# Patient Record
Sex: FEMALE | Race: WHITE | NOT HISPANIC OR LATINO | Employment: UNEMPLOYED | URBAN - METROPOLITAN AREA
[De-identification: names, ages, dates, MRNs, and addresses within clinical notes are randomized per-mention and may not be internally consistent; named-entity substitution may affect disease eponyms.]

---

## 2017-08-03 ENCOUNTER — OFFICE VISIT (OUTPATIENT)
Dept: FAMILY MEDICINE | Facility: CLINIC | Age: 47
End: 2017-08-03
Payer: COMMERCIAL

## 2017-08-03 VITALS
HEART RATE: 76 BPM | BODY MASS INDEX: 37.26 KG/M2 | WEIGHT: 237.4 LBS | SYSTOLIC BLOOD PRESSURE: 122 MMHG | DIASTOLIC BLOOD PRESSURE: 98 MMHG | HEIGHT: 67 IN

## 2017-08-03 DIAGNOSIS — M19.072 DJD (DEGENERATIVE JOINT DISEASE), ANKLE AND FOOT, LEFT: ICD-10-CM

## 2017-08-03 DIAGNOSIS — G89.29 CHRONIC BILATERAL LOW BACK PAIN WITH LEFT-SIDED SCIATICA: Primary | ICD-10-CM

## 2017-08-03 DIAGNOSIS — R51.9 NONINTRACTABLE EPISODIC HEADACHE, UNSPECIFIED HEADACHE TYPE: ICD-10-CM

## 2017-08-03 DIAGNOSIS — M54.42 CHRONIC BILATERAL LOW BACK PAIN WITH LEFT-SIDED SCIATICA: Primary | ICD-10-CM

## 2017-08-03 DIAGNOSIS — G43.009 MIGRAINE WITHOUT AURA AND WITHOUT STATUS MIGRAINOSUS, NOT INTRACTABLE: ICD-10-CM

## 2017-08-03 PROCEDURE — 99202 OFFICE O/P NEW SF 15 MIN: CPT | Performed by: FAMILY MEDICINE

## 2017-08-03 RX ORDER — IBUPROFEN 800 MG/1
1 TABLET, FILM COATED ORAL EVERY 6 HOURS PRN
COMMUNITY
Start: 2014-12-08 | End: 2018-07-26

## 2017-08-03 RX ORDER — LISINOPRIL 5 MG/1
5 TABLET ORAL DAILY
COMMUNITY
Start: 2017-07-06 | End: 2018-06-07

## 2017-08-03 RX ORDER — LORATADINE 10 MG/1
1 TABLET ORAL DAILY
COMMUNITY
Start: 2017-06-21 | End: 2018-09-28

## 2017-08-03 RX ORDER — OXYCODONE AND ACETAMINOPHEN 5; 325 MG/1; MG/1
1 TABLET ORAL 3 TIMES DAILY
COMMUNITY
Start: 2014-12-08 | End: 2017-09-08

## 2017-08-03 RX ORDER — ZOLMITRIPTAN 5 MG/1
1 TABLET, FILM COATED ORAL
COMMUNITY
Start: 2015-01-02 | End: 2018-01-19

## 2017-08-03 RX ORDER — METHOCARBAMOL 500 MG/1
500 TABLET, FILM COATED ORAL 3 TIMES DAILY
COMMUNITY
Start: 2017-07-06 | End: 2017-09-25

## 2017-08-03 RX ORDER — TRAMADOL HYDROCHLORIDE 50 MG/1
1 TABLET ORAL 2 TIMES DAILY
COMMUNITY
Start: 2014-12-08 | End: 2017-10-26 | Stop reason: ALTCHOICE
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
## 2017-08-03 NOTE — NURSING NOTE
"Initial BP (!) 122/98  Pulse 76  Ht 5' 7\" (1.702 m)  Wt 237 lb 6.4 oz (107.7 kg)  Breastfeeding? No  BMI 37.18 kg/m2 Estimated body mass index is 37.18 kg/(m^2) as calculated from the following:    Height as of this encounter: 5' 7\" (1.702 m).    Weight as of this encounter: 237 lb 6.4 oz (107.7 kg). .      "

## 2017-08-03 NOTE — MR AVS SNAPSHOT
"              After Visit Summary   8/3/2017    Arielle Pham    MRN: [id number]           Patient Information     Date Of Birth          1970        Visit Information        Provider Department      8/3/2017 11:00 AM Huong Stahl, DO [facility] Instructions    I'll give you a call on Monday          Follow-ups after your visit        Who to contact     Normal or non-critical lab and imaging results will be communicated to you by MyChart, letter or phone within 4 business days after the clinic has received the results. If you do not hear from us within 7 days, please contact the clinic through MyChart or phone. If you have a critical or abnormal lab result, we will notify you by phone as soon as possible.  Submit refill requests through [employer] or call your pharmacy and they will forward the refill request to us. Please allow 3 business days for your refill to be completed.          If you need to speak with a  for additional information , please call: [phone]           Additional Information About Your Visit        [facility] or [facility] Information     [employer] lets you send messages to your doctor, view your test results, renew your prescriptions, schedule appointments and more. To sign up, go to [email or web address] . Click on \"Log in\" on the left side of the screen, which will take you to the Welcome page. Then click on \"Sign up Now\" on the right side of the page.     You will be asked to enter the access code listed below, as well as some personal information. Please follow the directions to create your username and password.     Your access code is: [email or web address]  Expires: 2017 12:28 PM     Your access code will  in 90 days. If you need help or a new code, please call your [facility] clinic or [phone].        Care EveryWhere ID     This is your Care EveryWhere ID. This could be used by other organizations to access your [facility] medical " "records  [id number]        Your Vitals Were     Pulse Height Breastfeeding? BMI (Body Mass Index)          76 5' 7\" (1.702 m) No 37.18 kg/m2         Blood Pressure from Last 3 Encounters:   08/03/17 (!) 122/98    Weight from Last 3 Encounters:   08/03/17 237 lb 6.4 oz (107.7 kg)              Today, you had the following     No orders found for display       Primary Care Provider    None Specified       No primary provider on file.        Equal Access to Services     MARCO QUINTERO : Hadii aad ku hadasho Soomaali, waaxda luqadaha, qaybta kaalmada adeegyada, waxay idiin hayaan adeeg heavenlyaraseamus lastephie . So [facility] [phone].    ATENCIÓN: Si habla español, tiene a carmona disposición servicios gratuitos de asistencia lingüística. Llame al [phone].    We comply with applicable federal civil rights laws and [place] laws. We do not discriminate on the basis of race, color, national origin, age, disability sex, sexual orientation or gender identity.            Thank you!     Thank you for choosing [facility]  for your care. Our goal is always to provide you with excellent care. Hearing back from our patients is one way we can continue to improve our services. Please take a few minutes to complete the written survey that you may receive in the mail after your visit with us. Thank you!             Your Updated Medication List - Protect others around you: Learn how to safely use, store and throw away your medicines at www.disposemymeds.org.          This list is accurate as of: 8/3/17 12:28 PM.  Always use your most recent med list.                   Brand Name Dispense Instructions for use Diagnosis    ibuprofen 800 MG tablet    ADVIL/MOTRIN     Take 1 tablet by mouth every 6 hours as needed        lisinopril 5 MG tablet    PRINIVIL/ZESTRIL     Take 5 mg by mouth daily        loratadine 10 MG tablet    CLARITIN     Take 1 tablet by mouth daily        methocarbamol 500 MG tablet    ROBAXIN     Take 500 mg by " mouth 3 times daily        oxyCODONE-acetaminophen 5-325 MG per tablet    PERCOCET     Take 1 tablet by mouth 3 times daily        traMADol 50 MG tablet    ULTRAM     Take 1 tablet by mouth 2 times daily        ZOLMitriptan 5 MG tablet    ZOMIG     Take 1 tablet by mouth at onset of headache

## 2017-08-09 ENCOUNTER — TELEPHONE (OUTPATIENT)
Dept: FAMILY MEDICINE | Facility: CLINIC | Age: 47
End: 2017-08-09

## 2017-08-09 DIAGNOSIS — M54.42 CHRONIC BILATERAL LOW BACK PAIN WITH LEFT-SIDED SCIATICA: ICD-10-CM

## 2017-08-09 DIAGNOSIS — M54.2 NECK PAIN: Primary | ICD-10-CM

## 2017-08-09 DIAGNOSIS — G89.29 CHRONIC BILATERAL LOW BACK PAIN WITH LEFT-SIDED SCIATICA: ICD-10-CM

## 2017-08-09 PROBLEM — M19.072 DJD (DEGENERATIVE JOINT DISEASE), ANKLE AND FOOT, LEFT: Status: ACTIVE | Noted: 2017-08-09

## 2017-08-09 PROBLEM — R51.9 NONINTRACTABLE EPISODIC HEADACHE, UNSPECIFIED HEADACHE TYPE: Status: ACTIVE | Noted: 2017-08-09

## 2017-08-09 PROBLEM — G43.009 MIGRAINE WITHOUT AURA AND WITHOUT STATUS MIGRAINOSUS, NOT INTRACTABLE: Status: ACTIVE | Noted: 2017-08-09

## 2017-08-09 NOTE — TELEPHONE ENCOUNTER
Pt left msg  :     Im calling Dr balbuena back to let her know that I have tried PDR and physical therapy and injections and a bunch of other stuff, (no mention of when a good time to reach her  Back would be).     Ila [place], [place]

## 2017-08-09 NOTE — TELEPHONE ENCOUNTER
Attempted to reach pt to discuss possible recommendations for her care moving forward.  Left message asking for her to return our call on the  line with a good time to try to get a hold of her tomorrow.    Huong Stahl

## 2017-08-10 NOTE — TELEPHONE ENCOUNTER
Spoke with pt.  She is amenable to pain clinic for consult.  Referral placed.  Will await recommendations.  Plan to continue current regimen until then.    Huong Stahl

## 2017-08-11 ENCOUNTER — TELEPHONE (OUTPATIENT)
Dept: PALLIATIVE MEDICINE | Facility: CLINIC | Age: 47
End: 2017-08-11

## 2017-08-11 NOTE — LETTER
August 28, 2017    Arielle Pham  [address] [place] [address]  [place] [address]    Dear Arielle,                                                                 Welcome to the [facility].  We are located on the 2nd floor ([address]) of the [facility], located at [address] [place], [place] [address] [address].    Your appointment at the [facility] has been scheduled on Monday September 25, 2017 at 10:00 AM with Britta Georges MD.    At your first visit, you will meet your team of caregivers who will help you to develop pain management strategies that will last a lifetime. You will meet with our support staff to review your insurance information, and collect your co-payment if required by your insurance company. You will also meet with a medical pain specialist and care coordinator who will assess your pain and develop a plan of care for your successful pain rehabilitation. You should expect to spend 1-2 hours at your first visit with us. Usually, patients work with us for a period of 6-12 months, and eventually return to their primary doctor once their pain management has stabilized.      To help us make your visit go as smoothly as possible, please bring the following items with you on your visit:     Completed Pain Questionnaire enclosed in this packet.  If you do not bring the completed questionnaire, we may have to reschedule your appointment.  List of any medicines that you are currently taking or have been prescribed  Important NON-[facility] medical information such as medical records or tests results (X-rays, or laboratory tests)  Your health insurance card  Financial resources to cover your co-payment or balance due at the time of service (cash, personal check, Visa, and MasterCard are acceptable methods of payment)     Due to the demand for new patient evaluations, you must notify the scheduling department 48 hours in advance if you are not able to keep this appointment.  Failure to do so could affect your ability to reschedule with our clinic. Please be aware that we will not prescribe any medications at your first visit.     Please call [phone] with any questions regarding your appointment. We look forward to meeting you and working to address your health care needs.     Sincerely,    [facility]

## 2017-08-28 NOTE — TELEPHONE ENCOUNTER
Pain Management Center Referral      1. Confirmed address with patient? Yes  2. Confirmed phone number with patient? Yes  3. Confirmed referring provider? Yes  4. Is the PCP the same as the referring provider? Yes  5. Has the patient been to any previous pain clinics? No  (If yes, send TRAV with welcome letter)  6. Which insurance are we to bill for this appointment?  BLUE PLUS    7. Informed pt of cancellation (48 hour) policy? Yes    REGARDING OPIOID MEDICATIONS: We will always address appropriateness of opioid pain medications, but we generally will not automatically take on a prescribing role. When we do take on prescribing of opioids for chronic pain, it is in collaboration with the referring physician for an intermediate period of time (months), with an expectation that the primary physician or provider will assume the prescribing role if medications are effective at stable doses with demonstrated compliance. Therefore, please do not assume that your prescribing responsibilities end on the day of pain clinic consultation.  7. Informed pt of prescribing policy? Yes      8. Referring Provider: DR. FONG    9. Criteria for Triage Eval:   -Missed/Failed 1st DUAL appointment? N/A   -Medication Focused? N/A   -Mental Health Concerns? (e.g. Recent psych hospitalization/snap shot)? N/A   -Active substance abuse? N/A   -Patient behaviors (e.g. Offensive language/raised voice)? N/A

## 2017-09-08 ENCOUNTER — OFFICE VISIT (OUTPATIENT)
Dept: FAMILY MEDICINE | Facility: CLINIC | Age: 47
End: 2017-09-08
Payer: COMMERCIAL

## 2017-09-08 VITALS
DIASTOLIC BLOOD PRESSURE: 98 MMHG | BODY MASS INDEX: 36.85 KG/M2 | SYSTOLIC BLOOD PRESSURE: 144 MMHG | HEART RATE: 80 BPM | TEMPERATURE: 97.1 F | HEIGHT: 67 IN | WEIGHT: 234.8 LBS

## 2017-09-08 DIAGNOSIS — M19.072 DJD (DEGENERATIVE JOINT DISEASE), ANKLE AND FOOT, LEFT: Primary | ICD-10-CM

## 2017-09-08 DIAGNOSIS — M54.2 NECK PAIN: ICD-10-CM

## 2017-09-08 DIAGNOSIS — M51.369 DDD (DEGENERATIVE DISC DISEASE), LUMBAR: ICD-10-CM

## 2017-09-08 PROCEDURE — 99213 OFFICE O/P EST LOW 20 MIN: CPT | Performed by: FAMILY MEDICINE

## 2017-09-08 RX ORDER — OXYCODONE AND ACETAMINOPHEN 5; 325 MG/1; MG/1
1 TABLET ORAL 3 TIMES DAILY
Qty: 90 TABLET | Refills: 0 | Status: SHIPPED | OUTPATIENT
Start: 2017-09-08 | End: 2017-10-05

## 2017-09-08 ASSESSMENT — PAIN SCALES - GENERAL: PAINLEVEL: EXTREME PAIN (8)

## 2017-09-08 NOTE — MR AVS SNAPSHOT
"              After Visit Summary   9/8/2017    Arielle Pham    MRN: [id number]           Patient Information     Date Of Birth          1970        Visit Information        Provider Department      9/8/2017 1:00 PM Huong Stahl DO [facility]        Today's Diagnoses     DJD (degenerative joint disease), ankle and foot, left    -  1    DDD (degenerative disc disease), lumbar        Neck pain          Care Instructions    Just let me know when the tramadol prescription is due to be filled.      Please stop in for a blood pressure check when you are feeling better.  You can do that for no charge through our pharmacy          Follow-ups after your visit        Your next 10 appointments already scheduled     Sep 25, 2017 10:00 AM CDT   New Visit with Britta Georges MD   [facility] [place] ([facility])    [address]  [place] [address] [phone]              Who to contact     Normal or non-critical lab and imaging results will be communicated to you by [employer], letter or phone within 4 business days after the clinic has received the results. If you do not hear from us within 7 days, please contact the clinic through [employer] or phone. If you have a critical or abnormal lab result, we will notify you by phone as soon as possible.  Submit refill requests through [employer] or call your pharmacy and they will forward the refill request to us. Please allow 3 business days for your refill to be completed.          If you need to speak with a  for additional information , please call: [phone]           Additional Information About Your Visit        [employer] Information     [employer] lets you send messages to your doctor, view your test results, renew your prescriptions, schedule appointments and more. To sign up, go to [email or web address] . Click on \"Log in\" on the left side of the screen, which will take you to the Welcome " "page. Then click on \"Sign up Now\" on the right side of the page.     You will be asked to enter the access code listed below, as well as some personal information. Please follow the directions to create your username and password.     Your access code is: [email or web address]  Expires: 2017 12:28 PM     Your access code will  in 90 days. If you need help or a new code, please call your [facility] clinic or [phone].        Care EveryWhere ID     This is your Care EveryWhere ID. This could be used by other organizations to access your [facility] medical records  [id number]        Your Vitals Were     Pulse Temperature Height Breastfeeding? BMI (Body Mass Index)       80 97.1  F (36.2  C) (Tympanic) 5' 7\" (1.702 m) No 36.77 kg/m2        Blood Pressure from Last 3 Encounters:   17 (!) 144/98   17 (!) 122/98    Weight from Last 3 Encounters:   17 234 lb 12.8 oz (106.5 kg)   17 237 lb 6.4 oz (107.7 kg)              Today, you had the following     No orders found for display         Today's Medication Changes          These changes are accurate as of: 17  1:28 PM.  If you have any questions, ask your nurse or doctor.               Start taking these medicines.        Dose/Directions    order for DME   Used for:  DJD (degenerative joint disease), ankle and foot, left, DDD (degenerative disc disease), lumbar   Started by:  Huong Stahl DO        Rolling walker with seat for home use   Quantity:  1 Device   Refills:  0            Where to get your medicines      Some of these will need a paper prescription and others can be bought over the counter.  Ask your nurse if you have questions.     Bring a paper prescription for each of these medications     order for DME    oxyCODONE-acetaminophen 5-325 MG per tablet                Primary Care Provider    None Specified       No primary provider on file.        Equal Access to Services     MARCO QUINTERO AH: juanita Hammer " malachibroderick carmelita lainez, tomas howard ah. So [facility] [phone].    ATENCIÓN: Si maurice sullivan, tiene a carmona disposición servicios gratuitos de asistencia lingüística. Sheldon al [phone].    We comply with applicable federal civil rights laws and [place] laws. We do not discriminate on the basis of race, color, national origin, age, disability sex, sexual orientation or gender identity.            Thank you!     Thank you for choosing [facility]  for your care. Our goal is always to provide you with excellent care. Hearing back from our patients is one way we can continue to improve our services. Please take a few minutes to complete the written survey that you may receive in the mail after your visit with us. Thank you!             Your Updated Medication List - Protect others around you: Learn how to safely use, store and throw away your medicines at www.disposemymeds.org.          This list is accurate as of: 9/8/17  1:28 PM.  Always use your most recent med list.                   Brand Name Dispense Instructions for use Diagnosis    ibuprofen 800 MG tablet    ADVIL/MOTRIN     Take 1 tablet by mouth every 6 hours as needed        lisinopril 5 MG tablet    PRINIVIL/ZESTRIL     Take 5 mg by mouth daily        loratadine 10 MG tablet    CLARITIN     Take 1 tablet by mouth daily        methocarbamol 500 MG tablet    ROBAXIN     Take 500 mg by mouth 3 times daily        order for DME     1 Device    Rolling walker with seat for home use    DJD (degenerative joint disease), ankle and foot, left, DDD (degenerative disc disease), lumbar       oxyCODONE-acetaminophen 5-325 MG per tablet    PERCOCET    90 tablet    Take 1 tablet by mouth 3 times daily    DDD (degenerative disc disease), lumbar, Neck pain       traMADol 50 MG tablet    ULTRAM     Take 1 tablet by mouth 2 times daily        ZOLMitriptan 5 MG tablet    ZOMIG     Take 1 tablet by mouth at onset of  headache

## 2017-09-08 NOTE — PATIENT INSTRUCTIONS
Just let me know when the tramadol prescription is due to be filled.      Please stop in for a blood pressure check when you are feeling better.  You can do that for no charge through our pharmacy

## 2017-09-08 NOTE — NURSING NOTE
"Initial BP (!) 144/98  Pulse 80  Temp 97.1  F (36.2  C) (Tympanic)  Ht 5' 7\" (1.702 m)  Wt 234 lb 12.8 oz (106.5 kg)  Breastfeeding? No  BMI 36.77 kg/m2 Estimated body mass index is 36.77 kg/(m^2) as calculated from the following:    Height as of this encounter: 5' 7\" (1.702 m).    Weight as of this encounter: 234 lb 12.8 oz (106.5 kg). .      "

## 2017-09-08 NOTE — PROGRESS NOTES
SUBJECTIVE:   Arielle Pham is a 47 year old female who presents to clinic today for the following health issues:    Hypertension Follow-up      Outpatient blood pressures are not being checked.    Low Salt Diet: no added salt    Migraine Follow-Up    Headaches symptoms:  Stable     Frequency: 2-3 per week     Duration of headaches: 2-3 days    Able to do normal daily activities/work with migraines: No -     Rescue/Relief medication:ibuprofen (Advil, Motrin) and Zomig              Effectiveness: minor relief    Preventative medication: None    Neurologic complications: No new stroke-like symptoms, loss of vision or speech, numbness or weakness    In the past 4 weeks, how often have you gone to Urgent Care or the emergency room because of your headaches?  1     Pt has a HA now.  Has had this migraine since Wednesday.  Follows with [facility].  She has follow up with them scheduled.  Considering Botox.  This is her typical migraine.      Amount of exercise or physical activity: None    Problems taking medications regularly: No    Medication side effects: none  Diet: regular (no restrictions)    Medication Followup of percocet, tramadol    Taking Medication as prescribed: yes    Side Effects:  None    Medication Helping Symptoms:  yes     Medications continue to work well for her back pain.  We had discussed at our last visit pain clinic eval to determine if other options might be helpful to minimize narcotic usage.  She has appointment coming up later this month.  Reviewed  database, nothing unexpected seen.    Problem list and histories reviewed & adjusted, as indicated.  Additional history: as documented    Reviewed and updated as needed this visit by clinical staffTobacco  Allergies  Meds  Med Hx  Surg Hx  Fam Hx  Soc Hx      Reviewed and updated as needed this visit by Provider  Tobacco  Med Hx  Surg Hx  Fam Hx  Soc Hx        PE:  VS as above   Gen:  WN/WD/WH female in mild distress due to headache    A?P:       ICD-10-CM    1. DJD (degenerative joint disease), ankle and foot, left M19.072 order for DME   2. DDD (degenerative disc disease), lumbar M51.36 order for DME     oxyCODONE-acetaminophen (PERCOCET) 5-325 MG per tablet   3. Neck pain M54.2 oxyCODONE-acetaminophen (PERCOCET) 5-325 MG per tablet     Patient Instructions   Just let me know when the tramadol prescription is due to be filled.      Please stop in for a blood pressure check when you are feeling better.  You can do that for no charge through our pharmacy

## 2017-09-25 ENCOUNTER — OFFICE VISIT (OUTPATIENT)
Dept: PALLIATIVE MEDICINE | Facility: CLINIC | Age: 47
End: 2017-09-25
Payer: COMMERCIAL

## 2017-09-25 VITALS
SYSTOLIC BLOOD PRESSURE: 128 MMHG | HEART RATE: 83 BPM | WEIGHT: 235 LBS | DIASTOLIC BLOOD PRESSURE: 83 MMHG | BODY MASS INDEX: 36.81 KG/M2

## 2017-09-25 DIAGNOSIS — M53.3 SI (SACROILIAC) JOINT DYSFUNCTION: ICD-10-CM

## 2017-09-25 DIAGNOSIS — M25.571 CHRONIC PAIN OF BOTH ANKLES: ICD-10-CM

## 2017-09-25 DIAGNOSIS — G89.29 CHRONIC BILATERAL LOW BACK PAIN WITH LEFT-SIDED SCIATICA: Primary | ICD-10-CM

## 2017-09-25 DIAGNOSIS — M54.42 CHRONIC BILATERAL LOW BACK PAIN WITH LEFT-SIDED SCIATICA: Primary | ICD-10-CM

## 2017-09-25 DIAGNOSIS — G89.29 CHRONIC PAIN OF BOTH ANKLES: ICD-10-CM

## 2017-09-25 DIAGNOSIS — M25.572 CHRONIC PAIN OF BOTH ANKLES: ICD-10-CM

## 2017-09-25 PROCEDURE — 99244 OFF/OP CNSLTJ NEW/EST MOD 40: CPT | Performed by: PSYCHIATRY & NEUROLOGY

## 2017-09-25 ASSESSMENT — PATIENT HEALTH QUESTIONNAIRE - PHQ9: SUM OF ALL RESPONSES TO PHQ QUESTIONS 1-9: 15

## 2017-09-25 ASSESSMENT — PAIN SCALES - GENERAL: PAINLEVEL: SEVERE PAIN (6)

## 2017-09-25 NOTE — MR AVS SNAPSHOT
After Visit Summary   9/25/2017    Arielle Pham    MRN: [id number]           Patient Information     Date Of Birth          1970        Visit Information        Provider Department      9/25/2017 10:00 AM Britta Georges MD [facility]        Care Instructions    1. I will send a note to Dr. Stahl with the recommendations that we discussed in clinic.    ----------------------------------------------------------------  Nurse Triage line:  [phone]   Call this number with any questions or concerns. You may leave a detailed message anytime. Calls are typically returned Monday through Friday between 8 AM and 4:30 PM. We usually get back to you within 2 business days depending on the issue/request.       Medication refills:    For non-narcotic medications, call your pharmacy directly to request a refill. The pharmacy will contact the [facility] for authorization. Please allow 3-4 days for these refills to be processed.     For narcotic refills, call the nurse triage line or send a [employer] message. Please contact us 7-10 days before your refill is due. The message MUST include the name of the specific medication(s) requested and how you would like to receive the prescription(s). The options are as follows:    Pain Clinic staff can mail the prescription to your pharmacy. Please tell us the name of the pharmacy.    You may pick the prescription up at the Pain Clinic (tell us the location) or during a clinic visit with your pain provider    Pain Clinic staff can deliver the prescription to the [facility] pharmacy in the clinic building. Please tell us the location.      Scheduling number: [phone].  Call this number to schedule or change appointments.    We believe regular attendance is key to your success in our program.    Any time you are unable to keep your appointment we ask that you call us at least 24 hours in advance to let us know. This will allow us to offer  "the appointment time to another patient.               Follow-ups after your visit        Who to contact     If you have questions or need follow up information about today's clinic visit or your schedule please contact [facility] directly at [phone].  Normal or non-critical lab and imaging results will be communicated to you by [employer], letter or phone within 4 business days after the clinic has received the results. If you do not hear from us within 7 days, please contact the clinic through [employer] or phone. If you have a critical or abnormal lab result, we will notify you by phone as soon as possible.  Submit refill requests through [employer] or call your pharmacy and they will forward the refill request to us. Please allow 3 business days for your refill to be completed.          Additional Information About Your Visit        [employer] Information     [employer] lets you send messages to your doctor, view your test results, renew your prescriptions, schedule appointments and more. To sign up, go to [email or web address] . Click on \"Log in\" on the left side of the screen, which will take you to the Welcome page. Then click on \"Sign up Now\" on the right side of the page.     You will be asked to enter the access code listed below, as well as some personal information. Please follow the directions to create your username and password.     Your access code is: [email or web address]  Expires: 2017 12:28 PM     Your access code will  in 90 days. If you need help or a new code, please call your [place] clinic or [phone].        Care EveryWhere ID     This is your Care EveryWhere ID. This could be used by other organizations to access your [place] medical records  [id number]        Your Vitals Were     Pulse BMI (Body Mass Index)                83 36.81 kg/m2           Blood Pressure from Last 3 Encounters:   17 128/83   17 (!) 144/98   17 (!) 122/98    Weight from Last 3 " Encounters:   09/25/17 106.6 kg (235 lb)   09/08/17 106.5 kg (234 lb 12.8 oz)   08/03/17 107.7 kg (237 lb 6.4 oz)              Today, you had the following     No orders found for display         Today's Medication Changes          These changes are accurate as of: 9/25/17 10:59 AM.  If you have any questions, ask your nurse or doctor.               Stop taking these medicines if you haven't already. Please contact your care team if you have questions.     methocarbamol 500 MG tablet   Commonly known as:  ROBAXIN                    Primary Care Provider    None Specified       No primary provider on file.        Equal Access to Services     [facility]: Sierra More, juanita sharp, carmelita lainez, tomas howard . So [facility] [phone].    ATENCIÓN: Si habla español, tiene a carmona disposición servicios gratuitos de asistencia lingüística. Llame al [phone].    We comply with applicable federal civil rights laws and [place] laws. We do not discriminate on the basis of race, color, national origin, age, disability sex, sexual orientation or gender identity.            Thank you!     Thank you for choosing [facility]  for your care. Our goal is always to provide you with excellent care. Hearing back from our patients is one way we can continue to improve our services. Please take a few minutes to complete the written survey that you may receive in the mail after your visit with us. Thank you!             Your Updated Medication List - Protect others around you: Learn how to safely use, store and throw away your medicines at www.disposemymeds.org.          This list is accurate as of: 9/25/17 10:59 AM.  Always use your most recent med list.                   Brand Name Dispense Instructions for use Diagnosis    ibuprofen 800 MG tablet    ADVIL/MOTRIN     Take 1 tablet by mouth every 6 hours as needed        lisinopril 5 MG tablet     PRINIVIL/ZESTRIL     Take 5 mg by mouth daily        loratadine 10 MG tablet    CLARITIN     Take 1 tablet by mouth daily        order for DME     1 Device    Rolling walker with seat for home use    DJD (degenerative joint disease), ankle and foot, left, DDD (degenerative disc disease), lumbar       oxyCODONE-acetaminophen 5-325 MG per tablet    PERCOCET    90 tablet    Take 1 tablet by mouth 3 times daily    DDD (degenerative disc disease), lumbar, Neck pain       traMADol 50 MG tablet    ULTRAM     Take 1 tablet by mouth 2 times daily        ZOLMitriptan 5 MG tablet    ZOMIG     Take 1 tablet by mouth at onset of headache

## 2017-09-25 NOTE — NURSING NOTE
"Chief Complaint   Patient presents with     Pain     Back pain        Initial /83  Pulse 83  Wt 106.6 kg (235 lb)  BMI 36.81 kg/m2 Estimated body mass index is 36.81 kg/(m^2) as calculated from the following:    Height as of 9/8/17: 1.702 m (5' 7\").    Weight as of this encounter: 106.6 kg (235 lb).  Medication Reconciliation: complete     Indra Noguera MA    "

## 2017-09-25 NOTE — PATIENT INSTRUCTIONS
1. I will send a note to Dr. Stahl with the recommendations that we discussed in clinic.    ----------------------------------------------------------------  Nurse Triage line:  [phone]   Call this number with any questions or concerns. You may leave a detailed message anytime. Calls are typically returned Monday through Friday between 8 AM and 4:30 PM. We usually get back to you within 2 business days depending on the issue/request.       Medication refills:    For non-narcotic medications, call your pharmacy directly to request a refill. The pharmacy will contact the [facility] for authorization. Please allow 3-4 days for these refills to be processed.     For narcotic refills, call the nurse triage line or send a [employer] message. Please contact us 7-10 days before your refill is due. The message MUST include the name of the specific medication(s) requested and how you would like to receive the prescription(s). The options are as follows:    Pain Clinic staff can mail the prescription to your pharmacy. Please tell us the name of the pharmacy.    You may pick the prescription up at the Pain Clinic (tell us the location) or during a clinic visit with your pain provider    Pain Clinic staff can deliver the prescription to the [place] pharmacy in the clinic building. Please tell us the location.      Scheduling number: [phone].  Call this number to schedule or change appointments.    We believe regular attendance is key to your success in our program.    Any time you are unable to keep your appointment we ask that you call us at least 24 hours in advance to let us know. This will allow us to offer the appointment time to another patient.
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
## 2017-10-05 DIAGNOSIS — M51.369 DDD (DEGENERATIVE DISC DISEASE), LUMBAR: ICD-10-CM

## 2017-10-05 DIAGNOSIS — M54.2 NECK PAIN: ICD-10-CM

## 2017-10-05 RX ORDER — OXYCODONE AND ACETAMINOPHEN 5; 325 MG/1; MG/1
1 TABLET ORAL EVERY 4 HOURS PRN
Qty: 90 TABLET | Refills: 0 | Status: SHIPPED | OUTPATIENT
Start: 2017-10-05 | End: 2017-11-07

## 2017-10-05 NOTE — TELEPHONE ENCOUNTER
Called patient she says is taking 3 per day 1 in the morning and 2 at bedtime.    Trinity Altamirano [facility]

## 2017-10-05 NOTE — TELEPHONE ENCOUNTER
Please call Arielle.  Script done and left with secretaries.  I saw her pain consult note.  I changed the dosing instructions on her percocet based on their recommendations.  I did not fill for a higher quantity because I am not sure how much she is taking.  Please find out how many she is taking per day.    Huong Stahl

## 2017-10-05 NOTE — TELEPHONE ENCOUNTER
Reason for Call:  Medication or medication refill:    Do you use a [facility]?  Name of the pharmacy and phone number for the current request:  Patient will  at     Name of the medication requested: perocet    Other request:    Can we leave a detailed message on this number? YES    Phone number patient can be reached at: Home number on file [phone] (home)    Best Time:     Call taken on 10/5/2017 at 9:20 AM by Malka Altamirano

## 2017-10-05 NOTE — TELEPHONE ENCOUNTER
Script walked over to the [facility].    Trinity Altamirano, [facility]

## 2017-10-26 ENCOUNTER — OFFICE VISIT (OUTPATIENT)
Dept: FAMILY MEDICINE | Facility: CLINIC | Age: 47
End: 2017-10-26
Payer: COMMERCIAL

## 2017-10-26 VITALS
OXYGEN SATURATION: 99 % | TEMPERATURE: 98.3 F | HEART RATE: 94 BPM | WEIGHT: 234 LBS | HEIGHT: 67 IN | BODY MASS INDEX: 36.73 KG/M2 | DIASTOLIC BLOOD PRESSURE: 88 MMHG | SYSTOLIC BLOOD PRESSURE: 124 MMHG

## 2017-10-26 DIAGNOSIS — J01.90 ACUTE SINUSITIS WITH SYMPTOMS > 10 DAYS: Primary | ICD-10-CM

## 2017-10-26 DIAGNOSIS — G43.009 MIGRAINE WITHOUT AURA AND WITHOUT STATUS MIGRAINOSUS, NOT INTRACTABLE: ICD-10-CM

## 2017-10-26 DIAGNOSIS — M51.369 DDD (DEGENERATIVE DISC DISEASE), LUMBAR: ICD-10-CM

## 2017-10-26 DIAGNOSIS — M53.3 SI (SACROILIAC) JOINT DYSFUNCTION: ICD-10-CM

## 2017-10-26 PROCEDURE — 99214 OFFICE O/P EST MOD 30 MIN: CPT | Performed by: FAMILY MEDICINE

## 2017-10-26 NOTE — PROGRESS NOTES
SUBJECTIVE:   Arielle Pham is a 47 year old female who presents to clinic today for the following health issues:    ENT Symptoms             Symptoms: cc Present Absent Comment   Fever/Chills   x    Fatigue  x     Muscle Aches  x     Eye Irritation  x  Watery    Sneezing  x     Nasal Celestino/Drg  x     Sinus Pressure/Pain x x     Loss of smell  x     Dental pain   x    Sore Throat x x     Swollen Glands  x     Ear Pain/Fullness  x     Cough  x     Wheeze   x    Chest Pain   x    Shortness of breath   x    Rash   x    Other   x      Symptom duration:  2 weeks   Symptom severity:  moderate   Treatments tried:  sudafed, tylenol cold   Contacts:  family     Lost voice beginning of this week.  Has had more of a sore throat the last couple of days.   Has had maxillary sinus pressure as well.  Has a h/o frequent sinusitis.  Has been improved over the last few years, now maybe 1-2 episodes er year.  This feels like a typical sinusitis to her.      * disability forms    Is applying for SSI and has a form from the [facility] that needs to be updated.    *  Chronic pain.  Has stopped the tramadol as directed by the pain clinic.  Continues with the oxycodone 3 tablets per day, 1 in the morning and 2 at bedtime.  Has stopped all NSAID and tylenol as instructed by them for her headaches.  She has now been off for over a month.  She has not noticed any decrease in her headache frequency but does note she struggles more with pain in the mid day since she is no longer taking tramadol or NSAID's.      Has been approved for botox injections for her headaches, hoping to get improved relief there    Is interested in getting back into pool therapy as recommended by the pain clinic as well  Has done that in the past and tolerated it very well.      Problem list and histories reviewed & adjusted, as indicated.  Additional history: as documented      Reviewed and updated as needed this visit by clinical staffTobacco  Allergies  Meds  Med Hx   Surg Hx  Fam Hx  Soc Hx      Reviewed and updated as needed this visit by Provider  Tobacco  Med Hx  Surg Hx  Fam Hx  Soc Hx      ROS: Remainder of Constitutional, CV, Respiratory, GI,  negative with exception of that mentioned above    PE:  VS as above   Gen:  WN/WD/WH female in NAD   HEENT:  NC/AT, conjunctiva wnl, TM's wnl hugo pearly gray with good light reflex, oropharynx mildly erythematous, nasal mucosa edematous with thick drainage noted   Neck:  supple, no LAD appreciated   Heart:  RRR without murmur, nl S1, S2, no rubs or gallops   Lungs CTA hugo without rales/ronchi/wheezes    A/P:      ICD-10-CM    1. Acute sinusitis with symptoms > 10 days J01.90 amoxicillin-clavulanate (AUGMENTIN) 875-125 MG per tablet   2. DDD (degenerative disc disease), lumbar M51.36    3. SI (sacroiliac) joint dysfunction M53.3    4. Migraine without aura and without status migrainosus, not intractable G43.009      Patient Instructions   We'll treat with the augmentin for a sinus infection today  You should see improvement by 3-4 days of treatment and resolution by the end of the 10 days.  You might consider a probiotic as well.    Just let me know where pool therapy is covered for you, I would be happy to write a referral.    Since you have not seen any improvment in your headache frequency off the ibuprofen and tylenol I think it would be okay to add back in some as needed doses.  Be aware this can cause stomach irritation and bleeding so please minimize as you can    Paperwork completed

## 2017-10-26 NOTE — MR AVS SNAPSHOT
After Visit Summary   10/26/2017    Arielle Pham    MRN: [id number]           Patient Information     Date Of Birth          1970        Visit Information        Provider Department      10/26/2017 2:40 PM Huong Stahl DO [facility]        Today's Diagnoses     Acute sinusitis with symptoms > 10 days    -  1      Care Instructions    We'll treat with the augmentin for a sinus infection today  You should see improvement by 3-4 days of treatment and resolution by the end of the 10 days.  You might consider a probiotic as well.    Just let me know where pool therapy is covered for you, I would be happy to write a referral.    Since you have not seen any improvment in your headache frequency off the ibuprofen and tylenol I think it would be okay to add back in some as needed doses.  Be aware this can cause stomach irritation and bleeding so please minimize as you can          Follow-ups after your visit        Your next 10 appointments already scheduled     Oct 30, 2017  2:00 PM CDT   SHORT with Huong Stahl DO   [facility]    [address] [place] [id number] [phone]              Who to contact     Normal or non-critical lab and imaging results will be communicated to you by MyChart, letter or phone within 4 business days after the clinic has received the results. If you do not hear from us within 7 days, please contact the clinic through MyChart or phone. If you have a critical or abnormal lab result, we will notify you by phone as soon as possible.  Submit refill requests through Finsphere or call your pharmacy and they will forward the refill request to us. Please allow 3 business days for your refill to be completed.          If you need to speak with a  for additional information , please call: [phone]           Additional Information About Your Visit        MyChart Information      "Wellntel lets you send messages to your doctor, view your test results, renew your prescriptions, schedule appointments and more. To sign up, go to [email or web address] . Click on \"Log in\" on the left side of the screen, which will take you to the Welcome page. Then click on \"Sign up Now\" on the right side of the page.     You will be asked to enter the access code listed below, as well as some personal information. Please follow the directions to create your username and password.     Your access code is: [email or web address]  Expires: 2017 12:28 PM     Your access code will  in 90 days. If you need help or a new code, please call your [facility] clinic or [phone].        Care EveryWhere ID     This is your Care EveryWhere ID. This could be used by other organizations to access your [facility] medical records  [id number]        Your Vitals Were     Pulse Temperature Height Pulse Oximetry Breastfeeding? BMI (Body Mass Index)    94 98.3  F (36.8  C) (Tympanic) 5' 7\" (1.702 m) 99% No 36.65 kg/m2       Blood Pressure from Last 3 Encounters:   10/26/17 124/88   17 128/83   17 (!) 144/98    Weight from Last 3 Encounters:   10/26/17 234 lb (106.1 kg)   17 235 lb (106.6 kg)   17 234 lb 12.8 oz (106.5 kg)              Today, you had the following     No orders found for display         Today's Medication Changes          These changes are accurate as of: 10/26/17  3:06 PM.  If you have any questions, ask your nurse or doctor.               Start taking these medicines.        Dose/Directions    amoxicillin-clavulanate 875-125 MG per tablet   Commonly known as:  AUGMENTIN   Used for:  Acute sinusitis with symptoms > 10 days   Started by:  Huong Stahl DO        Dose:  1 tablet   Take 1 tablet by mouth 2 times daily   Quantity:  20 tablet   Refills:  0         Stop taking these medicines if you haven't already. Please contact your care team if you have questions.     traMADol 50 MG tablet "   Commonly known as:  ULTRAM   Stopped by:  Huong Stahl DO                Where to get your medicines      These medications were sent to [id number] [address] [place] [id number] [place]  AT [facility] & [address] [place] [address]     Phone:  [phone]     amoxicillin-clavulanate 875-125 MG per tablet                Primary Care Provider Office Phone # Fax #    Huong Stahl [phone] [address] [place] [address]        Equal Access to Services     [facility]: Hadii aad ku hadasho Soomaali, waaxda luqadaha, qaybta kaalmada adeegyada, waxramos nevillein hayaan adezechariah howard . So [facility] [phone].    ATENCIÓN: Si habla español, tiene a carmona disposición servicios gratuitos de asistencia lingüística. [facility] [phone].    We comply with applicable federal civil rights laws and [place] laws. We do not discriminate on the basis of race, color, national origin, age, disability, sex, sexual orientation, or gender identity.            Thank you!     Thank you for choosing [facility]  for your care. Our goal is always to provide you with excellent care. Hearing back from our patients is one way we can continue to improve our services. Please take a few minutes to complete the written survey that you may receive in the mail after your visit with us. Thank you!             Your Updated Medication List - Protect others around you: Learn how to safely use, store and throw away your medicines at www.disposemymeds.org.          This list is accurate as of: 10/26/17  3:06 PM.  Always use your most recent med list.                   Brand Name Dispense Instructions for use Diagnosis    amoxicillin-clavulanate 875-125 MG per tablet    AUGMENTIN    20 tablet    Take 1 tablet by mouth 2 times daily    Acute sinusitis with symptoms > 10 days       ibuprofen 800 MG tablet    ADVIL/MOTRIN     Take 1 tablet by mouth every 6  hours as needed        lisinopril 5 MG tablet    PRINIVIL/ZESTRIL     Take 5 mg by mouth daily        loratadine 10 MG tablet    CLARITIN     Take 1 tablet by mouth daily        order for DME     1 Device    Rolling walker with seat for home use    DJD (degenerative joint disease), ankle and foot, left, DDD (degenerative disc disease), lumbar       oxyCODONE-acetaminophen 5-325 MG per tablet    PERCOCET    90 tablet    Take 1 tablet by mouth every 4 hours as needed for moderate to severe pain Maximum of 5 tablets per day    DDD (degenerative disc disease), lumbar, Neck pain       ZOLMitriptan 5 MG tablet    ZOMIG     Take 1 tablet by mouth at onset of headache

## 2017-10-26 NOTE — PATIENT INSTRUCTIONS
We'll treat with the augmentin for a sinus infection today  You should see improvement by 3-4 days of treatment and resolution by the end of the 10 days.  You might consider a probiotic as well.    Just let me know where pool therapy is covered for you, I would be happy to write a referral.    Since you have not seen any improvment in your headache frequency off the ibuprofen and tylenol I think it would be okay to add back in some as needed doses.  Be aware this can cause stomach irritation and bleeding so please minimize as you can

## 2017-10-26 NOTE — NURSING NOTE
"Initial /88  Pulse 94  Temp 98.3  F (36.8  C) (Tympanic)  Ht 5' 7\" (1.702 m)  Wt 234 lb (106.1 kg)  SpO2 99%  Breastfeeding? No  BMI 36.65 kg/m2 Estimated body mass index is 36.65 kg/(m^2) as calculated from the following:    Height as of this encounter: 5' 7\" (1.702 m).    Weight as of this encounter: 234 lb (106.1 kg). .      "

## 2017-10-28 PROBLEM — M53.3 SI (SACROILIAC) JOINT DYSFUNCTION: Status: ACTIVE | Noted: 2017-10-28

## 2017-10-29 ENCOUNTER — HEALTH MAINTENANCE LETTER (OUTPATIENT)
Age: 47
End: 2017-10-29

## 2017-11-07 DIAGNOSIS — M54.2 NECK PAIN: ICD-10-CM

## 2017-11-07 DIAGNOSIS — M51.369 DDD (DEGENERATIVE DISC DISEASE), LUMBAR: ICD-10-CM

## 2017-11-07 RX ORDER — OXYCODONE AND ACETAMINOPHEN 5; 325 MG/1; MG/1
1 TABLET ORAL EVERY 4 HOURS PRN
Qty: 90 TABLET | Refills: 0 | Status: SHIPPED | OUTPATIENT
Start: 2017-11-07 | End: 2017-12-01

## 2017-11-08 ENCOUNTER — TELEPHONE (OUTPATIENT)
Dept: FAMILY MEDICINE | Facility: CLINIC | Age: 47
End: 2017-11-08

## 2017-12-01 ENCOUNTER — TELEPHONE (OUTPATIENT)
Dept: FAMILY MEDICINE | Facility: CLINIC | Age: 47
End: 2017-12-01

## 2017-12-01 DIAGNOSIS — M54.2 NECK PAIN: ICD-10-CM

## 2017-12-01 DIAGNOSIS — M51.369 DDD (DEGENERATIVE DISC DISEASE), LUMBAR: ICD-10-CM

## 2017-12-01 RX ORDER — OXYCODONE AND ACETAMINOPHEN 5; 325 MG/1; MG/1
1 TABLET ORAL EVERY 4 HOURS PRN
Qty: 120 TABLET | Refills: 0 | Status: SHIPPED | OUTPATIENT
Start: 2017-12-01 | End: 2018-01-05

## 2017-12-01 NOTE — TELEPHONE ENCOUNTER
The patient gave me prior permission to leave a message on her voice mail. I gave her the options but let her know we would only be available until 4 pm. If she needed to use the Percocet over the weekend in the middle of the day she  Could then call Monday and let us know what she prefers taking. Estela Rust RN

## 2017-12-01 NOTE — TELEPHONE ENCOUNTER
We can either add back her tramadol as she was taking it in the middle of the day or she can take the percocet in the middle of the day and we can dispense a higher number.  I think either option would be okay.  If she ouwld prefer to use the percocet I would give her enough to last for 30 days.    Huong Stahl

## 2017-12-01 NOTE — TELEPHONE ENCOUNTER
"I spoke with Arielle. She is having a lot of back pain and headaches. States, \"I take one of the Percocet in the morning and then 2 at night so I can sleep. They took away the Tramadol that I used in between during the day because they thought it caused my headaches but I still have the headaches and now more back pain. I have been trying to use the ibuprofen in between but it doesn't do anything. I dont know if there is something else I can take. At my old clinic they put me on Lyrica and they were going to increase the dose but for some reason decided to put me on a medication that I had side effects from. Maybe trying Lyrica again?\"    Patient said she has never had an MRI or CT of her head. Has only had neuro tests with touching her nose etc.She is concerned there may be something more going on inside her head. She is scheduled to have Botox injections in January.She takes just 3 tablets of Percocet per day because she wants it to last for 1 month at she gets 90 at a time.    I recommended that she go to the ED if she is having the worst headache of her life. She is just wanting something less strong to take during the day but stronger than ibuprofen to help with her back and headaches.  Estela Rust RN    "

## 2017-12-01 NOTE — TELEPHONE ENCOUNTER
Patient called back. She would like to try adding percocet in the middle of the day. She thinks the tramadol option did not help enough. Patient is in agreement with dispensing the 30 days of increase percocet. She would like this script walked over to the [facility] pharmacy once entered.

## 2017-12-01 NOTE — TELEPHONE ENCOUNTER
Reason for call:  Patient reporting a symptom    Symptom or request: Patient is calling in regarding her headaches. She is currently taking percocet - 1 pill in AM - 2 pills in PM. Patient states she wakes up at 6am and takes the first dose. By noon she has a horrible headache but cannot take anything else to help until later in the day. She has tried taking Ibuprofen during this inbetween period but this does not help. Patient was taken off tramadol because provider thought this may be causing her headaches. Patient would like to know what she can do for her pain during this mid-day time.    Duration (how long have symptoms been present): two months    Have you been treated for this before? Yes    Additional comments: none    Phone Number patient can be reached at:  Home number on file [phone] (home)    Best Time:  any    Can we leave a detailed message on this number:  YES    Call taken on 12/1/2017 at 1:05 PM by Janna Lucero

## 2018-01-05 DIAGNOSIS — M54.2 NECK PAIN: ICD-10-CM

## 2018-01-05 DIAGNOSIS — M51.369 DDD (DEGENERATIVE DISC DISEASE), LUMBAR: ICD-10-CM

## 2018-01-05 RX ORDER — OXYCODONE AND ACETAMINOPHEN 5; 325 MG/1; MG/1
1 TABLET ORAL EVERY 4 HOURS PRN
Qty: 120 TABLET | Refills: 0 | Status: SHIPPED | OUTPATIENT
Start: 2018-01-05 | End: 2018-02-05

## 2018-01-05 NOTE — TELEPHONE ENCOUNTER
Reason for Call:  Medication or medication refill:    Do you use a [facility]?  Name of the pharmacy and phone number for the current request:  See above    Name of the medication requested: percocet    Other request:     Can we leave a detailed message on this number? YES    Phone number patient can be reached at: Home number on file [phone] (home)    Best Time:     Call taken on 1/5/2018 at 9:49 AM by Malka Altamirano

## 2018-01-05 NOTE — TELEPHONE ENCOUNTER
Scripts walked over to the [facility].    Trinity Altamirano, [facility]

## 2018-01-19 ENCOUNTER — OFFICE VISIT (OUTPATIENT)
Dept: FAMILY MEDICINE | Facility: CLINIC | Age: 48
End: 2018-01-19
Payer: COMMERCIAL

## 2018-01-19 ENCOUNTER — RADIANT APPOINTMENT (OUTPATIENT)
Dept: GENERAL RADIOLOGY | Facility: CLINIC | Age: 48
End: 2018-01-19
Attending: NURSE PRACTITIONER
Payer: COMMERCIAL

## 2018-01-19 VITALS
SYSTOLIC BLOOD PRESSURE: 116 MMHG | WEIGHT: 239.4 LBS | DIASTOLIC BLOOD PRESSURE: 86 MMHG | HEART RATE: 72 BPM | HEIGHT: 68 IN | BODY MASS INDEX: 36.28 KG/M2 | TEMPERATURE: 97.8 F
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
## 2018-01-19 DIAGNOSIS — S63.502A SPRAIN OF LEFT WRIST, INITIAL ENCOUNTER: ICD-10-CM

## 2018-01-19 DIAGNOSIS — G43.009 MIGRAINE WITHOUT AURA AND WITHOUT STATUS MIGRAINOSUS, NOT INTRACTABLE: ICD-10-CM

## 2018-01-19 DIAGNOSIS — S89.92XA KNEE INJURY, LEFT, INITIAL ENCOUNTER: ICD-10-CM
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
## 2018-01-19 PROCEDURE — 73562 X-RAY EXAM OF KNEE 3: CPT | Mod: LT

## 2018-01-19 PROCEDURE — 99214 OFFICE O/P EST MOD 30 MIN: CPT | Performed by: NURSE PRACTITIONER

## 2018-01-19 PROCEDURE — 73110 X-RAY EXAM OF WRIST: CPT | Mod: LT

## 2018-01-19 RX ORDER — ZOLMITRIPTAN 5 MG/1
5 TABLET, FILM COATED ORAL
Qty: 9 TABLET | Refills: 1 | Status: SHIPPED | OUTPATIENT
Start: 2018-01-19 | End: 2018-05-18

## 2018-01-19 ASSESSMENT — ENCOUNTER SYMPTOMS
NAUSEA: 1
NUMBNESS: 0
CONSTIPATION: 0
FATIGUE: 0
DIZZINESS: 1
RHINORRHEA: 0
ABDOMINAL DISTENTION: 0
MYALGIAS: 0
HEADACHES: 1
SORE THROAT: 0
ARTHRALGIAS: 1
COUGH: 0
VOMITING: 0
WHEEZING: 0
SHORTNESS OF BREATH: 0
ABDOMINAL PAIN: 0
DIARRHEA: 0
LIGHT-HEADEDNESS: 0
CHEST TIGHTNESS: 0
PALPITATIONS: 0

## 2018-01-19 NOTE — NURSING NOTE
"Chief Complaint   Patient presents with     Fall     1/14/18       Initial /86 (BP Location: Other (Comment), Patient Position: Sitting, Cuff Size: Adult Large)  Pulse 72  Temp 97.8  F (36.6  C) (Tympanic)  Ht 5' 8\" (1.727 m)  Wt 239 lb 6.4 oz (108.6 kg)  LMP 01/03/2018  BMI 36.4 kg/m2 Estimated body mass index is 36.4 kg/(m^2) as calculated from the following:    Height as of this encounter: 5' 8\" (1.727 m).    Weight as of this encounter: 239 lb 6.4 oz (108.6 kg).  Medication Reconciliation: complete     April JOSSIE Carmichael      "

## 2018-01-19 NOTE — PATIENT INSTRUCTIONS
RICE     Rest an injury, elevate it, and use ice and compression as directed.   RICE stands for rest, ice, compression, and elevation. These can limit pain and swelling after an injury. RICE may be recommended to help treat fractures, sprains, strains, and bruises or bumps.   Home care  The following explain the details of RICE:    Rest. Limit the use of the injured body part. This helps prevent further damage to the body part and gives it time to heal. In some cases, you may need a sling, brace, splint, or cast to help keep the body part still until it has healed.    Ice. Applying ice right after an injury helps relieve pain and swelling. Wrap a bag of ice in a thin towel. Then, place it over the injured area. Do this for 10 to 15 minutes every 3 to 4 hours. Continue for the next 1 to 3 days or until your symptoms improve. Never put ice directly on your skin or ice an area longer than 15 minutes at a time.    Compression. Putting pressure on an injury helps reduce swelling and provides support. Wrap the injured area firmly with an elastic bandage/wrap. Make sure not to wrap the bandage too tightly or you will cut off blood flow to the injured area. If your bandage loosens, rewrap it.    Elevation. Keeping an injury raised above the level of your heart reduces swelling, pain, and throbbing. For instance, if you have a broken leg, it may help to rest your leg on several pillows when sitting or lying down. Try to keep the injured area elevated for at least 2 to 3 hours per day.  Follow-up care  Follow up with your healthcare provider, or as advised.  When to seek medical advice  Call your healthcare provider right away if any of these occur:    Fever of 100.4 F (38 C) or higher, or as directed by your healthcare provider    Increased pain or swelling in the injured body part    Injured body part becomes cold, blue, numb, or tingly    Signs of infection. These include warmth in the skin, redness, drainage, or bad  smell coming from the injured body part.  Date Last Reviewed: 1/18/2016 2000-2017 [employer]. [address], [place] [address]. All rights reserved. This information is not intended as a substitute for professional medical care. Always follow your healthcare professional's instructions.

## 2018-01-19 NOTE — MR AVS SNAPSHOT
After Visit Summary   1/19/2018    Arielle Pham    MRN: [id number]           Patient Information     Date Of Birth          1970        Visit Information        Provider Department      1/19/2018 9:40 AM Dorota Ocampo APRN [facility]        Today's Diagnoses     Fall, initial encounter    -  1    Migraine without aura and without status migrainosus, not intractable          Care Instructions      RICE     Rest an injury, elevate it, and use ice and compression as directed.   RICE stands for rest, ice, compression, and elevation. These can limit pain and swelling after an injury. RICE may be recommended to help treat fractures, sprains, strains, and bruises or bumps.   Home care  The following explain the details of RICE:    Rest. Limit the use of the injured body part. This helps prevent further damage to the body part and gives it time to heal. In some cases, you may need a sling, brace, splint, or cast to help keep the body part still until it has healed.    Ice. Applying ice right after an injury helps relieve pain and swelling. Wrap a bag of ice in a thin towel. Then, place it over the injured area. Do this for 10 to 15 minutes every 3 to 4 hours. Continue for the next 1 to 3 days or until your symptoms improve. Never put ice directly on your skin or ice an area longer than 15 minutes at a time.    Compression. Putting pressure on an injury helps reduce swelling and provides support. Wrap the injured area firmly with an elastic bandage/wrap. Make sure not to wrap the bandage too tightly or you will cut off blood flow to the injured area. If your bandage loosens, rewrap it.    Elevation. Keeping an injury raised above the level of your heart reduces swelling, pain, and throbbing. For instance, if you have a broken leg, it may help to rest your leg on several pillows when sitting or lying down. Try to keep the injured area elevated for at least 2 to 3 hours per  "day.  Follow-up care  Follow up with your healthcare provider, or as advised.  When to seek medical advice  Call your healthcare provider right away if any of these occur:    Fever of 100.4 F (38 C) or higher, or as directed by your healthcare provider    Increased pain or swelling in the injured body part    Injured body part becomes cold, blue, numb, or tingly    Signs of infection. These include warmth in the skin, redness, drainage, or bad smell coming from the injured body part.  Date Last Reviewed: 1/18/2016 2000-2017 [employer]. [address] [place] [address]. All rights reserved. This information is not intended as a substitute for professional medical care. Always follow your healthcare professional's instructions.                Follow-ups after your visit        Who to contact     Normal or non-critical lab and imaging results will be communicated to you by WebLinchart, letter or phone within 4 business days after the clinic has received the results. If you do not hear from us within 7 days, please contact the clinic through WebLinchart or phone. If you have a critical or abnormal lab result, we will notify you by phone as soon as possible.  Submit refill requests through ENEFpro or call your pharmacy and they will forward the refill request to us. Please allow 3 business days for your refill to be completed.          If you need to speak with a  for additional information , please call: [phone]           Additional Information About Your Visit        ENEFpro Information     ENEFpro lets you send messages to your doctor, view your test results, renew your prescriptions, schedule appointments and more. To sign up, go to [email or web address] . Click on \"Log in\" on the left side of the screen, which will take you to the Welcome page. Then click on \"Sign up Now\" on the right side of the page.     You will be asked to enter the access code listed below, as well as " "some personal information. Please follow the directions to create your username and password.     Your access code is: [email or web address]  Expires: 2018 10:21 AM     Your access code will  in 90 days. If you need help or a new code, please call your [facility] clinic or [phone].        Care EveryWhere ID     This is your Care EveryWhere ID. This could be used by other organizations to access your [facility] medical records  [id number]        Your Vitals Were     Pulse Temperature Height Last Period BMI (Body Mass Index)       72 97.8  F (36.6  C) (Tympanic) 5' 8\" (1.727 m) 2018 36.4 kg/m2        Blood Pressure from Last 3 Encounters:   18 116/86   10/26/17 124/88   17 128/83    Weight from Last 3 Encounters:   18 239 lb 6.4 oz (108.6 kg)   10/26/17 234 lb (106.1 kg)   17 235 lb (106.6 kg)                 Where to get your medicines      These medications were sent to [facility] [address] [place] [address]  AT [address] [facility] [address]     Phone:  [phone]     ZOLMitriptan 5 MG tablet          Primary Care Provider Office Phone # Fax #    Huong Beaver DO [facility] [phone] [address] [place] [facility] [address]        Equal Access to Services     [facility]: Hadii aad ku hadasho Soomaali, waaxda luqadaha, qaybta kaalmada adeegyada, tomas xiong. So [facility] [phone].    ATENCIÓN: Si habla español, tiene a carmona disposición servicios gratuitos de asistencia lingüística. Llame al [phone].    We comply with applicable federal civil rights laws and [place] laws. We do not discriminate on the basis of race, color, national origin, age, disability, sex, sexual orientation, or gender identity.            Thank you!     Thank you for choosing [facility]  for your care. Our goal is always to provide you with excellent care. Hearing back from our " patients is one way we can continue to improve our services. Please take a few minutes to complete the written survey that you may receive in the mail after your visit with us. Thank you!             Your Updated Medication List - Protect others around you: Learn how to safely use, store and throw away your medicines at www.disposemymeds.org.          This list is accurate as of: 1/19/18 10:21 AM.  Always use your most recent med list.                   Brand Name Dispense Instructions for use Diagnosis    ibuprofen 800 MG tablet    ADVIL/MOTRIN     Take 1 tablet by mouth every 6 hours as needed        lisinopril 5 MG tablet    PRINIVIL/ZESTRIL     Take 5 mg by mouth daily        loratadine 10 MG tablet    CLARITIN     Take 1 tablet by mouth daily        order for DME     1 Device    Rolling walker with seat for home use    DJD (degenerative joint disease), ankle and foot, left, DDD (degenerative disc disease), lumbar       oxyCODONE-acetaminophen 5-325 MG per tablet    PERCOCET    120 tablet    Take 1 tablet by mouth every 4 hours as needed for moderate to severe pain Maximum of 4 tablets per day    DDD (degenerative disc disease), lumbar, Neck pain       ZOLMitriptan 5 MG tablet    ZOMIG    9 tablet    Take 1 tablet (5 mg) by mouth at onset of headache    Migraine without aura and without status migrainosus, not intractable

## 2018-01-19 NOTE — PROGRESS NOTES
SUBJECTIVE:   Arielle Pham is a 47 year old female who presents to clinic today for the following health issues:      Musculoskeletal problem/pain      Duration: 1/14/18    Description  Location: fell onto hands and knees, hit forehead    Intensity:  severe  Accompanying signs and symptoms: Felt dizzy after the fall. Has been nauseated. Knees are bruised and painful, has pain in upper back, numbness in her hands.     Aggravating factors include: walking and certain positions     History  Previous similar problem: no   Previous evaluation:  none    Precipitating or alleviating factors:  Trauma or overuse: YES- fell walking into [employer]. The sidewalk was slippery because it was snowing. Landed on her hands and knees and hit her forehead. No loss of consciousness.     Therapies tried and outcome: heat and ice not effective    Needs new Rx for Zomig. Is not able to get it prescribed at pain clinic.  No longer seeing provider from [facility] who used to prescribe it.     Problem list and histories reviewed & adjusted, as indicated.  Additional history: as documented    Current Outpatient Prescriptions   Medication Sig Dispense Refill     ZOLMitriptan (ZOMIG) 5 MG tablet Take 1 tablet (5 mg) by mouth at onset of headache 9 tablet 1     oxyCODONE-acetaminophen (PERCOCET) 5-325 MG per tablet Take 1 tablet by mouth every 4 hours as needed for moderate to severe pain Maximum of 4 tablets per day 120 tablet 0     lisinopril (PRINIVIL/ZESTRIL) 5 MG tablet Take 5 mg by mouth daily       ibuprofen (ADVIL/MOTRIN) 800 MG tablet Take 1 tablet by mouth every 6 hours as needed       loratadine (CLARITIN) 10 MG tablet Take 1 tablet by mouth daily       order for DME Rolling walker with seat for home use (Patient not taking: Reported on 1/19/2018) 1 Device 0     [DISCONTINUED] ZOLMitriptan (ZOMIG) 5 MG tablet Take 1 tablet by mouth at onset of headache       Allergies   Allergen Reactions     Gabapentin Palpitations       Reviewed and  updated as needed this visit by clinical staffTobacco  Allergies  Meds  Problems  Med Hx  Surg Hx  Fam Hx  Soc Hx        Reviewed and updated as needed this visit by Provider  Problems      Please abstract the following data from this visit with this patient into the appropriate field in Epic:    Pap smear done on this date: 3/29/16 (approximately), by this group: [facility], results were NIL.     On Sunday fell and hit head. Has issue with constant headaches all the time. Did hit head, no LOC. Did get some nauseated, last time was on Wed. Current headache that has is the same that typically has except is a bit tender to the touch to area that hit. Last dizziness was Wed. Left knee is hurting a lot when walks. Is bruised where fell. Having lower back pain and pain between shoulder blades. Has carpel tunnel and does have more numbness in hands. Has been using ice and heat, heat does help some but not much. Both knees are hiurting but the left is much worse. Wrists are also hurting.  Has prescription for Percocet for chronic pain.  Has noticed that when takes it does help to decrease pain to areas.    ROS:  Review of Systems   Constitutional: Negative for fatigue.   HENT: Negative for ear pain, rhinorrhea and sore throat.    Eyes: Negative for visual disturbance.   Respiratory: Negative for cough, chest tightness, shortness of breath and wheezing.    Cardiovascular: Negative for chest pain, palpitations and leg swelling.   Gastrointestinal: Positive for nausea (last on Wed). Negative for abdominal distention, abdominal pain, constipation, diarrhea and vomiting.   Endocrine: Negative for cold intolerance and heat intolerance.   Musculoskeletal: Positive for arthralgias (bilat wrists, knees). Negative for myalgias.   Skin: Negative for rash.   Neurological: Positive for dizziness (last on Wed) and headaches (at baseline). Negative for light-headedness and numbness.         OBJECTIVE:     /86 (BP Location:  "Other (Comment), Patient Position: Sitting, Cuff Size: Adult Large)  Pulse 72  Temp 97.8  F (36.6  C) (Tympanic)  Ht 5' 8\" (1.727 m)  Wt 239 lb 6.4 oz (108.6 kg)  LMP 01/03/2018  BMI 36.4 kg/m2  Body mass index is 36.4 kg/(m^2).  Physical Exam   Constitutional: She appears well-developed and well-nourished.   HENT:   Head: Normocephalic and atraumatic.   Right Ear: Tympanic membrane and external ear normal. No middle ear effusion.   Left Ear: Tympanic membrane and external ear normal.  No middle ear effusion.   Nose: No mucosal edema.   Mouth/Throat: Oropharynx is clear and moist and mucous membranes are normal.   Eyes: EOM are normal. Pupils are equal, round, and reactive to light.   Neck: Carotid bruit is not present. No thyromegaly present.   Cardiovascular: Normal rate, regular rhythm and normal heart sounds.    Pulmonary/Chest: Effort normal and breath sounds normal.   Abdominal: Soft. Normal appearance and bowel sounds are normal.   Musculoskeletal:        Right wrist: She exhibits tenderness. She exhibits normal range of motion, no bony tenderness, no swelling, no effusion, no crepitus and no deformity.        Left wrist: She exhibits tenderness and bony tenderness. She exhibits normal range of motion, no swelling, no effusion, no crepitus and no deformity.        Right knee: She exhibits ecchymosis (slight). She exhibits normal range of motion, no swelling, no effusion, no erythema and no bony tenderness. Tenderness (slight) found.        Left knee: She exhibits swelling, ecchymosis (to patella) and bony tenderness (over patella). She exhibits normal range of motion, no effusion and no erythema. No tenderness found.   Neurological: She is alert.   Skin: Skin is warm and dry.   Psychiatric: She has a normal mood and affect. Her behavior is normal.       ASSESSMENT/PLAN:   1. Fall, initial encounter  - XR Wrist Left G/E 3 Views; Future  - XR Knee Left 3 Views; Future    2. Migraine without aura and " without status migrainosus, not intractable  Doing well on current, will refill   - ZOLMitriptan (ZOMIG) 5 MG tablet; Take 1 tablet (5 mg) by mouth at onset of headache  Dispense: 9 tablet; Refill: 1    3. Sprain of left wrist, initial encounter  Ice to area 4 times per day for 20 minutes  Continue percocet that already has at home   Elevate  Range of motion  Stretching     4. Knee injury, left, initial encounter  Ice to area 4 times per day for 20 minutes  Continue percocet that already has at home   Elevate  Range of motion  Stretching             DONN Goodrich [facility]

## 2018-01-30 ENCOUNTER — RADIANT APPOINTMENT (OUTPATIENT)
Dept: GENERAL RADIOLOGY | Facility: CLINIC | Age: 48
End: 2018-01-30
Attending: FAMILY MEDICINE
Payer: COMMERCIAL

## 2018-01-30 ENCOUNTER — OFFICE VISIT (OUTPATIENT)
Dept: FAMILY MEDICINE | Facility: CLINIC | Age: 48
End: 2018-01-30
Payer: COMMERCIAL

## 2018-01-30 VITALS
HEART RATE: 72 BPM | TEMPERATURE: 97.4 F | DIASTOLIC BLOOD PRESSURE: 78 MMHG | HEIGHT: 65 IN | BODY MASS INDEX: 39.49 KG/M2 | WEIGHT: 237 LBS | SYSTOLIC BLOOD PRESSURE: 112 MMHG

## 2018-01-30 DIAGNOSIS — M54.50 ACUTE RIGHT-SIDED LOW BACK PAIN WITHOUT SCIATICA: ICD-10-CM

## 2018-01-30 DIAGNOSIS — M19.072 DJD (DEGENERATIVE JOINT DISEASE), ANKLE AND FOOT, LEFT: ICD-10-CM

## 2018-01-30 DIAGNOSIS — M51.369 DDD (DEGENERATIVE DISC DISEASE), LUMBAR: ICD-10-CM

## 2018-01-30 DIAGNOSIS — M53.3 SI (SACROILIAC) JOINT DYSFUNCTION: ICD-10-CM

## 2018-01-30 DIAGNOSIS — M54.50 ACUTE RIGHT-SIDED LOW BACK PAIN WITHOUT SCIATICA: Primary | ICD-10-CM

## 2018-01-30 PROCEDURE — 72170 X-RAY EXAM OF PELVIS: CPT | Mod: FY

## 2018-01-30 PROCEDURE — 99213 OFFICE O/P EST LOW 20 MIN: CPT | Performed by: FAMILY MEDICINE

## 2018-01-30 PROCEDURE — 72100 X-RAY EXAM L-S SPINE 2/3 VWS: CPT | Mod: FY

## 2018-01-30 NOTE — PATIENT INSTRUCTIONS
I did not see anything acute on your x-ray today, the radiologist will look at this later today as well.    I suspect this is more of a jarring/muscular injury for you.  I would recommend trying an over the counter cream with lidocaine for pain relief.  I also pout in a new physical therapy referral for you.    Please let me know if your pain worsens or if your develop and radiating pain in the leg or weakness.

## 2018-01-30 NOTE — NURSING NOTE
"Initial /78  Pulse 72  Temp 97.4  F (36.3  C) (Tympanic)  Ht 5' 5\" (1.651 m)  Wt 237 lb (107.5 kg)  LMP 01/03/2018  Breastfeeding? No  BMI 39.44 kg/m2 Estimated body mass index is 39.44 kg/(m^2) as calculated from the following:    Height as of this encounter: 5' 5\" (1.651 m).    Weight as of this encounter: 237 lb (107.5 kg). .      "

## 2018-01-30 NOTE — PROGRESS NOTES
SUBJECTIVE:   Arielle Pham is a 47 year old female who presents to clinic today for the following health issues:    Musculoskeletal problem/pain      Duration: 2 weeks    Description  Location: right hip    Intensity:  moderate, severe    Accompanying signs and symptoms: radiation of pain to low back    History  Previous similar problem: YES  Previous evaluation:  none    Precipitating or alleviating factors:  Trauma or overuse: YES-  Fell 2 weeks ago  Aggravating factors include: sitting, standing and walking    Therapies tried and outcome: rest/inactivity, heat, ice, Ibuprofen and percocet are effective    Slipped and feel a couple of weeks ago.  Slipped and fell onto her hands and knees.  Hit the top of her head.  Landed on her knees and cece her back.  Was seen in clinic and had x-rays of her knees and wrists done.      Pain is across the low back but mostly on the right.  Feels like if she moves a certian way, like shifting in her chair or trying to sit up straight, it hurts.  Gets a sharp jarring pain.    No radiation of pain down the leg but will feel some tightness in the buttock.  No new weakness in her legs.  No tingling or numbness.      Problem list and histories reviewed & adjusted, as indicated.  Additional history: as documented    Reviewed and updated as needed this visit by clinical staff  Tobacco  Allergies  Meds  Med Hx  Surg Hx  Fam Hx  Soc Hx      Reviewed and updated as needed this visit by Provider  Tobacco  Med Hx  Surg Hx  Fam Hx  Soc Hx      ROS: Remainder of Constitutional, CV, Respiratory, GI,  negative with exception of that mentioned above    PE:  VS as above   Gen:  WN/WD/WH female in NAD   MSK:  Pt with limited ROM of back at baseline due to chronic back pain, unchanged.  No midline tenderness on palpation of lumbar spine.  Tender R lumbar paraspinal and into the R lateral low back.  Some hugo R>L SI tenderness as well.  Sensation and strength intact in LE hugo at  5/5    X-ray pelvis:  No fracture per my visualization  X-ray lumbar:  Degenerative disease in the low back, no acute finding per my visualization    A?P;      ICD-10-CM    1. Acute right-sided low back pain without sciatica M54.5 XR Lumbar Spine 2/3 Views     XR Pelvis 1/2 Views     PHYSICAL THERAPY REFERRAL   2. DDD (degenerative disc disease), lumbar M51.36 PHYSICAL THERAPY REFERRAL   3. SI (sacroiliac) joint dysfunction M53.3 PHYSICAL THERAPY REFERRAL   4. DJD (degenerative joint disease), ankle and foot, left M19.072 PHYSICAL THERAPY REFERRAL     Patient Instructions   I did not see anything acute on your x-ray today, the radiologist will look at this later today as well.    I suspect this is more of a jarring/muscular injury for you.  I would recommend trying an over the counter cream with lidocaine for pain relief.  I also pout in a new physical therapy referral for you.    Please let me know if your pain worsens or if your develop and radiating pain in the leg or weakness.         Pt ran out of PT sessions last year, would like a new referral for pool therapy for all of her concerns as well as this new one.  Referral placed.

## 2018-01-30 NOTE — MR AVS SNAPSHOT
"              After Visit Summary   1/30/2018    Arielle Pham    MRN: [id number]           Patient Information     Date Of Birth          1970        Visit Information        Provider Department      1/30/2018 9:40 AM Huong Stahl DO [facility]        Today's Diagnoses     Acute right-sided low back pain without sciatica    -  1    DDD (degenerative disc disease), lumbar        SI (sacroiliac) joint dysfunction        DJD (degenerative joint disease), ankle and foot, left          Care Instructions    I did not see anything acute on your x-ray today, the radiologist will look at this later today as well.    I suspect this is more of a jarring/muscular injury for you.  I would recommend trying an over the counter cream with lidocaine for pain relief.  I also pout in a new physical therapy referral for you.    Please let me know if your pain worsens or if your develop and radiating pain in the leg or weakness.               Follow-ups after your visit        Additional Services     PHYSICAL THERAPY REFERRAL       Sister Chucky leo    Treatment: Evaluation & Treatment  Special Instructions/Modalities: pool therapy  Special Programs: None    Please be aware that coverage of these services is subject to the terms and limitations of your health insurance plan.  Call member services at your health plan with any benefit or coverage questions.      **Note to Provider:  If you are referring outside of [place] for the therapy appointment, please list the name of the location in the \"special instructions\" above, print the referral and give to the patient to schedule the appointment.                  Who to contact     Normal or non-critical lab and imaging results will be communicated to you by MyChart, letter or phone within 4 business days after the clinic has received the results. If you do not hear from us within 7 days, please contact the clinic through MyChart or phone. If you have a " "critical or abnormal lab result, we will notify you by phone as soon as possible.  Submit refill requests through [employer] or call your pharmacy and they will forward the refill request to us. Please allow 3 business days for your refill to be completed.          If you need to speak with a  for additional information , please call: [phone]           Additional Information About Your Visit        BuddyharTip or Skip Information     [employer] lets you send messages to your doctor, view your test results, renew your prescriptions, schedule appointments and more. To sign up, go to [email or web address] . Click on \"Log in\" on the left side of the screen, which will take you to the Welcome page. Then click on \"Sign up Now\" on the right side of the page.     You will be asked to enter the access code listed below, as well as some personal information. Please follow the directions to create your username and password.     Your access code is: [email or web address]  Expires: 2018 10:21 AM     Your access code will  in 90 days. If you need help or a new code, please call your [place] clinic or [phone].        Care EveryWhere ID     This is your Care EveryWhere ID. This could be used by other organizations to access your [place] medical records  [id number]        Your Vitals Were     Pulse Temperature Height Last Period Breastfeeding? BMI (Body Mass Index)    72 97.4  F (36.3  C) (Tympanic) 5' 5\" (1.651 m) 2018 No 39.44 kg/m2       Blood Pressure from Last 3 Encounters:   18 112/78   18 116/86   10/26/17 124/88    Weight from Last 3 Encounters:   18 237 lb (107.5 kg)   18 239 lb 6.4 oz (108.6 kg)   10/26/17 234 lb (106.1 kg)              We Performed the Following     PHYSICAL THERAPY REFERRAL        Primary Care Provider Office Phone # Fax #    Huong Stahl [phone] [address] [place] [address]        Equal Access to Services     [facility] " GAAR : Hadii aad ku hadbeau Wongali, waaxda luqadaha, qaybta kaalmada adeegjaronda, tomas jaminin hayaan ruben delaney anita . So [facility] [phone].    ATENCIÓN: Si habla nate, tiene a carmona disposición servicios gratuitos de asistencia lingüística. Llame al [phone].    We comply with applicable federal civil rights laws and [place] laws. We do not discriminate on the basis of race, color, national origin, age, disability, sex, sexual orientation, or gender identity.            Thank you!     Thank you for choosing [facility]  for your care. Our goal is always to provide you with excellent care. Hearing back from our patients is one way we can continue to improve our services. Please take a few minutes to complete the written survey that you may receive in the mail after your visit with us. Thank you!             Your Updated Medication List - Protect others around you: Learn how to safely use, store and throw away your medicines at www.disposemymeds.org.          This list is accurate as of 1/30/18 11:12 AM.  Always use your most recent med list.                   Brand Name Dispense Instructions for use Diagnosis    ibuprofen 800 MG tablet    ADVIL/MOTRIN     Take 1 tablet by mouth every 6 hours as needed        lisinopril 5 MG tablet    PRINIVIL/ZESTRIL     Take 5 mg by mouth daily        loratadine 10 MG tablet    CLARITIN     Take 1 tablet by mouth daily        order for DME     1 Device    Rolling walker with seat for home use    DJD (degenerative joint disease), ankle and foot, left, DDD (degenerative disc disease), lumbar       oxyCODONE-acetaminophen 5-325 MG per tablet    PERCOCET    120 tablet    Take 1 tablet by mouth every 4 hours as needed for moderate to severe pain Maximum of 4 tablets per day    DDD (degenerative disc disease), lumbar, Neck pain       ZOLMitriptan 5 MG tablet    ZOMIG    9 tablet    Take 1 tablet (5 mg) by mouth at onset of headache    Migraine without aura  and without status migrainosus, not intractable

## 2018-02-05 DIAGNOSIS — M51.369 DDD (DEGENERATIVE DISC DISEASE), LUMBAR: ICD-10-CM

## 2018-02-05 DIAGNOSIS — M54.2 NECK PAIN: ICD-10-CM

## 2018-02-05 RX ORDER — OXYCODONE AND ACETAMINOPHEN 5; 325 MG/1; MG/1
1 TABLET ORAL EVERY 4 HOURS PRN
Qty: 120 TABLET | Refills: 0 | Status: SHIPPED | OUTPATIENT
Start: 2018-02-05 | End: 2018-03-07

## 2018-02-05 NOTE — TELEPHONE ENCOUNTER
Script walked over to the [facility].    Trinity Altamirano, [facility]

## 2018-03-07 ENCOUNTER — TELEPHONE (OUTPATIENT)
Dept: FAMILY MEDICINE | Facility: CLINIC | Age: 48
End: 2018-03-07

## 2018-03-07 DIAGNOSIS — M54.2 NECK PAIN: ICD-10-CM

## 2018-03-07 DIAGNOSIS — M51.369 DDD (DEGENERATIVE DISC DISEASE), LUMBAR: ICD-10-CM

## 2018-03-07 RX ORDER — OXYCODONE AND ACETAMINOPHEN 5; 325 MG/1; MG/1
1 TABLET ORAL EVERY 4 HOURS PRN
Qty: 120 TABLET | Refills: 0 | Status: CANCELLED | OUTPATIENT
Start: 2018-03-07

## 2018-03-07 RX ORDER — OXYCODONE AND ACETAMINOPHEN 5; 325 MG/1; MG/1
1 TABLET ORAL EVERY 4 HOURS PRN
Qty: 120 TABLET | Refills: 0 | Status: SHIPPED | OUTPATIENT
Start: 2018-03-07 | End: 2018-04-03

## 2018-03-07 NOTE — TELEPHONE ENCOUNTER
Pt called in and is requesting a refill on her percocet , she states she takes it for her back and foot pain.     Ila Mead, [place]

## 2018-04-03 DIAGNOSIS — M54.2 NECK PAIN: ICD-10-CM

## 2018-04-03 DIAGNOSIS — M51.369 DDD (DEGENERATIVE DISC DISEASE), LUMBAR: ICD-10-CM

## 2018-04-03 RX ORDER — OXYCODONE AND ACETAMINOPHEN 5; 325 MG/1; MG/1
1 TABLET ORAL EVERY 4 HOURS PRN
Qty: 120 TABLET | Refills: 0 | Status: SHIPPED | OUTPATIENT
Start: 2018-04-03 | End: 2018-05-07

## 2018-05-07 DIAGNOSIS — M54.2 NECK PAIN: ICD-10-CM

## 2018-05-07 DIAGNOSIS — M51.369 DDD (DEGENERATIVE DISC DISEASE), LUMBAR: ICD-10-CM

## 2018-05-07 RX ORDER — OXYCODONE AND ACETAMINOPHEN 5; 325 MG/1; MG/1
1 TABLET ORAL EVERY 4 HOURS PRN
Qty: 120 TABLET | Refills: 0 | Status: SHIPPED | OUTPATIENT
Start: 2018-05-07 | End: 2018-06-07

## 2018-05-18 DIAGNOSIS — G43.009 MIGRAINE WITHOUT AURA AND WITHOUT STATUS MIGRAINOSUS, NOT INTRACTABLE: ICD-10-CM

## 2018-05-21 RX ORDER — ZOLMITRIPTAN 5 MG/1
TABLET, FILM COATED ORAL
Qty: 9 TABLET | Refills: 0 | Status: SHIPPED | OUTPATIENT
Start: 2018-05-21 | End: 2018-06-22

## 2018-05-21 NOTE — TELEPHONE ENCOUNTER
Prescription approved per [facility] Refill Protocol or patient Primary care provider (PCP)  KAREN Whiting RN/Jovan Hinton

## 2018-05-21 NOTE — TELEPHONE ENCOUNTER
"Requested Prescriptions   Pending Prescriptions Disp Refills     ZOLMitriptan (ZOMIG) 5 MG tablet [Pharmacy Med Name: ZOLMITRIPTAN 5MG TABLETS] 9 tablet 0    Last Written Prescription Date:  01/19/2018 #9 x 1  Last filled 03/05/2018  Last office visit: 1/30/2018 VISHNU Stahl   Future Office Visit:  None   Sig: TAKE 1 TABLET BY MOUTH AT ONSET OF HEADACHE    Serotonin Agonists Failed    5/18/2018  5:50 PM       Failed - Serotonin Agonist request needs review.    Please review patient's record. If patient has had 8 or more treatments in the past month, please forward to provider.         Passed - Blood pressure under 140/90 in past 12 months    BP Readings from Last 3 Encounters:   01/30/18 112/78   01/19/18 116/86   10/26/17 124/88                Passed - Recent (12 mo) or future (30 days) visit within the authorizing provider's specialty    Patient had office visit in the last 12 months or has a visit in the next 30 days with authorizing provider or within the authorizing provider's specialty.  See \"Patient Info\" tab in inbasket, or \"Choose Columns\" in Meds & Orders section of the refill encounter.           Passed - Patient is age 18 or older       Passed - No active pregnancy on record       Passed - No positive pregnancy test in past 12 months          "

## 2018-05-30 ENCOUNTER — TRANSFERRED RECORDS (OUTPATIENT)
Dept: HEALTH INFORMATION MANAGEMENT | Facility: CLINIC | Age: 48
End: 2018-05-30

## 2018-06-07 DIAGNOSIS — M51.369 DDD (DEGENERATIVE DISC DISEASE), LUMBAR: ICD-10-CM

## 2018-06-07 DIAGNOSIS — I10 ESSENTIAL HYPERTENSION: Primary | ICD-10-CM

## 2018-06-07 DIAGNOSIS — M54.2 NECK PAIN: ICD-10-CM

## 2018-06-07 RX ORDER — LISINOPRIL 5 MG/1
5 TABLET ORAL DAILY
Qty: 30 TABLET | Refills: 0 | Status: SHIPPED | OUTPATIENT
Start: 2018-06-07 | End: 2018-08-17

## 2018-06-07 RX ORDER — OXYCODONE AND ACETAMINOPHEN 5; 325 MG/1; MG/1
1 TABLET ORAL 2 TIMES DAILY PRN
Qty: 30 TABLET | Refills: 0 | Status: SHIPPED | OUTPATIENT
Start: 2018-06-07 | End: 2018-06-13

## 2018-06-07 NOTE — TELEPHONE ENCOUNTER
patient called for a refill on her percocet and lisinopril.    Trinity Altamirano [facility]

## 2018-06-08 ENCOUNTER — TELEPHONE (OUTPATIENT)
Dept: LAB | Facility: CLINIC | Age: 48
End: 2018-06-08

## 2018-06-08 DIAGNOSIS — Z13.6 CARDIOVASCULAR SCREENING; LDL GOAL LESS THAN 160: Primary | ICD-10-CM

## 2018-06-08 DIAGNOSIS — I10 ESSENTIAL HYPERTENSION: ICD-10-CM

## 2018-06-08 NOTE — TELEPHONE ENCOUNTER
Patient coming in for lab work on Monday, 6/11/18.   Patient will be fasting.          Please place future orders. Thanks

## 2018-06-11 DIAGNOSIS — I10 ESSENTIAL HYPERTENSION: ICD-10-CM

## 2018-06-11 DIAGNOSIS — Z13.6 CARDIOVASCULAR SCREENING; LDL GOAL LESS THAN 160: ICD-10-CM

## 2018-06-11 LAB
ALBUMIN SERPL-MCNC: 3.7 G/DL (ref 3.4–5)
ALP SERPL-CCNC: 70 U/L (ref 40–150)
ALT SERPL W P-5'-P-CCNC: 25 U/L (ref 0–50)
ANION GAP SERPL CALCULATED.3IONS-SCNC: 4 MMOL/L (ref 3–14)
AST SERPL W P-5'-P-CCNC: 15 U/L (ref 0–45)
BILIRUB SERPL-MCNC: 0.4 MG/DL (ref 0.2–1.3)
BUN SERPL-MCNC: 23 MG/DL (ref 7–30)
CALCIUM SERPL-MCNC: 9.4 MG/DL (ref 8.5–10.1)
CHLORIDE SERPL-SCNC: 107 MMOL/L (ref 94–109)
CHOLEST SERPL-MCNC: 222 MG/DL
CO2 SERPL-SCNC: 28 MMOL/L (ref 20–32)
CREAT SERPL-MCNC: 0.87 MG/DL (ref 0.52–1.04)
CREAT UR-MCNC: 109 MG/DL
GFR SERPL CREATININE-BSD FRML MDRD: 70 ML/MIN/1.7M2
GLUCOSE SERPL-MCNC: 84 MG/DL (ref 70–99)
HDLC SERPL-MCNC: 50 MG/DL
LDLC SERPL CALC-MCNC: 144 MG/DL
MICROALBUMIN UR-MCNC: 10 MG/L
MICROALBUMIN/CREAT UR: 8.89 MG/G CR (ref 0–25)
NONHDLC SERPL-MCNC: 172 MG/DL
POTASSIUM SERPL-SCNC: 4.3 MMOL/L (ref 3.4–5.3)
PROT SERPL-MCNC: 7.1 G/DL (ref 6.8–8.8)
SODIUM SERPL-SCNC: 139 MMOL/L (ref 133–144)
TRIGL SERPL-MCNC: 141 MG/DL

## 2018-06-11 PROCEDURE — 80061 LIPID PANEL: CPT | Performed by: FAMILY MEDICINE

## 2018-06-11 PROCEDURE — 82043 UR ALBUMIN QUANTITATIVE: CPT | Performed by: FAMILY MEDICINE

## 2018-06-11 PROCEDURE — 36415 COLL VENOUS BLD VENIPUNCTURE: CPT | Performed by: FAMILY MEDICINE

## 2018-06-11 PROCEDURE — 80053 COMPREHEN METABOLIC PANEL: CPT | Performed by: FAMILY MEDICINE

## 2018-06-11 NOTE — LETTER
June 12, 2018      Arielle Pham  [address] [place] [address]      Dear ,    We are writing to inform you of your test results.    Your electrolytes, kidney and liver testing were normal.     Resulted Orders   Comprehensive metabolic panel   Result Value Ref Range    Sodium 139 133 - 144 mmol/L    Potassium 4.3 3.4 - 5.3 mmol/L    Chloride 107 94 - 109 mmol/L    Carbon Dioxide 28 20 - 32 mmol/L    Anion Gap 4 3 - 14 mmol/L    Glucose 84 70 - 99 mg/dL      Comment:      Fasting specimen    Urea Nitrogen 23 7 - 30 mg/dL    Creatinine 0.87 0.52 - 1.04 mg/dL    GFR Estimate 70 >60 mL/min/1.7m2      Comment:      Non  GFR Calc    GFR Estimate If Black 84 >60 mL/min/1.7m2      Comment:       GFR Calc    Calcium 9.4 8.5 - 10.1 mg/dL    Bilirubin Total 0.4 0.2 - 1.3 mg/dL    Albumin 3.7 3.4 - 5.0 g/dL    Protein Total 7.1 6.8 - 8.8 g/dL    Alkaline Phosphatase 70 40 - 150 U/L    ALT 25 0 - 50 U/L    AST 15 0 - 45 U/L     If you have any questions or concerns, please call the clinic at the number listed above.       Sincerely,    Huong Sthal, DO/am

## 2018-06-11 NOTE — LETTER
Angela 15, 2018      Ariellemitra Pham  [address] [place] [address]          Arielle,    This is Dr. Baca, a partner of Dr. Stahl.     Enclosed you'll find a copy of your recent test results. Your urine test for protein was normal, no signs of excessive protein in your urine. If elevated, this can be a sign of kidney damage. This test is recommended yearly for people with high blood pressure.     Your cholesterol is up slightly.  At this time, there is no strong reason to take a cholesterol-lowering medication.  I would recommend having your cholesterol rechecked in 2 years.       Resulted Orders   Lipid panel reflex to direct LDL Fasting   Result Value Ref Range    Cholesterol 222 (H) <200 mg/dL      Comment:      Desirable:       <200 mg/dl    Triglycerides 141 <150 mg/dL      Comment:      Fasting specimen    HDL Cholesterol 50 >49 mg/dL    LDL Cholesterol Calculated 144 (H) <100 mg/dL      Comment:      Above desirable:  100-129 mg/dl  Borderline High:  130-159 mg/dL  High:             160-189 mg/dL  Very high:       >189 mg/dl      Non HDL Cholesterol 172 (H) <130 mg/dL      Comment:      Above Desirable:  130-159 mg/dl  Borderline high:  160-189 mg/dl  High:             190-219 mg/dl  Very high:       >219 mg/dl     Comprehensive metabolic panel   Result Value Ref Range    Sodium 139 133 - 144 mmol/L    Potassium 4.3 3.4 - 5.3 mmol/L    Chloride 107 94 - 109 mmol/L    Carbon Dioxide 28 20 - 32 mmol/L    Anion Gap 4 3 - 14 mmol/L    Glucose 84 70 - 99 mg/dL      Comment:      Fasting specimen    Urea Nitrogen 23 7 - 30 mg/dL    Creatinine 0.87 0.52 - 1.04 mg/dL    GFR Estimate 70 >60 mL/min/1.7m2      Comment:      Non  GFR Calc    GFR Estimate If Black 84 >60 mL/min/1.7m2      Comment:       GFR Calc    Calcium 9.4 8.5 - 10.1 mg/dL    Bilirubin Total 0.4 0.2 - 1.3 mg/dL    Albumin 3.7 3.4 - 5.0 g/dL    Protein Total 7.1 6.8 - 8.8 g/dL    Alkaline Phosphatase 70 40 - 150 U/L     ALT 25 0 - 50 U/L    AST 15 0 - 45 U/L   Albumin Random Urine Quantitative with Creat Ratio   Result Value Ref Range    Creatinine Urine 109 mg/dL    Albumin Urine mg/L 10 mg/L    Albumin Urine mg/g Cr 8.89 0 - 25 mg/g Cr       If you have any questions or concerns, please call the clinic at the number listed above.       Sincerely,        Stefany Baca MD/ag

## 2018-06-13 ENCOUNTER — TELEPHONE (OUTPATIENT)
Dept: FAMILY MEDICINE | Facility: CLINIC | Age: 48
End: 2018-06-13

## 2018-06-13 DIAGNOSIS — M51.369 DDD (DEGENERATIVE DISC DISEASE), LUMBAR: ICD-10-CM

## 2018-06-13 DIAGNOSIS — M54.2 NECK PAIN: ICD-10-CM

## 2018-06-13 NOTE — TELEPHONE ENCOUNTER
Dr stahl , pt is calling and states that she usually gets 120 pills of percocet and she was only able to get 30 of the 120 she normally gets, because you were out of town and alok would only give a qty of 30, so the pt is calling to get the rest of the pills now from Dr Stahl. Please advise.     Ila Mead, [place]

## 2018-06-13 NOTE — TELEPHONE ENCOUNTER
I cannot do that until I am in clinic tomorrow.  If she needs it today one of my partners will have to fill.    Huong Stahl

## 2018-06-14 RX ORDER — OXYCODONE AND ACETAMINOPHEN 5; 325 MG/1; MG/1
1 TABLET ORAL 2 TIMES DAILY PRN
Qty: 120 TABLET | Refills: 0 | Status: SHIPPED | OUTPATIENT
Start: 2018-06-14 | End: 2018-07-13

## 2018-06-14 NOTE — TELEPHONE ENCOUNTER
Called and left message to return our call.  We need to know what to do with her Percocet  RX.    Bessy Harris/[place]

## 2018-06-15 NOTE — TELEPHONE ENCOUNTER
Patient came to clinic to  script but had no drivers licence on her.  I walked it to the pharmacy to fill it for her.    [facility]

## 2018-06-18 ENCOUNTER — OFFICE VISIT (OUTPATIENT)
Dept: FAMILY MEDICINE | Facility: CLINIC | Age: 48
End: 2018-06-18
Payer: COMMERCIAL

## 2018-06-18 VITALS
WEIGHT: 237 LBS | SYSTOLIC BLOOD PRESSURE: 136 MMHG | TEMPERATURE: 97.6 F | DIASTOLIC BLOOD PRESSURE: 82 MMHG | HEART RATE: 96 BPM | HEIGHT: 65 IN | BODY MASS INDEX: 39.49 KG/M2

## 2018-06-18 DIAGNOSIS — M53.3 SI (SACROILIAC) JOINT DYSFUNCTION: ICD-10-CM

## 2018-06-18 DIAGNOSIS — R51.9 NONINTRACTABLE EPISODIC HEADACHE, UNSPECIFIED HEADACHE TYPE: ICD-10-CM

## 2018-06-18 DIAGNOSIS — M79.642 PAIN IN BOTH HANDS: Primary | ICD-10-CM

## 2018-06-18 DIAGNOSIS — M51.369 DDD (DEGENERATIVE DISC DISEASE), LUMBAR: ICD-10-CM

## 2018-06-18 DIAGNOSIS — M19.072 DJD (DEGENERATIVE JOINT DISEASE), ANKLE AND FOOT, LEFT: ICD-10-CM

## 2018-06-18 DIAGNOSIS — M79.641 PAIN IN BOTH HANDS: Primary | ICD-10-CM

## 2018-06-18 PROCEDURE — 99214 OFFICE O/P EST MOD 30 MIN: CPT | Performed by: FAMILY MEDICINE

## 2018-06-18 NOTE — PROGRESS NOTES
SUBJECTIVE:   Arielle Pham is a 48 year old female who presents to clinic today for the following health issues:    Musculoskeletal problem/pain      Duration: 1 month    Description  Location: bilateral R>L    Intensity:  moderate    Accompanying signs and symptoms: tingling    History  Previous similar problem: no   Previous evaluation:  none    Precipitating or alleviating factors:  Trauma or overuse: no   Aggravating factors include: picking things up    Therapies tried and outcome: heat is effective and ice is not effective    Feels like she has pain on the palm near the base of the thumb.  Has some tingling in her hands as well, was told previously that she has carpal tunnel.    Bothers when she is trying to pick something up.  No weakness.  Has been ongoing intermittently for about a month.  Palmar pain is R>L and mild.  Not terribly bothersome but she wonders what is going on.    Tingling is throughout the hand, L>R and has been unchanged over a long period of time.      * requesting Rx for amitriptyline for headaches, she was on it previously and it helped  Was taking 25mg twice daily.  Just had nerve blocks in the back of her head for her headaches.  This was 1-2 months ago.  Did this through [facility] whom she sees for procedures related to her headaches.  Now haivng more pain around her eyes.  Had some amitriptyline left over and she restarted and it seems to be helping.    Feels like this is her typical headache pain.  Usually the occipital injections help the headache pain around her eyes as well but did not this time.  She has not had any new headache symptoms.    She felt the amitriptyline was effective when initially prescribed for her headaches.  This was prescribed by her previous primary.  She did not have any particular side effects.  Notes that now she does have some AM drowsiness after her morning dose.    Medication was stopped because she felt this dose lost efficacy over time and her primary  doctor was unwilling in use a higher dose because of concerns over weight gain.    *  Pt has not been able to schedule with pool therapy for her back and leg pain.  She has tried and was told there was no referral.  Would like another referral placed.    Problem list and histories reviewed & adjusted, as indicated.  Additional history: as documented    Reviewed and updated as needed this visit by clinical staff  Tobacco  Allergies  Meds  Med Hx  Surg Hx  Fam Hx  Soc Hx      Reviewed and updated as needed this visit by Provider  Tobacco  Med Hx  Surg Hx  Fam Hx  Soc Hx        ROS: Remainder of Constitutional, CV, Respiratory, GI,  negative with exception of that mentioned above    PE:  VS as above   Gen:  WN/WD/WH female in NAD   MSk:  hugo hands without deformity.  Location of pain in the thenar eminence hugo, R>L, not tender on palpation.  Full ROM of hand, wrist and fingers without pain hugo.     Psych: Alert and oriented times 3; coherent speech, normal   rate and volume, able to articulate logical thoughts, able   to abstract reason, no tangential thoughts, no hallucinations   or delusions  Her affect is bright and appropriate    A/p:      ICD-10-CM    1. Pain in both hands M79.641     M79.642    2. Nonintractable episodic headache, unspecified headache type R51 amitriptyline (ELAVIL) 25 MG tablet   3. DJD (degenerative joint disease), ankle and foot, left M19.072 PHYSICAL THERAPY REFERRAL   4. DDD (degenerative disc disease), lumbar M51.36 PHYSICAL THERAPY REFERRAL   5. SI (sacroiliac) joint dysfunction M53.3 PHYSICAL THERAPY REFERRAL     Patient Instructions   I suspect the hand pain might be arthritis related.  If it worsens let me know and we can get an x-ray    I did fill the amitriptyline for you.  Let's have you do just the 25mg at bedtime.  Let me know next week how that is going.  If not adequate we can increase the bedtime dose.    I placed a new referral for [facility] pool therapy    No  apparent contraindication to amitriptyline, has found it effective.  Will move th qHS dosing to avoid AM drowsiness following dose.  Will increase dose as needed

## 2018-06-18 NOTE — PATIENT INSTRUCTIONS
I suspect the hand pain might be arthritis related.  If it worsens let me know and we can get an x-ray    I did fill the amitriptyline for you.  Let's have you do just the 25mg at bedtime.  Let me know next week how that is going.  If not adequate we can increase the bedtime dose.    I placed a new referral for [facility]

## 2018-06-18 NOTE — MR AVS SNAPSHOT
"              After Visit Summary   6/18/2018    Arielle Pham    MRN: [id number]           Patient Information     Date Of Birth          1970        Visit Information        Provider Department      6/18/2018 1:20 PM Huong Stahl DO [facility]        Today's Diagnoses     Nonintractable episodic headache, unspecified headache type    -  1    DJD (degenerative joint disease), ankle and foot, left        DDD (degenerative disc disease), lumbar        SI (sacroiliac) joint dysfunction          Care Instructions    I suspect the hand pain might be arthritis related.  If it worsens let me know and we can get an x-ray    I did fill the amitriptyline for you.  Let's have you do just the 25mg at bedtime.  Let me know next week how that is going.  If not adequate we can increase the bedtime dose.    I placed a new referral for [facility] therapy          Follow-ups after your visit        Additional Services     PHYSICAL THERAPY REFERRAL       [facility]    Treatment: Evaluation & Treatment  Special Instructions/Modalities: pool therapy  Special Programs: None    Please be aware that coverage of these services is subject to the terms and limitations of your health insurance plan.  Call member services at your health plan with any benefit or coverage questions.      **Note to Provider:  If you are referring outside of [facility] for the therapy appointment, please list the name of the location in the \"special instructions\" above, print the referral and give to the patient to schedule the appointment.                  Who to contact     Normal or non-critical lab and imaging results will be communicated to you by MyChart, letter or phone within 4 business days after the clinic has received the results. If you do not hear from us within 7 days, please contact the clinic through MyChart or phone. If you have a critical or abnormal lab result, we will notify you by phone as soon as " "possible.  Submit refill requests through [employer] or call your pharmacy and they will forward the refill request to us. Please allow 3 business days for your refill to be completed.          If you need to speak with a  for additional information , please call: [phone]           Additional Information About Your Visit        Care EveryWhere ID     This is your Care EveryWhere ID. This could be used by other organizations to access your [place] medical records  [id number]        Your Vitals Were     Pulse Temperature Height Breastfeeding? BMI (Body Mass Index)       96 97.6  F (36.4  C) (Tympanic) 5' 5\" (1.651 m) No 39.44 kg/m2        Blood Pressure from Last 3 Encounters:   06/18/18 136/82   01/30/18 112/78   01/19/18 116/86    Weight from Last 3 Encounters:   06/18/18 237 lb (107.5 kg)   01/30/18 237 lb (107.5 kg)   01/19/18 239 lb 6.4 oz (108.6 kg)              We Performed the Following     PHYSICAL THERAPY REFERRAL          Today's Medication Changes          These changes are accurate as of 6/18/18  2:03 PM.  If you have any questions, ask your nurse or doctor.               Start taking these medicines.        Dose/Directions    amitriptyline 25 MG tablet   Commonly known as:  ELAVIL   Used for:  Nonintractable episodic headache, unspecified headache type   Started by:  Huong Stahl DO        Dose:  25 mg   Take 1 tablet (25 mg) by mouth At Bedtime   Quantity:  30 tablet   Refills:  1            Where to get your medicines      These medications were sent to [facility] - [place] [address] AT [address] [place] [address]     Phone:  [phone]     amitriptyline 25 MG tablet                Primary Care Provider Office Phone # Fax #    Huong Stahl [phone] [address] [place] [address]        Equal Access to Services     [facility] LEON AH: Hadii juanita Menezes " malachicarmelita villafana waxay idiin hayaan adeeg kharash la'aan ah. So [facility] [phone].    ATENCIÓN: Si maurice sullivan, tiene a carmona disposición servicios gratuitos de asistencia lingüística. Sheldon al [phone].    We comply with applicable federal civil rights laws and [place] laws. We do not discriminate on the basis of race, color, national origin, age, disability, sex, sexual orientation, or gender identity.            Thank you!     [facility]. Our goal is always to provide you with excellent care. Hearing back from our patients is one way we can continue to improve our services. Please take a few minutes to complete the written survey that you may receive in the mail after your visit with us. Thank you!             Your Updated Medication List - Protect others around you: Learn how to safely use, store and throw away your medicines at www.disposemymeds.org.          This list is accurate as of 6/18/18  2:03 PM.  Always use your most recent med list.                   Brand Name Dispense Instructions for use Diagnosis    amitriptyline 25 MG tablet    ELAVIL    30 tablet    Take 1 tablet (25 mg) by mouth At Bedtime    Nonintractable episodic headache, unspecified headache type       ibuprofen 800 MG tablet    ADVIL/MOTRIN     Take 1 tablet by mouth every 6 hours as needed        lisinopril 5 MG tablet    PRINIVIL/ZESTRIL    30 tablet    Take 1 tablet (5 mg) by mouth daily    Essential hypertension       loratadine 10 MG tablet    CLARITIN     Take 1 tablet by mouth daily        order for DME     1 Device    Rolling walker with seat for home use    DJD (degenerative joint disease), ankle and foot, left, DDD (degenerative disc disease), lumbar       oxyCODONE-acetaminophen 5-325 MG per tablet    PERCOCET    120 tablet    Take 1 tablet by mouth 2 times daily as needed for moderate to severe pain Maximum of 4 tablets per day    DDD (degenerative  disc disease), lumbar, Neck pain       ZOLMitriptan 5 MG tablet    ZOMIG    9 tablet    TAKE 1 TABLET BY MOUTH AT ONSET OF HEADACHE    Migraine without aura and without status migrainosus, not intractable

## 2018-06-18 NOTE — NURSING NOTE
"Initial /82  Pulse 96  Temp 97.6  F (36.4  C) (Tympanic)  Ht 5' 5\" (1.651 m)  Wt 237 lb (107.5 kg)  Breastfeeding? No  BMI 39.44 kg/m2 Estimated body mass index is 39.44 kg/(m^2) as calculated from the following:    Height as of this encounter: 5' 5\" (1.651 m).    Weight as of this encounter: 237 lb (107.5 kg). .      "

## 2018-06-22 DIAGNOSIS — G43.009 MIGRAINE WITHOUT AURA AND WITHOUT STATUS MIGRAINOSUS, NOT INTRACTABLE: ICD-10-CM

## 2018-06-22 RX ORDER — ZOLMITRIPTAN 5 MG/1
TABLET, FILM COATED ORAL
Qty: 9 TABLET | Refills: 0 | Status: SHIPPED | OUTPATIENT
Start: 2018-06-22 | End: 2018-08-17

## 2018-06-22 NOTE — TELEPHONE ENCOUNTER
"Requested Prescriptions   Pending Prescriptions Disp Refills     ZOLMitriptan (ZOMIG) 5 MG tablet [Pharmacy Med Name: ZOLMITRIPTAN 5MG TABLETS] 9 tablet 0    Last Written Prescription Date:  05/21/2018  #9 x 0  Last filled 05/21/2018  Last office visit: 6/18/2018 VISHNU Stahl   Future Office Visit: None    Sig: TAKE 1 TABLET BY MOUTH AT ONSET OF HEADACHE    Serotonin Agonists Failed    6/22/2018 12:07 PM       Failed - Serotonin Agonist request needs review.    Please review patient's record. If patient has had 8 or more treatments in the past month, please forward to provider.         Passed - Blood pressure under 140/90 in past 12 months    BP Readings from Last 3 Encounters:   06/18/18 136/82   01/30/18 112/78   01/19/18 116/86                Passed - Recent (12 mo) or future (30 days) visit within the authorizing provider's specialty    Patient had office visit in the last 12 months or has a visit in the next 30 days with authorizing provider or within the authorizing provider's specialty.  See \"Patient Info\" tab in inbasket, or \"Choose Columns\" in Meds & Orders section of the refill encounter.           Passed - Patient is age 18 or older       Passed - No active pregnancy on record       Passed - No positive pregnancy test in past 12 months          "

## 2018-06-26 ENCOUNTER — TELEPHONE (OUTPATIENT)
Dept: FAMILY MEDICINE | Facility: CLINIC | Age: 48
End: 2018-06-26

## 2018-06-26 NOTE — TELEPHONE ENCOUNTER
Received a PA request from [facility] for Zolmitriptan 5mg    Routed request to the [employer] epa team.        Reji Jay RT (r)  [facility]

## 2018-06-26 NOTE — TELEPHONE ENCOUNTER
PA Initiation    Medication: ZOLMITRIPTAN 5MG  Insurance Company: [employer] - Phone [phone] Fax [phone]  Pharmacy Filling the Rx: [facility] [address] [place] [address] [address] AT [facility]  Filling Pharmacy Phone: [phone]  Filling Pharmacy Fax:    Start Date: 6/26/2018

## 2018-06-27 NOTE — TELEPHONE ENCOUNTER
Prior Authorization Approval    Authorization Effective Date: 6/22/2018  Authorization Expiration Date: 6/22/2019  Medication: ZOLMITRIPTAN 5MG - APPROVED  Approved Dose/Quantity: DAILY  Reference #: [id number]   Insurance Company: [employer] - Phone [phone] Fax [phone]  Expected CoPay: NA     CoPay Card Available:      Foundation Assistance Needed:    Which Pharmacy is filling the prescription (Not needed for infusion/clinic administered): [facility] [address] [place] [address] [address] AT [facility]  Pharmacy Notified: Yes  Patient Notified: No

## 2018-06-28 ENCOUNTER — TELEPHONE (OUTPATIENT)
Dept: FAMILY MEDICINE | Facility: CLINIC | Age: 48
End: 2018-06-28

## 2018-07-02 ENCOUNTER — TELEPHONE (OUTPATIENT)
Dept: FAMILY MEDICINE | Facility: CLINIC | Age: 48
End: 2018-07-02

## 2018-07-02 NOTE — TELEPHONE ENCOUNTER
Reason for Call: Request for an order or referral:    Order or referral being requested: Referral for PT for back and SI joints    Date needed: as soon as possible    Has the patient been seen by the PCP for this problem? YES    Additional comments: Pt needs referral to [facility]. Fax [phone]    Phone number Patient can be reached at:  Home number on file [phone] (home)    Best Time:  any    Can we leave a detailed message on this number?      Call taken on 7/2/2018 at 10:00 AM by Genny Johnson

## 2018-07-02 NOTE — TELEPHONE ENCOUNTER
Referral was ordered. Patient said that [facility] needs the order faxed to [phone]. Order was faxed.  Kat Vega RN

## 2018-07-02 NOTE — TELEPHONE ENCOUNTER
Received a phone call from the patient, pharmacy stating not been approved, follow up with the pharmacy, has not been notified, has now been notified.    Reji Jay RT (r)  [facility]

## 2018-07-12 ENCOUNTER — TELEPHONE (OUTPATIENT)
Dept: FAMILY MEDICINE | Facility: CLINIC | Age: 48
End: 2018-07-12

## 2018-07-12 DIAGNOSIS — M54.2 NECK PAIN: ICD-10-CM

## 2018-07-12 DIAGNOSIS — R51.9 NONINTRACTABLE EPISODIC HEADACHE, UNSPECIFIED HEADACHE TYPE: ICD-10-CM

## 2018-07-12 DIAGNOSIS — M51.369 DDD (DEGENERATIVE DISC DISEASE), LUMBAR: ICD-10-CM

## 2018-07-13 RX ORDER — OXYCODONE AND ACETAMINOPHEN 5; 325 MG/1; MG/1
1 TABLET ORAL 2 TIMES DAILY PRN
Qty: 120 TABLET | Refills: 0 | Status: SHIPPED | OUTPATIENT
Start: 2018-07-13 | End: 2018-08-10

## 2018-07-26 DIAGNOSIS — M51.369 DDD (DEGENERATIVE DISC DISEASE), LUMBAR: ICD-10-CM

## 2018-07-26 DIAGNOSIS — M53.3 SI (SACROILIAC) JOINT DYSFUNCTION: ICD-10-CM

## 2018-07-26 DIAGNOSIS — M19.072 DJD (DEGENERATIVE JOINT DISEASE), ANKLE AND FOOT, LEFT: Primary | ICD-10-CM

## 2018-07-26 NOTE — TELEPHONE ENCOUNTER
"Requested Prescriptions   Pending Prescriptions Disp Refills     ibuprofen (ADVIL/MOTRIN) 800 MG tablet [Pharmacy Med Name: IBUPROFEN 800MG TABLETS] 270 tablet 0    Last Written Prescription Date:  12/08/2017 reported by patient  Last filled 06/22/2018  Last office visit: 6/18/2018 VISHNU Stahl   Future Office Visit: None   Sig: TAKE 1 TABLET BY MOUTH THREE TIMES DAILY AS NEEDED    NSAID Medications Failed    7/26/2018  3:20 PM       Failed - Normal CBC on file in past 12 months    No lab results found.    For [facility] ONLY: FDCX108 = WBC, TLBQ346 = RBC         Passed - Blood pressure under 140/90 in past 12 months    BP Readings from Last 3 Encounters:   06/18/18 136/82   01/30/18 112/78   01/19/18 116/86                Passed - Normal ALT on file in past 12 months    Recent Labs   Lab Test  06/11/18   1021   ALT  25            Passed - Normal AST on file in past 12 months    Recent Labs   Lab Test  06/11/18   1021   AST  15            Passed - Recent (12 mo) or future (30 days) visit within the authorizing provider's specialty    Patient had office visit in the last 12 months or has a visit in the next 30 days with authorizing provider or within the authorizing provider's specialty.  See \"Patient Info\" tab in inbasket, or \"Choose Columns\" in Meds & Orders section of the refill encounter.           Passed - Patient is age 6-64 years       Passed - No active pregnancy on record       Passed - Normal serum creatinine on file in past 12 months    Recent Labs   Lab Test  06/11/18   1021   CR  0.87            Passed - No positive pregnancy test in past 12 months          "

## 2018-07-27 NOTE — TELEPHONE ENCOUNTER
Routing refill request to provider for review/approval because:  Medication is reported/historical  Failed protocol below.  Please advise refill.    PAT Villa

## 2018-07-30 RX ORDER — IBUPROFEN 800 MG/1
TABLET, FILM COATED ORAL
Qty: 270 TABLET | Refills: 0 | Status: SHIPPED | OUTPATIENT
Start: 2018-07-30 | End: 2018-10-19

## 2018-08-10 DIAGNOSIS — M54.2 NECK PAIN: ICD-10-CM

## 2018-08-10 DIAGNOSIS — M51.369 DDD (DEGENERATIVE DISC DISEASE), LUMBAR: ICD-10-CM

## 2018-08-10 RX ORDER — OXYCODONE AND ACETAMINOPHEN 5; 325 MG/1; MG/1
1 TABLET ORAL 2 TIMES DAILY PRN
Qty: 120 TABLET | Refills: 0 | Status: SHIPPED | OUTPATIENT
Start: 2018-08-10 | End: 2018-09-13

## 2018-08-10 NOTE — TELEPHONE ENCOUNTER
Script walked over to the [facility].    Trinity Altamirano, [facility]

## 2018-08-10 NOTE — TELEPHONE ENCOUNTER
Patient requesting a refill on her percocet.    Trinity Altamirano [facility]

## 2018-08-17 DIAGNOSIS — I10 ESSENTIAL HYPERTENSION: ICD-10-CM

## 2018-08-17 DIAGNOSIS — G43.009 MIGRAINE WITHOUT AURA AND WITHOUT STATUS MIGRAINOSUS, NOT INTRACTABLE: ICD-10-CM

## 2018-08-17 RX ORDER — LISINOPRIL 5 MG/1
5 TABLET ORAL DAILY
Qty: 90 TABLET | Refills: 0 | Status: SHIPPED | OUTPATIENT
Start: 2018-08-17 | End: 2018-11-13

## 2018-08-17 RX ORDER — ZOLMITRIPTAN 5 MG/1
TABLET, FILM COATED ORAL
Qty: 9 TABLET | Refills: 2 | Status: SHIPPED | OUTPATIENT
Start: 2018-08-17 | End: 2018-11-07

## 2018-08-17 NOTE — TELEPHONE ENCOUNTER
Patient called and is out of both meds would like filled today if possible please.    Thank you    Jovan Mix [facility]

## 2018-08-17 NOTE — TELEPHONE ENCOUNTER
Patient came in for labs and Dr Baca reviewed but no follow-up appointment with you.     Please also see cholesterol results. B/p has been stable.  Estela Rust RN'

## 2018-08-30 ENCOUNTER — TRANSFERRED RECORDS (OUTPATIENT)
Dept: HEALTH INFORMATION MANAGEMENT | Facility: CLINIC | Age: 48
End: 2018-08-30

## 2018-09-06 ENCOUNTER — OFFICE VISIT (OUTPATIENT)
Dept: FAMILY MEDICINE | Facility: CLINIC | Age: 48
End: 2018-09-06
Payer: COMMERCIAL

## 2018-09-06 VITALS
BODY MASS INDEX: 39.57 KG/M2 | SYSTOLIC BLOOD PRESSURE: 136 MMHG | WEIGHT: 237.5 LBS | HEART RATE: 100 BPM | HEIGHT: 65 IN | DIASTOLIC BLOOD PRESSURE: 92 MMHG

## 2018-09-06 DIAGNOSIS — M25.562 ACUTE PAIN OF LEFT KNEE: ICD-10-CM

## 2018-09-06 DIAGNOSIS — F41.1 GAD (GENERALIZED ANXIETY DISORDER): Primary | ICD-10-CM

## 2018-09-06 DIAGNOSIS — I10 ESSENTIAL HYPERTENSION: ICD-10-CM

## 2018-09-06 PROCEDURE — 99214 OFFICE O/P EST MOD 30 MIN: CPT | Performed by: FAMILY MEDICINE

## 2018-09-06 RX ORDER — LISINOPRIL 5 MG/1
5 TABLET ORAL DAILY
Qty: 90 TABLET | Refills: 0 | Status: CANCELLED | OUTPATIENT
Start: 2018-09-06

## 2018-09-06 ASSESSMENT — ANXIETY QUESTIONNAIRES
1. FEELING NERVOUS, ANXIOUS, OR ON EDGE: NEARLY EVERY DAY
GAD7 TOTAL SCORE: 17
5. BEING SO RESTLESS THAT IT IS HARD TO SIT STILL: SEVERAL DAYS
6. BECOMING EASILY ANNOYED OR IRRITABLE: NEARLY EVERY DAY
2. NOT BEING ABLE TO STOP OR CONTROL WORRYING: NEARLY EVERY DAY
3. WORRYING TOO MUCH ABOUT DIFFERENT THINGS: NEARLY EVERY DAY
7. FEELING AFRAID AS IF SOMETHING AWFUL MIGHT HAPPEN: MORE THAN HALF THE DAYS

## 2018-09-06 ASSESSMENT — PATIENT HEALTH QUESTIONNAIRE - PHQ9: 5. POOR APPETITE OR OVEREATING: MORE THAN HALF THE DAYS

## 2018-09-06 NOTE — MR AVS SNAPSHOT
After Visit Summary   9/6/2018    Arielle Pham    MRN: [id number]           Patient Information     Date Of Birth          1970        Visit Information        Provider Department      9/6/2018 1:00 PM Huong Stahl DO [facility]        Today's Diagnoses     ROHAN (generalized anxiety disorder)    -  1    Essential hypertension          Care Instructions    I suspect the knee pain is likely a quadriceps strain.  This will get better overtime.  Continue icing and consider a topical pain medicine like Biofreeze if you would like.    I agree a counselor is a great option for you.  I placed a referral, you should be able to call [facility] to schedule.  Please let me know if you feel things are worsening              Follow-ups after your visit        Additional Services     MENTAL HEALTH REFERRAL  - Adult; Outpatient Treatment; Individual/Couples/Family/Group Therapy/Health Psychology; Other: Not Listed - Enter Referral Details in Scheduling Comments Below       [facility]                  Who to contact     Normal or non-critical lab and imaging results will be communicated to you by MyChart, letter or phone within 4 business days after the clinic has received the results. If you do not hear from us within 7 days, please contact the clinic through MyChart or phone. If you have a critical or abnormal lab result, we will notify you by phone as soon as possible.  Submit refill requests through [employer] or call your pharmacy and they will forward the refill request to us. Please allow 3 business days for your refill to be completed.          If you need to speak with a  for additional information , please call: [phone]           Additional Information About Your Visit        Care EveryWhere ID     This is your Care EveryWhere ID. This could be used by other organizations to access your [place] medical records  [id number]        Your Vitals Were      "Pulse Height BMI (Body Mass Index)             100 5' 5\" (1.651 m) 39.52 kg/m2          Blood Pressure from Last 3 Encounters:   09/06/18 (!) 136/92   06/18/18 136/82   01/30/18 112/78    Weight from Last 3 Encounters:   09/06/18 237 lb 8 oz (107.7 kg)   06/18/18 237 lb (107.5 kg)   01/30/18 237 lb (107.5 kg)              We Performed the Following     MENTAL HEALTH REFERRAL  - Adult; Outpatient Treatment; Individual/Couples/Family/Group Therapy/Health Psychology; Other: Not Listed - Enter Referral Details in Scheduling Comments Below        Primary Care Provider Office Phone # Fax #    Huong Beaver DO [facility] [phone] [address] [place] [address]        Equal Access to Services     VENITA QUINTERO : Sierra More, waaxda luqadaha, qaybta kaalmayessi lainez, tomas howard . So [facility] [phone].    ATENCIÓN: Si habla español, tiene a carmona disposición servicios gratuitos de asistencia lingüística. Sheldon al [phone].    We comply with applicable federal civil rights laws and [place] laws. We do not discriminate on the basis of race, color, national origin, age, disability, sex, sexual orientation, or gender identity.            Thank you!     Thank you for choosing [facility] [place]  for your care. Our goal is always to provide you with excellent care. Hearing back from our patients is one way we can continue to improve our services. Please take a few minutes to complete the written survey that you may receive in the mail after your visit with us. Thank you!             Your Updated Medication List - Protect others around you: Learn how to safely use, store and throw away your medicines at www.disposemymeds.org.          This list is accurate as of 9/6/18  1:56 PM.  Always use your most recent med list.                   Brand Name Dispense Instructions for use Diagnosis    amitriptyline 25 MG tablet    ELAVIL    90 tablet    Take 1 " tablet (25 mg) by mouth At Bedtime    Nonintractable episodic headache, unspecified headache type       ibuprofen 800 MG tablet    ADVIL/MOTRIN    270 tablet    TAKE 1 TABLET BY MOUTH THREE TIMES DAILY AS NEEDED    DJD (degenerative joint disease), ankle and foot, left, SI (sacroiliac) joint dysfunction, DDD (degenerative disc disease), lumbar       lisinopril 5 MG tablet    PRINIVIL/ZESTRIL    90 tablet    Take 1 tablet (5 mg) by mouth daily    Essential hypertension       loratadine 10 MG tablet    CLARITIN     Take 1 tablet by mouth daily        order for DME     1 Device    Rolling walker with seat for home use    DJD (degenerative joint disease), ankle and foot, left, DDD (degenerative disc disease), lumbar       oxyCODONE-acetaminophen 5-325 MG per tablet    PERCOCET    120 tablet    Take 1 tablet by mouth 2 times daily as needed for moderate to severe pain Maximum of 4 tablets per day    DDD (degenerative disc disease), lumbar, Neck pain       ZOLMitriptan 5 MG tablet    ZOMIG    9 tablet    TAKE 1 TABLET BY MOUTH AT ONSET OF HEADACHE    Migraine without aura and without status migrainosus, not intractable

## 2018-09-06 NOTE — PATIENT INSTRUCTIONS
I suspect the knee pain is likely a quadriceps strain.  This will get better overtime.  Continue icing and consider a topical pain medicine like Biofreeze if you would like.    I agree a counselor is a great option for you.  I placed a referral, you should be able to call [facility] to schedule.  Please let me know if you feel things are worsening

## 2018-09-06 NOTE — PROGRESS NOTES
"  SUBJECTIVE:   Arielle Pham is a 48 year old female who presents to clinic today for the following health issues:  - Forms for work ability to complete    Abnormal Mood Symptoms  Onset: intermittently for \"a while\"    Description:   Depression: YES  Anxiety: YES    Accompanying Signs & Symptoms:  Still participating in activities that you used to enjoy: Somewhat  Fatigue: no  Irritability: no  Difficulty concentrating: YES  Changes in appetite: no  Problems with sleep: YES- Trouble falling and staying asleep at night  Heart racing/beating fast : YES  Thoughts of hurting yourself or others: none    History:   Recent stress: no, but she does have stress with ex  and behavioral problems with daughter  Prior depression hospitalization: None  Family history of depression: YES  Family history of anxiety: YES    Precipitating factors:   Alcohol/drug use: no    Alleviating factors:  Deep breaths    Therapies Tried and outcome: None       Patient's main concerns involve her anxiety and her left knee.     For anxiety, she has been getting progressively worse anxiety over the last couple of years since the divorce with her  3 years ago. Her primary triggers are her ex  not seeing the children and then sporadically wanting to see them, and her oldest daughter who has been having behavioral problems. She says that she wishes he would \"either be more consistent or not involved at all\". She has panic attacks several times a week where she will feel short of breath and have overall anxious feelings. She previously had been to [facility] for counseling (where her daughter goes as well), however she stopped going 2 years ago. She is willing to start again with a different therapist. She said she had previously been on a anxiety medication but she did not like it because it made her \"really sleepy\".   Not interested in considering medication for anxiety today    For her knee, she complained that she " "hit a tree branch and \"jerked it\", which caused pain. The pain is worse when it is palpated. She does not complain of the knee \"locking\".  Was walking and had her L foot got caught and jerked her knee.  Feels like she can barely walk in the AM due to pain.  No swelling.  Had pain at the time.  Twisted the knee.  Very stiff in the AM, improves with moving around.  No locking or catching.  No sense of instability.  H/o PFS but no knee injury.  Pain all in the front of the knee.      Problem list and histories reviewed & adjusted, as indicated.  Additional history: as documented    Reviewed and updated as needed this visit by clinical staff  Tobacco  Allergies  Meds  Med Hx  Surg Hx  Fam Hx  Soc Hx      Reviewed and updated as needed this visit by Provider  Tobacco  Allergies  Med Hx  Surg Hx  Fam Hx  Soc Hx        ROS: Remainder of Constitutional, CV, Respiratory, GI,  negative with exception of that mentioned above    PE:  VS as above   Gen:  WN/WD/WH female in NAD   Psych: Alert and oriented times 3; coherent speech, normal   rate and volume, able to articulate logical thoughts, able   to abstract reason, no tangential thoughts, no hallucinations   or delusions  Her affect is mildly anxious    L knee: no apparent fracture or effusion. There was pain to palpation on the proximal portion of the patella that runs along the lateral aspect. The pain was increased on flexion of the knee joint. Normal range of motion. Ant drawer and Lachman's test normal. Post drawer normal. Varus/valgus stress tests normal.  Quadriceps muscle strength intact        A/P:      ICD-10-CM    1. ROHAN (generalized anxiety disorder) F41.1 MENTAL HEALTH REFERRAL  - Adult; Outpatient Treatment; Individual/Couples/Family/Group Therapy/Health Psychology; Other: Not Listed - Enter Referral Details in Scheduling Comments Below   2. Acute pain of left knee M25.562    3. Essential hypertension I10      Patient Instructions   I suspect the " knee pain is likely a quadriceps strain.  This will get better overtime.  Continue icing and consider a topical pain medicine like Biofreeze if you would like.    I agree a counselor is a great option for you.  I placed a referral, you should be able to call Davin to schedule.  Please let me know if you feel things are worsening            Slightly elevated blood pressure noted on vitals. Will follow up on a separate blood pressure visit.

## 2018-09-07 ASSESSMENT — PATIENT HEALTH QUESTIONNAIRE - PHQ9: SUM OF ALL RESPONSES TO PHQ QUESTIONS 1-9: 14

## 2018-09-07 ASSESSMENT — ANXIETY QUESTIONNAIRES: GAD7 TOTAL SCORE: 17

## 2018-09-10 ENCOUNTER — ALLIED HEALTH/NURSE VISIT (OUTPATIENT)
Dept: FAMILY MEDICINE | Facility: CLINIC | Age: 48
End: 2018-09-10
Payer: COMMERCIAL

## 2018-09-10 VITALS — HEART RATE: 79 BPM | DIASTOLIC BLOOD PRESSURE: 80 MMHG | SYSTOLIC BLOOD PRESSURE: 126 MMHG

## 2018-09-10 DIAGNOSIS — Z01.30 BLOOD PRESSURE CHECK: Primary | ICD-10-CM

## 2018-09-10 DIAGNOSIS — M54.2 NECK PAIN: ICD-10-CM

## 2018-09-10 DIAGNOSIS — M51.369 DDD (DEGENERATIVE DISC DISEASE), LUMBAR: ICD-10-CM

## 2018-09-10 PROCEDURE — 99207 ZZC NO CHARGE NURSE ONLY: CPT

## 2018-09-10 RX ORDER — OXYCODONE AND ACETAMINOPHEN 5; 325 MG/1; MG/1
1 TABLET ORAL 2 TIMES DAILY PRN
Qty: 120 TABLET | Refills: 0 | Status: CANCELLED | OUTPATIENT
Start: 2018-09-10

## 2018-09-10 NOTE — PROGRESS NOTES
SUBJECTIVE:  Arielle Pham is a 48 year old female who presents for a follow up evaluation of her hypertension.    The reason for the visit is:  Elevated BP at last office visit    Patient is taking medication as prescribed  Patient is tolerating medications well.      Current complaints: none      Current Outpatient Prescriptions   Medication     amitriptyline (ELAVIL) 25 MG tablet     ibuprofen (ADVIL/MOTRIN) 800 MG tablet     lisinopril (PRINIVIL/ZESTRIL) 5 MG tablet     loratadine (CLARITIN) 10 MG tablet     oxyCODONE-acetaminophen (PERCOCET) 5-325 MG per tablet     ZOLMitriptan (ZOMIG) 5 MG tablet     order for DME     No current facility-administered medications for this visit.        Allergies   Allergen Reactions     Gabapentin Palpitations         OBJECTIVE:  Please get a blood pressure AND a pulse.  A height is also needed if has not been done in the past year.    There were no vitals taken for this visit.    Vitals as recorded, a large cuff was used.    ASSESSMENT:    Is the HYPERTENSION goal on the problem list? Yes  Patient Active Problem List   Diagnosis     Low back pain     Neck pain     DJD (degenerative joint disease), ankle and foot, left     Migraine without aura and without status migrainosus, not intractable     Nonintractable episodic headache, unspecified headache type     DDD (degenerative disc disease), lumbar     SI (sacroiliac) joint dysfunction     Essential hypertension       Plan:  The patient's blood pressure is less than documented goal. The patient will be discharged home. CC: this note to the patient's primary provider. Chastity Kong CMA on 9/10/2018 at 9:28 AM      Patient is requesting a refill on Percocet.  She is not out of medication but was told by the pharmacy to request refills 1 week ahead of time.

## 2018-09-10 NOTE — MR AVS SNAPSHOT
"              After Visit Summary   9/10/2018    Arielle Pham    MRN: [id number]           Patient Information     Date Of Birth          1970        Visit Information        Provider Department      9/10/2018 9:30 AM Jennifer/Lpn, [facility]        Today's Diagnoses     Blood pressure check    -  1    DDD (degenerative disc disease), lumbar        Neck pain           Follow-ups after your visit        Who to contact     Normal or non-critical lab and imaging results will be communicated to you by Promobuckethart, letter or phone within 4 business days after the clinic has received the results. If you do not hear from us within 7 days, please contact the clinic through Promobuckethart or phone. If you have a critical or abnormal lab result, we will notify you by phone as soon as possible.  Submit refill requests through [employer] or call your pharmacy and they will forward the refill request to us. Please allow 3 business days for your refill to be completed.          If you need to speak with a  for additional information , please call: [phone]           Additional Information About Your Visit        MyCharRewardsPay Information     [employer] lets you send messages to your doctor, view your test results, renew your prescriptions, schedule appointments and more. To sign up, go to [email or web address] . Click on \"Log in\" on the left side of the screen, which will take you to the Welcome page. Then click on \"Sign up Now\" on the right side of the page.     You will be asked to enter the access code listed below, as well as some personal information. Please follow the directions to create your username and password.     Your access code is: [email or web address]  Expires: 2018  9:18 AM     Your access code will  in 90 days. If you need help or a new code, please call your [facility] or [phone].        Care EveryWhere ID     This is your Care EveryWhere ID. This could be used by other " organizations to access your [facility] medical records  [id number]        Your Vitals Were     Pulse                   79            Blood Pressure from Last 3 Encounters:   09/10/18 126/80   09/06/18 (!) 136/92   06/18/18 136/82    Weight from Last 3 Encounters:   09/06/18 237 lb 8 oz (107.7 kg)   06/18/18 237 lb (107.5 kg)   01/30/18 237 lb (107.5 kg)              Today, you had the following     No orders found for display       Primary Care Provider Office Phone # Fax #    Huong SthalDO [phone] [address] [place] [address]        Equal Access to Services     MARCO QUINTERO : Sierra More, wanorma sharp, qajasonta kaalmada fatou, tomas xiong. So [facility] [place] [phone].    ATENCIÓN: Si habla español, tiene a carmona disposición servicios gratuitos de asistencia lingüística. Llame al [phone].    We comply with applicable federal civil rights laws and [place] laws. We do not discriminate on the basis of race, color, national origin, age, disability, sex, sexual orientation, or gender identity.            Thank you!     Thank you for choosing [facility]  for your care. Our goal is always to provide you with excellent care. Hearing back from our patients is one way we can continue to improve our services. Please take a few minutes to complete the written survey that you may receive in the mail after your visit with us. Thank you!             Your Updated Medication List - Protect others around you: Learn how to safely use, store and throw away your medicines at www.disposemymeds.org.          This list is accurate as of 9/10/18 11:59 PM.  Always use your most recent med list.                   Brand Name Dispense Instructions for use Diagnosis    amitriptyline 25 MG tablet    ELAVIL    90 tablet    Take 1 tablet (25 mg) by mouth At Bedtime    Nonintractable episodic headache, unspecified headache type       ibuprofen  800 MG tablet    ADVIL/MOTRIN    270 tablet    TAKE 1 TABLET BY MOUTH THREE TIMES DAILY AS NEEDED    DJD (degenerative joint disease), ankle and foot, left, SI (sacroiliac) joint dysfunction, DDD (degenerative disc disease), lumbar       lisinopril 5 MG tablet    PRINIVIL/ZESTRIL    90 tablet    Take 1 tablet (5 mg) by mouth daily    Essential hypertension       loratadine 10 MG tablet    CLARITIN     Take 1 tablet by mouth daily        order for DME     1 Device    Rolling walker with seat for home use    DJD (degenerative joint disease), ankle and foot, left, DDD (degenerative disc disease), lumbar       ZOLMitriptan 5 MG tablet    ZOMIG    9 tablet    TAKE 1 TABLET BY MOUTH AT ONSET OF HEADACHE    Migraine without aura and without status migrainosus, not intractable

## 2018-09-12 ENCOUNTER — TELEPHONE (OUTPATIENT)
Dept: FAMILY MEDICINE | Facility: CLINIC | Age: 48
End: 2018-09-12

## 2018-09-12 DIAGNOSIS — M54.2 NECK PAIN: ICD-10-CM

## 2018-09-12 DIAGNOSIS — M51.369 DDD (DEGENERATIVE DISC DISEASE), LUMBAR: ICD-10-CM

## 2018-09-13 RX ORDER — OXYCODONE AND ACETAMINOPHEN 5; 325 MG/1; MG/1
1 TABLET ORAL 2 TIMES DAILY PRN
Qty: 120 TABLET | Refills: 0 | Status: SHIPPED | OUTPATIENT
Start: 2018-09-13 | End: 2018-10-08

## 2018-09-13 NOTE — TELEPHONE ENCOUNTER
Script walked over to the [facility].    Trinity Altamirano, [facility]

## 2018-09-28 DIAGNOSIS — J30.2 SEASONAL ALLERGIC RHINITIS, UNSPECIFIED CHRONICITY, UNSPECIFIED TRIGGER: Primary | ICD-10-CM

## 2018-09-28 RX ORDER — LORATADINE 10 MG/1
TABLET ORAL
Qty: 90 TABLET | Refills: 3 | Status: SHIPPED | OUTPATIENT
Start: 2018-09-28 | End: 2019-12-05

## 2018-09-28 NOTE — TELEPHONE ENCOUNTER
Prescription approved per [facility] Refill Protocol.    Estela Rust RN

## 2018-10-08 ENCOUNTER — TELEPHONE (OUTPATIENT)
Dept: FAMILY MEDICINE | Facility: CLINIC | Age: 48
End: 2018-10-08

## 2018-10-08 DIAGNOSIS — M54.2 NECK PAIN: ICD-10-CM

## 2018-10-08 DIAGNOSIS — M51.369 DDD (DEGENERATIVE DISC DISEASE), LUMBAR: ICD-10-CM

## 2018-10-08 RX ORDER — OXYCODONE AND ACETAMINOPHEN 5; 325 MG/1; MG/1
1 TABLET ORAL 2 TIMES DAILY PRN
Qty: 120 TABLET | Refills: 0 | Status: SHIPPED | OUTPATIENT
Start: 2018-10-12 | End: 2018-11-07

## 2018-10-19 DIAGNOSIS — M51.369 DDD (DEGENERATIVE DISC DISEASE), LUMBAR: ICD-10-CM

## 2018-10-19 DIAGNOSIS — M19.072 DJD (DEGENERATIVE JOINT DISEASE), ANKLE AND FOOT, LEFT: ICD-10-CM

## 2018-10-19 DIAGNOSIS — T50.905A ADVERSE DRUG EFFECT: Primary | ICD-10-CM

## 2018-10-19 DIAGNOSIS — M53.3 SI (SACROILIAC) JOINT DYSFUNCTION: ICD-10-CM

## 2018-10-19 RX ORDER — IBUPROFEN 800 MG/1
TABLET, FILM COATED ORAL
Qty: 270 TABLET | Refills: 0 | Status: SHIPPED | OUTPATIENT
Start: 2018-10-19 | End: 2019-01-22

## 2018-11-07 ENCOUNTER — TELEPHONE (OUTPATIENT)
Dept: FAMILY MEDICINE | Facility: CLINIC | Age: 48
End: 2018-11-07

## 2018-11-07 DIAGNOSIS — M51.369 DDD (DEGENERATIVE DISC DISEASE), LUMBAR: ICD-10-CM

## 2018-11-07 DIAGNOSIS — M54.2 NECK PAIN: ICD-10-CM

## 2018-11-07 DIAGNOSIS — G43.009 MIGRAINE WITHOUT AURA AND WITHOUT STATUS MIGRAINOSUS, NOT INTRACTABLE: ICD-10-CM

## 2018-11-07 RX ORDER — ZOLMITRIPTAN 5 MG/1
TABLET, FILM COATED ORAL
Qty: 9 TABLET | Refills: 2 | Status: SHIPPED | OUTPATIENT
Start: 2018-11-07 | End: 2018-12-10

## 2018-11-07 RX ORDER — OXYCODONE AND ACETAMINOPHEN 5; 325 MG/1; MG/1
1 TABLET ORAL 2 TIMES DAILY PRN
Qty: 120 TABLET | Refills: 0 | Status: SHIPPED | OUTPATIENT
Start: 2018-11-07 | End: 2018-12-10

## 2018-11-07 NOTE — TELEPHONE ENCOUNTER
Pt is calling to get a refill on her medications , please advise.     Ila Mead, [facility] [place]

## 2018-11-13 DIAGNOSIS — I10 ESSENTIAL HYPERTENSION: ICD-10-CM

## 2018-11-13 RX ORDER — LISINOPRIL 5 MG/1
TABLET ORAL
Qty: 90 TABLET | Refills: 0 | Status: SHIPPED | OUTPATIENT
Start: 2018-11-13 | End: 2020-06-25

## 2018-11-13 NOTE — TELEPHONE ENCOUNTER
Prescription approved per [facility] Refill Protocol.  Estela Rust RN

## 2018-11-13 NOTE — TELEPHONE ENCOUNTER
"Requested Prescriptions   Pending Prescriptions Disp Refills     lisinopril (PRINIVIL/ZESTRIL) 5 MG tablet [Pharmacy Med Name: LISINOPRIL 5MG TABLETS] 90 tablet 0    Last Written Prescription Date:  08/17/2018 #90 x 0  Last filled 10/08/2018  Last office visit: 09/06/2018 VISHNU Stahl   Future Office Visit:  None    Sig: TAKE 1 TABLET(5 MG) BY MOUTH DAILY    ACE Inhibitors (Including Combos) Protocol Passed    11/13/2018  1:13 PM       Passed - Blood pressure under 140/90 in past 12 months    BP Readings from Last 3 Encounters:   09/10/18 126/80   09/06/18 (!) 136/92   06/18/18 136/82                Passed - Recent (12 mo) or future (30 days) visit within the authorizing provider's specialty    Patient had office visit in the last 12 months or has a visit in the next 30 days with authorizing provider or within the authorizing provider's specialty.  See \"Patient Info\" tab in inbasket, or \"Choose Columns\" in Meds & Orders section of the refill encounter.             Passed - Patient is age 18 or older       Passed - No active pregnancy on record       Passed - Normal serum creatinine on file in past 12 months    Recent Labs   Lab Test  06/11/18   1021   CR  0.87            Passed - Normal serum potassium on file in past 12 months    Recent Labs   Lab Test  06/11/18   1021   POTASSIUM  4.3            Passed - No positive pregnancy test in past 12 months          "

## 2018-11-26 ENCOUNTER — TELEPHONE (OUTPATIENT)
Dept: FAMILY MEDICINE | Facility: CLINIC | Age: 48
End: 2018-11-26

## 2018-11-26 DIAGNOSIS — J32.9 SINUS INFECTION: Primary | ICD-10-CM

## 2018-11-26 RX ORDER — AZITHROMYCIN 250 MG/1
TABLET, FILM COATED ORAL
Qty: 6 TABLET | Refills: 0 | Status: SHIPPED | OUTPATIENT
Start: 2018-11-26 | End: 2019-03-04

## 2018-11-26 NOTE — TELEPHONE ENCOUNTER
Call placed to patient.  She reports feeling nausea with Augmentin, did have emesis.  Reports headache worse,using Sudafed and nasal spray without relief.  Consulted with Dr Stahl.  Called patient to ask if there is an alternative antibiotic that she tolerates better, voicemail message left to return call.  Patient returned call, has taken Z pack for sinus infections and tolerated well.  Order placed per Dr Stahl verbal order.  Dayami MORA/CARA

## 2018-11-26 NOTE — TELEPHONE ENCOUNTER
Patient went into urgent care and has a sinus infection and the antibiotic they gave her makes her sick and she needs something else.    Trinity Altamirano, [facility]

## 2018-11-30 ENCOUNTER — TRANSFERRED RECORDS (OUTPATIENT)
Dept: HEALTH INFORMATION MANAGEMENT | Facility: CLINIC | Age: 48
End: 2018-11-30

## 2018-12-10 ENCOUNTER — TELEPHONE (OUTPATIENT)
Dept: FAMILY MEDICINE | Facility: CLINIC | Age: 48
End: 2018-12-10

## 2018-12-10 DIAGNOSIS — G43.009 MIGRAINE WITHOUT AURA AND WITHOUT STATUS MIGRAINOSUS, NOT INTRACTABLE: ICD-10-CM

## 2018-12-10 DIAGNOSIS — M54.2 NECK PAIN: ICD-10-CM

## 2018-12-10 DIAGNOSIS — M51.369 DDD (DEGENERATIVE DISC DISEASE), LUMBAR: ICD-10-CM

## 2018-12-10 RX ORDER — ZOLMITRIPTAN 5 MG/1
TABLET, FILM COATED ORAL
Qty: 9 TABLET | Refills: 2 | Status: SHIPPED | OUTPATIENT
Start: 2018-12-10 | End: 2019-01-07

## 2018-12-10 RX ORDER — OXYCODONE AND ACETAMINOPHEN 5; 325 MG/1; MG/1
1 TABLET ORAL 2 TIMES DAILY PRN
Qty: 120 TABLET | Refills: 0 | Status: SHIPPED | OUTPATIENT
Start: 2018-12-10 | End: 2019-01-07

## 2018-12-10 NOTE — TELEPHONE ENCOUNTER
Pt is calling and states that she called last week looking for medication to be filled, I  Advised I dint see a request placed and so we are asking for  This top be expedited, please advise.     Ila Mead, [facility]

## 2018-12-10 NOTE — TELEPHONE ENCOUNTER
Left msg for pt to  scripts at the [facility] pharmacy.     Ila Mead, [facility] [place]

## 2019-01-07 DIAGNOSIS — M51.369 DDD (DEGENERATIVE DISC DISEASE), LUMBAR: ICD-10-CM

## 2019-01-07 DIAGNOSIS — G43.009 MIGRAINE WITHOUT AURA AND WITHOUT STATUS MIGRAINOSUS, NOT INTRACTABLE: ICD-10-CM

## 2019-01-07 DIAGNOSIS — M54.2 NECK PAIN: ICD-10-CM

## 2019-01-07 RX ORDER — ZOLMITRIPTAN 5 MG/1
TABLET, FILM COATED ORAL
Qty: 9 TABLET | Refills: 2 | Status: SHIPPED | OUTPATIENT
Start: 2019-01-07 | End: 2019-02-07

## 2019-01-07 RX ORDER — OXYCODONE AND ACETAMINOPHEN 5; 325 MG/1; MG/1
1 TABLET ORAL 2 TIMES DAILY PRN
Qty: 120 TABLET | Refills: 0 | Status: SHIPPED | OUTPATIENT
Start: 2019-01-07 | End: 2019-02-07

## 2019-01-07 NOTE — TELEPHONE ENCOUNTER
Pt is calling and states that she is calling ahead to get her refills approved, please advise.     Ila Mead, [place]

## 2019-01-22 DIAGNOSIS — M19.072 DJD (DEGENERATIVE JOINT DISEASE), ANKLE AND FOOT, LEFT: ICD-10-CM

## 2019-01-22 DIAGNOSIS — M51.369 DDD (DEGENERATIVE DISC DISEASE), LUMBAR: ICD-10-CM

## 2019-01-22 DIAGNOSIS — M53.3 SI (SACROILIAC) JOINT DYSFUNCTION: ICD-10-CM

## 2019-01-23 RX ORDER — IBUPROFEN 800 MG/1
TABLET, FILM COATED ORAL
Qty: 270 TABLET | Refills: 0 | Status: SHIPPED | OUTPATIENT
Start: 2019-01-23 | End: 2019-04-30

## 2019-01-23 NOTE — TELEPHONE ENCOUNTER
"Requested Prescriptions   Pending Prescriptions Disp Refills     ibuprofen (ADVIL/MOTRIN) 800 MG tablet [Pharmacy Med Name: IBUPROFEN 800MG TABLETS] 270 tablet 0    Last Written Prescription Date:  10/19/18  Last Fill Quantity: 270,  # refills: 0   Last office visit: 9/10/2018 with prescribing provider:  esha   Future Office Visit:     Sig: TAKE 1 TABLET BY MOUTH THREE TIMES DAILY AS NEEDED    NSAID Medications Failed - 1/22/2019  2:16 PM       Failed - Normal CBC on file in past 12 months    No lab results found.             Passed - Blood pressure under 140/90 in past 12 months    BP Readings from Last 3 Encounters:   09/10/18 126/80   09/06/18 (!) 136/92   06/18/18 136/82                Passed - Normal ALT on file in past 12 months    Recent Labs   Lab Test 06/11/18  1021   ALT 25            Passed - Normal AST on file in past 12 months    Recent Labs   Lab Test 06/11/18  1021   AST 15            Passed - Recent (12 mo) or future (30 days) visit within the authorizing provider's specialty    Patient had office visit in the last 12 months or has a visit in the next 30 days with authorizing provider or within the authorizing provider's specialty.  See \"Patient Info\" tab in inbasket, or \"Choose Columns\" in Meds & Orders section of the refill encounter.             Passed - Patient is age 6-64 years       Passed - Medication is active on med list       Passed - No active pregnancy on record       Passed - Normal serum creatinine on file in past 12 months    Recent Labs   Lab Test 06/11/18  1021   CR 0.87            Passed - No positive pregnancy test in past 12 months          "

## 2019-02-07 DIAGNOSIS — M54.2 NECK PAIN: ICD-10-CM

## 2019-02-07 DIAGNOSIS — M51.369 DDD (DEGENERATIVE DISC DISEASE), LUMBAR: ICD-10-CM

## 2019-02-07 DIAGNOSIS — G43.009 MIGRAINE WITHOUT AURA AND WITHOUT STATUS MIGRAINOSUS, NOT INTRACTABLE: ICD-10-CM

## 2019-02-07 RX ORDER — OXYCODONE AND ACETAMINOPHEN 5; 325 MG/1; MG/1
1 TABLET ORAL 2 TIMES DAILY PRN
Qty: 120 TABLET | Refills: 0 | Status: SHIPPED | OUTPATIENT
Start: 2019-02-07 | End: 2019-03-08

## 2019-02-07 RX ORDER — ZOLMITRIPTAN 5 MG/1
TABLET, FILM COATED ORAL
Qty: 9 TABLET | Refills: 2 | Status: SHIPPED | OUTPATIENT
Start: 2019-02-07 | End: 2019-03-08

## 2019-02-07 NOTE — TELEPHONE ENCOUNTER
Patient requesting a refill on her percocet and zomig.    Trinity Altamirano, [facility]

## 2019-02-07 NOTE — TELEPHONE ENCOUNTER
Script walked over to the [facility].    Trinity Altamirano, [facility]

## 2019-03-01 ENCOUNTER — TRANSFERRED RECORDS (OUTPATIENT)
Dept: HEALTH INFORMATION MANAGEMENT | Facility: CLINIC | Age: 49
End: 2019-03-01

## 2019-03-04 ENCOUNTER — ANCILLARY PROCEDURE (OUTPATIENT)
Dept: GENERAL RADIOLOGY | Facility: CLINIC | Age: 49
End: 2019-03-04
Attending: FAMILY MEDICINE
Payer: COMMERCIAL

## 2019-03-04 ENCOUNTER — OFFICE VISIT (OUTPATIENT)
Dept: FAMILY MEDICINE | Facility: CLINIC | Age: 49
End: 2019-03-04
Payer: COMMERCIAL

## 2019-03-04 VITALS
RESPIRATION RATE: 20 BRPM | TEMPERATURE: 98.1 F | BODY MASS INDEX: 42.32 KG/M2 | HEIGHT: 65 IN | SYSTOLIC BLOOD PRESSURE: 118 MMHG | WEIGHT: 254 LBS | HEART RATE: 96 BPM | DIASTOLIC BLOOD PRESSURE: 68 MMHG

## 2019-03-04 DIAGNOSIS — M41.9 SCOLIOSIS, UNSPECIFIED SCOLIOSIS TYPE, UNSPECIFIED SPINAL REGION: ICD-10-CM

## 2019-03-04 DIAGNOSIS — M54.6 CHRONIC BILATERAL THORACIC BACK PAIN: ICD-10-CM

## 2019-03-04 DIAGNOSIS — J01.00 ACUTE MAXILLARY SINUSITIS, RECURRENCE NOT SPECIFIED: Primary | ICD-10-CM

## 2019-03-04 DIAGNOSIS — G89.29 CHRONIC BILATERAL THORACIC BACK PAIN: ICD-10-CM

## 2019-03-04 PROCEDURE — 72100 X-RAY EXAM L-S SPINE 2/3 VWS: CPT | Mod: FY

## 2019-03-04 PROCEDURE — 72072 X-RAY EXAM THORAC SPINE 3VWS: CPT | Mod: FY

## 2019-03-04 PROCEDURE — 99214 OFFICE O/P EST MOD 30 MIN: CPT | Performed by: FAMILY MEDICINE

## 2019-03-04 RX ORDER — AZITHROMYCIN 250 MG/1
TABLET, FILM COATED ORAL
Qty: 6 TABLET | Refills: 0 | Status: SHIPPED | OUTPATIENT
Start: 2019-03-04 | End: 2019-04-12

## 2019-03-04 ASSESSMENT — MIFFLIN-ST. JEOR: SCORE: 1778.02

## 2019-03-04 NOTE — PROGRESS NOTES
SUBJECTIVE:   Arielle Pham is a 49 year old female who presents to clinic today for the following health issues:    ENT Symptoms             Symptoms: cc Present Absent Comment   Fever/Chills   x    Fatigue  x     Muscle Aches   x    Eye Irritation  x  Right eye watering    Sneezing  x     Nasal Celestino/Drg  x  Congestion    Sinus Pressure/Pain x x     Loss of smell  x     Dental pain   x    Sore Throat  x     Swollen Glands   x    Ear Pain/Fullness  x  Plugged    Cough   x    Wheeze   x    Chest Pain   x    Shortness of breath   x    Rash   x    Other   x      Symptom duration:  2 weeks    Symptom severity:  moderate   Treatments tried:  sudafed, steam   Contacts:  daughter     Was treated in Nov with Augmentin.  Feels like this makes her sick to her stomach and makes her headaches worse.   Treated with levaquin in December.  Feels Z pack has always worked better for her.    Now with maxillary sinus pain and nasal congestion.  Blowing yellow nasal discharge.  Symptoms not improving, consistent.     Has used steam but never sinus irrigation.  Using flonase as well.      * discuss bone density test, recommended by neurologist    Has been having more upper back pain and the provider who has been doing her occipital injections was worried about a hump forming and possibly some osteoporosis.   Has a visit upcoming with another provider from [facility]     Problem list and histories reviewed & adjusted, as indicated.  Additional history: as documented    Reviewed and updated as needed this visit by clinical staff  Tobacco  Allergies  Meds  Med Hx  Surg Hx  Fam Hx  Soc Hx      Reviewed and updated as needed this visit by Provider  Tobacco  Meds  Med Hx  Surg Hx  Fam Hx  Soc Hx        ROS: Remainder of Constitutional, CV, Respiratory, GI,  negative with exception of that mentioned above    PE:  VS as above   Gen:  WN/WD/WH female in NAD   HEENT:  NC/AT, conjunctiva wnl, TM's wnl hugo pearly gray with good  light reflex, oropharynx clear without  Exudate/erythema, sinus tenderness to palpation present   Neck:  supple, no LAD appreciated   Heart:  RRR without murmur, nl S1, S2, no rubs or gallops   Lungs CTA hugo without rales/ronchi/wheezes   MSK:  Back with thoracic curve noted convex L with paraspinal humping present.  Mild thoracic kyphosis present    X-ray thoracic and lumbar spine:  No compression fractures per my visualization    A/p:      ICD-10-CM    1. Acute maxillary sinusitis, recurrence not specified J01.00 azithromycin (ZITHROMAX) 250 MG tablet   2. Scoliosis, unspecified scoliosis type, unspecified spinal region M41.9 XR Thoracic Spine 3 Views     XR Lumbar Spine 2/3 Views     PHYSICAL THERAPY REFERRAL   3. Chronic bilateral thoracic back pain M54.6 PHYSICAL THERAPY REFERRAL    G89.29      Patient Instructions   For the sinus infection:  We'll treat with the azithromycin as we discussed.  I would also recommend trying a sinus irrigation like the Netti pot.  You can pick these up at any pharmacy      For the concern regarding osteoporosis:  I do not see any compression fractures on your x-ray.  The radiologist will look at these as well.  I do see a curve which explains the left sided humping you have.  I will let you know the formal x-ray readings when available.    Looks like you are coming due for your pap smear as well.  Please schedule a physical when you are able    Reviewed with pt pathophysiology of osteoporosis causing kyphosis.  No sign of compression fractures.  Humping related to spinal curve/scoliosis.  Reviewed PT to address upper back pain.  Pt prefers [facility] in [place].

## 2019-03-04 NOTE — PATIENT INSTRUCTIONS
For the sinus infection:  We'll treat with the azithromycin as we discussed.  I would also recommend trying a sinus irrigation like the Netti pot.  You can pick these up at any pharmacy      For the concern regarding osteoporosis:  I do not see any compression fractures on your x-ray.  The radiologist will look at these as well.  I do see a curve which explains the left sided humping you have.  I will let you know the formal x-ray readings when available.    Looks like you are coming due for your pap smear as well.  Please schedule a physical when you are able

## 2019-03-04 NOTE — LETTER
[facility]  03/04/19    Patient: Arielle Pham  YOB: 1970  Medical Record Number: [id number]                                                                  Opioid / Opioid Plus Controlled Substance Agreement    I understand that my care provider has prescribed an opioid (narcotic) controlled substance to help manage my condition(s). I am taking this medicine to help me function or work. I know this is strong medicine, and that it can cause serious side effects. Opioid medicine can be sedating, addicting and may cause a dependency on the drug. They can affect my ability to drive or think, and cause depression. They need to be taken exactly as prescribed. Combining opioids with certain medicines or chemicals (such as cocaine, sedatives and tranquilizers, sleeping pills, meth) can be dangerous or even fatal. Also, if I stop opioids suddenly, I may have severe withdrawal symptoms. Last, I understand that opioids do not work for all types of pain nor for all patients. If not helpful, I may be asked to stop them.      The risks, benefits, and side effects of these medicine(s) were explained to me. I agree that:    1. I will take part in other treatments as advised by my care team. This may be psychiatry or counseling, physical therapy, behavioral therapy, group treatment or a referral to a pain clinic. I will reduce or stop my medicine when my care team tells me to do so.  2. I will take my medicines as prescribed. I will not change the dose or schedule unless my care team tells me to. There will be no refills if I  run out early.   I may be contactedwithout warning and asked to complete a urine drug test or pill count at any time.   3. I will keep all my appointments, and understand this is part of the monitoring of opioids. My care team may require an office visit for EVERY opioid/controlled substance refill. If I miss appointments or don t follow instructions, my care team may stop my  medicine.  4. I will not ask other providers to prescribe controlled substances, and I will not accept controlled substances from other people. If I need another prescribed controlled substance for a new reason, I will tell my care team within 1 business day.  5. I will use one pharmacy to fill all of my controlled substance prescriptions, and it is up to me to make sure that I do not run out of my medicines on weekends or holidays. If my care team is willing to refill my opioid prescription without a visit, I must request refills only during office hours, refills may take up to 3 days to process, and it may take up to 5 to 7 days for my medicine to be mailed and ready at my pharmacy. Prescriptions will not be mailed anywhere except my pharmacy.        [id number]  Rev 12/18         Registration to scan to EHR                             Page 1 of 2               Controlled Substance Agreement Opioid        [facility]  03/04/19  Patient: Arielle Pham  YOB: 1970  Medical Record Number: [id number]                                                                  6. I am responsible for my prescriptions. If the medicine/prescription is lost or stolen, it will not be replaced. I also agree not to share controlled substance medicines with anyone.  7. I agree to not use ANY illegal or recreational drugs. This includes marijuana, cocaine, bath salts or other drugs. I agree not to use alcohol unless my care team says I may.          I agree to give urine samples whenever asked. If I don t give a urine sample, the care team may stop my medicine.    8. If I enroll in the [place] Medical Marijuana program, I will tell my care team. I will also sign an agreement to share my medical records with my care team.   9. I will bring in my list of medicines (or my medicine bottles) each time I come to the clinic.   10. I will tell my care team right away if I become pregnant or have a new medical problem  treated outside of my regular clinic.  11. I understand that this medicine can affect my thinking and judgment. It may be unsafe for me to drive, use machinery and do dangerous tasks. I will not do any of these things until I know how the medicine affects me. If my dose changes, I will wait to see how it affects me. I will contact my care team if I have concerns about medicine side effects.    I understand that if I do not follow any of the conditions above, my prescriptions or treatment may be stopped.      I agree that my provider, clinic care team, and pharmacy may work with any city, state or federal law enforcement agency that investigates the misuse, sale, or other diversion of my controlled medicine. I will allow my provider to discuss my care with or share a copy of this agreement with any other treating provider, pharmacy or emergency room where I receive care. I agree to give up (waive) any right of privacy or confidentiality with respect to these consents.     I have read this agreement and have asked questions about anything I did not understand.      ________________________________________________________________________  Patient signature - Date/Time -  Arielle Pham                                      ________________________________________________________________________  Witness signature                                                            ________________________________________________________________________  Provider signature - Huong Stahl DO      [id number]  Rev 12/18         Registration to scan to EHR                         Page 2 of 2                   Controlled Substance Agreement Opioid           Page 1 of 2  Opioid Pain Medicines (also known as Narcotics)  What You Need to Know    What are opioids?   Opioids are pain medicines that must be prescribed by a doctor.  They are also known as narcotics.    Examples are:     morphine (MS Contin, Lucia)    oxycodone  (Oxycontin)    oxycodone and acetaminophen (Percocet)    hydrocodone and acetaminophen (Vicodin, Norco)     fentanyl patch (Duragesic)     hydromorphone (Dilaudid)     methadone     What do opioids do well?   Opioids are best for short-term pain after a surgery or injury. They also work well for cancer pain. Unlike other pain medicines, they do not cause liver or kidney failure or ulcers. They may help some people with long-lasting (chronic) pain.     What do opioids NOT do well?   Opioids never get rid of pain entirely, and they do not work well for most patients with chronic pain. Opioids do not reduce swelling, one of the causes of pain. They also don t work well for nerve pain.                           For informational purposes only.  Not to replace the advice of your care provider.  Copyright [address] [facility]. All right reserved. [employer] 422114-Yes 02/18.      Page 2 of 2    Risks and side effects   Talk to your doctor before you start or decide to keep taking one of these medicines. Side effects include:    Lowering your breathing rate enough to cause death    Overdose, including death, especially if taking higher than prescribed doses    Long-term opioid use    Worse depression symptoms; less pleasure in things you usually enjoy    Feeling tired or sluggish    Slower thoughts or cloudy thinking    Being more sensitive to pain over time; pain is harder to control    Trouble sleeping or restless sleep    Changes in hormone levels (for example, less testosterone)    Changes in sex drive or ability to have sex    Constipation    Unsafe driving    Itching and sweating    Feeling dizzy    Nausea, vomiting and dry mouth    What else should I know about opioids?  When someone takes opioids for too long or too often, they become dependent. This means that if you stop or reduce the medicine too quickly, you will have withdrawal symptoms.    Dependence is not the same as addiction. Addiction is when  people keep using a substance that harms their body, their mind or their relations with others. If you have a history of drug or alcohol abuse, taking opioids can cause a relapse.    Over time, opioids don t work as well. Most people will need higher and higher doses. The higher the dose, the more serious the side effects. We don t know the long-term effects of opioids.      Prescribed opioids aren't the best way to manage chronic pain    Other ways to manage pain include:      Ibuprofen or acetaminophen.  You should always try this first.      Treat health problems that may be causing pain.      acupuncture or massage, deep breathing, meditation, visual imagery, aromatherapy.      Use heat or ice at the pain site      Physical therapy and exercise      Stop smoking      See a counselor or therapist                                                  People who have used opioids for a long time may have a lower quality of life, worse depression, higher levels of pain and more visits to doctors.    Never share your opioids with others. Be sure to store opioids in a secure place, locked if possible.Young children can easily swallow them and overdose.     You can overdose on opioids.  Signs of overdose include decrease or loss of consciousness, slowed breathing, trouble waking and blue lips.  If someone is worried about overdose, they should call 911.    If you are at risk for overdose, you may get naloxone (Narcan, a medicine that reverses the effects of opioids.  If you overdose, a friend or family member can give you Narcan while waiting for the ambulance.  They need to know the signs of overdose and how to give Narcan.    While you're taking opioids:    Don't use alcohol or street drugs. Taking them together can cause death.    Don't take any of these medicines unless your doctor says its okay.  Taking these with opioids can cause death.    Benzodiazepines (such as lorazepam         or diazepam)    Muscle relaxers  (such as cyclobenzaprine)    sleeping pills    other opioids    Safe disposal of opioids  Find your area drug take-back program, your pharmacy mail-back program, buy a special disposal bag (such as Deterra) from your pharmacy or flush them down the toilet.  Use the guidelines at:  www.fda.gov/drugs/resourcesforyou

## 2019-03-04 NOTE — NURSING NOTE
"Initial /68   Pulse 96   Temp 98.1  F (36.7  C) (Tympanic)   Resp 20   Ht 1.651 m (5' 5\")   Wt 115.2 kg (254 lb)   Breastfeeding? No   BMI 42.27 kg/m   Estimated body mass index is 42.27 kg/m  as calculated from the following:    Height as of this encounter: 1.651 m (5' 5\").    Weight as of this encounter: 115.2 kg (254 lb). .      "

## 2019-03-05 ENCOUNTER — TELEPHONE (OUTPATIENT)
Dept: FAMILY MEDICINE | Facility: CLINIC | Age: 49
End: 2019-03-05

## 2019-03-05 NOTE — TELEPHONE ENCOUNTER
Clarified order for pharmacy and faxed clarification and refills.  Lisinopril 5mg po daily. Order has not been changed since last refill 11/13/18.  Dayami Cadena RN

## 2019-03-05 NOTE — TELEPHONE ENCOUNTER
Received a fax from [facility] Re: Lisinopril 10mg    Medication, 10mg, is not listed in the current or historical med list.

## 2019-03-08 DIAGNOSIS — M54.2 NECK PAIN: ICD-10-CM

## 2019-03-08 DIAGNOSIS — G43.009 MIGRAINE WITHOUT AURA AND WITHOUT STATUS MIGRAINOSUS, NOT INTRACTABLE: ICD-10-CM

## 2019-03-08 DIAGNOSIS — M51.369 DDD (DEGENERATIVE DISC DISEASE), LUMBAR: ICD-10-CM

## 2019-03-08 RX ORDER — ZOLMITRIPTAN 5 MG/1
TABLET, FILM COATED ORAL
Qty: 9 TABLET | Refills: 0 | Status: SHIPPED | OUTPATIENT
Start: 2019-03-08 | End: 2019-04-11

## 2019-03-08 RX ORDER — OXYCODONE AND ACETAMINOPHEN 5; 325 MG/1; MG/1
1 TABLET ORAL 2 TIMES DAILY PRN
Qty: 120 TABLET | Refills: 0 | Status: SHIPPED | OUTPATIENT
Start: 2019-03-08 | End: 2019-04-11

## 2019-03-08 NOTE — TELEPHONE ENCOUNTER
Patient would like her meds filled today if possible.  She says she called the pharmacy the other day and has not heard anything.    Trinity Altamirano [facility]

## 2019-03-12 ENCOUNTER — TELEPHONE (OUTPATIENT)
Dept: FAMILY MEDICINE | Facility: CLINIC | Age: 49
End: 2019-03-12

## 2019-03-12 DIAGNOSIS — M54.50 CHRONIC LOW BACK PAIN WITHOUT SCIATICA, UNSPECIFIED BACK PAIN LATERALITY: Primary | ICD-10-CM

## 2019-03-12 DIAGNOSIS — G89.29 CHRONIC LOW BACK PAIN WITHOUT SCIATICA, UNSPECIFIED BACK PAIN LATERALITY: Primary | ICD-10-CM

## 2019-03-12 NOTE — TELEPHONE ENCOUNTER
"Pt called this morning asking for us to send her physical therapy referral to [facility] in [place].  I did as requested and sent fax, I received a note back from them asking for \"clarification if this is a order for Just physical therapy or a functional capacity evaluation?. That is listed in the process instructions  And we need to know what is this order for?\"      Please place new order and will need  to fax to [facility] # [phone]  "

## 2019-03-12 NOTE — TELEPHONE ENCOUNTER
New Referral Placed.  Please print and fax accordingly.  It is strictly for physical therapy only.     1. Chronic low back pain without sciatica, unspecified back pain laterality  - PHYSICAL THERAPY REFERRAL; Future

## 2019-03-29 ENCOUNTER — TELEPHONE (OUTPATIENT)
Dept: FAMILY MEDICINE | Facility: CLINIC | Age: 49
End: 2019-03-29

## 2019-03-29 NOTE — TELEPHONE ENCOUNTER
"I spoke with Arielle. She said she has a lot of issues with her back and degenerative bone issues. 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  Patient states, \"Like if a child would accidentally press on my chest it would send me to the roof from the pain.\"    Patient has been having worsening reproducible chest discomfort. States, \"I worry sometimes that it might be something else but when I press on the middle of my chest or off to the side I can feel the pain\"    Patient denies nausea, shortness of breath, flushing or dizziness. She would just like to talk about this with Dr Stahl to see if something can be done about it.    I recommended that if the pain worsened or she had any associated cardiac symptoms mentioned that she call 911. I explained that the ER was the safest place to get a work up and could get results right away for her. The patient agreed and said she would like an appointment next week as long as she is stable. Scheduled 40 minutes on Friday per her request. Estela Rust RN    "

## 2019-03-29 NOTE — TELEPHONE ENCOUNTER
Pt has questions about results of xray from 3/4. I read her Dr. Stahl's note and she still had questions about scoliosis.     Geraldine Mata, [place]

## 2019-04-09 DIAGNOSIS — G43.009 MIGRAINE WITHOUT AURA AND WITHOUT STATUS MIGRAINOSUS, NOT INTRACTABLE: ICD-10-CM

## 2019-04-09 DIAGNOSIS — M51.369 DDD (DEGENERATIVE DISC DISEASE), LUMBAR: ICD-10-CM

## 2019-04-09 DIAGNOSIS — M54.2 NECK PAIN: ICD-10-CM

## 2019-04-10 DIAGNOSIS — G43.009 MIGRAINE WITHOUT AURA AND WITHOUT STATUS MIGRAINOSUS, NOT INTRACTABLE: ICD-10-CM

## 2019-04-10 NOTE — TELEPHONE ENCOUNTER
"Requested Prescriptions   Pending Prescriptions Disp Refills     ZOLMitriptan (ZOMIG) 5 MG tablet  Last Written Prescription Date:  3/8/19  Last Fill Quantity: 9,  # refills: 0   Last office visit: 3/4/2019 with prescribing provider:  esha   Future Office Visit:   Next 5 appointments (look out 90 days)    Apr 12, 2019  9:00 AM CDT  SHORT with Huong Stahl DO  [facility] ([facility] [address] [address]  [phone]          9 tablet 0     Sig: TAKE 1 TABLET BY MOUTH AT ONSET OF HEADACHE       Serotonin Agonists Failed - 4/9/2019  9:23 AM        Failed - Serotonin Agonist request needs review.     Please review patient's record. If patient has had 8 or more treatments in the past month, please forward to provider.          Passed - Blood pressure under 140/90 in past 12 months     BP Readings from Last 3 Encounters:   03/04/19 118/68   09/10/18 126/80   09/06/18 (!) 136/92                 Passed - Recent (12 mo) or future (30 days) visit within the authorizing provider's specialty     Patient had office visit in the last 12 months or has a visit in the next 30 days with authorizing provider or within the authorizing provider's specialty.  See \"Patient Info\" tab in inbasket, or \"Choose Columns\" in Meds & Orders section of the refill encounter.              Passed - Medication is active on med list        Passed - Patient is age 18 or older        Passed - No active pregnancy on record        Passed - No positive pregnancy test in past 12 months        oxyCODONE-acetaminophen (PERCOCET) 5-325 MG tablet  Last Written Prescription Date:  3/8/19  Last Fill Quantity: 120,  # refills: 0   Last office visit: 3/4/2019 with prescribing provider:  ehsa   Future Office Visit:   Next 5 appointments (look out 90 days)    Apr 12, 2019  9:00 AM CDT  SHORT with Huong Stahl DO  [facility] ([facility]) [address] " [place] [address]  [phone]          120 tablet 0     Sig: Take 1 tablet by mouth 2 times daily as needed for moderate to severe pain Maximum of 4 tablets per day       There is no refill protocol information for this order

## 2019-04-10 NOTE — TELEPHONE ENCOUNTER
"Requested Prescriptions   Pending Prescriptions Disp Refills     ZOLMitriptan (ZOMIG) 5 MG tablet [Pharmacy Med Name: ZOLMITRIPTAN 5MG TABS]  Last Written Prescription Date:  3/8/19  Last Fill Quantity: 9,  # refills: 0   Last office visit: 3/4/2019 with prescribing provider:  esha   Future Office Visit:   Next 5 appointments (look out 90 days)    Apr 12, 2019  9:00 AM CDT  SHORT with Huong Stahl,   [facility] ([facility] [address] [address]  [phone]          9 tablet 0     Sig: TAKE ONE TABLET BY MOUTH AT ONSET OF HEADACHE       Serotonin Agonists Failed - 4/10/2019 12:19 PM        Failed - Serotonin Agonist request needs review.     Please review patient's record. If patient has had 8 or more treatments in the past month, please forward to provider.          Passed - Blood pressure under 140/90 in past 12 months     BP Readings from Last 3 Encounters:   03/04/19 118/68   09/10/18 126/80   09/06/18 (!) 136/92                 Passed - Recent (12 mo) or future (30 days) visit within the authorizing provider's specialty     Patient had office visit in the last 12 months or has a visit in the next 30 days with authorizing provider or within the authorizing provider's specialty.  See \"Patient Info\" tab in inbasket, or \"Choose Columns\" in Meds & Orders section of the refill encounter.              Passed - Medication is active on med list        Passed - Patient is age 18 or older        Passed - No active pregnancy on record        Passed - No positive pregnancy test in past 12 months          "

## 2019-04-10 NOTE — TELEPHONE ENCOUNTER
Routing refill request to provider for review/approval because:  Probaby using more than recommended. Last dispensed #9 on 3/8/19. Patient is coming in Friday for a differnet concern. Estela Rust RN

## 2019-04-11 DIAGNOSIS — M51.369 DDD (DEGENERATIVE DISC DISEASE), LUMBAR: ICD-10-CM

## 2019-04-11 DIAGNOSIS — M54.2 NECK PAIN: ICD-10-CM

## 2019-04-11 RX ORDER — ZOLMITRIPTAN 5 MG/1
TABLET, FILM COATED ORAL
Qty: 9 TABLET | Refills: 0 | Status: SHIPPED | OUTPATIENT
Start: 2019-04-11 | End: 2019-05-07

## 2019-04-11 RX ORDER — ZOLMITRIPTAN 5 MG/1
TABLET, FILM COATED ORAL
Qty: 9 TABLET | Refills: 0 | OUTPATIENT
Start: 2019-04-11

## 2019-04-11 RX ORDER — OXYCODONE AND ACETAMINOPHEN 5; 325 MG/1; MG/1
1 TABLET ORAL 2 TIMES DAILY PRN
Qty: 120 TABLET | Refills: 0 | Status: SHIPPED | OUTPATIENT
Start: 2019-04-11 | End: 2019-05-07

## 2019-04-11 NOTE — TELEPHONE ENCOUNTER
Patient would like her meds filled today if possible please.    Trinity Altamirano [facility]

## 2019-04-11 NOTE — TELEPHONE ENCOUNTER
Percocet 5/325mg      Last Written Prescription Date:  04/11/2019 #120 x 0  Last filled - not provided  Last Office Visit: 03/04/2019 VISHNU Stahl    Future Office visit:    Next 5 appointments (look out 90 days)    Apr 12, 2019  9:00 AM CDT  SHORT with Huong Stahl DO  [facility] ([facility]) [address] [id number] [phone]         Routing refill request to provider for review/approval because:  Drug not on the [facility], [facility] or  Health refill protocol or controlled substance

## 2019-04-11 NOTE — TELEPHONE ENCOUNTER
Last filled 03-   Last qty 120  Last office visit 03-      Livia Cardoso [facility]  Certified Pharmacy Technician

## 2019-04-12 ENCOUNTER — OFFICE VISIT (OUTPATIENT)
Dept: FAMILY MEDICINE | Facility: CLINIC | Age: 49
End: 2019-04-12
Payer: COMMERCIAL

## 2019-04-12 VITALS
RESPIRATION RATE: 16 BRPM | WEIGHT: 255 LBS | SYSTOLIC BLOOD PRESSURE: 130 MMHG | BODY MASS INDEX: 42.49 KG/M2 | HEIGHT: 65 IN | TEMPERATURE: 97.4 F | HEART RATE: 84 BPM | DIASTOLIC BLOOD PRESSURE: 86 MMHG

## 2019-04-12 DIAGNOSIS — R51.9 NONINTRACTABLE EPISODIC HEADACHE, UNSPECIFIED HEADACHE TYPE: ICD-10-CM

## 2019-04-12 DIAGNOSIS — G43.009 MIGRAINE WITHOUT AURA AND WITHOUT STATUS MIGRAINOSUS, NOT INTRACTABLE: ICD-10-CM

## 2019-04-12 DIAGNOSIS — G47.62 NOCTURNAL LEG CRAMPS: Primary | ICD-10-CM

## 2019-04-12 LAB
ANION GAP SERPL CALCULATED.3IONS-SCNC: 6 MMOL/L (ref 3–14)
BUN SERPL-MCNC: 22 MG/DL (ref 7–30)
CALCIUM SERPL-MCNC: 9.2 MG/DL (ref 8.5–10.1)
CHLORIDE SERPL-SCNC: 106 MMOL/L (ref 94–109)
CO2 SERPL-SCNC: 26 MMOL/L (ref 20–32)
CREAT SERPL-MCNC: 0.72 MG/DL (ref 0.52–1.04)
GFR SERPL CREATININE-BSD FRML MDRD: >90 ML/MIN/{1.73_M2}
GLUCOSE SERPL-MCNC: 85 MG/DL (ref 70–99)
MAGNESIUM SERPL-MCNC: 2.3 MG/DL (ref 1.6–2.3)
PHOSPHATE SERPL-MCNC: 2.5 MG/DL (ref 2.5–4.5)
POTASSIUM SERPL-SCNC: 4.5 MMOL/L (ref 3.4–5.3)
SODIUM SERPL-SCNC: 138 MMOL/L (ref 133–144)

## 2019-04-12 PROCEDURE — 99213 OFFICE O/P EST LOW 20 MIN: CPT | Performed by: FAMILY MEDICINE

## 2019-04-12 PROCEDURE — 80048 BASIC METABOLIC PNL TOTAL CA: CPT | Performed by: FAMILY MEDICINE

## 2019-04-12 PROCEDURE — 36415 COLL VENOUS BLD VENIPUNCTURE: CPT | Performed by: FAMILY MEDICINE

## 2019-04-12 PROCEDURE — 84100 ASSAY OF PHOSPHORUS: CPT | Performed by: FAMILY MEDICINE

## 2019-04-12 PROCEDURE — 83735 ASSAY OF MAGNESIUM: CPT | Performed by: FAMILY MEDICINE

## 2019-04-12 RX ORDER — OXYCODONE AND ACETAMINOPHEN 5; 325 MG/1; MG/1
1 TABLET ORAL 2 TIMES DAILY PRN
Qty: 120 TABLET | Refills: 0 | OUTPATIENT
Start: 2019-04-12

## 2019-04-12 ASSESSMENT — MIFFLIN-ST. JEOR: SCORE: 1782.55
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
## 2019-04-12 NOTE — PATIENT INSTRUCTIONS
Glad things are better on the magnesium.  Okay to continue this.  I will lt you know your lab results when available.    Looks like you are due for a pap smear.  Let's plan a physical in June for pap smear and fasting blood work.  You will need to be fasting for 12 hours (nothing but water) prior to your blood work.

## 2019-04-12 NOTE — NURSING NOTE
"Initial /86   Pulse 84   Temp 97.4  F (36.3  C) (Tympanic)   Resp 16   Ht 1.651 m (5' 5\")   Wt 115.7 kg (255 lb)   Breastfeeding? No   BMI 42.43 kg/m   Estimated body mass index is 42.43 kg/m  as calculated from the following:    Height as of this encounter: 1.651 m (5' 5\").    Weight as of this encounter: 115.7 kg (255 lb). .      "

## 2019-04-30 DIAGNOSIS — M53.3 SI (SACROILIAC) JOINT DYSFUNCTION: ICD-10-CM

## 2019-04-30 DIAGNOSIS — M51.369 DDD (DEGENERATIVE DISC DISEASE), LUMBAR: ICD-10-CM

## 2019-04-30 DIAGNOSIS — M19.072 DJD (DEGENERATIVE JOINT DISEASE), ANKLE AND FOOT, LEFT: ICD-10-CM

## 2019-04-30 NOTE — TELEPHONE ENCOUNTER
ibuprofen (ADVIL/MOTRIN) 800 MG tablet [Pharmacy Med Name: IBUPROFEN 800MG TABLETS]  Routing refill request to provider for review/approval because:  NSAID Medications Failed   Normal CBC on file in past 12 months   CBC w/platelet diff was ordered 10/19/18 status is showing future    Trinity Patel, RN

## 2019-04-30 NOTE — TELEPHONE ENCOUNTER
"Requested Prescriptions   Pending Prescriptions Disp Refills     ibuprofen (ADVIL/MOTRIN) 800 MG tablet [Pharmacy Med Name: IBUPROFEN 800MG TABLETS]  Last Written Prescription Date:  01/23/2019 #270 x 0  Last filled 04/01/2019  Last office visit: 11/12/2019 VISHNU Stahl   Future Office Visit:  None   270 tablet 0     Sig: TAKE 1 TABLET BY MOUTH THREE TIMES DAILY AS NEEDED       NSAID Medications Failed - 4/30/2019  1:25 PM        Failed - Normal CBC on file in past 12 months     No lab results found.              Passed - Blood pressure under 140/90 in past 12 months     BP Readings from Last 3 Encounters:   04/12/19 130/86   03/04/19 118/68   09/10/18 126/80                 Passed - Normal ALT on file in past 12 months     Recent Labs   Lab Test 06/11/18  1021   ALT 25             Passed - Normal AST on file in past 12 months     Recent Labs   Lab Test 06/11/18  1021   AST 15             Passed - Recent (12 mo) or future (30 days) visit within the authorizing provider's specialty     Patient had office visit in the last 12 months or has a visit in the next 30 days with authorizing provider or within the authorizing provider's specialty.  See \"Patient Info\" tab in inbasket, or \"Choose Columns\" in Meds & Orders section of the refill encounter.              Passed - Patient is age 6-64 years        Passed - Medication is active on med list        Passed - No active pregnancy on record        Passed - Normal serum creatinine on file in past 12 months     Recent Labs   Lab Test 04/12/19  1001   CR 0.72             Passed - No positive pregnancy test in past 12 months          "

## 2019-05-02 RX ORDER — IBUPROFEN 800 MG/1
TABLET, FILM COATED ORAL
Qty: 270 TABLET | Refills: 0 | Status: SHIPPED | OUTPATIENT
Start: 2019-05-02 | End: 2019-08-07

## 2019-05-06 DIAGNOSIS — M54.2 NECK PAIN: ICD-10-CM

## 2019-05-06 DIAGNOSIS — G43.009 MIGRAINE WITHOUT AURA AND WITHOUT STATUS MIGRAINOSUS, NOT INTRACTABLE: ICD-10-CM

## 2019-05-06 DIAGNOSIS — M51.369 DDD (DEGENERATIVE DISC DISEASE), LUMBAR: ICD-10-CM

## 2019-05-06 NOTE — TELEPHONE ENCOUNTER
Pt is calling and asking for a refill on her medications, she wants them at the [facility] pharmacy.     Ila Mead, [place]

## 2019-05-07 RX ORDER — OXYCODONE AND ACETAMINOPHEN 5; 325 MG/1; MG/1
1 TABLET ORAL 2 TIMES DAILY PRN
Qty: 120 TABLET | Refills: 0 | Status: SHIPPED | OUTPATIENT
Start: 2019-05-07 | End: 2019-06-06

## 2019-05-07 RX ORDER — ZOLMITRIPTAN 5 MG/1
TABLET, FILM COATED ORAL
Qty: 9 TABLET | Refills: 0 | Status: SHIPPED | OUTPATIENT
Start: 2019-05-07 | End: 2019-06-06

## 2019-06-03 ENCOUNTER — TRANSFERRED RECORDS (OUTPATIENT)
Dept: HEALTH INFORMATION MANAGEMENT | Facility: CLINIC | Age: 49
End: 2019-06-03

## 2019-06-06 DIAGNOSIS — M54.2 NECK PAIN: ICD-10-CM

## 2019-06-06 DIAGNOSIS — G43.009 MIGRAINE WITHOUT AURA AND WITHOUT STATUS MIGRAINOSUS, NOT INTRACTABLE: ICD-10-CM

## 2019-06-06 DIAGNOSIS — M51.369 DDD (DEGENERATIVE DISC DISEASE), LUMBAR: ICD-10-CM

## 2019-06-06 RX ORDER — OXYCODONE AND ACETAMINOPHEN 5; 325 MG/1; MG/1
1 TABLET ORAL 2 TIMES DAILY PRN
Qty: 60 TABLET | Refills: 0 | Status: SHIPPED | OUTPATIENT
Start: 2019-06-06 | End: 2019-06-26

## 2019-06-06 RX ORDER — ZOLMITRIPTAN 5 MG/1
TABLET, FILM COATED ORAL
Qty: 9 TABLET | Refills: 0 | Status: SHIPPED | OUTPATIENT
Start: 2019-06-06 | End: 2019-07-29

## 2019-06-06 NOTE — TELEPHONE ENCOUNTER
Patient requesting a refill on percocet and Zolmitriptan.    Trinity Altamirano, [facility]

## 2019-06-06 NOTE — TELEPHONE ENCOUNTER
Script waked over to the [facility].    Trinity Altamirano, [place]

## 2019-06-11 ENCOUNTER — TRANSFERRED RECORDS (OUTPATIENT)
Dept: HEALTH INFORMATION MANAGEMENT | Facility: CLINIC | Age: 49
End: 2019-06-11

## 2019-06-25 ENCOUNTER — AMBULATORY - HEALTHEAST (OUTPATIENT)
Dept: CARDIOLOGY | Facility: CLINIC | Age: 49
End: 2019-06-25

## 2019-06-25 LAB
ALT SERPL-CCNC: 20 U/L (ref 0–45)
AST SERPL-CCNC: 17 U/L (ref 0–40)
CREAT SERPL-MCNC: 0.85 MG/DL (ref 0.6–1.1)
GFR SERPL CREATININE-BSD FRML MDRD: >60 ML/MIN/1.73M2
GLUCOSE SERPL-MCNC: 90 MG/DL (ref 70–125)
POTASSIUM SERPL-SCNC: 4.9 MMOL/L (ref 3.5–5)

## 2019-06-26 ENCOUNTER — TELEPHONE (OUTPATIENT)
Dept: FAMILY MEDICINE | Facility: CLINIC | Age: 49
End: 2019-06-26

## 2019-06-26 DIAGNOSIS — M54.2 NECK PAIN: ICD-10-CM

## 2019-06-26 DIAGNOSIS — M51.369 DDD (DEGENERATIVE DISC DISEASE), LUMBAR: ICD-10-CM

## 2019-06-26 RX ORDER — OXYCODONE AND ACETAMINOPHEN 5; 325 MG/1; MG/1
1 TABLET ORAL EVERY 6 HOURS PRN
Qty: 120 TABLET | Refills: 0 | Status: SHIPPED | OUTPATIENT
Start: 2019-06-26 | End: 2019-07-29

## 2019-06-26 NOTE — TELEPHONE ENCOUNTER
Patient requesting refill of percocet to  pharmacy. Pt scheduled to see Favio 7/1.    Geraldine Mata, Station

## 2019-06-26 NOTE — TELEPHONE ENCOUNTER
Not sure why it was filled or fewer tabs with las fill.  Refilled again for pt's usual quantity  Huong Stahl

## 2019-07-01 ENCOUNTER — OFFICE VISIT (OUTPATIENT)
Dept: FAMILY MEDICINE | Facility: CLINIC | Age: 49
End: 2019-07-01
Payer: COMMERCIAL

## 2019-07-01 VITALS
HEART RATE: 64 BPM | DIASTOLIC BLOOD PRESSURE: 80 MMHG | HEIGHT: 65 IN | BODY MASS INDEX: 42.88 KG/M2 | RESPIRATION RATE: 14 BRPM | WEIGHT: 257.38 LBS | TEMPERATURE: 97.8 F | SYSTOLIC BLOOD PRESSURE: 132 MMHG

## 2019-07-01 DIAGNOSIS — K21.9 GASTROESOPHAGEAL REFLUX DISEASE WITHOUT ESOPHAGITIS: ICD-10-CM

## 2019-07-01 DIAGNOSIS — G89.4 CHRONIC PAIN SYNDROME: ICD-10-CM

## 2019-07-01 DIAGNOSIS — M41.9 SCOLIOSIS OF THORACIC SPINE, UNSPECIFIED SCOLIOSIS TYPE: ICD-10-CM

## 2019-07-01 DIAGNOSIS — R07.89 CHEST WALL PAIN: Primary | ICD-10-CM

## 2019-07-01 PROCEDURE — 99214 OFFICE O/P EST MOD 30 MIN: CPT | Performed by: FAMILY MEDICINE

## 2019-07-01 ASSESSMENT — MIFFLIN-ST. JEOR: SCORE: 1793.33

## 2019-07-01 ASSESSMENT — PAIN SCALES - GENERAL: PAINLEVEL: MODERATE PAIN (5)

## 2019-07-01 NOTE — PATIENT INSTRUCTIONS
For the chest pain:  Keep the appointment with cardiology for tomorrow.  I agree that the pain is most likely related to your chest wall.  I suspect the scoliosis in the upper back is contributing to this.    For the scoliosis:  I would recommend a visit with a nonsurgical spine doctor for evaluation of this.  Since you have noticed change and worsening of the humping it is reasonable to see if they want to monitor this over time for changes.    For the reflux;  I would recommend the ranitidine 75mg twice daily for 2 weeks if you continue to have symptoms      For the headaches;  Call [facility] and see if you are able to see a nurse for instructions on injections.  Review your experience with your last 2 injections at your next visit with the doctor and see what they think

## 2019-07-01 NOTE — PROGRESS NOTES
"Subjective     Arielle Pham is a 49 year old female who presents to clinic today for the following health issues:    HPI     ED/UC Followup:    Facility:  [facility]  Date of visit: 6/24/2019 and 6/25/2019  Reason for visit: Chest pain  Current Status: She states that this past weekend she was having intermittent chest pain that seems to radiate to left shoulder, neck and into face. No tooth pain. She states that she has had more problems with reflux and she is taking TUMS prn with some improvement. Today she states that she has a \"cool feeling\" in the middle of her chest, like the feeling you get when drinking a cold glass of water. No vision changes, LOC/falls, headaches or sweating. She does note \"a lot\" of new stressors. She is being seem by cardiology tomorrow.     Initial symptoms began after getting up and walking across the room.  Began in the chest and L shoulder.  Was seen in ED and had negative EKG and trop on Monday.  Was given nitroglycerin which was not helpful. Was discharged to home and then woke in the night with hugo hand symptoms and went back for additional eval which was also negative.  Has hugo carpal tunnel and symptoms were not necessairly new but concerning given the recent chest pain.    Had an \"ultrasound\" when she went back to the ED which was quite painful with the pressure.  This reproduced and worsened pain.    Since her ED visits pain has been off and on.  Seemed a bit worse on Saturday again but now better since Sunday.  Pain is off and on, seems to be aggravated by movement, even just getting out of the chair can trigger it.  Seems better with rest.    Had eval for similar pain (not as bad per pt) in 2015 which was attributed to chest wall pain.  Chest pain has actually been going on internittently for many years but this time pain into the neck and shoulder were different/worse.  No associated SOB or diaphoresis noted.      *  Has not had reflux in a long time.  Began having " "symptoms of reflux on Friday.  Started as she was eating something.  Seemed the same on Saturday.  Very burning and uncomfortable.  Yesterday did not have any symptoms.  Used TUMS Friday and Saturday which resolved symptoms.  Did have endoscopy for this in the past.  Feels she needs to drink with her food because it feels like foods get stuck, Saturday was worse but now back to baseline    *  Last time she went in for injections (occipital nerve blocks).  Had worsened headache and pain in upper back.  Had follow up with Neurology in June and has another appointment again in August    Reviewed and updated as needed this visit by Provider  Tobacco  Med Hx  Surg Hx  Fam Hx  Soc Hx        Review of Systems   ROS COMP: Constitutional, HEENT, cardiovascular, pulmonary, gi and gu systems are negative, except as otherwise noted.      Objective    /80   Pulse 64   Temp 97.8  F (36.6  C) (Tympanic)   Resp 14   Ht 1.651 m (5' 5\")   Wt 116.7 kg (257 lb 6 oz)   BMI 42.83 kg/m    Body mass index is 42.83 kg/m .  Physical Exam   PE:  VS as above   Gen:  WN/WD/WH female in NAD   Heart:  RRR without murmur, nl S1, S2, no rubs or gallops   Lungs CTA hugo without rales/ronchi/wheezes   Chest wall:  Tender on palpation of costochondral junction just L of sternum which reproduces pt's pain.     MSk:  Scoliosis again noted, convex L    Diagnostic Test Results:  Labs reviewed in Epic.  Reviewed ED visits and past stress testing.        A/p:      ICD-10-CM    1. Chest wall pain R07.89    2. Scoliosis of thoracic spine, unspecified scoliosis type M41.9 ORTHO  REFERRAL   3. Gastroesophageal reflux disease without esophagitis K21.9    4. Chronic pain syndrome G89.4      Patient Instructions   For the chest pain:  Keep the appointment with cardiology for tomorrow.  I agree that the pain is most likely related to your chest wall.  I suspect the scoliosis in the upper back is contributing to this.    For the scoliosis:  I " would recommend a visit with a nonsurgical spine doctor for evaluation of this.  Since you have noticed change and worsening of the humping it is reasonable to see if they want to monitor this over time for changes.    For the reflux;  I would recommend the ranitidine 75mg twice daily for 2 weeks if you continue to have symptoms      For the headaches;  Call [facility] and see if you are able to see a nurse for instructions on injections.  Review your experience with your last 2 injections at your next visit with the doctor and see what they think

## 2019-07-02 ENCOUNTER — OFFICE VISIT - HEALTHEAST (OUTPATIENT)
Dept: CARDIOLOGY | Facility: CLINIC | Age: 49
End: 2019-07-02

## 2019-07-02 DIAGNOSIS — E66.01 MORBID OBESITY (H): ICD-10-CM

## 2019-07-02 DIAGNOSIS — I10 ESSENTIAL HYPERTENSION: ICD-10-CM

## 2019-07-02 DIAGNOSIS — R07.2 PRECORDIAL PAIN: ICD-10-CM

## 2019-07-02 DIAGNOSIS — R94.39 ABNORMAL CARDIOVASCULAR STRESS TEST: ICD-10-CM

## 2019-07-02 ASSESSMENT — MIFFLIN-ST. JEOR: SCORE: 1831.52

## 2019-07-11 ENCOUNTER — HOSPITAL ENCOUNTER (OUTPATIENT)
Dept: CT IMAGING | Facility: CLINIC | Age: 49
Discharge: HOME | End: 2019-07-11
Attending: INTERNAL MEDICINE

## 2019-07-11 DIAGNOSIS — R94.39 ABNORMAL CARDIOVASCULAR STRESS TEST: ICD-10-CM

## 2019-07-11 DIAGNOSIS — R07.2 PRECORDIAL PAIN: ICD-10-CM

## 2019-07-11 LAB
BSA FOR ECHO PROCEDURE: 2.37 M2
CV CALCIUM SCORE AGATSTON LM: 0
CV CALCIUM SCORING AGATSON LAD: 726
CV CALCIUM SCORING AGATSTON CX: 0
CV CALCIUM SCORING AGATSTON RCA: 26
CV CALCIUM SCORING AGATSTON TOTAL: 752
LEFT VENTRICLE HEART RATE: 70 BPM

## 2019-07-11 ASSESSMENT — MIFFLIN-ST. JEOR: SCORE: 1831.52

## 2019-07-16 ENCOUNTER — TELEPHONE (OUTPATIENT)
Dept: FAMILY MEDICINE | Facility: CLINIC | Age: 49
End: 2019-07-16

## 2019-07-16 ENCOUNTER — COMMUNICATION - HEALTHEAST (OUTPATIENT)
Dept: CARDIOLOGY | Facility: CLINIC | Age: 49
End: 2019-07-16

## 2019-07-16 DIAGNOSIS — E78.5 HYPERLIPIDEMIA: ICD-10-CM

## 2019-07-16 NOTE — TELEPHONE ENCOUNTER
Pt called and left msg that she saw a cardiologist and was asked what nystatin drugs had she previously tried, pt wants to hear back from a nurse to discuss this matter.     Ila Mead, [place]

## 2019-07-17 NOTE — TELEPHONE ENCOUNTER
The patient was wondering what statin drug she has had in the past. I do not see anything listed and she is a relatively new patient to [place].    The patient will check with her former clinic. Estela Rust RN

## 2019-07-26 DIAGNOSIS — M54.2 NECK PAIN: ICD-10-CM

## 2019-07-26 DIAGNOSIS — M51.369 DDD (DEGENERATIVE DISC DISEASE), LUMBAR: ICD-10-CM

## 2019-07-26 DIAGNOSIS — G43.009 MIGRAINE WITHOUT AURA AND WITHOUT STATUS MIGRAINOSUS, NOT INTRACTABLE: ICD-10-CM

## 2019-07-26 NOTE — TELEPHONE ENCOUNTER
Patient requesting a refill on her migraine meds.    Trinity Altamirano [facility]

## 2019-07-29 RX ORDER — ZOLMITRIPTAN 5 MG/1
TABLET, FILM COATED ORAL
Qty: 9 TABLET | Refills: 0 | Status: SHIPPED | OUTPATIENT
Start: 2019-07-29 | End: 2019-09-04

## 2019-07-29 RX ORDER — OXYCODONE AND ACETAMINOPHEN 5; 325 MG/1; MG/1
1 TABLET ORAL EVERY 6 HOURS PRN
Qty: 120 TABLET | Refills: 0 | Status: SHIPPED | OUTPATIENT
Start: 2019-07-29 | End: 2019-08-27

## 2019-07-30 ENCOUNTER — TELEPHONE (OUTPATIENT)
Dept: FAMILY MEDICINE | Facility: CLINIC | Age: 49
End: 2019-07-30

## 2019-07-30 NOTE — TELEPHONE ENCOUNTER
Received a Prior Authorization request from [facility] for Zolmitriptan 5mg    Request routed to the [employer] epa (electronic prior authorization) team        Reji KRISHNAMURTHY (ben)  [facility]

## 2019-08-07 DIAGNOSIS — M19.072 DJD (DEGENERATIVE JOINT DISEASE), ANKLE AND FOOT, LEFT: ICD-10-CM

## 2019-08-07 DIAGNOSIS — M51.369 DDD (DEGENERATIVE DISC DISEASE), LUMBAR: ICD-10-CM

## 2019-08-07 DIAGNOSIS — M53.3 SI (SACROILIAC) JOINT DYSFUNCTION: ICD-10-CM

## 2019-08-07 RX ORDER — IBUPROFEN 800 MG/1
TABLET, FILM COATED ORAL
Qty: 270 TABLET | Refills: 0 | Status: SHIPPED | OUTPATIENT
Start: 2019-08-07 | End: 2019-10-30

## 2019-08-07 NOTE — TELEPHONE ENCOUNTER
Prescription approved per [facility].  Estela Rust RN

## 2019-08-07 NOTE — TELEPHONE ENCOUNTER
"Requested Prescriptions   Pending Prescriptions Disp Refills     ibuprofen (ADVIL/MOTRIN) 800 MG tablet [Pharmacy Med Name: IBUPROFEN 800MG TABLETS]  Last Written Prescription Date:  5/2/19  Last Fill Quantity: 270,  # refills: 0   Last office visit: 7/1/2019 with prescribing provider:  esha   Future Office Visit:     270 tablet 0     Sig: TAKE 1 TABLET BY MOUTH THREE TIMES DAILY AS NEEDED       NSAID Medications Failed - 8/7/2019 11:30 AM        Failed - Normal ALT on file in past 12 months     Recent Labs   Lab Test 06/11/18  1021   ALT 25             Failed - Normal AST on file in past 12 months     Recent Labs   Lab Test 06/11/18  1021   AST 15             Failed - Normal CBC on file in past 12 months     No lab results found.              Passed - Blood pressure under 140/90 in past 12 months     BP Readings from Last 3 Encounters:   07/01/19 132/80   04/12/19 130/86   03/04/19 118/68                 Passed - Recent (12 mo) or future (30 days) visit within the authorizing provider's specialty     Patient had office visit in the last 12 months or has a visit in the next 30 days with authorizing provider or within the authorizing provider's specialty.  See \"Patient Info\" tab in inbasket, or \"Choose Columns\" in Meds & Orders section of the refill encounter.              Passed - Patient is age 6-64 years        Passed - Medication is active on med list        Passed - No active pregnancy on record        Passed - Normal serum creatinine on file in past 12 months     Recent Labs   Lab Test 04/12/19  1001   CR 0.72             Passed - No positive pregnancy test in past 12 months          "

## 2019-08-08 NOTE — TELEPHONE ENCOUNTER
Central Prior Authorization Team   Phone: [phone]    PA Initiation    Medication: Zolmitriptan  Insurance Company: [employer] [place] - Phone [phone] Fax [phone]  Pharmacy Filling the Rx: [id number] [place], [place] - [address]  [facility]  Filling Pharmacy Phone: [phone]  Filling Pharmacy Fax:    Start Date: 8/8/2019

## 2019-08-09 NOTE — TELEPHONE ENCOUNTER
Central Prior Authorization Team   Phone: [phone]    Prior Authorization Approval    Authorization Effective Date: 6/23/2019  Authorization Expiration Date: 8/9/2020  Medication: Zolmitriptan  Approved Dose/Quantity: 18 tablets per 30 days  Reference #: [id number]   Insurance Company: [employer] [place] - Phone [phone] Fax [phone]  Expected CoPay:       CoPay Card Available:      Foundation Assistance Needed:    Which Pharmacy is filling the prescription (Not needed for infusion/clinic administered): [id number] [place] [address] AT [facility]  Pharmacy Notified: Yes  Patient Notified: Yes  **Instructed pharmacy to notify patient when script is ready to /ship.**

## 2019-08-13 ENCOUNTER — OFFICE VISIT - HEALTHEAST (OUTPATIENT)
Dept: CARDIOLOGY | Facility: CLINIC | Age: 49
End: 2019-08-13

## 2019-08-13 ENCOUNTER — TRANSFERRED RECORDS (OUTPATIENT)
Dept: HEALTH INFORMATION MANAGEMENT | Facility: CLINIC | Age: 49
End: 2019-08-13

## 2019-08-13 DIAGNOSIS — E78.5 HYPERLIPIDEMIA, UNSPECIFIED HYPERLIPIDEMIA TYPE: ICD-10-CM

## 2019-08-13 DIAGNOSIS — R07.2 PRECORDIAL PAIN: ICD-10-CM

## 2019-08-13 DIAGNOSIS — E78.5 HYPERLIPIDEMIA: ICD-10-CM

## 2019-08-13 DIAGNOSIS — I25.10 ATHEROSCLEROSIS OF NATIVE CORONARY ARTERY OF NATIVE HEART WITHOUT ANGINA PECTORIS: ICD-10-CM

## 2019-08-13 DIAGNOSIS — R94.39 ABNORMAL CARDIOVASCULAR STRESS TEST: ICD-10-CM

## 2019-08-13 DIAGNOSIS — R93.1 ABNORMAL COMPUTED TOMOGRAPHY ANGIOGRAPHY OF HEART: ICD-10-CM

## 2019-08-13 DIAGNOSIS — E66.01 MORBID OBESITY (H): ICD-10-CM

## 2019-08-13 ASSESSMENT — MIFFLIN-ST. JEOR: SCORE: 1773.94

## 2019-08-23 DIAGNOSIS — M54.2 NECK PAIN: ICD-10-CM

## 2019-08-23 DIAGNOSIS — M51.369 DDD (DEGENERATIVE DISC DISEASE), LUMBAR: ICD-10-CM

## 2019-08-27 RX ORDER — OXYCODONE AND ACETAMINOPHEN 5; 325 MG/1; MG/1
1 TABLET ORAL EVERY 6 HOURS PRN
Qty: 120 TABLET | Refills: 0 | Status: SHIPPED | OUTPATIENT
Start: 2019-08-27 | End: 2019-09-24

## 2019-08-27 NOTE — TELEPHONE ENCOUNTER
Patient is calling to check the status on her Percocet.    [facility].     Thank you,  Cassi Galvan, CSS

## 2019-09-03 ENCOUNTER — TRANSFERRED RECORDS (OUTPATIENT)
Dept: HEALTH INFORMATION MANAGEMENT | Facility: CLINIC | Age: 49
End: 2019-09-03

## 2019-09-04 DIAGNOSIS — G43.009 MIGRAINE WITHOUT AURA AND WITHOUT STATUS MIGRAINOSUS, NOT INTRACTABLE: ICD-10-CM

## 2019-09-04 RX ORDER — ZOLMITRIPTAN 5 MG/1
TABLET, FILM COATED ORAL
Qty: 9 TABLET | Refills: 0 | Status: SHIPPED | OUTPATIENT
Start: 2019-09-04 | End: 2019-11-03

## 2019-09-04 NOTE — TELEPHONE ENCOUNTER
"Requested Prescriptions   Pending Prescriptions Disp Refills     ZOLMitriptan (ZOMIG) 5 MG tablet [Pharmacy Med Name: ZOLMITRIPTAN 5MG TABLETS]  Last Written Prescription Date:  7/29/19  Last Fill Quantity: 9,  # refills: 0   Last office visit: 7/1/2019 with prescribing provider:  esha   Future Office Visit:     9 tablet 0     Sig: TAKE 1 TABLET BY MOUTH AT ONSET OF HEADACHE       Serotonin Agonists Failed - 9/4/2019 12:24 PM        Failed - Serotonin Agonist request needs review.     Please review patient's record. If patient has had 8 or more treatments in the past month, please forward to provider.          Passed - Blood pressure under 140/90 in past 12 months     BP Readings from Last 3 Encounters:   07/01/19 132/80   04/12/19 130/86   03/04/19 118/68                 Passed - Recent (12 mo) or future (30 days) visit within the authorizing provider's specialty     Patient had office visit in the last 12 months or has a visit in the next 30 days with authorizing provider or within the authorizing provider's specialty.  See \"Patient Info\" tab in inbasket, or \"Choose Columns\" in Meds & Orders section of the refill encounter.              Passed - Medication is active on med list        Passed - Patient is age 18 or older        Passed - No active pregnancy on record        Passed - No positive pregnancy test in past 12 months          "

## 2019-09-04 NOTE — TELEPHONE ENCOUNTER
Prescription approved per [facility].  Elle Lea RN

## 2019-09-23 DIAGNOSIS — M51.369 DDD (DEGENERATIVE DISC DISEASE), LUMBAR: ICD-10-CM

## 2019-09-23 DIAGNOSIS — M54.2 NECK PAIN: ICD-10-CM

## 2019-09-24 RX ORDER — OXYCODONE AND ACETAMINOPHEN 5; 325 MG/1; MG/1
1 TABLET ORAL EVERY 6 HOURS PRN
Qty: 120 TABLET | Refills: 0 | Status: SHIPPED | OUTPATIENT
Start: 2019-09-24 | End: 2019-10-22

## 2019-10-21 DIAGNOSIS — M51.369 DDD (DEGENERATIVE DISC DISEASE), LUMBAR: ICD-10-CM

## 2019-10-21 DIAGNOSIS — M54.2 NECK PAIN: ICD-10-CM

## 2019-10-21 NOTE — TELEPHONE ENCOUNTER
Pt is calling and requesting a refill on her percocet, please advise.     Ila Mead, [place]

## 2019-10-22 RX ORDER — OXYCODONE AND ACETAMINOPHEN 5; 325 MG/1; MG/1
1 TABLET ORAL EVERY 6 HOURS PRN
Qty: 120 TABLET | Refills: 0 | Status: SHIPPED | OUTPATIENT
Start: 2019-10-22 | End: 2019-11-21

## 2019-10-30 DIAGNOSIS — M51.369 DDD (DEGENERATIVE DISC DISEASE), LUMBAR: ICD-10-CM

## 2019-10-30 DIAGNOSIS — M19.072 DJD (DEGENERATIVE JOINT DISEASE), ANKLE AND FOOT, LEFT: ICD-10-CM

## 2019-10-30 DIAGNOSIS — M53.3 SI (SACROILIAC) JOINT DYSFUNCTION: ICD-10-CM

## 2019-10-30 NOTE — TELEPHONE ENCOUNTER
"Requested Prescriptions   Pending Prescriptions Disp Refills     ibuprofen (ADVIL/MOTRIN) 800 MG tablet [Pharmacy Med Name: IBUPROFEN 800MG TABLETS] 270 tablet 0     Sig: TAKE 1 TABLET BY MOUTH THREE TIMES DAILY AS NEEDED       NSAID Medications Failed - 10/30/2019 12:29 PM        Failed - Normal CBC on file in past 12 months     No lab results found.              Passed - Blood pressure under 140/90 in past 12 months     BP Readings from Last 3 Encounters:   07/01/19 132/80   04/12/19 130/86   03/04/19 118/68                 Passed - Normal ALT on file in past 12 months     Recent Labs   Lab Test 06/25/19   ALT 20             Passed - Normal AST on file in past 12 months     Recent Labs   Lab Test 06/25/19   AST 17             Passed - Recent (12 mo) or future (30 days) visit within the authorizing provider's specialty     Patient has had an office visit with the authorizing provider or a provider within the authorizing providers department within the previous 12 mos or has a future within next 30 days. See \"Patient Info\" tab in inbasket, or \"Choose Columns\" in Meds & Orders section of the refill encounter.              Passed - Patient is age 6-64 years        Passed - Medication is active on med list        Passed - No active pregnancy on record        Passed - Normal serum creatinine on file in past 12 months     Recent Labs   Lab Test 06/25/19   CR 0.85             Passed - No positive pregnancy test in past 12 months        Last Written Prescription Date:  8/7/19  Last Fill Quantity: 270,  # refills: 0   Last office visit: 7/1/2019 with prescribing provider:  Huong Stahl     Future Office Visit:      "

## 2019-10-31 NOTE — TELEPHONE ENCOUNTER
Routing refill request to provider for review/approval because:  Protocol requires a cbc within a year. Future pending. Estela Rust RN

## 2019-11-01 RX ORDER — IBUPROFEN 800 MG/1
TABLET, FILM COATED ORAL
Qty: 270 TABLET | Refills: 0 | Status: SHIPPED | OUTPATIENT
Start: 2019-11-01 | End: 2020-01-30

## 2019-11-03 DIAGNOSIS — G43.009 MIGRAINE WITHOUT AURA AND WITHOUT STATUS MIGRAINOSUS, NOT INTRACTABLE: ICD-10-CM

## 2019-11-05 NOTE — TELEPHONE ENCOUNTER
"Requested Prescriptions   Pending Prescriptions Disp Refills     ZOLMitriptan (ZOMIG) 5 MG tablet [Pharmacy Med Name: ZOLMITRIPTAN 5MG TABLETS] 9 tablet 0     Sig: TAKE 1 TABLET BY MOUTH AT ONSET OF HEADACHE  Last Written Prescription Date:  09/04/2019 #9 x 0  Last filled 09/04/2019  Last office visit: 7/1/2019 VISHNU Stahl   Future Office Visit:  None         Serotonin Agonists Failed - 11/3/2019  2:51 PM        Failed - Serotonin Agonist request needs review.     Please review patient's record. If patient has had 8 or more treatments in the past month, please forward to provider.          Passed - Blood pressure under 140/90 in past 12 months     BP Readings from Last 3 Encounters:   07/01/19 132/80   04/12/19 130/86   03/04/19 118/68                 Passed - Recent (12 mo) or future (30 days) visit within the authorizing provider's specialty     Patient has had an office visit with the authorizing provider or a provider within the authorizing providers department within the previous 12 mos or has a future within next 30 days. See \"Patient Info\" tab in inbasket, or \"Choose Columns\" in Meds & Orders section of the refill encounter.              Passed - Medication is active on med list        Passed - Patient is age 18 or older        Passed - No active pregnancy on record        Passed - No positive pregnancy test in past 12 months          "

## 2019-11-06 NOTE — TELEPHONE ENCOUNTER
Routing refill request to provider for review/approval because:  Does not meet RN refill protocol.  Kat Vega RN

## 2019-11-07 RX ORDER — ZOLMITRIPTAN 5 MG/1
TABLET, FILM COATED ORAL
Qty: 9 TABLET | Refills: 0 | Status: SHIPPED | OUTPATIENT
Start: 2019-11-07 | End: 2019-12-05

## 2019-11-21 DIAGNOSIS — M54.2 NECK PAIN: ICD-10-CM

## 2019-11-21 DIAGNOSIS — M51.369 DDD (DEGENERATIVE DISC DISEASE), LUMBAR: ICD-10-CM

## 2019-11-21 RX ORDER — OXYCODONE AND ACETAMINOPHEN 5; 325 MG/1; MG/1
1 TABLET ORAL EVERY 6 HOURS PRN
Qty: 120 TABLET | Refills: 0 | Status: SHIPPED | OUTPATIENT
Start: 2019-11-21 | End: 2019-12-23

## 2019-12-03 ENCOUNTER — TRANSFERRED RECORDS (OUTPATIENT)
Dept: HEALTH INFORMATION MANAGEMENT | Facility: CLINIC | Age: 49
End: 2019-12-03

## 2019-12-05 ENCOUNTER — OFFICE VISIT (OUTPATIENT)
Dept: FAMILY MEDICINE | Facility: CLINIC | Age: 49
End: 2019-12-05
Payer: COMMERCIAL

## 2019-12-05 VITALS
TEMPERATURE: 97.7 F | HEART RATE: 79 BPM | WEIGHT: 250 LBS | RESPIRATION RATE: 14 BRPM | DIASTOLIC BLOOD PRESSURE: 60 MMHG | HEIGHT: 65 IN | SYSTOLIC BLOOD PRESSURE: 119 MMHG | BODY MASS INDEX: 41.65 KG/M2

## 2019-12-05 DIAGNOSIS — M19.072 DJD (DEGENERATIVE JOINT DISEASE), ANKLE AND FOOT, LEFT: ICD-10-CM

## 2019-12-05 DIAGNOSIS — J30.2 SEASONAL ALLERGIC RHINITIS, UNSPECIFIED TRIGGER: ICD-10-CM

## 2019-12-05 DIAGNOSIS — E78.5 HYPERLIPIDEMIA LDL GOAL <130: ICD-10-CM

## 2019-12-05 DIAGNOSIS — G43.009 MIGRAINE WITHOUT AURA AND WITHOUT STATUS MIGRAINOSUS, NOT INTRACTABLE: ICD-10-CM

## 2019-12-05 DIAGNOSIS — M67.979 TIBIALIS POSTERIOR TENDINOPATHY: ICD-10-CM

## 2019-12-05 DIAGNOSIS — Z79.891 CHRONIC PRESCRIPTION OPIATE USE: ICD-10-CM

## 2019-12-05 DIAGNOSIS — M21.42 ACQUIRED LEFT FLAT FOOT: ICD-10-CM

## 2019-12-05 DIAGNOSIS — F32.1 CURRENT MODERATE EPISODE OF MAJOR DEPRESSIVE DISORDER, UNSPECIFIED WHETHER RECURRENT (H): Primary | ICD-10-CM

## 2019-12-05 PROCEDURE — 99214 OFFICE O/P EST MOD 30 MIN: CPT | Performed by: FAMILY MEDICINE

## 2019-12-05 RX ORDER — ESCITALOPRAM OXALATE 10 MG/1
TABLET ORAL
Qty: 30 TABLET | Refills: 1 | Status: SHIPPED | OUTPATIENT
Start: 2019-12-05 | End: 2020-06-08 | Stop reason: SINTOL

## 2019-12-05 ASSESSMENT — ANXIETY QUESTIONNAIRES
5. BEING SO RESTLESS THAT IT IS HARD TO SIT STILL: NOT AT ALL
6. BECOMING EASILY ANNOYED OR IRRITABLE: MORE THAN HALF THE DAYS
GAD7 TOTAL SCORE: 11
IF YOU CHECKED OFF ANY PROBLEMS ON THIS QUESTIONNAIRE, HOW DIFFICULT HAVE THESE PROBLEMS MADE IT FOR YOU TO DO YOUR WORK, TAKE CARE OF THINGS AT HOME, OR GET ALONG WITH OTHER PEOPLE: VERY DIFFICULT
3. WORRYING TOO MUCH ABOUT DIFFERENT THINGS: MORE THAN HALF THE DAYS
7. FEELING AFRAID AS IF SOMETHING AWFUL MIGHT HAPPEN: SEVERAL DAYS
2. NOT BEING ABLE TO STOP OR CONTROL WORRYING: MORE THAN HALF THE DAYS
1. FEELING NERVOUS, ANXIOUS, OR ON EDGE: MORE THAN HALF THE DAYS

## 2019-12-05 ASSESSMENT — MIFFLIN-ST. JEOR: SCORE: 1759.87

## 2019-12-05 ASSESSMENT — PATIENT HEALTH QUESTIONNAIRE - PHQ9
5. POOR APPETITE OR OVEREATING: MORE THAN HALF THE DAYS
SUM OF ALL RESPONSES TO PHQ QUESTIONS 1-9: 11

## 2019-12-05 ASSESSMENT — PAIN SCALES - GENERAL: PAINLEVEL: NO PAIN (0)

## 2019-12-05 NOTE — LETTER
My Depression Action Plan  Name: Arielle Pham   Date of Birth 1970  Date: 12/5/2019    My doctor: Huong Stahl   My clinic: [facility]  [address] [place] [id number] [phone]          GREEN    ZONE   Good Control    What it looks like:     Things are going generally well. You have normal up s and down s. You may even feel depressed from time to time, but bad moods usually last less than a day.   What you need to do:  1. Continue to care for yourself (see self care plan)  2. Check your depression survival kit and update it as needed  3. Follow your physician s recommendations including any medication.  4. Do not stop taking medication unless you consult with your physician first.           YELLOW         ZONE Getting Worse    What it looks like:     Depression is starting to interfere with your life.     It may be hard to get out of bed; you may be starting to isolate yourself from others.    Symptoms of depression are starting to last most all day and this has happened for several days.     You may have suicidal thoughts but they are not constant.   What you need to do:     1. Call your care team, your response to treatment will improve if you keep your care team informed of your progress. Yellow periods are signs an adjustment may need to be made.     2. Continue your self-care, even if you have to fake it!    3. Talk to someone in your support network    4. Open up your depression survival kit           RED    ZONE Medical Alert - Get Help    What it looks like:     Depression is seriously interfering with your life.     You may experience these or other symptoms: You can t get out of bed most days, can t work or engage in other necessary activities, you have trouble taking care of basic hygiene, or basic responsibilities, thoughts of suicide or death that will not go away, self-injurious behavior.     What you need to do:  1. Call your care team and  request a same-day appointment. If they are not available (weekends or after hours) call your local crisis line, emergency room or 911.            Depression Self Care Plan / Survival Kit    Self-Care for Depression  Here s the deal. Your body and mind are really not as separate as most people think.  What you do and think affects how you feel and how you feel influences what you do and think. This means if you do things that people who feel good do, it will help you feel better.  Sometimes this is all it takes.  There is also a place for medication and therapy depending on how severe your depression is, so be sure to consult with your medical provider and/ or Behavioral Health Consultant if your symptoms are worsening or not improving.     In order to better manage my stress, I will:    Exercise  Get some form of exercise, every day. This will help reduce pain and release endorphins, the  feel good  chemicals in your brain. This is almost as good as taking antidepressants!  This is not the same as joining a gym and then never going! (they count on that by the way ) It can be as simple as just going for a walk or doing some gardening, anything that will get you moving.      Hygiene   Maintain good hygiene (Get out of bed in the morning, Make your bed, Brush your teeth, Take a shower, and Get dressed like you were going to work, even if you are unemployed).  If your clothes don't fit try to get ones that do.    Diet  I will strive to eat foods that are good for me, drink plenty of water, and avoid excessive sugar, caffeine, alcohol, and other mood-altering substances.  Some foods that are helpful in depression are: complex carbohydrates, B vitamins, flaxseed, fish or fish oil, fresh fruits and vegetables.    Psychotherapy  I agree to participate in Individual Therapy (if recommended).    Medication  If prescribed medications, I agree to take them.  Missing doses can result in serious side effects.  I understand that  drinking alcohol, or other illicit drug use, may cause potential side effects.  I will not stop my medication abruptly without first discussing it with my provider.    Staying Connected With Others  I will stay in touch with my friends, family members, and my primary care provider/team.    Use your imagination  Be creative.  We all have a creative side; it doesn t matter if it s oil painting, sand castles, or mud pies! This will also kick up the endorphins.    Witness Beauty  (AKA stop and smell the roses) Take a look outside, even in mid-winter. Notice colors, textures. Watch the squirrels and birds.     Service to others  Be of service to others.  There is always someone else in need.  By helping others we can  get out of ourselves  and remember the really important things.  This also provides opportunities for practicing all the other parts of the program.    Humor  Laugh and be silly!  Adjust your TV habits for less news and crime-drama and more comedy.    Control your stress  Try breathing deep, massage therapy, biofeedback, and meditation. Find time to relax each day.     My support system    Clinic Contact:  Phone number:    Contact 1:  Phone number:    Contact 2:  Phone number:    Gnosticism/:  Phone number:    Therapist:  Phone number:    Local crisis center:    Phone number:    Other community support:  Phone number:

## 2019-12-05 NOTE — PATIENT INSTRUCTIONS
For the cholesterol:  You can try restarting the pravastatin and Co enzyme Q10 and see if that helps with the aches.  If the aches redevelop just stop and contact your cardiologist.    For the walker:  The prescription went to [facility], hopefully you can  your walker there    For the mood:  We'll start the lexapro as we discussed.  Please let me know if you have any trouble with it.  We should follow up in 4-6 weeks to see how things are going.  We can do that through your MyChart

## 2019-12-05 NOTE — PROGRESS NOTES
Subjective     Arielle Pham is a 49 year old female who presents to clinic today for the following health issues:    HPI   Medication Followup of pravastatin- pt is having hand pain pt called heart clinic but they haven't called her back pt has stop taking med wants to get on something else.    Taking Medication as prescribed: NO    Side Effects:  pain in hands    Medication Helping Symptoms:  Yes    Developed upper arm achiness and hugo hand pain which were the same symptoms she had on the an alternate medication for cholesterol.  Thinks this was lipitor    Stopped med a couple months ago.  Only took for about 2 weeks.  Does have coenzyme Q10 but did not take it while she was on the pravastatin.  Willing to retry.    Chart review reveals mild-moderate atherosclerosis on CTA done this summer.  Statin therapy recommended by cardiology.  Notes reviewed       PT needs Rx for a walker       Requested one last year but not covered.    Would intend to use the wlaker both in the home and outside.  A seated walker would be helpful for getting dressed, cooking and bathing.  Currently uses and office chair but seated walker would allow much improved mobioity    It would also allow her to be more mobile outside of the home with traveling to appointment and grocery shopping.    *  Saw Shaheed yesterday for shots - trigger point shots.  On Amgality until she sees  in Feb.  Thinks it might help 1 type of HA but still getting her other types.  .    Remains on oxycodone for multiple pains including headache, ankle pain and back pain.  Feels med is effective in helping her to function throughout her day.  No side effects noted.        *  Mood:  Has been struggling with mood for some time.  Thinks she was on a med once in the past but it made her tired and was stopped.  Not sure what this was and has not been on anything else.  Has a lot of stressors, mostly related to her daughter who has a lot of behavioral problems and has been  "diagnosed with ADD and ODD.  Really struggling to manage.  Feels down and tearful frequently.  Has anxiety regarding this as well.  No thoughts of self harm.  Has seen therapists in the past but did not find it helpful.  Would be willing to consider a support group if one existed.  Would be interested in considering a medication.      Reviewed and updated as needed this visit by Provider  Tobacco  Allergies  Meds  Med Hx  Surg Hx  Fam Hx  Soc Hx        Review of Systems   ROS COMP: Constitutional, HEENT, cardiovascular, pulmonary, gi and gu systems are negative, except as otherwise noted.      Objective    /60 (BP Location: Right arm, Patient Position: Chair, Cuff Size: Adult Regular)   Pulse 79   Temp 97.7  F (36.5  C) (Tympanic)   Resp 14   Ht 1.651 m (5' 5\")   Wt 113.4 kg (250 lb)   LMP  (LMP Unknown)   Breastfeeding No   BMI 41.60 kg/m    Body mass index is 41.6 kg/m .  Physical Exam   PE:  VS as above   Gen:  WN/WD/WH female in NAD   Heart:  RRR without murmur, nl S1, S2, no rubs or gallops   Lungs CTA hugo without rales/ronchi/wheezes   Psych: Alert and oriented times 3; coherent speech, normal   rate and volume, able to articulate logical thoughts, able   to abstract reason, no tangential thoughts, no hallucinations   or delusions  Her affect is tearful when discussing her daughter      Diagnostic Test Results:  Labs reviewed in Epic        A/P:      ICD-10-CM    1. Current moderate episode of major depressive disorder, unspecified whether recurrent (H) F32.1 escitalopram (LEXAPRO) 10 MG tablet   2. Hyperlipidemia LDL goal <130 E78.5    3. Chronic prescription opiate use Z79.891    4. DJD (degenerative joint disease), ankle and foot, left M19.072    5. Acquired left flat foot M21.42 Walker Order   6. Tibialis posterior tendinopathy M67.979 Walker Order     Patient Instructions   For the cholesterol:  You can try restarting the pravastatin and Co enzyme Q10 and see if that helps with the " aches.  If the aches redevelop just stop and contact your cardiologist.    For the walker:  The prescription went to [facility], hopefully you can  your walker there    For the mood:  We'll start the lexapro as we discussed.  Please let me know if you have any trouble with it.  We should follow up in 4-6 weeks to see how things are going.  We can do that through your MyChart      Chronic pain stable on current dosing of opiates.  Plan to continue as prescribed.  F/u 3-6 months for pain

## 2019-12-06 PROBLEM — F32.1 CURRENT MODERATE EPISODE OF MAJOR DEPRESSIVE DISORDER, UNSPECIFIED WHETHER RECURRENT (H): Status: ACTIVE | Noted: 2019-12-06

## 2019-12-06 PROBLEM — E78.5 HYPERLIPIDEMIA LDL GOAL <130: Status: ACTIVE | Noted: 2019-12-06

## 2019-12-06 RX ORDER — ZOLMITRIPTAN 5 MG/1
TABLET, FILM COATED ORAL
Qty: 9 TABLET | Refills: 0 | Status: SHIPPED | OUTPATIENT
Start: 2019-12-06 | End: 2020-01-17

## 2019-12-06 RX ORDER — LORATADINE 10 MG/1
TABLET ORAL
Qty: 90 TABLET | Refills: 0 | Status: SHIPPED | OUTPATIENT
Start: 2019-12-06 | End: 2021-03-08

## 2019-12-06 ASSESSMENT — ANXIETY QUESTIONNAIRES: GAD7 TOTAL SCORE: 11

## 2019-12-06 NOTE — TELEPHONE ENCOUNTER
Routing refill request to provider for review/approval because:  Zolmitriptan 5mg:  Failed protocol   Loratadine(Claritin):  Last written Rx on 9/28/2018, updated Rx needed    Office visit yesterday,  12/5/19    Trinity Patel RN

## 2019-12-06 NOTE — TELEPHONE ENCOUNTER
"Requested Prescriptions   Pending Prescriptions Disp Refills     ZOLMitriptan (ZOMIG) 5 MG tablet [Pharmacy Med Name: ZOLMITRIPTAN 5MG TABLETS] 9 tablet 0     Sig: TAKE 1 TABLET BY MOUTH AT ONSET OF HEADACHE  Last Written Prescription Date:  11/07/2019 #9 x 0  Last filled 11/07/2019  Last office visit: 12/5/2019 VISHNU Stahl   Future Office Visit:  None         Serotonin Agonists Failed - 12/5/2019  5:36 PM        Failed - Serotonin Agonist request needs review.     Please review patient's record. If patient has had 8 or more treatments in the past month, please forward to provider.          Passed - Blood pressure under 140/90 in past 12 months     BP Readings from Last 3 Encounters:   12/05/19 119/60   07/01/19 132/80   04/12/19 130/86                 Passed - Recent (12 mo) or future (30 days) visit within the authorizing provider's specialty     Patient has had an office visit with the authorizing provider or a provider within the authorizing providers department within the previous 12 mos or has a future within next 30 days. See \"Patient Info\" tab in inbasket, or \"Choose Columns\" in Meds & Orders section of the refill encounter.              Passed - Medication is active on med list        Passed - Patient is age 18 or older        Passed - No active pregnancy on record        Passed - No positive pregnancy test in past 12 months        loratadine (CLARITIN) 10 MG tablet [Pharmacy Med Name: LORATADINE 10MG TABLETS] 90 tablet 0     Sig: TAKE 1 TABLET BY MOUTH EVERY DAY  Last Written Prescription Date:  09/28/2018 #90 x 3  Last filled 09/04/2019  Last office visit: 12/5/2019 VISHNU Stahl   Future Office Visit:  None         Antihistamines Protocol Passed - 12/5/2019  5:36 PM        Passed - Patient is 3-64 years of age     Apply weight-based dosing for peds patients age 3 - 12 years of age.    Forward request to provider for patients under the age of 3 or over the age of 64.          Passed - Recent (12 mo) or future " "(30 days) visit within the authorizing provider's specialty     Patient has had an office visit with the authorizing provider or a provider within the authorizing providers department within the previous 12 mos or has a future within next 30 days. See \"Patient Info\" tab in inbasket, or \"Choose Columns\" in Meds & Orders section of the refill encounter.              Passed - Medication is active on med list          "

## 2019-12-23 DIAGNOSIS — M51.369 DDD (DEGENERATIVE DISC DISEASE), LUMBAR: ICD-10-CM

## 2019-12-23 DIAGNOSIS — M54.2 NECK PAIN: ICD-10-CM

## 2019-12-23 RX ORDER — OXYCODONE AND ACETAMINOPHEN 5; 325 MG/1; MG/1
1 TABLET ORAL EVERY 6 HOURS PRN
Qty: 120 TABLET | Refills: 0 | Status: SHIPPED | OUTPATIENT
Start: 2019-12-23 | End: 2020-01-17

## 2020-01-13 DIAGNOSIS — G43.009 MIGRAINE WITHOUT AURA AND WITHOUT STATUS MIGRAINOSUS, NOT INTRACTABLE: ICD-10-CM

## 2020-01-15 NOTE — TELEPHONE ENCOUNTER
"Requested Prescriptions   Pending Prescriptions Disp Refills     ZOLMitriptan (ZOMIG) 5 MG tablet [Pharmacy Med Name: ZOLMITRIPTAN 5MG TABLETS]  Last Written Prescription Date:  12/6/19  Last Fill Quantity: 9,  # refills: 0   Last office visit: 12/5/2019 with prescribing provider:  esha   Future Office Visit:     9 tablet 0     Sig: TAKE 1 TABLET BY MOUTH AT ONSET OF HEADACHE       Serotonin Agonists Failed - 1/13/2020  2:51 PM        Failed - Serotonin Agonist request needs review.     Please review patient's record. If patient has had 8 or more treatments in the past month, please forward to provider.          Passed - Blood pressure under 140/90 in past 12 months     BP Readings from Last 3 Encounters:   12/05/19 119/60   07/01/19 132/80   04/12/19 130/86                 Passed - Recent (12 mo) or future (30 days) visit within the authorizing provider's specialty     Patient has had an office visit with the authorizing provider or a provider within the authorizing providers department within the previous 12 mos or has a future within next 30 days. See \"Patient Info\" tab in inbasket, or \"Choose Columns\" in Meds & Orders section of the refill encounter.              Passed - Medication is active on med list        Passed - Patient is age 18 or older        Passed - No active pregnancy on record        Passed - No positive pregnancy test in past 12 months          "

## 2020-01-17 ENCOUNTER — TELEPHONE (OUTPATIENT)
Dept: FAMILY MEDICINE | Facility: CLINIC | Age: 50
End: 2020-01-17

## 2020-01-17 DIAGNOSIS — M54.2 NECK PAIN: ICD-10-CM

## 2020-01-17 DIAGNOSIS — M51.369 DDD (DEGENERATIVE DISC DISEASE), LUMBAR: ICD-10-CM

## 2020-01-17 RX ORDER — ZOLMITRIPTAN 5 MG/1
TABLET, FILM COATED ORAL
Qty: 9 TABLET | Refills: 0 | Status: SHIPPED | OUTPATIENT
Start: 2020-01-17 | End: 2020-02-18

## 2020-01-17 RX ORDER — OXYCODONE AND ACETAMINOPHEN 5; 325 MG/1; MG/1
1 TABLET ORAL EVERY 6 HOURS PRN
Qty: 120 TABLET | Refills: 0 | Status: SHIPPED | OUTPATIENT
Start: 2020-01-20 | End: 2020-02-18

## 2020-01-17 NOTE — TELEPHONE ENCOUNTER
Reason for Call:  Medication or medication refill:percocet    Do you use a [facility]?  Name of the pharmacy and phone number for the current request:  [facility]    Name of the medication requested: percocet    Other request: Pt also states pharmacy said they have to do a PA on the zomig, says this isn't normal    Can we leave a detailed message on this number? YES    Phone number patient can be reached at: Cell number on file:    Telephone Information:   Mobile [phone]       Best Time: any    Call taken on 1/17/2020 at 9:41 AM by Geraldine Mata

## 2020-01-17 NOTE — TELEPHONE ENCOUNTER
Script sent.  Looks beena the fill is due 1/21.  Sent with the date 1/20 to .  It looks like the percocet might need a PA too.  Perhaps her formulary changed at the beginning of the year.  She should discuss with her pharmacist.    Huong Stahl, DO

## 2020-01-21 ENCOUNTER — OFFICE VISIT - HEALTHEAST (OUTPATIENT)
Dept: CARDIOLOGY | Facility: CLINIC | Age: 50
End: 2020-01-21

## 2020-01-21 DIAGNOSIS — E66.01 MORBID OBESITY (H): ICD-10-CM

## 2020-01-21 DIAGNOSIS — I25.10 ATHEROSCLEROSIS OF NATIVE CORONARY ARTERY OF NATIVE HEART WITHOUT ANGINA PECTORIS: ICD-10-CM

## 2020-01-21 DIAGNOSIS — R93.1 ABNORMAL COMPUTED TOMOGRAPHY ANGIOGRAPHY OF HEART: ICD-10-CM

## 2020-01-21 DIAGNOSIS — R94.39 ABNORMAL CARDIOVASCULAR STRESS TEST: ICD-10-CM

## 2020-01-21 DIAGNOSIS — E78.5 HYPERLIPIDEMIA: ICD-10-CM

## 2020-01-21 DIAGNOSIS — R07.2 PRECORDIAL PAIN: ICD-10-CM

## 2020-01-22 ENCOUNTER — COMMUNICATION - HEALTHEAST (OUTPATIENT)
Dept: CARDIOLOGY | Facility: CLINIC | Age: 50
End: 2020-01-22

## 2020-01-23 DIAGNOSIS — M54.2 NECK PAIN: ICD-10-CM

## 2020-01-23 DIAGNOSIS — M51.369 DDD (DEGENERATIVE DISC DISEASE), LUMBAR: ICD-10-CM

## 2020-01-23 NOTE — TELEPHONE ENCOUNTER
Talk to pharmacy and will fill the one that was sent over on January 17.    Trinity Altamirano, [facility]

## 2020-01-23 NOTE — TELEPHONE ENCOUNTER
Routing refill request to provider for review/approval because:  Drug not on the [facility] refill protocol     Trinity Patel RN

## 2020-01-24 RX ORDER — OXYCODONE AND ACETAMINOPHEN 5; 325 MG/1; MG/1
1 TABLET ORAL EVERY 6 HOURS PRN
Qty: 120 TABLET | Refills: 0 | OUTPATIENT
Start: 2020-01-24

## 2020-01-24 NOTE — TELEPHONE ENCOUNTER
Spoke with [facility], script matt to [facility] last week.  Should be available for her to fill there.    Tried to contact pt to let her know.  Left message on mobile number for her to  script at [facility] and call Nurse Advisor if problems.    Huong Stahl, DO

## 2020-01-27 DIAGNOSIS — M53.3 SI (SACROILIAC) JOINT DYSFUNCTION: ICD-10-CM

## 2020-01-27 DIAGNOSIS — M51.369 DDD (DEGENERATIVE DISC DISEASE), LUMBAR: ICD-10-CM

## 2020-01-27 DIAGNOSIS — M19.072 DJD (DEGENERATIVE JOINT DISEASE), ANKLE AND FOOT, LEFT: ICD-10-CM

## 2020-01-27 DIAGNOSIS — M54.2 NECK PAIN: ICD-10-CM

## 2020-01-28 RX ORDER — OXYCODONE AND ACETAMINOPHEN 5; 325 MG/1; MG/1
1 TABLET ORAL EVERY 6 HOURS PRN
Qty: 120 TABLET | Refills: 0 | OUTPATIENT
Start: 2020-01-28

## 2020-01-28 NOTE — TELEPHONE ENCOUNTER
Call placed to patient.  Reports she did  this RX at [facility], does not need percocet filled at [facility].  Cancelled RX refill request.  Dayami Cadena RN

## 2020-01-28 NOTE — TELEPHONE ENCOUNTER
"Requested Prescriptions   Pending Prescriptions Disp Refills     ibuprofen (ADVIL/MOTRIN) 800 MG tablet [Pharmacy Med Name: IBUPROFEN 800MG TABLETS] 270 tablet 0     Sig: TAKE 1 TABLET BY MOUTH THREE TIMES DAILY AS NEEDED  Last Written Prescription Date:  11/1/2019 #270 x 0  Last filled - not provided  Last office visit: 12/5/2019 VISHNU Stahl   Future Office Visit:  None         NSAID Medications Failed - 1/27/2020 11:54 AM        Failed - Normal CBC on file in past 12 months     No lab results found.              Passed - Blood pressure under 140/90 in past 12 months     BP Readings from Last 3 Encounters:   12/05/19 119/60   07/01/19 132/80   04/12/19 130/86                 Passed - Normal ALT on file in past 12 months     Recent Labs   Lab Test 06/25/19   ALT 20             Passed - Normal AST on file in past 12 months     Recent Labs   Lab Test 06/25/19   AST 17             Passed - Recent (12 mo) or future (30 days) visit within the authorizing provider's specialty     Patient has had an office visit with the authorizing provider or a provider within the authorizing providers department within the previous 12 mos or has a future within next 30 days. See \"Patient Info\" tab in inbasket, or \"Choose Columns\" in Meds & Orders section of the refill encounter.              Passed - Patient is age 6-64 years        Passed - Medication is active on med list        Passed - No active pregnancy on record        Passed - Normal serum creatinine on file in past 12 months     Recent Labs   Lab Test 06/25/19   CR 0.85             Passed - No positive pregnancy test in past 12 months          "

## 2020-01-28 NOTE — TELEPHONE ENCOUNTER
Percocet 5/325mg      Last Written Prescription Date:  01/20/2020 #120 x 0  Last filled 11/22/2019 #120 x 0  Last Office Visit: 12/05/2019 VISHNU Stahl  Future Office visit:   None    Routing refill request to provider for review/approval because:  Drug not on the [facility], [facility] or [facility] refill protocol or controlled substance

## 2020-01-28 NOTE — TELEPHONE ENCOUNTER
I just wrote this prescription to be filled 1/20.  Can your please contact pt and get additional information    Huong Stahl, DO

## 2020-01-28 NOTE — TELEPHONE ENCOUNTER
Routing refill request to provider for review/approval because:  Drug not on the [facility] refill protocol   Dayami Cadena RN

## 2020-01-30 RX ORDER — IBUPROFEN 800 MG/1
TABLET, FILM COATED ORAL
Qty: 270 TABLET | Refills: 0 | Status: SHIPPED | OUTPATIENT
Start: 2020-01-30 | End: 2020-04-17

## 2020-02-18 DIAGNOSIS — M51.369 DDD (DEGENERATIVE DISC DISEASE), LUMBAR: ICD-10-CM

## 2020-02-18 DIAGNOSIS — G43.009 MIGRAINE WITHOUT AURA AND WITHOUT STATUS MIGRAINOSUS, NOT INTRACTABLE: ICD-10-CM

## 2020-02-18 DIAGNOSIS — M54.2 NECK PAIN: ICD-10-CM

## 2020-02-18 RX ORDER — OXYCODONE AND ACETAMINOPHEN 5; 325 MG/1; MG/1
1 TABLET ORAL EVERY 6 HOURS PRN
Qty: 120 TABLET | Refills: 0 | Status: SHIPPED | OUTPATIENT
Start: 2020-02-18 | End: 2020-03-18

## 2020-02-18 RX ORDER — ZOLMITRIPTAN 5 MG/1
TABLET, FILM COATED ORAL
Qty: 9 TABLET | Refills: 0 | Status: SHIPPED | OUTPATIENT
Start: 2020-02-18 | End: 2020-03-18

## 2020-02-18 NOTE — TELEPHONE ENCOUNTER
Refills provided. Patient to follow up with PCP for ongoing refills.    Janett Mayo PA-C on 2/18/2020 at 11:11 AM

## 2020-03-05 ENCOUNTER — AMBULATORY - HEALTHEAST (OUTPATIENT)
Dept: CARDIOLOGY | Facility: CLINIC | Age: 50
End: 2020-03-05

## 2020-03-05 DIAGNOSIS — E78.5 HYPERLIPIDEMIA: ICD-10-CM

## 2020-03-05 LAB
ALT SERPL W P-5'-P-CCNC: 17 U/L (ref 0–45)
AST SERPL W P-5'-P-CCNC: 12 U/L (ref 0–40)
CHOLEST SERPL-MCNC: 205 MG/DL
FASTING STATUS PATIENT QL REPORTED: YES
HDLC SERPL-MCNC: 58 MG/DL
LDLC SERPL CALC-MCNC: 129 MG/DL
TRIGL SERPL-MCNC: 90 MG/DL

## 2020-03-06 ENCOUNTER — TRANSFERRED RECORDS (OUTPATIENT)
Dept: HEALTH INFORMATION MANAGEMENT | Facility: CLINIC | Age: 50
End: 2020-03-06

## 2020-03-12 ENCOUNTER — TELEPHONE (OUTPATIENT)
Dept: FAMILY MEDICINE | Facility: CLINIC | Age: 50
End: 2020-03-12

## 2020-03-12 ENCOUNTER — OFFICE VISIT - HEALTHEAST (OUTPATIENT)
Dept: CARDIOLOGY | Facility: CLINIC | Age: 50
End: 2020-03-12

## 2020-03-12 DIAGNOSIS — I25.10 CORONARY ARTERY DISEASE INVOLVING NATIVE CORONARY ARTERY OF NATIVE HEART WITHOUT ANGINA PECTORIS: ICD-10-CM

## 2020-03-12 NOTE — LETTER
[address] [address]  Phone: [phone]      March 12, 2020    Arielle Pham  [address] [place] [address]          Dear Arielle Pham    As part of [facility]'s commitment to health and wellness, we inform our patients when records indicate the need for specific health screening.    It is time for you to schedule the following:       Annual Physical, Colon cancer screening and Mammogram.        To schedule an appointment with a [facility] physician, or nurse practitioner, please call:   [facility] at [phone]    NOTE:  Please disregard this notice if you have had this procedure repeated or already made an appointment.        Sincerely,        Dr. Stahl/juwan

## 2020-03-12 NOTE — TELEPHONE ENCOUNTER
Panel Management Review      Patient has the following on her problem list:     Depression / Dysthymia review    Measure:  Needs PHQ-9 score of 4 or less during index window.  Administer PHQ-9 and if score is 5 or more, send encounter to provider for next steps.    5 - 7 month window range:     PHQ-9 SCORE 9/25/2017 9/6/2018 12/5/2019   PHQ-9 Total Score 15 14 11       If PHQ-9 recheck is 5 or more, route to provider for next steps.    Patient is due for:  PHQ9    Hypertension   Last three blood pressure readings:  BP Readings from Last 3 Encounters:   12/05/19 119/60   07/01/19 132/80   04/12/19 130/86     Blood pressure: Passed    HTN Guidelines:  Less than 140/90      Composite cancer screening  Chart review shows that this patient is due/due soon for the following Mammogram and Colonoscopy  Summary:    Patient is due/failing the following:   Drug screen, HIV screen, Shingrix, COLONOSCOPY, MAMMOGRAM, PHQ9 and PHYSICAL    Action needed:   Patient needs office visit for physical.    Type of outreach:    Sent letter.    Questions for provider review:    None                                                                                                                                    Aileen Chao CMA       Chart routed to none .

## 2020-03-17 DIAGNOSIS — M54.2 NECK PAIN: ICD-10-CM

## 2020-03-17 DIAGNOSIS — G43.009 MIGRAINE WITHOUT AURA AND WITHOUT STATUS MIGRAINOSUS, NOT INTRACTABLE: ICD-10-CM

## 2020-03-17 DIAGNOSIS — M51.369 DDD (DEGENERATIVE DISC DISEASE), LUMBAR: ICD-10-CM

## 2020-03-17 NOTE — TELEPHONE ENCOUNTER
Pt is calling and states that she needs a refill on her  Medications, please advise.     Ila Mead, [place]

## 2020-03-18 RX ORDER — OXYCODONE AND ACETAMINOPHEN 5; 325 MG/1; MG/1
1 TABLET ORAL EVERY 6 HOURS PRN
Qty: 120 TABLET | Refills: 0 | Status: SHIPPED | OUTPATIENT
Start: 2020-03-18 | End: 2020-04-17

## 2020-03-18 RX ORDER — ZOLMITRIPTAN 5 MG/1
TABLET, FILM COATED ORAL
Qty: 9 TABLET | Refills: 0 | Status: SHIPPED | OUTPATIENT
Start: 2020-03-18 | End: 2020-04-17

## 2020-03-31 ENCOUNTER — TRANSFERRED RECORDS (OUTPATIENT)
Dept: HEALTH INFORMATION MANAGEMENT | Facility: CLINIC | Age: 50
End: 2020-03-31

## 2020-04-30 ENCOUNTER — PATIENT OUTREACH (OUTPATIENT)
Dept: CARE COORDINATION | Facility: CLINIC | Age: 50
End: 2020-04-30

## 2020-04-30 DIAGNOSIS — G89.4 CHRONIC PAIN SYNDROME: Primary | ICD-10-CM

## 2020-04-30 NOTE — LETTER
[facility] CARE COORDINATION  [address] [place] [address]      May 7, 2020    Arielle Pham  [address] [place] [address]      Dear Arielle,    I am a clinic care coordinator who works with Huong Stahl DO at [facility]. I wanted to thank you for spending the time to talk with me.  Below is a description of clinic care coordination and how I can further assist you.      The clinic care coordination team is made up of a registered nurse,  and community health worker who understand the health care system. The goal of clinic care coordination is to help you manage your health and improve access to the health care system in the most efficient manner. The team can assist you in meeting your health care goals by providing education, coordinating services, strengthening the communication among your providers and supporting you with any resource needs.    Please feel free to contact me at [phone] with any questions or concerns. We are focused on providing you with the highest-quality healthcare experience possible and that all starts with you.     Sincerely,     WILTON Mathew     [facility] Care Coordination  [facility]  Tel: [phone]

## 2020-04-30 NOTE — PROGRESS NOTES
Clinic Care Coordination Contact  [facility]/Voicemail    Referral Source: Behavioral Health Clinician  Clinical Data: Care Coordinator Outreach  Outreach attempted x 1. Voicemail not set up yet.  Plan:  Care Coordinator will try to reach patient again in 1-2 business days.    Marcella Garay SANTA     [facility] Care Coordination  [facility]  Tel: [phone]

## 2020-04-30 NOTE — LETTER
[facility]  Complex Care Plan  About Me:    Patient Name:  Arielle Pham    YOB: 1970  Age:         50 year old   [place] MRN:    [id number] Telephone Information:  Home Phone [phone]   Mobile [phone]       Address:  [address] [place] [address] [address] [place] [address] Email address:  No e-mail address on record      Emergency Contact(s)    Name Relationship Lgl Grd Work Phone Home Phone Mobile Phone   1. CRUZ [place] Mother   [phone]    2. CRUZ [place] Mother   [phone]            Primary language:  English     needed? No  Mobility Status/ Medical Equipment: Independent    Health Maintenance  Health Maintenance Reviewed:   Health Maintenance Due   Topic Date Due     PREVENTIVE CARE VISIT  1970     URINE DRUG SCREEN  1970     MAMMO SCREENING  1970     COLORECTAL CANCER SCREENING  02/24/1980     HIV SCREENING  02/24/1985     ZOSTER IMMUNIZATION (1 of 2) 02/24/2020     PHQ-9  06/05/2020       My Access Plan  Medical Emergency 911   Primary Clinic Line [facility] - [phone]   24 Hour Appointment Line [phone] or  [phone]) (toll-free)   24 Hour Nurse Line [phone] (toll-free)   Preferred Urgent Care [facility], [phone]   Preferred Hospital [place] [place]  [phone]   Preferred Pharmacy [id number] [place] [id number] AT [facility]     Behavioral Health Crisis Line The National Suicide Prevention Lifeline at 1-223.893.2327 or 911             My Care Team Members  Patient Care Team       Relationship Specialty Notifications Start End    Huong Stahl DO PCP - General Family Practice  10/23/17     Phone: [phone] Fax: [phone] [address] [place] [address]    Huong Stahl DO Assigned PCP   7/13/17     Phone: [phone] Fax: [phone] [address] [place]  [place] [address]    Marcella Garay LSW Lead Care Coordinator Primary Care - CC  4/30/20     Phone: [phone]                 My Care Plans  Self Management and Treatment Plan  Goals and (Comments)  Goals        General    Mental Health Management (pt-stated)     Notes - Note created  5/7/2020  1:25 PM by Marcella Garay LSW    Goal Statement: I would like to manage my mental health   Date Goal set: 05/07/2020  Barriers: Side effects to medication; isolation due to stay at home order; depression  Strengths: supportive family; spring weather; self determination  Date to Achieve By: 08/06/2020  Patient expressed understanding of goal: yes  Action steps to achieve this goal:  1. I will get outside 3-5 times per week  2. I will reach out to support system when I feel down               Action Plans on File:            Depression          Advance Care Plans/Directives Type:        My Medical and Care Information  Problem List   Patient Active Problem List   Diagnosis     Low back pain     Neck pain     DJD (degenerative joint disease), ankle and foot, left     Migraine without aura and without status migrainosus, not intractable     Nonintractable episodic headache, unspecified headache type     DDD (degenerative disc disease), lumbar     SI (sacroiliac) joint dysfunction     Essential hypertension     Chronic pain syndrome     Chronic prescription opiate use     Current moderate episode of major depressive disorder, unspecified whether recurrent (H)     Hyperlipidemia LDL goal <130          Care Coordination Start Date: 4/30/2020   Frequency of Care Coordination: monthly   Form Last Updated: 05/07/2020

## 2020-05-04 NOTE — PROGRESS NOTES
Clinic Care Coordination Contact  [facility]/Voicemail    Referral Source: Behavioral Health Clinician  Clinical Data: Care Coordinator Outreach  Outreach attempted x 2.  Left message on patient's voicemail with call back information and requested return call.  Plan:  Care Coordinator will try to reach patient again in 3-5 business days.    Marcella Garay [facility] Care Coordination  [facility]  Tel: [phone]

## 2020-05-07 SDOH — ECONOMIC STABILITY: INCOME INSECURITY: IN THE LAST 12 MONTHS, WAS THERE A TIME WHEN YOU WERE NOT ABLE TO PAY THE MORTGAGE OR RENT ON TIME?: NO

## 2020-05-07 SDOH — ECONOMIC STABILITY: FOOD INSECURITY: WITHIN THE PAST 12 MONTHS, THE FOOD YOU BOUGHT JUST DIDN'T LAST AND YOU DIDN'T HAVE MONEY TO GET MORE.: NEVER TRUE

## 2020-05-07 SDOH — ECONOMIC STABILITY: FOOD INSECURITY: HOW HARD IS IT FOR YOU TO PAY FOR THE VERY BASICS LIKE FOOD, HOUSING, MEDICAL CARE, AND HEATING?: NOT HARD AT ALL

## 2020-05-07 SDOH — ECONOMIC STABILITY: FOOD INSECURITY: WITHIN THE PAST 12 MONTHS, YOU WORRIED THAT YOUR FOOD WOULD RUN OUT BEFORE YOU GOT MONEY TO BUY MORE.: NEVER TRUE

## 2020-05-07 SDOH — ECONOMIC STABILITY: FOOD INSECURITY: WITHIN THE PAST 12 MONTHS, YOU WORRIED THAT YOUR FOOD WOULD RUN OUT BEFORE YOU GOT THE MONEY TO BUY MORE.: NEVER TRUE

## 2020-05-07 SDOH — ECONOMIC STABILITY: INCOME INSECURITY: HOW HARD IS IT FOR YOU TO PAY FOR THE VERY BASICS LIKE FOOD, HOUSING, MEDICAL CARE, AND HEATING?: NOT HARD AT ALL

## 2020-05-07 ASSESSMENT — ACTIVITIES OF DAILY LIVING (ADL): DEPENDENT_IADLS:: INDEPENDENT

## 2020-05-07 NOTE — PROGRESS NOTES
Clinic Care Coordination Contact    Clinic Care Coordination Contact  OUTREACH    Referral Information:  Referral Source: Payor request for Proactive Outreach due to COVID-19 risk         Primary Diagnosis: Psychosocial    Chief Complaint   Patient presents with     Clinic Care Coordination - Initial             Universal Utilization:   Clinic Utilization  Difficulty keeping appointments:: No  Compliance Concerns: No  No-Show Concerns: No  No PCP office visit in Past Year: No  Utilization    Last refreshed: 5/7/2020  1:21 PM:  Hospital Admissions 0           Last refreshed: 5/7/2020  1:21 PM:  ED Visits 0           Last refreshed: 5/7/2020  1:21 PM:  No Show Count (past year) 0              Current as of: 5/7/2020  1:21 PM              Clinical Concerns:  Current Medical Concerns:    Patient Active Problem List   Diagnosis     Low back pain     Neck pain     DJD (degenerative joint disease), ankle and foot, left     Migraine without aura and without status migrainosus, not intractable     Nonintractable episodic headache, unspecified headache type     DDD (degenerative disc disease), lumbar     SI (sacroiliac) joint dysfunction     Essential hypertension     Chronic pain syndrome     Chronic prescription opiate use     Current moderate episode of major depressive disorder, unspecified whether recurrent (H)     Hyperlipidemia LDL goal <130       Current Behavioral Concerns: Pt has history of major depressive disorder      Education Provided to patient: Patient was given information on ANIBAL;coping with mental health; alternate mental health coping mechanisms during quarantine and discussions of coping during isolation  Pain  Pain (GOAL):: Yes  Limitation of routine activities due to chronic pain:: Yes  Aggravating Factors: Activity, Positioning  Health Maintenance Reviewed: Up to date  Clinical Pathway: None    Medication Management:  Pt takes medication for pain and is knowledgeable on how to safely administer meds  on her own. Pt is aware of side effects. Pt stopped taking  Anti-depressants as they were causing her to be very drowsy.    Functional Status:  Dependent ADLs:: Independent  Dependent IADLs:: Independent  Bed or wheelchair confined:: No  Mobility Status: Independent  Fallen 2 or more times in the past year?: No  Any fall with injury in the past year?: No    Living Situation:  Current living arrangement:: I live in a private home with spouse  Type of residence:: Private home - stairs    Lifestyle & Psychosocial Needs:     Social Needs     Financial resource strain: Not hard at all     Food insecurity     Worry: Never true     Inability: Never true     Transportation needs     Medical: Not on file     Non-medical: Not on file     Diet:: Regular  Inadequate nutrition (GOAL):: No  Tube Feeding: No  Inadequate activity/exercise (GOAL):: Yes  Significant changes in sleep pattern (GOAL): Yes  Transportation means:: Regular car     Taoism or spiritual beliefs that impact treatment:: No  Mental health DX:: Yes  Mental health DX how managed:: Medication  Mental health management concern (GOAL):: Yes  Informal Support system:: Children, Friends, Neighbors, Family   Socioeconomic History     Marital status:      Spouse name: Not on file     Number of children: Not on file     Years of education: Not on file     Highest education level: Not on file     Tobacco Use     Smoking status: Never Smoker     Smokeless tobacco: Never Used   Substance and Sexual Activity     Alcohol use: No     Drug use: No     Sexual activity: Yes     Partners: Male          Pt is currently diagnosed with major recurrent depressive disorder.Pt has had an increase in symptoms due to isolation with the stay at home order. Pt tries to get outside frequently with springtime arriving and find that helps with her mood.     See above for information on compliance with medication    Pt has      Resources and Interventions:  Current Resources:       Community Resources: Other (see comment)(podiatry)  Supplies used at home:: Nutritional Supplements, Other(podiatry)  Equipment Currently Used at Home: none    Advance Care Plan/Directive  Advanced Care Plans/Directives on file:: No    Referrals Placed: Other ([place])     Goals:   Goals        General    Mental Health Management (pt-stated)     Notes - Note created  5/7/2020  1:25 PM by Marcella Garay LSW    Goal Statement: I would like to manage my mental health   Date Goal set: 05/07/2020  Barriers: Side effects to medication; isolation due to stay at home order; depression  Strengths: supportive family; spring weather; self determination  Date to Achieve By: 08/06/2020  Patient expressed understanding of goal: yes  Action steps to achieve this goal:  1. I will get outside 3-5 times per week  2. I will reach out to support system when I feel down              Patient/Caregiver understanding: yes    Outreach Frequency: monthly      Plan: Patient will take time each day for self care including getting outside every day. Patient will take medication as prescribed and monitor side effects. CC will send introduction letter and complex care plan to pt. SW CC will follow up in 1 month.    WILTON Mathew  Social Work Care Coordinator  -[place]  [phone]

## 2020-05-14 DIAGNOSIS — G43.009 MIGRAINE WITHOUT AURA AND WITHOUT STATUS MIGRAINOSUS, NOT INTRACTABLE: ICD-10-CM

## 2020-05-14 DIAGNOSIS — M54.2 NECK PAIN: ICD-10-CM

## 2020-05-14 DIAGNOSIS — M51.369 DDD (DEGENERATIVE DISC DISEASE), LUMBAR: ICD-10-CM

## 2020-05-14 RX ORDER — OXYCODONE AND ACETAMINOPHEN 5; 325 MG/1; MG/1
1 TABLET ORAL EVERY 6 HOURS PRN
Qty: 120 TABLET | Refills: 0 | Status: SHIPPED | OUTPATIENT
Start: 2020-05-14 | End: 2020-06-15

## 2020-05-14 RX ORDER — ZOLMITRIPTAN 5 MG/1
TABLET, FILM COATED ORAL
Qty: 9 TABLET | Refills: 0 | Status: SHIPPED | OUTPATIENT
Start: 2020-05-14 | End: 2020-06-15

## 2020-05-14 NOTE — TELEPHONE ENCOUNTER
I left a message for the patient to return my call.  Did she use all the Zomig that was refilled last month?    Amena HARRIS RN, BSN

## 2020-05-14 NOTE — TELEPHONE ENCOUNTER
Spoke with pt. She knows the refill request for the Percocet is early, but she was told to call early. She is not out of the medication. Of note, her CSA is .    She did use all 9 tablets of the Zomig that were last refilled on 2020. She does follow with the [facility], and they are trying to find a different medication in addition to the Zomig. Her next appt is 2020.    Writer did tell pt she may need to follow up with Dr. Stahl, via Virtual or Clinic visit; will let pt know once Dr. Stahl reviews these refills and gives her recommendations.    Thanks,     Amena HARRIS, RN, BSN

## 2020-05-14 NOTE — TELEPHONE ENCOUNTER
Realize CSA is outdated.  Will bring pt in this summer to update.  He 30 day refill is due Saturday, appropriate to be requesting Thursday    Huong Stahl DO

## 2020-05-14 NOTE — TELEPHONE ENCOUNTER
Pt is calling for a refill on her pain medication of percocet and zomig.     Ila Mead, [place]

## 2020-06-08 ENCOUNTER — OFFICE VISIT (OUTPATIENT)
Dept: FAMILY MEDICINE | Facility: CLINIC | Age: 50
End: 2020-06-08
Payer: COMMERCIAL

## 2020-06-08 VITALS
TEMPERATURE: 97.7 F | DIASTOLIC BLOOD PRESSURE: 98 MMHG | HEART RATE: 76 BPM | HEIGHT: 65 IN | WEIGHT: 254.2 LBS | SYSTOLIC BLOOD PRESSURE: 138 MMHG | BODY MASS INDEX: 42.35 KG/M2

## 2020-06-08 DIAGNOSIS — K64.4 EXTERNAL HEMORRHOIDS: Primary | ICD-10-CM

## 2020-06-08 DIAGNOSIS — F41.0 ANXIETY ATTACK: ICD-10-CM

## 2020-06-08 DIAGNOSIS — I10 ESSENTIAL HYPERTENSION: ICD-10-CM

## 2020-06-08 DIAGNOSIS — Z79.891 CHRONIC PRESCRIPTION OPIATE USE: ICD-10-CM

## 2020-06-08 LAB
AMPHETAMINES UR QL: NOT DETECTED NG/ML
ANION GAP SERPL CALCULATED.3IONS-SCNC: 8 MMOL/L (ref 3–14)
BARBITURATES UR QL SCN: NOT DETECTED NG/ML
BENZODIAZ UR QL SCN: NOT DETECTED NG/ML
BUN SERPL-MCNC: 25 MG/DL (ref 7–30)
BUPRENORPHINE UR QL: NOT DETECTED NG/ML
CALCIUM SERPL-MCNC: 9.9 MG/DL (ref 8.5–10.1)
CANNABINOIDS UR QL: NOT DETECTED NG/ML
CHLORIDE SERPL-SCNC: 107 MMOL/L (ref 94–109)
CO2 SERPL-SCNC: 24 MMOL/L (ref 20–32)
COCAINE UR QL SCN: NOT DETECTED NG/ML
CREAT SERPL-MCNC: 0.72 MG/DL (ref 0.52–1.04)
D-METHAMPHET UR QL: NOT DETECTED NG/ML
GFR SERPL CREATININE-BSD FRML MDRD: >90 ML/MIN/{1.73_M2}
GLUCOSE SERPL-MCNC: 92 MG/DL (ref 70–99)
METHADONE UR QL SCN: NOT DETECTED NG/ML
OPIATES UR QL SCN: NOT DETECTED NG/ML
OXYCODONE UR QL SCN: ABNORMAL NG/ML
PCP UR QL SCN: NOT DETECTED NG/ML
POTASSIUM SERPL-SCNC: 4.6 MMOL/L (ref 3.4–5.3)
PROPOXYPH UR QL: NOT DETECTED NG/ML
SODIUM SERPL-SCNC: 139 MMOL/L (ref 133–144)
TRICYCLICS UR QL SCN: NOT DETECTED NG/ML

## 2020-06-08 PROCEDURE — 36415 COLL VENOUS BLD VENIPUNCTURE: CPT | Performed by: FAMILY MEDICINE

## 2020-06-08 PROCEDURE — 99214 OFFICE O/P EST MOD 30 MIN: CPT | Performed by: FAMILY MEDICINE

## 2020-06-08 PROCEDURE — 80048 BASIC METABOLIC PNL TOTAL CA: CPT | Performed by: FAMILY MEDICINE

## 2020-06-08 PROCEDURE — 80306 DRUG TEST PRSMV INSTRMNT: CPT | Performed by: FAMILY MEDICINE

## 2020-06-08 RX ORDER — HYDROCORTISONE 25 MG/G
CREAM TOPICAL 2 TIMES DAILY PRN
Qty: 30 G | Refills: 0 | Status: SHIPPED | OUTPATIENT
Start: 2020-06-08 | End: 2021-01-11

## 2020-06-08 RX ORDER — LOSARTAN POTASSIUM 25 MG/1
12.5 TABLET ORAL DAILY
Qty: 15 TABLET | Refills: 0 | Status: SHIPPED | OUTPATIENT
Start: 2020-06-08 | End: 2020-06-25

## 2020-06-08 ASSESSMENT — ANXIETY QUESTIONNAIRES
1. FEELING NERVOUS, ANXIOUS, OR ON EDGE: NEARLY EVERY DAY
3. WORRYING TOO MUCH ABOUT DIFFERENT THINGS: NEARLY EVERY DAY
2. NOT BEING ABLE TO STOP OR CONTROL WORRYING: NEARLY EVERY DAY
IF YOU CHECKED OFF ANY PROBLEMS ON THIS QUESTIONNAIRE, HOW DIFFICULT HAVE THESE PROBLEMS MADE IT FOR YOU TO DO YOUR WORK, TAKE CARE OF THINGS AT HOME, OR GET ALONG WITH OTHER PEOPLE: VERY DIFFICULT
6. BECOMING EASILY ANNOYED OR IRRITABLE: SEVERAL DAYS
5. BEING SO RESTLESS THAT IT IS HARD TO SIT STILL: NOT AT ALL
7. FEELING AFRAID AS IF SOMETHING AWFUL MIGHT HAPPEN: MORE THAN HALF THE DAYS
GAD7 TOTAL SCORE: 15

## 2020-06-08 ASSESSMENT — PATIENT HEALTH QUESTIONNAIRE - PHQ9
SUM OF ALL RESPONSES TO PHQ QUESTIONS 1-9: 10
5. POOR APPETITE OR OVEREATING: NEARLY EVERY DAY

## 2020-06-08 ASSESSMENT — MIFFLIN-ST. JEOR: SCORE: 1773.92

## 2020-06-08 NOTE — LETTER
[facility]  06/08/20    Patient: Arielle Pham  YOB: 1970  Medical Record Number: [id number]                                                                  Opioid / Opioid Plus Controlled Substance Agreement    I understand that my care provider has prescribed an opioid (narcotic) controlled substance to help manage my condition(s). I am taking this medicine to help me function or work. I know this is strong medicine, and that it can cause serious side effects. Opioid medicine can be sedating, addicting and may cause a dependency on the drug. They can affect my ability to drive or think, and cause depression. They need to be taken exactly as prescribed. Combining opioids with certain medicines or chemicals (such as cocaine, sedatives and tranquilizers, sleeping pills, meth) can be dangerous or even fatal. Also, if I stop opioids suddenly, I may have severe withdrawal symptoms. Last, I understand that opioids do not work for all types of pain nor for all patients. If not helpful, I may be asked to stop them.      The risks, benefits, and side effects of these medicine(s) were explained to me. I agree that:    1. I will take part in other treatments as advised by my care team. This may be psychiatry or counseling, physical therapy, behavioral therapy, group treatment or a referral to a pain clinic. I will reduce or stop my medicine when my care team tells me to do so.  2. I will take my medicines as prescribed. I will not change the dose or schedule unless my care team tells me to. There will be no refills if I  run out early.   I may be contactedwithout warning and asked to complete a urine drug test or pill count at any time.   3. I will keep all my appointments, and understand this is part of the monitoring of opioids. My care team may require an office visit for EVERY opioid/controlled substance refill. If I miss appointments or don t follow instructions, my care team may stop my  medicine.  4. I will not ask other providers to prescribe controlled substances, and I will not accept controlled substances from other people. If I need another prescribed controlled substance for a new reason, I will tell my care team within 1 business day.  5. I will use one pharmacy to fill all of my controlled substance prescriptions, and it is up to me to make sure that I do not run out of my medicines on weekends or holidays. If my care team is willing to refill my opioid prescription without a visit, I must request refills only during office hours, refills may take up to 3 days to process, and it may take up to 5 to 7 days for my medicine to be mailed and ready at my pharmacy. Prescriptions will not be mailed anywhere except my pharmacy.        [id number]  Rev 12/18         Registration to scan to EHR                             Page 1 of 2               Controlled Substance Agreement Opioid        [facility]  06/08/20  Patient: Arielle Pham  YOB: 1970  Medical Record Number: [id number]                                                                  6. I am responsible for my prescriptions. If the medicine/prescription is lost or stolen, it will not be replaced. I also agree not to share controlled substance medicines with anyone.  7. I agree to not use ANY illegal or recreational drugs. This includes marijuana, cocaine, bath salts or other drugs. I agree not to use alcohol unless my care team says I may.          I agree to give urine samples whenever asked. If I don t give a urine sample, the care team may stop my medicine.    8. If I enroll in the [place] Medical Marijuana program, I will tell my care team. I will also sign an agreement to share my medical records with my care team.   9. I will bring in my list of medicines (or my medicine bottles) each time I come to the clinic.   10. I will tell my care team right away if I become pregnant or have a new medical problem treated  outside of my regular clinic.  11. I understand that this medicine can affect my thinking and judgment. It may be unsafe for me to drive, use machinery and do dangerous tasks. I will not do any of these things until I know how the medicine affects me. If my dose changes, I will wait to see how it affects me. I will contact my care team if I have concerns about medicine side effects.    I understand that if I do not follow any of the conditions above, my prescriptions or treatment may be stopped.      I agree that my provider, clinic care team, and pharmacy may work with any city, state or federal law enforcement agency that investigates the misuse, sale, or other diversion of my controlled medicine. I will allow my provider to discuss my care with or share a copy of this agreement with any other treating provider, pharmacy or emergency room where I receive care. I agree to give up (waive) any right of privacy or confidentiality with respect to these consents.     I have read this agreement and have asked questions about anything I did not understand.      ________________________________________________________________________  Patient signature - Date/Time -  Arielle Pham                                      ________________________________________________________________________  Witness signature                                                            ________________________________________________________________________  Provider signature - Huong Stahl DO      [id number]  Rev 12/18         Registration to scan to EHR                         Page 2 of 2                   Controlled Substance Agreement Opioid           Page 1 of 2  Opioid Pain Medicines (also known as Narcotics)  What You Need to Know    What are opioids?   Opioids are pain medicines that must be prescribed by a doctor.  They are also known as narcotics.    Examples are:     morphine (MS Contin, Lucia)    oxycodone (Oxycontin)    oxycodone  and acetaminophen (Percocet)    hydrocodone and acetaminophen (Vicodin, Norco)     fentanyl patch (Duragesic)     hydromorphone (Dilaudid)     methadone     What do opioids do well?   Opioids are best for short-term pain after a surgery or injury. They also work well for cancer pain. Unlike other pain medicines, they do not cause liver or kidney failure or ulcers. They may help some people with long-lasting (chronic) pain.     What do opioids NOT do well?   Opioids never get rid of pain entirely, and they do not work well for most patients with chronic pain. Opioids do not reduce swelling, one of the causes of pain. They also don t work well for nerve pain.                           For informational purposes only.  Not to replace the advice of your care provider.  Copyright [facility]. All right reserved. Navita 153047-Cfz 02/18.      Page 2 of 2    Risks and side effects   Talk to your doctor before you start or decide to keep taking one of these medicines. Side effects include:    Lowering your breathing rate enough to cause death    Overdose, including death, especially if taking higher than prescribed doses    Long-term opioid use    Worse depression symptoms; less pleasure in things you usually enjoy    Feeling tired or sluggish    Slower thoughts or cloudy thinking    Being more sensitive to pain over time; pain is harder to control    Trouble sleeping or restless sleep    Changes in hormone levels (for example, less testosterone)    Changes in sex drive or ability to have sex    Constipation    Unsafe driving    Itching and sweating    Feeling dizzy    Nausea, vomiting and dry mouth    What else should I know about opioids?  When someone takes opioids for too long or too often, they become dependent. This means that if you stop or reduce the medicine too quickly, you will have withdrawal symptoms.    Dependence is not the same as addiction. Addiction is when people keep using a  substance that harms their body, their mind or their relations with others. If you have a history of drug or alcohol abuse, taking opioids can cause a relapse.    Over time, opioids don t work as well. Most people will need higher and higher doses. The higher the dose, the more serious the side effects. We don t know the long-term effects of opioids.      Prescribed opioids aren't the best way to manage chronic pain    Other ways to manage pain include:      Ibuprofen or acetaminophen.  You should always try this first.      Treat health problems that may be causing pain.      acupuncture or massage, deep breathing, meditation, visual imagery, aromatherapy.      Use heat or ice at the pain site      Physical therapy and exercise      Stop smoking      See a counselor or therapist                                                  People who have used opioids for a long time may have a lower quality of life, worse depression, higher levels of pain and more visits to doctors.    Never share your opioids with others. Be sure to store opioids in a secure place, locked if possible.Young children can easily swallow them and overdose.     You can overdose on opioids.  Signs of overdose include decrease or loss of consciousness, slowed breathing, trouble waking and blue lips.  If someone is worried about overdose, they should call 911.    If you are at risk for overdose, you may get naloxone (Narcan, a medicine that reverses the effects of opioids.  If you overdose, a friend or family member can give you Narcan while waiting for the ambulance.  They need to know the signs of overdose and how to give Narcan.    While you're taking opioids:    Don't use alcohol or street drugs. Taking them together can cause death.    Don't take any of these medicines unless your doctor says its okay.  Taking these with opioids can cause death.    Benzodiazepines (such as lorazepam         or diazepam)    Muscle relaxers (such as  cyclobenzaprine)    sleeping pills    other opioids    Safe disposal of opioids  Find your area drug take-back program, your pharmacy mail-back program, buy a special disposal bag (such as Deterra) from your pharmacy or flush them down the toilet.  Use the guidelines at:  www.fda.gov/drugs/resourcesforyou

## 2020-06-08 NOTE — PROGRESS NOTES
Subjective     Arielle Pham is a 50 year old female who presents to clinic today for the following health issues:    HPI   Hypertension Follow-up    Stopped lisinopril 1 month ago due to headache   Has been on the medication for years but would miss a dose periodically and then discovered that she seemed to have some improvement in one of her headache types n days when she did not take the medication.  Has now camila off the med for about 1 month.  Would like to consider an alternative    Had trigger point injections that seemed to really help her shoulder.  This is no longer an issue.  Does injection every 3 months.      Has been trying to walk more with her waler and be more active.  Tolerating this well.  No CP or SOB.  Knows she is deconditioned but nothing unexpected.        Do you check your blood pressure regularly outside of the clinic? No     Are you following a low salt diet? No    Are your blood pressures ever more than 140 on the top number (systolic) OR more   than 90 on the bottom number (diastolic), for example 140/90? Not checking      How many servings of fruits and vegetables do you eat daily?  2-3    On average, how many sweetened beverages do you drink each day (Examples: soda, juice, sweet tea, etc.  Do NOT count diet or artificially sweetened beverages)?   3    How many days per week do you exercise enough to make your heart beat faster? 3 or less    How many minutes a day do you exercise enough to make your heart beat faster? 10 - 19    How many days per week do you miss taking your medication? 0    Hemorrhoids       Duration: 3 days     Description:   Pain: no   Itching: YES    Accompanying signs and symptoms:   Blood in stool: no   Changes in stool pattern: no     History (similar episodes/previous evaluation): h/o of hemorrhoids, intermittent since pregnancy    Precipitating or alleviating factors: None    Therapies tried and outcome: tucks    Has been a long standing issue intermittently.   "Started when she had her daughters.   No change in intermittent issues.   Only a problem only every few years.  No bleeding now but has had in the past.  Usually gets a cream for it.    Was a bit constipated at onset.  Constipation is now resolved.  Constipation is not usually an issue with her pain meds.  Does use Miralax prn.      *  Feels pain is controlled.  On percocet for back pain and foot/ankle pain.  Has seen pain clinic who did not have a lot additional to offer.  Pt feels she is functional in her daily life with current medication.  Denies any side effects.   Due for UDS and updated pain letter today    *  Remains quite anxious.  Feels this is related to changes with Covid and struggles with her daughter.  Actually had to call the police on her daughter who was ringing neighbors doorbells and acting out.  Daughter is now on medication and is doing much better.  Some stress has improved.  Pt reports that she has episodes of panic where she feels anxious and worried out if the blue.  Is not really interested in a daily medicine for anxiety. Has done therapy in the past but did not really find it helpful.  Would be willing to reconsider.      Reviewed and updated as needed this visit by Provider  Tobacco  Allergies  Meds  Med Hx  Surg Hx  Fam Hx  Soc Hx        Review of Systems   Constitutional, HEENT, cardiovascular, pulmonary, gi and gu systems are negative, except as otherwise noted.      Objective    BP (!) 138/98   Pulse 76   Temp 97.7  F (36.5  C) (Tympanic)   Ht 1.651 m (5' 5\")   Wt 115.3 kg (254 lb 3.2 oz)   Breastfeeding No   BMI 42.30 kg/m    Body mass index is 42.3 kg/m .  Physical Exam   PE:  VS as above   Gen:  WN/WD/WH female in NAD   Heart:  RRR without murmur, nl S1, S2, no rubs or gallops   Lungs CTA hugo without rales/ronchi/wheezes   Ext:  1+ pedal edema hugo   Psych: Alert and oriented times 3; coherent speech, normal   rate and volume, able to articulate logical thoughts, able "   to abstract reason, no tangential thoughts, no hallucinations   or delusions  Her affect is mildly anxious        Diagnostic Test Results:  Labs reviewed in Epic        A/P:      ICD-10-CM    1. External hemorrhoids  K64.4 hydrocortisone, Perianal, (HYDROCORTISONE) 2.5 % cream   2. Essential hypertension  I10 losartan (COZAAR) 25 MG tablet     **Basic metabolic panel FUTURE anytime     Basic metabolic panel  (Ca, Cl, CO2, Creat, Gluc, K, Na, BUN)   3. Chronic prescription opiate use  Z79.891 Drug Abuse Screen Panel 13, Urine (Pain Care Package)   4. Anxiety attack  F41.0 MENTAL HEALTH REFERRAL  - Adult; Outpatient Treatment; Individual/Couples/Family/Group Therapy/Health Psychology; [facility] [place]: [facility] [phone]; We will contact you to schedule the appointment or please call with any questions     HTN:  Uncontrolled.  Lisinopril stopped due to HA.  Begin losartan, f/u 2 weeks for BP and labs    Hemorrhoids:  Typical for pt, prescription given.  Reviewed importance of f/u if not resolving as expected.    Anxiety:  Reviewed options.  Pt wishes to try therapy, referral placed    Pain:  Stable and adequately controlled on current regimen.  UDS and letter done today.    HM:  Due for CPE and screenings.  Reviewed with pt and she will schedule.    Patient Instructions   For the blood pressure:  We'll try the losartan.  This will be 1/2 tablet daily.  Please return for blood pressure check and nonfasting blood work in 2 weeks.    For the hemorrhoids:  I sent a prescription for the cream.  Please let me know if things do not resolve in the next week or so or if things worsen    For the anxiety:  We'll try cognitive behavioral therapy for the panic.  they will call you to schedule    We updated your opiate letter and drug screen today.    Please plan on scheduling your physical in the fall and we'll get your colon screening and breast screening addressed as well.

## 2020-06-08 NOTE — LETTER
June 11, 2020      Arielle Pham  [address] [place] [address]        Dear ,    We are writing to inform you of your test results.    Your urine drug screen was as expected.  Your electrolytes and kidney testing were normal.      Resulted Orders   Drug Abuse Screen Panel 13, Urine (Pain Care Package)   Result Value Ref Range    Cannabinoids (74-eep-7-carboxy-9-THC) Not Detected NDET^Not Detected ng/mL      Comment:      Cutoff for a negative cannabinoid is 50 ng/mL or less.    Phencyclidine (Phencyclidine) Not Detected NDET^Not Detected ng/mL      Comment:      Cutoff for a negative PCP is 25 ng/mL or less.    Cocaine (Benzoylecgonine) Not Detected NDET^Not Detected ng/mL      Comment:      Cutoff for a negative cocaine is 150 ng/ml or less.    Methamphetamine (d-Methamphetamine) Not Detected NDET^Not Detected ng/mL      Comment:      Cutoff for a negative methamphetamine is 500 ng/ml or less.    Opiates (Morphine) Not Detected NDET^Not Detected ng/mL      Comment:      Cutoff for a negative opiate is 100 ng/ml or less.    Amphetamine (d-Amphetamine) Not Detected NDET^Not Detected ng/mL      Comment:      Cutoff for a negative amphetamine is 500 ng/mL or less.    Benzodiazepines (Nordiazepam) Not Detected NDET^Not Detected ng/mL      Comment:      Cutoff for a negative benzodiazepine is 150 ng/ml or less.    Tricyclic Antidepressants (Desipramine) Not Detected NDET^Not Detected ng/mL      Comment:      Cutoff for a negative tricyclic antidepressant is 300 ng/ml or less.    Methadone (Methadone) Not Detected NDET^Not Detected ng/mL      Comment:      Cutoff for a negative methadone is 200 ng/ml or less.    Barbiturates (Butalbital) Not Detected NDET^Not Detected ng/mL      Comment:      Cutoff for a negative barbituate is 200 ng/ml or less.    Oxycodone (Oxycodone) Detected, Abnormal Result (A) NDET^Not Detected ng/mL      Comment:      Cutoff for a positive oxycodone is greater than 100 ng/ml.  This  is an unconfirmed screening result to be used for medical purposes only.   Order [id number] for confirmation or individual confirmation tests to [facility].      Propoxyphene (Norpropoxyphene) Not Detected NDET^Not Detected ng/mL      Comment:      Cutoff for a negative propoxyphene is 300 ng/ml or less    Buprenorphine (Buprenorphine) Not Detected NDET^Not Detected ng/mL      Comment:      Cutoff for a negative buprenorphine is 10 ng/ml or less   Basic metabolic panel  (Ca, Cl, CO2, Creat, Gluc, K, Na, BUN)   Result Value Ref Range    Sodium 139 133 - 144 mmol/L    Potassium 4.6 3.4 - 5.3 mmol/L    Chloride 107 94 - 109 mmol/L    Carbon Dioxide 24 20 - 32 mmol/L    Anion Gap 8 3 - 14 mmol/L    Glucose 92 70 - 99 mg/dL    Urea Nitrogen 25 7 - 30 mg/dL    Creatinine 0.72 0.52 - 1.04 mg/dL    GFR Estimate >90 >60 mL/min/[1.73_m2]      Comment:      Non  GFR Calc  Starting 12/18/2018, serum creatinine based estimated GFR (eGFR) will be   calculated using the Chronic Kidney Disease Epidemiology Collaboration   (CKD-EPI) equation.      GFR Estimate If Black >90 >60 mL/min/[1.73_m2]      Comment:       GFR Calc  Starting 12/18/2018, serum creatinine based estimated GFR (eGFR) will be   calculated using the Chronic Kidney Disease Epidemiology Collaboration   (CKD-EPI) equation.      Calcium 9.9 8.5 - 10.1 mg/dL       If you have any questions or concerns, please call the clinic at the number listed above.       Sincerely,        Huong Stahl, DO/am

## 2020-06-08 NOTE — PATIENT INSTRUCTIONS
For the blood pressure:  We'll try the losartan.  This will be 1/2 tablet daily.  Please return for blood pressure check and nonfasting blood work in 2 weeks.    For the hemorrhoids:  I sent a prescription for the cream.  Please let me know if things do not resolve in the next week or so or if things worsen    For the anxiety:  We'll try cognitive behavioral therapy for the panic.  they will call you to schedule    We updated your opiate letter and drug screen today.    Please plan on scheduling your physical in the fall and we'll get your colon screening and breast screening addressed as well.

## 2020-06-09 ASSESSMENT — ANXIETY QUESTIONNAIRES: GAD7 TOTAL SCORE: 15

## 2020-06-10 ENCOUNTER — PATIENT OUTREACH (OUTPATIENT)
Dept: CARE COORDINATION | Facility: CLINIC | Age: 50
End: 2020-06-10

## 2020-06-10 ASSESSMENT — ACTIVITIES OF DAILY LIVING (ADL): DEPENDENT_IADLS:: INDEPENDENT

## 2020-06-10 NOTE — PROGRESS NOTES
Clinic Care Coordination Contact    Follow Up Progress Note      Assessment: Arielle reports she is doing well during the isolation. She has not gotten sick and is starting to enjoy the summer weather. She reports feeling  and excited for summer.    Goals addressed this encounter:   Goals Addressed                 This Visit's Progress      Mental Health Management (pt-stated)   On track     Goal Statement: I would like to manage my mental health   Date Goal set: 05/07/2020  Barriers: Side effects to medication; isolation due to stay at home order; depression  Strengths: supportive family; spring weather; self determination  Date to Achieve By: 08/06/2020  Patient expressed understanding of goal: yes  Action steps to achieve this goal:  1. I will get outside 3-5 times per week  2. I will reach out to support system when I feel down               Intervention/Education provided during outreach: ALEJANDRO RICHARDSON used open ended questions.     Outreach Frequency: monthly    Plan:   Arielle will continue to take her medication as prescribed and get outside as much as possible  Care Coordinator will follow up in 1 month.    WILTON Mathew     [facility] Care Coordination  [facility]  Tel: [phone]

## 2020-06-15 DIAGNOSIS — G43.009 MIGRAINE WITHOUT AURA AND WITHOUT STATUS MIGRAINOSUS, NOT INTRACTABLE: ICD-10-CM

## 2020-06-15 DIAGNOSIS — M54.2 NECK PAIN: ICD-10-CM

## 2020-06-15 DIAGNOSIS — M51.369 DDD (DEGENERATIVE DISC DISEASE), LUMBAR: ICD-10-CM

## 2020-06-15 RX ORDER — OXYCODONE AND ACETAMINOPHEN 5; 325 MG/1; MG/1
1 TABLET ORAL EVERY 6 HOURS PRN
Qty: 120 TABLET | Refills: 0 | Status: SHIPPED | OUTPATIENT
Start: 2020-06-15 | End: 2020-07-15

## 2020-06-15 RX ORDER — ZOLMITRIPTAN 5 MG/1
TABLET, FILM COATED ORAL
Qty: 9 TABLET | Refills: 0 | Status: SHIPPED | OUTPATIENT
Start: 2020-06-15 | End: 2020-07-15

## 2020-06-25 ENCOUNTER — OFFICE VISIT (OUTPATIENT)
Dept: FAMILY MEDICINE | Facility: CLINIC | Age: 50
End: 2020-06-25
Payer: COMMERCIAL

## 2020-06-25 VITALS
DIASTOLIC BLOOD PRESSURE: 82 MMHG | HEART RATE: 78 BPM | HEIGHT: 65 IN | TEMPERATURE: 98.9 F | BODY MASS INDEX: 42.82 KG/M2 | WEIGHT: 257 LBS | SYSTOLIC BLOOD PRESSURE: 132 MMHG

## 2020-06-25 DIAGNOSIS — I10 ESSENTIAL HYPERTENSION: Primary | ICD-10-CM

## 2020-06-25 PROBLEM — E66.01 MORBID OBESITY (H): Status: ACTIVE | Noted: 2020-06-25

## 2020-06-25 LAB
ANION GAP SERPL CALCULATED.3IONS-SCNC: 6 MMOL/L (ref 3–14)
BUN SERPL-MCNC: 23 MG/DL (ref 7–30)
CALCIUM SERPL-MCNC: 9.6 MG/DL (ref 8.5–10.1)
CHLORIDE SERPL-SCNC: 108 MMOL/L (ref 94–109)
CO2 SERPL-SCNC: 25 MMOL/L (ref 20–32)
CREAT SERPL-MCNC: 0.89 MG/DL (ref 0.52–1.04)
GFR SERPL CREATININE-BSD FRML MDRD: 76 ML/MIN/{1.73_M2}
GLUCOSE SERPL-MCNC: 87 MG/DL (ref 70–99)
POTASSIUM SERPL-SCNC: 4.4 MMOL/L (ref 3.4–5.3)
SODIUM SERPL-SCNC: 139 MMOL/L (ref 133–144)

## 2020-06-25 PROCEDURE — 36415 COLL VENOUS BLD VENIPUNCTURE: CPT | Performed by: NURSE PRACTITIONER

## 2020-06-25 PROCEDURE — 80048 BASIC METABOLIC PNL TOTAL CA: CPT | Performed by: NURSE PRACTITIONER

## 2020-06-25 PROCEDURE — 99213 OFFICE O/P EST LOW 20 MIN: CPT | Performed by: NURSE PRACTITIONER

## 2020-06-25 RX ORDER — GALCANEZUMAB 120 MG/ML
INJECTION, SOLUTION SUBCUTANEOUS
COMMUNITY
Start: 2020-03-10 | End: 2021-03-12

## 2020-06-25 RX ORDER — ROSUVASTATIN CALCIUM 5 MG/1
5 TABLET, COATED ORAL
COMMUNITY
Start: 2020-03-12 | End: 2021-01-11 | Stop reason: SINTOL

## 2020-06-25 RX ORDER — ESCITALOPRAM OXALATE 10 MG/1
TABLET ORAL
COMMUNITY
Start: 2019-12-05 | End: 2021-01-11 | Stop reason: SINTOL

## 2020-06-25 RX ORDER — ASPIRIN 81 MG/1
81 TABLET ORAL DAILY
COMMUNITY
End: 2022-07-05

## 2020-06-25 RX ORDER — ALBUTEROL SULFATE 90 UG/1
1 AEROSOL, METERED RESPIRATORY (INHALATION)
COMMUNITY
Start: 2020-03-10 | End: 2021-01-11

## 2020-06-25 RX ORDER — FLUTICASONE PROPIONATE 50 MCG
1 SPRAY, SUSPENSION (ML) NASAL
COMMUNITY
Start: 2020-03-10 | End: 2021-01-11

## 2020-06-25 RX ORDER — LOSARTAN POTASSIUM 25 MG/1
12.5 TABLET ORAL DAILY
Qty: 45 TABLET | Refills: 1 | Status: SHIPPED | OUTPATIENT
Start: 2020-06-25 | End: 2021-01-11 | Stop reason: SINTOL

## 2020-06-25 ASSESSMENT — ENCOUNTER SYMPTOMS
COUGH: 0
CONSTIPATION: 0
SORE THROAT: 0
LIGHT-HEADEDNESS: 0
MYALGIAS: 0
WHEEZING: 0
RHINORRHEA: 0
ABDOMINAL DISTENTION: 0
SHORTNESS OF BREATH: 0
DYSPHORIC MOOD: 0
DIZZINESS: 0
ABDOMINAL PAIN: 0
FATIGUE: 0
SLEEP DISTURBANCE: 0
NAUSEA: 0
NERVOUS/ANXIOUS: 0
ARTHRALGIAS: 0
PALPITATIONS: 0
HEADACHES: 0
VOMITING: 0
CHEST TIGHTNESS: 0
NUMBNESS: 0
DIARRHEA: 0

## 2020-06-25 ASSESSMENT — MIFFLIN-ST. JEOR: SCORE: 1786.62

## 2020-06-25 NOTE — PROGRESS NOTES
Subjective     Arielle Pham is a 50 year old female who presents to clinic today for the following health issues:    HPI   Hypertension Follow-up      Do you check your blood pressure regularly outside of the clinic? No     Are you following a low salt diet? Yes    Are your blood pressures ever more than 140 on the top number (systolic) OR more   than 90 on the bottom number (diastolic), for example 140/90? Unknown- does not check    *Started on losartan 25mg- 1/2 tab on 06/08/20. No side effects-still having HA's but feels they are not due to medication      How many servings of fruits and vegetables do you eat daily?  2-3    On average, how many sweetened beverages do you drink each day (Examples: soda, juice, sweet tea, etc.  Do NOT count diet or artificially sweetened beverages)?   3    How many days per week do you exercise enough to make your heart beat faster? 3 or less    How many minutes a day do you exercise enough to make your heart beat faster? 10 - 19    How many days per week do you miss taking your medication? 0    [address] [facility]    Current Outpatient Medications   Medication Sig Dispense Refill     albuterol (PROAIR HFA/PROVENTIL HFA/VENTOLIN HFA) 108 (90 Base) MCG/ACT inhaler Inhale 1 puff into the lungs       aspirin 81 MG EC tablet Take 81 mg by mouth       EMGALITY 120 MG/ML injection        escitalopram (LEXAPRO) 10 MG tablet        fluticasone (FLONASE) 50 MCG/ACT nasal spray Spray 1 spray in nostril       hydrocortisone, Perianal, (HYDROCORTISONE) 2.5 % cream Place rectally 2 times daily as needed for hemorrhoids 30 g 0     ibuprofen (ADVIL/MOTRIN) 800 MG tablet TAKE 1 TABLET BY MOUTH THREE TIMES DAILY AS NEEDED 270 tablet 0     loratadine (CLARITIN) 10 MG tablet TAKE 1 TABLET BY MOUTH EVERY DAY 90 tablet 0     losartan (COZAAR) 25 MG tablet Take 0.5 tablets (12.5 mg) by mouth daily 45 tablet 1     order for DME Rolling walker with seat for home use 1 Device 0     oxyCODONE-acetaminophen  (PERCOCET) 5-325 MG tablet Take 1 tablet by mouth every 6 hours as needed for moderate to severe pain Maximum of 4 tablets per day 120 tablet 0     rosuvastatin (CRESTOR) 5 MG tablet Take 5 mg by mouth       ZOLMitriptan (ZOMIG) 5 MG tablet TAKE 1 TABLET BY MOUTH AT ONSET OF HEADACHE 9 tablet 0     Allergies   Allergen Reactions     Hmg-Coa-R Inhibitors Other (See Comments)     Severe joint and muscle ache from Atorvastastin     Codeine Dizziness and Other (See Comments)     Gabapentin Palpitations     Reviewed and updated as needed this visit by Provider        Here today for follow-up on blood pressure.  Had been on lisinopril for some time.  That was stopped due to causing worsening headaches.  Currently taking losartan 25 mg a half a tab daily.  Feels like now headaches are back to baseline.  Is tolerating medication well.  No side effects that is aware of.  No dizziness or lightheaded.  No chest pain, palpitations or shortness of breath.      Review of Systems   Constitutional: Negative for fatigue.   HENT: Negative for ear pain, rhinorrhea and sore throat.    Eyes: Negative for visual disturbance.   Respiratory: Negative for cough, chest tightness, shortness of breath and wheezing.    Cardiovascular: Negative for chest pain, palpitations and leg swelling.   Gastrointestinal: Negative for abdominal distention, abdominal pain, constipation, diarrhea, nausea and vomiting.   Endocrine: Negative for cold intolerance and heat intolerance.   Musculoskeletal: Negative for arthralgias and myalgias.   Skin: Negative for rash.   Neurological: Negative for dizziness, light-headedness, numbness and headaches.   Psychiatric/Behavioral: Negative for dysphoric mood and sleep disturbance. The patient is not nervous/anxious.          Objective    There were no vitals taken for this visit.  There is no height or weight on file to calculate BMI.  Physical Exam  Constitutional:       Appearance: She is well-developed.    Cardiovascular:      Rate and Rhythm: Normal rate and regular rhythm.   Pulmonary:      Effort: Pulmonary effort is normal.      Breath sounds: Normal breath sounds.   Musculoskeletal:      Right lower leg: Edema (NP) present.      Left lower leg: Edema (NP) present.   Skin:     General: Skin is warm.   Neurological:      Mental Status: She is alert.             Assessment & Plan     1. Essential hypertension  Doing well on current dose of losartan.  Plan to continue.  We will recheck labs here today.  Refill sent.  Plan to follow-up in clinic in 6 months or sooner if needed.  - Basic metabolic panel  - losartan (COZAAR) 25 MG tablet; Take 0.5 tablets (12.5 mg) by mouth daily  Dispense: 45 tablet; Refill: 1      Return in about 6 months (around 12/25/2020) for A BP Recheck.    DONN Goodrich [facility]

## 2020-06-25 NOTE — LETTER
June 29, 2020      Arielle Pham  [address] [place] [address]        Dear ,    We are writing to inform you of your test results.    Your electrolytes are in normal range.   Your blood sugar is normal.   Your kidney function is normal.      Resulted Orders   Basic metabolic panel   Result Value Ref Range    Sodium 139 133 - 144 mmol/L    Potassium 4.4 3.4 - 5.3 mmol/L    Chloride 108 94 - 109 mmol/L    Carbon Dioxide 25 20 - 32 mmol/L    Anion Gap 6 3 - 14 mmol/L    Glucose 87 70 - 99 mg/dL    Urea Nitrogen 23 7 - 30 mg/dL    Creatinine 0.89 0.52 - 1.04 mg/dL    GFR Estimate 76 >60 mL/min/[1.73_m2]      Comment:      Non  GFR Calc  Starting 12/18/2018, serum creatinine based estimated GFR (eGFR) will be   calculated using the Chronic Kidney Disease Epidemiology Collaboration   (CKD-EPI) equation.      GFR Estimate If Black 88 >60 mL/min/[1.73_m2]      Comment:       GFR Calc  Starting 12/18/2018, serum creatinine based estimated GFR (eGFR) will be   calculated using the Chronic Kidney Disease Epidemiology Collaboration   (CKD-EPI) equation.      Calcium 9.6 8.5 - 10.1 mg/dL           If you have any questions or concerns, please call the clinic at the number listed above.       Sincerely,        Dorota Ocampo, DONN CNP/am

## 2020-07-13 ENCOUNTER — PATIENT OUTREACH (OUTPATIENT)
Dept: CARE COORDINATION | Facility: CLINIC | Age: 50
End: 2020-07-13

## 2020-07-27 ENCOUNTER — PATIENT OUTREACH (OUTPATIENT)
Dept: CARE COORDINATION | Facility: CLINIC | Age: 50
End: 2020-07-27

## 2020-07-27 NOTE — PROGRESS NOTES
Clinic Care Coordination Contact  [facility]/Voicemail       Clinical Data: Care Coordinator Outreach  Outreach attempted x 2.  Left message on patient's voicemail with call back information and requested return call.  Plan: Care Coordinator will pass off to CHW. CHW will outreach in 2 weeks. CC will chart review in one month.       WILTON Mathew     [facility] Care Coordination  [facility]  Tel: [phone]

## 2020-08-11 ENCOUNTER — TELEPHONE (OUTPATIENT)
Dept: FAMILY MEDICINE | Facility: CLINIC | Age: 50
End: 2020-08-11

## 2020-08-11 DIAGNOSIS — G43.009 MIGRAINE WITHOUT AURA AND WITHOUT STATUS MIGRAINOSUS, NOT INTRACTABLE: ICD-10-CM

## 2020-08-11 DIAGNOSIS — M54.2 NECK PAIN: ICD-10-CM

## 2020-08-11 DIAGNOSIS — M51.369 DDD (DEGENERATIVE DISC DISEASE), LUMBAR: ICD-10-CM

## 2020-08-11 RX ORDER — OXYCODONE AND ACETAMINOPHEN 5; 325 MG/1; MG/1
1 TABLET ORAL EVERY 6 HOURS PRN
Qty: 120 TABLET | Refills: 0 | Status: SHIPPED | OUTPATIENT
Start: 2020-08-11 | End: 2020-09-11

## 2020-08-11 RX ORDER — ZOLMITRIPTAN 5 MG/1
TABLET, FILM COATED ORAL
Qty: 9 TABLET | Refills: 0 | Status: SHIPPED | OUTPATIENT
Start: 2020-08-11 | End: 2020-09-11

## 2020-08-13 ENCOUNTER — TELEPHONE (OUTPATIENT)
Dept: FAMILY MEDICINE | Facility: CLINIC | Age: 50
End: 2020-08-13

## 2020-08-14 ENCOUNTER — PATIENT OUTREACH (OUTPATIENT)
Dept: FAMILY MEDICINE | Facility: CLINIC | Age: 50
End: 2020-08-14
Payer: COMMERCIAL

## 2020-08-14 NOTE — PROGRESS NOTES
Clinic Care Coordination Contact  Community Health Worker Follow Up    Goals:   Goals Addressed as of 8/14/2020 at 2:17 PM                 Today    6/10/20       Patient Stated      Mental Health Management (pt-stated)   20%  On track    Added 5/7/20 by Marcella Garay LSW     Goal Statement: I would like to manage my mental health   Date Goal set: 05/07/2020  Barriers: Side effects to medication; isolation due to stay at home order; depression  Strengths: supportive family; spring weather; self determination  Date to Achieve By: 08/06/2020  Patient expressed understanding of goal: yes  Action steps to achieve this goal:  1. I will get outside 3-5 times per week  2. I will reach out to support system when I feel down            CHW spoke with the patient today and asked how things are going with her. The patient stated that things are going okay and no new concerns since last talking with the care coordination team.     The CHW asked if she was able to get outside and enjoy the beautiful weather and she said not really this week due to all the rain but she has been trying to get out in the evenings and take walks.     Nothing new to pass along to the [facility] at this time.     Intervention and Education during outreach: Monthly    CHW Plan: CHW will follow up with the patient next month to see how things are going and if there are any changes.    Norah Francisco  Community Health Health Worker   [facility]  [email or web address]   Office: [phone]  Fax: [phone]

## 2020-08-18 NOTE — TELEPHONE ENCOUNTER
Prior Authorization Retail Medication Request    Medication/Dose: Zomig 5mg  ICD code (if different than what is on RX):    Previously Tried and Failed:    Rationale:      Insurance Name:  [facility]  Insurance ID:  [id number]      Surekha Shields CPhT  [id number]  [address] [place] [address]  Phone: [phone]  Fax: [phone]

## 2020-08-18 NOTE — TELEPHONE ENCOUNTER
PA Initiation    Medication: Zomig 5mg  Insurance Company: [employer] - Phone [phone] Fax [phone]  Pharmacy Filling the Rx: [facility] [place] [address]  Filling Pharmacy Phone: [phone]  Filling Pharmacy Fax: [phone]  Start Date: 8/18/2020

## 2020-08-19 NOTE — TELEPHONE ENCOUNTER
Prior Authorization Approval    Authorization Effective Date: 8/10/2020  Authorization Expiration Date: 8/19/2021  Medication: Zomig 5mg  Approved Dose/Quantity: 12 per 30 days  Reference #: [id number]   Insurance Company: [employer] - Phone [phone] Fax [phone]  Expected CoPay:       CoPay Card Available:      Foundation Assistance Needed:    Which Pharmacy is filling the prescription (Not needed for infusion/clinic administered): [facility] [address] [place] [address]  Pharmacy Notified: Yes  Patient Notified: Yes, **Instructed pharmacy to notify patient when script is ready to /ship.**

## 2020-08-27 ENCOUNTER — PATIENT OUTREACH (OUTPATIENT)
Dept: CARE COORDINATION | Facility: CLINIC | Age: 50
End: 2020-08-27

## 2020-08-27 ASSESSMENT — ACTIVITIES OF DAILY LIVING (ADL): DEPENDENT_IADLS:: INDEPENDENT

## 2020-08-27 NOTE — PROGRESS NOTES
Clinic Care Coordination Contact  [facility]/Voicemail    Referral Source: Behavioral Health Clinician  Clinical Data: Care Coordinator Outreach  Outreach attempted x 3.  Left message on patient's voicemail with call back information and requested return call.      Plan: Care Coordinator will send unable to contact letter with care coordinator contact information via mail. Care Coordinator will try to reach patient again in 1 month.    WILTON Mathew     [facility] Care Coordination  [facility]  Tel: [phone]

## 2020-08-27 NOTE — LETTER
[facility] [facility]ARE COORDINATION  [address] [place] [address]    August 27, 2020    Arielle Pham  [address] [place] [address]      Dear Arielle,    I am a clinic care coordinator who works with Huong Stahl DO at [facility]. I recently tried to call and was unable to reach you. Below is a description of clinic care coordination and how I can further assist you.      The clinic care coordination team is made up of a registered nurse,  and community health worker who understand the health care system. The goal of clinic care coordination is to help you manage your health and improve access to the health care system in the most efficient manner. The team can assist you in meeting your health care goals by providing education, coordinating services, strengthening the communication among your providers and supporting you with any resource needs.    Please feel free to contact me at [phone] with any questions or concerns. We are focused on providing you with the highest-quality healthcare experience possible and that all starts with you.     Sincerely,     WILTON Mathew     [facility] Care Coordination  [facility]  Tel: [phone]

## 2020-09-03 DIAGNOSIS — M51.369 DDD (DEGENERATIVE DISC DISEASE), LUMBAR: ICD-10-CM

## 2020-09-03 DIAGNOSIS — M53.3 SI (SACROILIAC) JOINT DYSFUNCTION: ICD-10-CM

## 2020-09-03 DIAGNOSIS — M19.072 DJD (DEGENERATIVE JOINT DISEASE), ANKLE AND FOOT, LEFT: ICD-10-CM

## 2020-09-04 ENCOUNTER — TRANSFERRED RECORDS (OUTPATIENT)
Dept: HEALTH INFORMATION MANAGEMENT | Facility: CLINIC | Age: 50
End: 2020-09-04

## 2020-09-09 RX ORDER — IBUPROFEN 800 MG/1
TABLET, FILM COATED ORAL
Qty: 270 TABLET | Refills: 0 | Status: SHIPPED | OUTPATIENT
Start: 2020-09-09 | End: 2020-12-05

## 2020-09-11 ENCOUNTER — TELEPHONE (OUTPATIENT)
Dept: FAMILY MEDICINE | Facility: CLINIC | Age: 50
End: 2020-09-11

## 2020-09-11 DIAGNOSIS — M51.369 DDD (DEGENERATIVE DISC DISEASE), LUMBAR: ICD-10-CM

## 2020-09-11 DIAGNOSIS — M54.2 NECK PAIN: ICD-10-CM

## 2020-09-11 DIAGNOSIS — G43.009 MIGRAINE WITHOUT AURA AND WITHOUT STATUS MIGRAINOSUS, NOT INTRACTABLE: ICD-10-CM

## 2020-09-11 RX ORDER — OXYCODONE AND ACETAMINOPHEN 5; 325 MG/1; MG/1
1 TABLET ORAL EVERY 6 HOURS PRN
Qty: 120 TABLET | Refills: 0 | Status: SHIPPED | OUTPATIENT
Start: 2020-09-11 | End: 2020-10-09

## 2020-09-11 RX ORDER — ZOLMITRIPTAN 5 MG/1
TABLET, FILM COATED ORAL
Qty: 9 TABLET | Refills: 0 | Status: SHIPPED | OUTPATIENT
Start: 2020-09-11 | End: 2020-10-09

## 2020-09-11 NOTE — TELEPHONE ENCOUNTER
Pt requesting refills to [facility] pharmacy.    Thank you,    Geraldine Mata, [place]

## 2020-09-18 ENCOUNTER — PATIENT OUTREACH (OUTPATIENT)
Dept: CARE COORDINATION | Facility: CLINIC | Age: 50
End: 2020-09-18

## 2020-09-18 ASSESSMENT — ACTIVITIES OF DAILY LIVING (ADL): DEPENDENT_IADLS:: INDEPENDENT

## 2020-09-18 NOTE — PROGRESS NOTES
Clinic Care Coordination Contact  [facility]/Voicemail    Referral Source: Behavioral Health Clinician  CHW Outreach attempted x 1.  Left message on patient's voicemail with call back information and requested return call.  Plan: CHW will try to reach patient again in 8-10 business days.    Vera HERNANDEZ Community Health Worker  [facility] Care Coordination  [facility]  Phone: [phone]

## 2020-09-28 ENCOUNTER — PATIENT OUTREACH (OUTPATIENT)
Dept: CARE COORDINATION | Facility: CLINIC | Age: 50
End: 2020-09-28

## 2020-09-28 NOTE — PROGRESS NOTES
Clinic Care Coordination Contact  [facility]/Voicemail       Clinical Data: Care Coordinator Outreach  Outreach attempted x 3.  Left message on patient's voicemail with call back information and requested return call.  Plan: Care Coordinator will send unable to contact letter with care coordinator contact information via [employer]. Care Coordinator will do no further outreaches at this time.      WILTON Mathew     [facility] Care Coordination  [facility]  Tel: [phone]

## 2020-09-28 NOTE — LETTER
[facility] CARE COORDINATION  [address] [place] [address]    September 28, 2020    Arielle Pham  [address] [place] [address]      Dear Arielle,    I am a clinic care coordinator who works with Huong Stahl DO at [facility]. I have been trying to reach you recently to introduce Clinic Care Coordination and to see if there was anything I could assist you with.  Below is a description of clinic care coordination and how I can further assist you.      The clinic care coordination team is made up of a registered nurse,  and community health worker who understand the health care system. The goal of clinic care coordination is to help you manage your health and improve access to the health care system in the most efficient manner. The team can assist you in meeting your health care goals by providing education, coordinating services, strengthening the communication among your providers and supporting you with any resource needs.    Please feel free to contact me at [phone] with any questions or concerns. We are focused on providing you with the highest-quality healthcare experience possible and that all starts with you.     Sincerely,     WILTON Mathew     [facility] Care Coordination  [facility]  Tel: [phone]

## 2020-10-09 ENCOUNTER — TELEPHONE (OUTPATIENT)
Dept: FAMILY MEDICINE | Facility: CLINIC | Age: 50
End: 2020-10-09

## 2020-10-09 DIAGNOSIS — M54.2 NECK PAIN: ICD-10-CM

## 2020-10-09 DIAGNOSIS — G43.009 MIGRAINE WITHOUT AURA AND WITHOUT STATUS MIGRAINOSUS, NOT INTRACTABLE: ICD-10-CM

## 2020-10-09 DIAGNOSIS — M51.369 DDD (DEGENERATIVE DISC DISEASE), LUMBAR: ICD-10-CM

## 2020-10-09 RX ORDER — ZOLMITRIPTAN 5 MG/1
TABLET, FILM COATED ORAL
Qty: 9 TABLET | Refills: 0 | Status: SHIPPED | OUTPATIENT
Start: 2020-10-09 | End: 2020-11-09

## 2020-10-09 RX ORDER — OXYCODONE AND ACETAMINOPHEN 5; 325 MG/1; MG/1
1 TABLET ORAL EVERY 6 HOURS PRN
Qty: 120 TABLET | Refills: 0 | Status: SHIPPED | OUTPATIENT
Start: 2020-10-09 | End: 2020-11-09

## 2020-10-09 NOTE — TELEPHONE ENCOUNTER
Patient requesting a refill on her meds.    [facility]

## 2020-11-09 ENCOUNTER — TELEPHONE (OUTPATIENT)
Dept: FAMILY MEDICINE | Facility: CLINIC | Age: 50
End: 2020-11-09

## 2020-11-09 DIAGNOSIS — M54.2 NECK PAIN: ICD-10-CM

## 2020-11-09 DIAGNOSIS — M51.369 DDD (DEGENERATIVE DISC DISEASE), LUMBAR: ICD-10-CM

## 2020-11-09 DIAGNOSIS — G43.009 MIGRAINE WITHOUT AURA AND WITHOUT STATUS MIGRAINOSUS, NOT INTRACTABLE: ICD-10-CM

## 2020-11-09 RX ORDER — OXYCODONE AND ACETAMINOPHEN 5; 325 MG/1; MG/1
1 TABLET ORAL EVERY 6 HOURS PRN
Qty: 120 TABLET | Refills: 0 | Status: SHIPPED | OUTPATIENT
Start: 2020-11-09 | End: 2020-12-09

## 2020-11-09 RX ORDER — ZOLMITRIPTAN 5 MG/1
TABLET, FILM COATED ORAL
Qty: 9 TABLET | Refills: 0 | Status: SHIPPED | OUTPATIENT
Start: 2020-11-09 | End: 2020-12-09

## 2020-12-04 DIAGNOSIS — M53.3 SI (SACROILIAC) JOINT DYSFUNCTION: ICD-10-CM

## 2020-12-04 DIAGNOSIS — M19.072 DJD (DEGENERATIVE JOINT DISEASE), ANKLE AND FOOT, LEFT: ICD-10-CM

## 2020-12-04 DIAGNOSIS — M51.369 DDD (DEGENERATIVE DISC DISEASE), LUMBAR: ICD-10-CM

## 2020-12-06 NOTE — TELEPHONE ENCOUNTER
Routing ibuprofen refill request as there is a drug interaction alert with escitalopram.  Thank you.  Malik Hillman RN

## 2020-12-07 RX ORDER — IBUPROFEN 800 MG/1
TABLET, FILM COATED ORAL
Qty: 270 TABLET | Refills: 0 | Status: SHIPPED | OUTPATIENT
Start: 2020-12-07 | End: 2021-03-10

## 2020-12-08 DIAGNOSIS — M51.369 DDD (DEGENERATIVE DISC DISEASE), LUMBAR: ICD-10-CM

## 2020-12-08 DIAGNOSIS — G43.009 MIGRAINE WITHOUT AURA AND WITHOUT STATUS MIGRAINOSUS, NOT INTRACTABLE: ICD-10-CM

## 2020-12-08 DIAGNOSIS — M54.2 NECK PAIN: ICD-10-CM

## 2020-12-08 NOTE — TELEPHONE ENCOUNTER
zolmittriptan      Last Written Prescription Date:  11/9/20  Last Fill Quantity: 9,   # refills: 0  Last Office Visit: 8/12/20  Future Office visit:       Routing refill request to provider for review/approval because:  Drug not on the [facility], [facility] or [facility] refill protocol or controlled substance    percocet      Last Written Prescription Date:  11/9/20  Last Fill Quantity: 120,   # refills: 0  Last Office Visit: 8/12/20  Future Office visit:       Routing refill request to provider for review/approval because:  Drug not on the [facility], [facility] or [facility] refill protocol or controlled substance

## 2020-12-09 RX ORDER — OXYCODONE AND ACETAMINOPHEN 5; 325 MG/1; MG/1
1 TABLET ORAL EVERY 6 HOURS PRN
Qty: 120 TABLET | Refills: 0 | Status: SHIPPED | OUTPATIENT
Start: 2020-12-09 | End: 2021-01-06

## 2020-12-09 RX ORDER — ZOLMITRIPTAN 5 MG/1
TABLET, FILM COATED ORAL
Qty: 9 TABLET | Refills: 0 | Status: SHIPPED | OUTPATIENT
Start: 2020-12-09 | End: 2021-01-06

## 2020-12-21 ENCOUNTER — TRANSFERRED RECORDS (OUTPATIENT)
Dept: HEALTH INFORMATION MANAGEMENT | Facility: CLINIC | Age: 50
End: 2020-12-21

## 2021-01-06 ENCOUNTER — MYC REFILL (OUTPATIENT)
Dept: FAMILY MEDICINE | Facility: CLINIC | Age: 51
End: 2021-01-06

## 2021-01-06 DIAGNOSIS — M54.2 NECK PAIN: ICD-10-CM

## 2021-01-06 DIAGNOSIS — M51.369 DDD (DEGENERATIVE DISC DISEASE), LUMBAR: ICD-10-CM

## 2021-01-06 DIAGNOSIS — G43.009 MIGRAINE WITHOUT AURA AND WITHOUT STATUS MIGRAINOSUS, NOT INTRACTABLE: ICD-10-CM

## 2021-01-07 RX ORDER — OXYCODONE AND ACETAMINOPHEN 5; 325 MG/1; MG/1
1 TABLET ORAL EVERY 6 HOURS PRN
Qty: 120 TABLET | Refills: 0 | Status: SHIPPED | OUTPATIENT
Start: 2021-01-07 | End: 2021-02-11

## 2021-01-07 RX ORDER — ZOLMITRIPTAN 5 MG/1
TABLET, FILM COATED ORAL
Qty: 9 TABLET | Refills: 0 | Status: SHIPPED | OUTPATIENT
Start: 2021-01-07 | End: 2021-02-11

## 2021-01-11 ENCOUNTER — VIRTUAL VISIT (OUTPATIENT)
Dept: FAMILY MEDICINE | Facility: CLINIC | Age: 51
End: 2021-01-11
Payer: COMMERCIAL

## 2021-01-11 DIAGNOSIS — I10 ESSENTIAL HYPERTENSION: Primary | ICD-10-CM

## 2021-01-11 DIAGNOSIS — E66.01 MORBID OBESITY (H): ICD-10-CM

## 2021-01-11 DIAGNOSIS — L85.3 DRY SKIN: ICD-10-CM

## 2021-01-11 PROCEDURE — 99214 OFFICE O/P EST MOD 30 MIN: CPT | Mod: 95 | Performed by: FAMILY MEDICINE

## 2021-01-11 RX ORDER — LISINOPRIL 10 MG/1
10 TABLET ORAL DAILY
Qty: 30 TABLET | Refills: 0 | Status: SHIPPED | OUTPATIENT
Start: 2021-01-11 | End: 2021-01-13

## 2021-01-11 NOTE — PATIENT INSTRUCTIONS
We'll have you try the lisinopril again in place of the losartan for your blood pressure.    Please plan on coming to clinic in 2 weeks for a nurse visit for blood pressure check and nonfasting blood work.    At that time we'll check your thyroid as well to be sure that is not contributing to your dry skin and weight concerns.    We discussed a few potential options for medical weight management.  They all seem to have some potential drawbacks.    I think our best option might the the liraglutide, a injectable diabetes medicine used for weight loss as well.  Please check with your insurance regarding coverage of that medication.  Let me know through [employer] if that is covered for you and we can decide on a plan forward once all our results are in in 2 weeks.

## 2021-01-11 NOTE — PROGRESS NOTES
Arielle is a 50 year old who is being evaluated via a billable video visit.      How would you like to obtain your AVS? [email or web address]  If the video visit is dropped, the invitation should be resent by: Send to e-mail at: [email or web address]  Will anyone else be joining your video visit? No       9:06 AM      Video Start Time: Unable to complete video visit due to technical difficulties.  Completed as telephone visit.      A/P:      ICD-10-CM    1. Essential hypertension  I10 **Comprehensive metabolic panel FUTURE anytime     DISCONTINUED: lisinopril (ZESTRIL) 10 MG tablet   2. Morbid obesity (H)  E66.01 TSH with free T4 reflex     **Comprehensive metabolic panel FUTURE anytime   3. Dry skin  L85.3 TSH with free T4 reflex     Patient Instructions   We'll have you try the lisinopril again in place of the losartan for your blood pressure.    Please plan on coming to clinic in 2 weeks for a nurse visit for blood pressure check and nonfasting blood work.    At that time we'll check your thyroid as well to be sure that is not contributing to your dry skin and weight concerns.    We discussed a few potential options for medical weight management.  They all seem to have some potential drawbacks.    I think our best option might the the liraglutide, a injectable diabetes medicine used for weight loss as well.  Please check with your insurance regarding coverage of that medication.  Let me know through [email or web address] if that is covered for you and we can decide on a plan forward once all our results are in in 2 weeks.      Reviewed with pt that we will first need to address the episode of pancreatitis.  If gallstone related and gallbladder removed possibility that this would be ab acceptable choice.  Will f/u after labs.    Unable to use naltrexone given current opiate therapy.  H/o side effects with topiramate.  Could consider phentermine if BP controlled    Subjective     Arielle is a 50 year old who presents to clinic today for the  "following health issues   HPI       * weight concern - trouble losing weight  States that she eats probably once daily, not a lot.  Tries to stay away from junk food and sugars.  Does get headaches when eating sugar.    Feels like she has gained 20# over the last year or so.  Frustrated because she is not eating a lot.  When she eats will make cauliflower and beef or vegetable.  Tries to stay away from carbs.  Feels like she has been eating like this for a few years.  Might have a snack in the evening (cottage cheese or jello).  Typically eats around 1-2PM.  Not a breakfast eater.  Has never tracked or calorie counted.    Exercise is limited by physical condition but tries to walk some.  Prior to the pandemic would swim sometimes.      Feels like weight has been an issue pretty much her whole life.  Was successful with losing about 90# about 5-6 years ago.  Was able to maintain her 90 pound loss until this past year.  Made diet changes and has continued with those now.  Feels she is eating the same way now as she was then.  Feels like she has regained 20-25 pounds.  Highest weight >300 pounds.    Has never used medication for weight management.  Recently feels very hungry, \"hungry all the time\".      Would like to avoid weight loss surgery.  Was on topiramate for headaches and had troublesome dreams.  Does have a h/o kidney stones as well, 1 episode many years ago.    Had an episode of pancreatitis at age 18, ? If gallstone related.  No h/o thyroid cancer.        *  Had been on losartan for HTN.  Stopped med as she felt it was making her dizzy.  Now wonderig if the lisinopril was really causing headaches as her headache pattern is unchanged.  Would like to go back to the lisinopril for HTN      * dry skin  Tends to get dry skin in the winter but this seems worse.  Even scalp seems dry.  Has been trying to drink plenty of water as well.  Has been worse the last few months.  Using a lot of different lotions/emollients " and skin oils.      No change in bowel habits.          Review of Systems   Constitutional, HEENT, cardiovascular, pulmonary, gi and gu systems are negative, except as otherwise noted.      Objective           Vitals:  No vitals were obtained today due to virtual visit.    Physical Exam   RESP: No audible wheeze, cough    NEURO:   Mentation and speech appropriate for age.      Epic reviewed    Video-Visit Details    Type of service:  telephone Visit    9:30 AM    Originating Location (pt. Location): Home    Distant Location (provider location):  [facility]     Platform used for Video Visit: Unable to complete video visit

## 2021-01-13 ENCOUNTER — MYC MEDICAL ADVICE (OUTPATIENT)
Dept: FAMILY MEDICINE | Facility: CLINIC | Age: 51
End: 2021-01-13

## 2021-01-13 DIAGNOSIS — I10 ESSENTIAL HYPERTENSION: ICD-10-CM

## 2021-01-13 RX ORDER — LISINOPRIL 10 MG/1
5 TABLET ORAL DAILY
Qty: 30 TABLET | Refills: 0 | COMMUNITY
Start: 2021-01-13 | End: 2021-03-10 | Stop reason: DRUGHIGH

## 2021-01-15 ENCOUNTER — HEALTH MAINTENANCE LETTER (OUTPATIENT)
Age: 51
End: 2021-01-15

## 2021-01-28 DIAGNOSIS — L85.3 DRY SKIN: ICD-10-CM

## 2021-01-28 DIAGNOSIS — E66.01 MORBID OBESITY (H): ICD-10-CM

## 2021-01-28 DIAGNOSIS — I10 ESSENTIAL HYPERTENSION: ICD-10-CM

## 2021-01-28 LAB
ALBUMIN SERPL-MCNC: 3.5 G/DL (ref 3.4–5)
ALP SERPL-CCNC: 79 U/L (ref 40–150)
ALT SERPL W P-5'-P-CCNC: 26 U/L (ref 0–50)
ANION GAP SERPL CALCULATED.3IONS-SCNC: 3 MMOL/L (ref 3–14)
AST SERPL W P-5'-P-CCNC: 13 U/L (ref 0–45)
BILIRUB SERPL-MCNC: 0.3 MG/DL (ref 0.2–1.3)
BUN SERPL-MCNC: 20 MG/DL (ref 7–30)
CALCIUM SERPL-MCNC: 9.4 MG/DL (ref 8.5–10.1)
CHLORIDE SERPL-SCNC: 109 MMOL/L (ref 94–109)
CO2 SERPL-SCNC: 25 MMOL/L (ref 20–32)
CREAT SERPL-MCNC: 0.79 MG/DL (ref 0.52–1.04)
GFR SERPL CREATININE-BSD FRML MDRD: 86 ML/MIN/{1.73_M2}
GLUCOSE SERPL-MCNC: 83 MG/DL (ref 70–99)
POTASSIUM SERPL-SCNC: 4.6 MMOL/L (ref 3.4–5.3)
PROT SERPL-MCNC: 6.7 G/DL (ref 6.8–8.8)
SODIUM SERPL-SCNC: 137 MMOL/L (ref 133–144)
TSH SERPL DL<=0.005 MIU/L-ACNC: 1.68 MU/L (ref 0.4–4)

## 2021-01-28 PROCEDURE — 80053 COMPREHEN METABOLIC PANEL: CPT | Performed by: FAMILY MEDICINE

## 2021-01-28 PROCEDURE — 36415 COLL VENOUS BLD VENIPUNCTURE: CPT | Performed by: FAMILY MEDICINE

## 2021-01-28 PROCEDURE — 84443 ASSAY THYROID STIM HORMONE: CPT | Performed by: FAMILY MEDICINE

## 2021-02-05 ENCOUNTER — TELEPHONE (OUTPATIENT)
Dept: FAMILY MEDICINE | Facility: CLINIC | Age: 51
End: 2021-02-05

## 2021-02-05 DIAGNOSIS — G43.009 MIGRAINE WITHOUT AURA AND WITHOUT STATUS MIGRAINOSUS, NOT INTRACTABLE: ICD-10-CM

## 2021-02-05 DIAGNOSIS — M54.2 NECK PAIN: ICD-10-CM

## 2021-02-05 DIAGNOSIS — M51.369 DDD (DEGENERATIVE DISC DISEASE), LUMBAR: ICD-10-CM

## 2021-02-05 NOTE — TELEPHONE ENCOUNTER
Reason for call:  Medication   If this is a refill request, has the caller requested the refill from the pharmacy already? No  Will the patient be using a [facility]? No  Name of the pharmacy and phone number for the current request: [facility] [place] [address]  [phone]    Name of the medication requested: percocet and ZOLMitriptan medications    Other request: n/a    Phone number to reach patient:  Cell number on file:    Telephone Information:   Mobile [phone]       Best Time:  And time    Can we leave a detailed message on this number?  YES    Travel screening: Not Applicable

## 2021-02-10 ENCOUNTER — TELEPHONE (OUTPATIENT)
Dept: FAMILY MEDICINE | Facility: CLINIC | Age: 51
End: 2021-02-10

## 2021-02-10 NOTE — TELEPHONE ENCOUNTER
Called last Friday for two prescriptions refilled and wondering about the status, please give a call back at your earliest convince.

## 2021-02-11 RX ORDER — ZOLMITRIPTAN 5 MG/1
TABLET, FILM COATED ORAL
Qty: 9 TABLET | Refills: 0 | Status: SHIPPED | OUTPATIENT
Start: 2021-02-11 | End: 2021-03-10

## 2021-02-11 RX ORDER — OXYCODONE AND ACETAMINOPHEN 5; 325 MG/1; MG/1
1 TABLET ORAL EVERY 6 HOURS PRN
Qty: 120 TABLET | Refills: 0 | Status: SHIPPED | OUTPATIENT
Start: 2021-02-11 | End: 2021-03-10

## 2021-03-07 DIAGNOSIS — M51.369 DDD (DEGENERATIVE DISC DISEASE), LUMBAR: ICD-10-CM

## 2021-03-07 DIAGNOSIS — M19.072 DJD (DEGENERATIVE JOINT DISEASE), ANKLE AND FOOT, LEFT: ICD-10-CM

## 2021-03-07 DIAGNOSIS — M53.3 SI (SACROILIAC) JOINT DYSFUNCTION: ICD-10-CM

## 2021-03-08 ENCOUNTER — MYC REFILL (OUTPATIENT)
Dept: FAMILY MEDICINE | Facility: CLINIC | Age: 51
End: 2021-03-08

## 2021-03-08 DIAGNOSIS — J30.2 SEASONAL ALLERGIC RHINITIS, UNSPECIFIED TRIGGER: ICD-10-CM

## 2021-03-08 DIAGNOSIS — M51.369 DDD (DEGENERATIVE DISC DISEASE), LUMBAR: ICD-10-CM

## 2021-03-08 DIAGNOSIS — M54.2 NECK PAIN: ICD-10-CM

## 2021-03-08 DIAGNOSIS — G43.009 MIGRAINE WITHOUT AURA AND WITHOUT STATUS MIGRAINOSUS, NOT INTRACTABLE: ICD-10-CM

## 2021-03-08 DIAGNOSIS — I10 ESSENTIAL HYPERTENSION: Primary | ICD-10-CM

## 2021-03-08 NOTE — TELEPHONE ENCOUNTER
Pt is calling asking for a lisinopril refill as well.  Pt states the last time she received 10mg dose in error because it was supposed to be 5mg.  Pt has been breaking them in half but says it is hard to due because the pill crumbles.  Pt would like 5mg refill.   Pt says she put in a refill request through [employer] for oxycodone and zomig as well.  Pls refill all.    Pls call [phone] if questions.  Ok to lm.

## 2021-03-09 NOTE — TELEPHONE ENCOUNTER
Routing refill request to provider for review/approval because:  Labs not current:  Needs CBC; future order placed per RN protocol.    Shad Starks RN

## 2021-03-10 RX ORDER — LISINOPRIL 5 MG/1
5 TABLET ORAL DAILY
Qty: 90 TABLET | Refills: 3 | Status: SHIPPED | OUTPATIENT
Start: 2021-03-10 | End: 2022-03-23

## 2021-03-10 RX ORDER — LORATADINE 10 MG/1
1 TABLET ORAL DAILY
Qty: 90 TABLET | Refills: 0 | Status: SHIPPED | OUTPATIENT
Start: 2021-03-10 | End: 2021-05-04

## 2021-03-10 RX ORDER — IBUPROFEN 800 MG/1
TABLET, FILM COATED ORAL
Qty: 270 TABLET | Refills: 0 | Status: SHIPPED | OUTPATIENT
Start: 2021-03-10 | End: 2021-05-25

## 2021-03-10 RX ORDER — OXYCODONE AND ACETAMINOPHEN 5; 325 MG/1; MG/1
1 TABLET ORAL EVERY 6 HOURS PRN
Qty: 120 TABLET | Refills: 0 | Status: SHIPPED | OUTPATIENT
Start: 2021-03-10 | End: 2021-04-06

## 2021-03-10 RX ORDER — ZOLMITRIPTAN 5 MG/1
TABLET, FILM COATED ORAL
Qty: 9 TABLET | Refills: 0 | Status: SHIPPED | OUTPATIENT
Start: 2021-03-10 | End: 2021-04-07

## 2021-03-10 NOTE — TELEPHONE ENCOUNTER
Patient calling to find out the status of these four refills.  Is out of medications and needs today.    Please call when done or if you have any questions.  OK to LM on VM.

## 2021-03-12 ENCOUNTER — PRE VISIT (OUTPATIENT)
Dept: NEUROLOGY | Facility: CLINIC | Age: 51
End: 2021-03-12

## 2021-03-12 ENCOUNTER — VIRTUAL VISIT (OUTPATIENT)
Dept: FAMILY MEDICINE | Facility: CLINIC | Age: 51
End: 2021-03-12
Payer: COMMERCIAL

## 2021-03-12 ENCOUNTER — MYC MEDICAL ADVICE (OUTPATIENT)
Dept: FAMILY MEDICINE | Facility: CLINIC | Age: 51
End: 2021-03-12

## 2021-03-12 DIAGNOSIS — G43.009 MIGRAINE WITHOUT AURA AND WITHOUT STATUS MIGRAINOSUS, NOT INTRACTABLE: Primary | ICD-10-CM

## 2021-03-12 PROCEDURE — 99213 OFFICE O/P EST LOW 20 MIN: CPT | Performed by: FAMILY MEDICINE

## 2021-03-12 NOTE — PROGRESS NOTES
Arielle aguilar is a 51 year old who is being evaluated via a billable video visit.      How would you like to obtain your AVS? MyChart  If the video visit is dropped, the invitation should be resent by: Send to e-mail at: [email or web address]  Will anyone else be joining your video visit? No    Telephone Start Time: 9:20 AM    A/P:      ICD-10-CM    1. Migraine without aura and without status migrainosus, not intractable  G43.009 NEUROLOGY ADULT REFERRAL     Reviewed current symptoms with pt.  These feel like her typical headaches but worse in the last 2 weeks.  Feels it is related to all the time she spends sitting at the computer with her kids.  Her injections help with this but not able to be seen until 3/22.  Plans to keep that appointment.  Looking for alternate neurology care.  Recommended trial of headache clinic at [facility].  Referral placed.  Advised to try to keep zomig to no more then 9 per month.      Will also try topical medication for neck pain, advised Aspercream or IcyHot    Subjective   Arielle aguilar is a 51 year old who presents for the following health issues   HPI     * referral for neurology    Back Pain  Onset/Duration: 2 months, worsened for the last week   Description:   Location of pain: upper back   Character of pain: dull ache, burning and intermittent  Pain radiation: none  New numbness or weakness in legs, not attributed to pain: no   Intensity: Currently 5-6/10, At its worst 6/10  Progression of Symptoms: worsening and intermittent  History:   Specific cause: none  Pain interferes with job: not applicable  History of back problems: low back pain   Any previous MRI or X-rays: Yes- at [facility]  Sees a specialist for back pain: No  Alleviating factors:   Improved by: cold and opioids    Precipitating factors:  Worsened by: movement  Therapies tried and outcome: ice and percocet are effective         Has been having trouble with [facility].  Had an appointment scheduled and was called the day of  appointment to be told she needed to see someone else as the provider was not a HA specialist.  Feels they are cancelling her appts and not getting her scheduled in a timely manner.    Frustrated with scheduling and follow up.  Thinking about transferring providers.    Pain is at the base of her neck and radiates down.  Sees a doctor for her headaches at [facility] a different provider for injections for migraines and shoulder pain.  Pain in her upper back area seems to be radiating from the neck. Sitting at a table a lot more and looking at the computer.  Using heat massage and ice.  Has never tired topical therapies.    Feels her shots last about 2 months.  Able to get them only every 3 months.  Due on 3/22.  Now for the last 1-2 weeks migraines have gotten a lot worse.  Pain has been bad around her eyes and in her whole head.  These are her typical headaches and do come from the base of her neck.  Was told that she can come in for injections but need to find a different doctor to follow for medication management for headaches.      Review of Systems   Constitutional, HEENT, cardiovascular, pulmonary, gi and gu systems are negative, except as otherwise noted.      Objective           Vitals:  No vitals were obtained today due to virtual visit.    Physical Exam   GENERAL: Healthy, alert and no distress  RESP: No audible wheeze, cough, or visible cyanosis.  No visible retractions or increased work of breathing.    PSYCH: Mentation appears normal, affect normal/bright, judgement and insight intact, normal speech and appearance well-groomed.    Epic reviewed            Video-Visit Details    Type of service: telephone Visit    telephone End Time:  9:31 AM    Total time 11 minutes    Originating Location (pt. Location): Home    Distant Location (provider location):  [facility]     Platform used for Video Visit: Unable to complete video visit  Due to no sound from patient

## 2021-03-12 NOTE — TELEPHONE ENCOUNTER
FUTURE VISIT INFORMATION      FUTURE VISIT INFORMATION:    Date: 3/15/2021    Time: 840am    Location: [facility]  REFERRAL INFORMATION:    Referring provider:  Dr. Stahl    Referring providers clinic:  [facility]     Reason for visit/diagnosis  Migraines     RECORDS REQUESTED FROM:       Clinic name Comments Records Status Imaging Status   Internal [facility]AN[facility] Stahl-3/12/2021, 4/12/2019 [facility] N/A          [facility]   Scanned to Chart and Requested  Requested                        3/12/2021-Request for records faxed to [facility]-MR @ 1015am    3/15/2021-[facility] Records scanned to Chart-MR @ 551am

## 2021-03-15 ENCOUNTER — VIRTUAL VISIT (OUTPATIENT)
Dept: NEUROLOGY | Facility: CLINIC | Age: 51
End: 2021-03-15
Attending: FAMILY MEDICINE
Payer: COMMERCIAL

## 2021-03-15 DIAGNOSIS — R51.9 WORSENING HEADACHES: Primary | ICD-10-CM

## 2021-03-15 DIAGNOSIS — H57.13 PAIN IN PERIORBITAL REGION OF BOTH EYES: ICD-10-CM

## 2021-03-15 PROCEDURE — 99204 OFFICE O/P NEW MOD 45 MIN: CPT | Mod: 95 | Performed by: NURSE PRACTITIONER
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
## 2021-03-15 NOTE — PATIENT INSTRUCTIONS
Plan:  Complete occipital nerve block as scheduled at [facility] on 3/20-we won't be able to get you in sooner.   Chose to stay with one [facility] for better care.   Brain MRI for any secondary causes of worsening of headaches/periorbital pain  Stay hydrated  Vitamin B 2 (riboflavin) 200-400 mg daily OTC   Follow up in person for treatment discussion and images review.   No changes in acute headache treatment -limit use of zomig to no more than 9 per month  Headache treatment focus-headache prevention      Chronic pain and a long term percocet use -follow up with your PCP and Pain management.

## 2021-03-22 ENCOUNTER — TRANSFERRED RECORDS (OUTPATIENT)
Dept: HEALTH INFORMATION MANAGEMENT | Facility: CLINIC | Age: 51
End: 2021-03-22

## 2021-04-06 ENCOUNTER — MYC REFILL (OUTPATIENT)
Dept: FAMILY MEDICINE | Facility: CLINIC | Age: 51
End: 2021-04-06

## 2021-04-06 DIAGNOSIS — M54.2 NECK PAIN: ICD-10-CM

## 2021-04-06 DIAGNOSIS — M51.369 DDD (DEGENERATIVE DISC DISEASE), LUMBAR: ICD-10-CM

## 2021-04-07 DIAGNOSIS — G43.009 MIGRAINE WITHOUT AURA AND WITHOUT STATUS MIGRAINOSUS, NOT INTRACTABLE: ICD-10-CM

## 2021-04-07 RX ORDER — OXYCODONE AND ACETAMINOPHEN 5; 325 MG/1; MG/1
1 TABLET ORAL EVERY 6 HOURS PRN
Qty: 120 TABLET | Refills: 0 | Status: SHIPPED | OUTPATIENT
Start: 2021-04-07 | End: 2021-05-04

## 2021-04-07 RX ORDER — ZOLMITRIPTAN 5 MG/1
TABLET, FILM COATED ORAL
Qty: 9 TABLET | Refills: 0 | Status: SHIPPED | OUTPATIENT
Start: 2021-04-07 | End: 2021-05-04

## 2021-04-07 NOTE — TELEPHONE ENCOUNTER
Routing refill request to provider for review/approval because:  Wanted to make sure PCP saw 3/15/21 Neurology visit notes.  Thank you.  Malik Hillman RN

## 2021-04-07 NOTE — TELEPHONE ENCOUNTER
Pending Prescriptions:                       Disp   Refills    ZOLMitriptan (ZOMIG) 5 MG tablet          9 tabl*0            Sig: TAKE 1 TABLET BY MOUTH AT ONSET OF HEADACHE      [id number] - [place] [facility] AT [address] [place] [address]  Phone: [phone] Fax: [phone]    Namrata Roblero CSS on 4/7/2021 at 10:38 AM

## 2021-04-13 NOTE — TELEPHONE ENCOUNTER
Reason for Call:  Medication or medication refill:    Do you use a [facility]?  Name of the pharmacy and phone number for the current request:  See above    Name of the medication requested: percocet    Other request:     Can we leave a detailed message on this number? YES    Phone number patient can be reached at: Home number on file [phone] (home)    Best Time:     Call taken on 2/5/2018 at 12:35 PM by Malka Altamirano       Statement Selected

## 2021-04-14 ENCOUNTER — APPOINTMENT (OUTPATIENT)
Dept: ULTRASOUND IMAGING | Facility: CLINIC | Age: 51
End: 2021-04-14
Attending: PHYSICIAN ASSISTANT
Payer: COMMERCIAL

## 2021-04-14 ENCOUNTER — APPOINTMENT (OUTPATIENT)
Dept: GENERAL RADIOLOGY | Facility: CLINIC | Age: 51
End: 2021-04-14
Attending: PHYSICIAN ASSISTANT
Payer: COMMERCIAL

## 2021-04-14 ENCOUNTER — OFFICE VISIT (OUTPATIENT)
Dept: FAMILY MEDICINE | Facility: CLINIC | Age: 51
End: 2021-04-14
Payer: COMMERCIAL

## 2021-04-14 ENCOUNTER — APPOINTMENT (OUTPATIENT)
Dept: ULTRASOUND IMAGING | Facility: CLINIC | Age: 51
End: 2021-04-14
Attending: FAMILY MEDICINE
Payer: COMMERCIAL

## 2021-04-14 ENCOUNTER — HOSPITAL ENCOUNTER (EMERGENCY)
Facility: CLINIC | Age: 51
Discharge: HOME | End: 2021-04-14
Attending: PHYSICIAN ASSISTANT | Admitting: PHYSICIAN ASSISTANT
Payer: COMMERCIAL

## 2021-04-14 VITALS
RESPIRATION RATE: 18 BRPM | SYSTOLIC BLOOD PRESSURE: 115 MMHG | BODY MASS INDEX: 39.99 KG/M2 | TEMPERATURE: 98 F | OXYGEN SATURATION: 98 % | DIASTOLIC BLOOD PRESSURE: 78 MMHG | HEART RATE: 92 BPM | HEIGHT: 69 IN | WEIGHT: 270 LBS

## 2021-04-14 VITALS
BODY MASS INDEX: 45.3 KG/M2 | OXYGEN SATURATION: 98 % | HEIGHT: 65 IN | SYSTOLIC BLOOD PRESSURE: 136 MMHG | WEIGHT: 271.9 LBS | TEMPERATURE: 99 F | DIASTOLIC BLOOD PRESSURE: 91 MMHG | RESPIRATION RATE: 17 BRPM | HEART RATE: 93 BPM

## 2021-04-14 DIAGNOSIS — S89.91XA KNEE INJURY, RIGHT, INITIAL ENCOUNTER: ICD-10-CM

## 2021-04-14 DIAGNOSIS — R20.9 COLD RIGHT FOOT: ICD-10-CM

## 2021-04-14 DIAGNOSIS — M79.661 PAIN OF RIGHT LOWER LEG: ICD-10-CM

## 2021-04-14 DIAGNOSIS — M79.604 PAIN OF RIGHT LOWER EXTREMITY: Primary | ICD-10-CM

## 2021-04-14 PROCEDURE — 93971 EXTREMITY STUDY: CPT | Mod: RT

## 2021-04-14 PROCEDURE — G0463 HOSPITAL OUTPT CLINIC VISIT: HCPCS | Mod: 25 | Performed by: PHYSICIAN ASSISTANT

## 2021-04-14 PROCEDURE — 73560 X-RAY EXAM OF KNEE 1 OR 2: CPT | Mod: RT

## 2021-04-14 PROCEDURE — 99214 OFFICE O/P EST MOD 30 MIN: CPT | Performed by: PHYSICIAN ASSISTANT

## 2021-04-14 PROCEDURE — 93926 LOWER EXTREMITY STUDY: CPT | Mod: RT

## 2021-04-14 ASSESSMENT — MIFFLIN-ST. JEOR
SCORE: 1904.09
SCORE: 1849.21

## 2021-04-14 ASSESSMENT — ENCOUNTER SYMPTOMS
WOUND: 0
CARDIOVASCULAR NEGATIVE: 1
RESPIRATORY NEGATIVE: 1
GASTROINTESTINAL NEGATIVE: 1
WEAKNESS: 0
NUMBNESS: 0
NEUROLOGICAL NEGATIVE: 1

## 2021-04-14 NOTE — DISCHARGE INSTRUCTIONS
Ice, elevate, rest, tylenol and ibuprofen over the counter as needed for pain as long as no allergies or contraindications.     Follow up with orthopedic specialist for further evaluation and management.     Use cane or walker to help assist with pain.   Ace wrap to right knee as needed.     Return to the Emergency Room if pain out of proportion, swelling, redness, decreased range of motion in the right knee, or numbness/tingling occur, or change/worsening of symptoms occur.

## 2021-04-14 NOTE — ED PROVIDER NOTES
History     Chief Complaint   Patient presents with     Leg Pain     RLE pain with no known injury     HPI  Arielle Pham is a 51 year old female with history of obesity, chronic prescription opiate use, depression, hyperlipidemia, hypertension, degenerative disc disease, migraine who presents today for right lower leg pain. Patient was seen in clinic and sent for further workup and evaluation. Patient states about a week ago she had intermittent mild right lower extremity pain after she went up and down stairs a few times.  She states that she was using hot and cold packs as well as taking her Percocet 4 times daily which is normal for her.  Last night she states she stepped down off of a step and heard a loud crack/snap in her knee.  She states that since then she has had increasing pain to the back of the knee that occasionally radiates up the back of the leg.  Patient denies any numbness, or weakness.  She states that it is painful to walk but she is able to.  Patient denies any hip or ankle pain.  She also states that last night she noticed that her right foot was cold, but did not notice any significant change in color or rash or swelling to the skin.  While in clinic today it was noted that patient's right foot appeared to be cold to the touch with some pallor and difficulty obtaining pulses in the right lower extremity.  Due to the pain and the exam findings.  Provider contacted the emergency department and talk to one of the ED providers who recommended the patient be seen and evaluated in the ED however that we did not have any sort of vascular specialties or surgeons here in our ED.  Patient was informed of this but still wanted to come to [place] ED today.  Patient arrived and was initially given a venous Doppler ultrasound while waiting in triage.  When she came back to room I informed her that the venous Doppler was negative however we also need to obtain arterial Doppler and x-ray to the right knee  for further evaluation and work-up.  Patient is in agreement with this plan.    Allergies:  Allergies   Allergen Reactions     Hmg-Coa-R Inhibitors Other (See Comments)     Severe joint and muscle ache from Atorvastastin     Codeine Dizziness and Other (See Comments)     Gabapentin Palpitations       Problem List:    Patient Active Problem List    Diagnosis Date Noted     Morbid obesity (H) 06/25/2020     Priority: Medium     Chronic prescription opiate use 12/06/2019     Priority: Medium     Patient is followed by Huong Stahl DO for ongoing prescription of pain medication.  All refills should only be approved by this provider, or covering partner.    Medication(s): percocet 5/325.   Maximum quantity per month: 120  Clinic visit frequency required: Q 3 months      Controlled substance agreement:  Encounter-Level CSA:    There are no encounter-level csa.     Patient-Level CSA:    Controlled Substance Agreement - Opioid - Scan on 4/8/2019  7:49 AM       Pain Clinic evaluation in the past: No    DIRE Total Score(s):  No flowsheet data found.    Last [facility] website verification:  Done recently (within the last 3 months)   [email or web address]             Current moderate episode of major depressive disorder, unspecified whether recurrent (H) 12/06/2019     Priority: Medium     Hyperlipidemia LDL goal <130 12/06/2019     Priority: Medium     Chronic pain syndrome 07/01/2019     Priority: Medium     Related to DJD of lumbar spine and LE.  S/p pain clinic eval through [place] 9/25/17.    Maintained on percocet 5/325 mg.  Max of 4 per day.  #120 for 30 day supply.    Office visit q3 months       Essential hypertension 09/06/2018     Priority: Medium     SI (sacroiliac) joint dysfunction 10/28/2017     Priority: Medium     DDD (degenerative disc disease), lumbar 09/08/2017     Priority: Medium     DJD (degenerative joint disease), ankle and foot, left 08/09/2017     Priority: Medium     Migraine without  "aura and without status migrainosus, not intractable 08/09/2017     Priority: Medium     Nonintractable episodic headache, unspecified headache type 08/09/2017     Priority: Medium     Neck pain 03/06/2015     Priority: Medium     Low back pain 11/25/2013     Priority: Medium     Diagnosis updated by automated process. Provider to review and confirm.          Past Medical History:    Past Medical History:   Diagnosis Date     Kidney stones      TMJ (dislocation of temporomandibular joint)        Past Surgical History:    Past Surgical History:   Procedure Laterality Date     CHOLECYSTECTOMY         Family History:    Family History   Problem Relation Age of Onset     Hypertension Mother      Alcoholism Father        Social History:  Marital Status:   [4]  Social History     Tobacco Use     Smoking status: Never Smoker     Smokeless tobacco: Never Used   Substance Use Topics     Alcohol use: No     Drug use: No        Medications:    aspirin 81 MG EC tablet  ibuprofen (ADVIL/MOTRIN) 800 MG tablet  lisinopril (ZESTRIL) 5 MG tablet  loratadine (CLARITIN) 10 MG tablet  order for DME  oxyCODONE-acetaminophen (PERCOCET) 5-325 MG tablet  ZOLMitriptan (ZOMIG) 5 MG tablet          Review of Systems   Respiratory: Negative.    Cardiovascular: Negative.    Gastrointestinal: Negative.    Musculoskeletal:        Right knee and leg pain.    Skin: Positive for pallor (noted at clinic to the right foot with it also being cold to the touch ). Negative for rash and wound.   Neurological: Negative.  Negative for weakness and numbness.   All other systems reviewed and are negative.      Physical Exam   BP: 115/78  Pulse: 92  Temp: 98  F (36.7  C)  Resp: 18  Height: 175.3 cm (5' 9\")  Weight: 122.5 kg (270 lb)  SpO2: 98 %      Physical Exam  Vitals signs and nursing note reviewed.   Constitutional:       General: She is not in acute distress.     Appearance: Normal appearance. She is obese. She is not ill-appearing, " toxic-appearing or diaphoretic.   Cardiovascular:      Rate and Rhythm: Normal rate and regular rhythm.      Pulses: Normal pulses.           Dorsalis pedis pulses are 2+ on the right side and 2+ on the left side.        Posterior tibial pulses are 2+ on the right side and 2+ on the left side.      Heart sounds: Normal heart sounds.   Pulmonary:      Breath sounds: Normal breath sounds.   Musculoskeletal:      Right hip: Normal.      Right knee: She exhibits decreased range of motion. She exhibits no swelling, no effusion, no ecchymosis, no laceration, no erythema, normal alignment, no LCL laxity, normal patellar mobility, normal meniscus and no MCL laxity. Tenderness (to the posterior right knee with palpation. ) found. No patellar tendon tenderness noted.      Right ankle: Normal. Achilles tendon normal.      Right foot: Normal range of motion and normal capillary refill. No tenderness, bony tenderness, swelling, crepitus, deformity or laceration.      Comments: Slightly colder to the touch than right foot. Capillary refills normal.    Feet:      Right foot:      Skin integrity: No ulcer, blister, skin breakdown, erythema, warmth, callus, dry skin or fissure.   Skin:     General: Skin is warm.      Capillary Refill: Capillary refill takes less than 2 seconds.      Findings: No bruising, erythema or rash.      Comments: Patient with no erythema, warmth, bruising, swelling, rash, or worsening pallor then left foot on exam.    Neurological:      General: No focal deficit present.      Mental Status: She is alert and oriented to person, place, and time.      GCS: GCS eye subscore is 4. GCS verbal subscore is 5. GCS motor subscore is 6.      Sensory: Sensation is intact. No sensory deficit.      Motor: Motor function is intact. No weakness.      Comments: Patient neurovascularly intact. Gait not tested in clinic since patient in wheelchair. Patient states she was able to ambulate today and last night, but painful.     Psychiatric:         Mood and Affect: Mood normal.         Behavior: Behavior normal.         Thought Content: Thought content normal.         Judgment: Judgment normal.         ED Course        Procedures              Critical Care time:  none                Results for orders placed or performed during the hospital encounter of 04/14/21 (from the past 24 hour(s))   US Lower Extremity Venous Duplex Right    Narrative    ULTRASOUND RIGHT LOWER EXTREMITY VENOUS DUPLEX April 14, 2021 1:32 PM    CLINICAL HISTORY: Posterior pain right lower extremity, referred from  clinic to rule out deep vein thrombosis.    TECHNIQUE: Venous Duplex ultrasound of the right lower extremity with  and without compression, augmentation and duplex. Color flow and  spectral Doppler with waveform analysis performed.    COMPARISON: None.    FINDINGS: Exam includes the common femoral, femoral, popliteal, and  contralateral common femoral veins as well as segmentally visualized  deep calf veins and greater saphenous vein.     RIGHT: No deep vein thrombosis. No superficial thrombophlebitis. No  popliteal cyst.      Impression    IMPRESSION: No deep venous thrombosis in the right lower extremity.    FELECIA WAGONER MD   XR Knee Right 1/2 Views    Narrative    XR KNEE RT 1 /2 VW 4/14/2021 4:14 PM     HISTORY: right knee injury yesterday with pain to posterior knee.      Impression    IMPRESSION: Degenerative changes, greatest in the patellofemoral and  medial compartments. Suspected lateral meniscal chondrocalcinosis. No  evidence of acute fracture.    IGLESIA COONEY MD   US Lower Extremity Arterial Duplex Right    Narrative    [facility]    EXAM: DUPLEX ARTERIAL ULTRASOUND OF THE RIGHT LOWER EXTREMITY    INDICATION: Traumatic injury to the right leg. Pain. Cold foot.    COMPARISON: None.    TECHNIQUE: Duplex imaging is performed utilizing gray-scale, two-dimensional images, and color-flow imaging. Doppler waveform analysis and spectral  Doppler imaging is also performed.    DUPLEX ARTERIAL ULTRASOUND FINDINGS:   ARTERIAL DUPLEX WITH WAVEFORMS:  VELOCITIES (cm/s)  RIGHT  CFA: 83; T  PFA: 43; T  SFA-Proximal: 88; T  SFA-Mid: 92; T  SFA-Distal: 57; T  Popliteal: 44; T  HASMUKH-Distal: 87; T  PTA-Distal: 46; T   Peroneal-Distal: 69; T  DPA: 65; T    (M=monophasic, B=biphasic, T=triphasic)       Impression    IMPRESSION:  RIGHT LOWER EXTREMITY: Normal study. Brisk multiphasic vascular waveforms throughout the right lower extremity arteries without evidence of a hemodynamically significant lesion.       Medications - No data to display    Assessments & Plan (with Medical Decision Making)     I have reviewed the nursing notes.    I have reviewed the findings, diagnosis, plan and need for follow up with the patient.  Arielle Pham is a 51 year old female with history of obesity, chronic prescription opiate use, depression, hyperlipidemia, hypertension, degenerative disc disease, migraine who presents today for right lower leg pain. Patient was seen in clinic and sent for further workup and evaluation. Patient states about a week ago she had intermittent mild right lower extremity pain after she went up and down stairs a few times.  She states that she was using hot and cold packs as well as taking her Percocet 4 times daily which is normal for her.  Last night she states she stepped down off of a step and heard a loud crack/snap in her knee.  She states that since then she has had increasing pain to the back of the knee that occasionally radiates up the back of the leg.  Patient denies any numbness, or weakness.  She states that it is painful to walk but she is able to.  Patient denies any hip or ankle pain.  She also states that last night she noticed that her right foot was cold, but did not notice any significant change in color or rash or swelling to the skin.  While in clinic today it was noted that patient's right foot appeared to be cold to the touch  with some pallor and difficulty obtaining pulses in the right lower extremity.  Due to the pain and the exam findings.  Provider contacted the emergency department and talk to one of the ED providers who recommended the patient be seen and evaluated in the ED however that we did not have any sort of vascular specialties or surgeons here in our ED.  Patient was informed of this but still wanted to come to [place] ED today.  Patient arrived and was initially given a venous Doppler ultrasound while waiting in triage.  When she came back to room I informed her that the venous Doppler was negative however we also need to obtain arterial Doppler and x-ray to the right knee for further evaluation and work-up.  Patient is in agreement with this plan.    See exam findings above.  Venous Doppler ultrasound was negative for DVTs, arterial venous ultrasound was also negative for arterial vascular issues.  X-ray to the right knee shows no fracture however degenerative changes noted.  See image results above.  Results discussed with patient and reviewed.  Patient given Ace wrap and informed to use her cane or walker to help her around the house to prevent a fall.  Patient also to ice, rest, elevate, Tylenol and ibuprofen and to use her pain medication as directed that she was previously prescribed for chronic pain.  Patient also given orthopedic information for follow-up.  This time no concerns for any sort of vascular injury that would require vascular surgeon.  Patient's distal pulses were normal in bilateral feet.  Patient agrees with this plan and discharged in stable condition. Follow up with orthopedic specialist or primary care provider in 2-3 days.     New Prescriptions    No medications on file       Final diagnoses:   Pain of right lower leg   Knee injury, right, initial encounter       4/14/2021   [facility] EMERGENCY DEPT     Barbara Keith PA-C  04/14/21 180

## 2021-04-14 NOTE — PROGRESS NOTES
"    Assessment & Plan     ASSESSMENT/PLAN:      ICD-10-CM    1. Pain of right lower extremity  M79.604    2. Cold right foot  R20.9      Concern with extreme pain out of proportion with exam and vascular compromise.  Discussed case with ER doctor, who advised if needed they would need to transfer her to hospital with vascular surgeon. I discussed this with patient, she elected to start evaluation at [place] vs going to [facility] or [facility]. Advised she go urgently and may need to be transferred depending on evaluation. She agrees to plan. Called ER to give report.      30 minutes spent on the date of the encounter doing chart review, history and exam, documentation and further activities per the note      Return today (on 4/14/2021) for ER.    MARYCRUZ Polanco [facility]    Johny aguilar is a 51 year old who presents for the following health issues  accompanied by her daughter:    HPI   Chief Complaint   Patient presents with     Leg Pain     right leg, 1 week ago was going up the stairs and felt like she pulled something. States last night she went to step down the stairs and heard a big loud crack and has been extremely painful since then     Initial mild pain started 1 week ago: She had gone up 5 stairs then went back down right away, and posterior knee/leg started hurting  Hot/cold packs, percocet 4 times daily normally.    Then last night stepped down off deck heard a loud crack/snap in knee, could barely make it to car.  Pain posterior knee, up back of leg, into groin  Extreme pain since last night as well as foot has been very cold  No paresthesias or true weakness      Review of Systems   Other than noted above, general, HEENT, respiratory, cardiac, MS, and gastrointestinal systems are negative.       Objective    BP (!) 136/91   Pulse 93   Temp 99  F (37.2  C) (Tympanic)   Resp 17   Ht 1.651 m (5' 5\")   Wt 123.3 kg (271 lb 14.4 oz)   SpO2 98%   BMI 45.25 kg/m    Body " mass index is 45.25 kg/m .  Physical Exam   GENERAL: healthy, alert and no distress  RESP: lungs clear to auscultation - no rales, rhonchi or wheezes  CV: regular rate and rhythm, normal S1 S2, no S3 or S4, no murmur, click or rub, no peripheral edema and peripheral pulses strong  MS:   Knee: limited flexion/extension of right knee, pain with these movements to posterior knee/hamstring area  Mild tenderness to lateral hamstring insertion on palpation. No other tenderness to palpation of knee or upper leg.  Pain with passive range of motion of ankle. Pain with active range of motion to ankle. Both times pain is in posterior knee/hamstring area.  No erythema or ecchymosis to posterior leg. No obvious swelling.  Tender to palpate bilateral shins  Hip: normal ROM passively with no pain. No tenderness to palpate groin/pelvis  Right foot: pallor (white in comparison with sarah tone of left foot). Capillary refill of toes <3 seconds. Unable to palpate distal pulses of right foot/ankle however concerned due to body habitus. Foot feels icy cold comparison to warm left foot. Patient has normal light and deep touch sensation compared to left.  Did not feel tense/firm compartment however majority of pain is in deep posterior upper leg.

## 2021-04-15 NOTE — TELEPHONE ENCOUNTER
Needs follow up appointment per Dr Stahl   [facility] Pregnancy And Lactation Text: This medication is Pregnancy Category X and is absolutely contraindicated during pregnancy. It is unknown if it is excreted in breast milk.

## 2021-05-04 ENCOUNTER — MYC REFILL (OUTPATIENT)
Dept: FAMILY MEDICINE | Facility: CLINIC | Age: 51
End: 2021-05-04

## 2021-05-04 DIAGNOSIS — M54.2 NECK PAIN: ICD-10-CM

## 2021-05-04 DIAGNOSIS — G43.009 MIGRAINE WITHOUT AURA AND WITHOUT STATUS MIGRAINOSUS, NOT INTRACTABLE: ICD-10-CM

## 2021-05-04 DIAGNOSIS — M51.369 DDD (DEGENERATIVE DISC DISEASE), LUMBAR: ICD-10-CM

## 2021-05-04 DIAGNOSIS — J30.2 SEASONAL ALLERGIC RHINITIS, UNSPECIFIED TRIGGER: ICD-10-CM

## 2021-05-05 RX ORDER — LORATADINE 10 MG/1
1 TABLET ORAL DAILY
Qty: 90 TABLET | Refills: 0 | OUTPATIENT
Start: 2021-05-05

## 2021-05-05 RX ORDER — LORATADINE 10 MG/1
1 TABLET ORAL DAILY
Qty: 90 TABLET | Refills: 1 | Status: SHIPPED | OUTPATIENT
Start: 2021-05-05 | End: 2022-04-04

## 2021-05-05 NOTE — TELEPHONE ENCOUNTER
Routing refill request to provider for review/approval because:  PCP to determine refill     Shad Starks RN

## 2021-05-05 NOTE — TELEPHONE ENCOUNTER
Routing refill request to provider for review/approval because:  Drug not on the [facility] refill protocol     Shad Starks RN

## 2021-05-05 NOTE — TELEPHONE ENCOUNTER
Routing refill request to provider for review/approval because:  Drug not on the [facility] refill protocol   Drug interaction warning  PCP to determine refill    Shad Starks RN

## 2021-05-06 RX ORDER — OXYCODONE AND ACETAMINOPHEN 5; 325 MG/1; MG/1
1 TABLET ORAL EVERY 6 HOURS PRN
Qty: 120 TABLET | Refills: 0 | Status: SHIPPED | OUTPATIENT
Start: 2021-05-06 | End: 2021-06-09

## 2021-05-06 RX ORDER — OXYCODONE AND ACETAMINOPHEN 5; 325 MG/1; MG/1
1 TABLET ORAL EVERY 6 HOURS PRN
Qty: 120 TABLET | Refills: 0 | OUTPATIENT
Start: 2021-05-06

## 2021-05-06 RX ORDER — ZOLMITRIPTAN 5 MG/1
TABLET, FILM COATED ORAL
Qty: 9 TABLET | Refills: 0 | OUTPATIENT
Start: 2021-05-06

## 2021-05-06 RX ORDER — ZOLMITRIPTAN 5 MG/1
TABLET, FILM COATED ORAL
Qty: 9 TABLET | Refills: 0 | Status: SHIPPED | OUTPATIENT
Start: 2021-05-06 | End: 2021-06-09

## 2021-05-13 ENCOUNTER — MYC MEDICAL ADVICE (OUTPATIENT)
Dept: NEUROLOGY | Facility: CLINIC | Age: 51
End: 2021-05-13

## 2021-05-18 ENCOUNTER — TELEPHONE (OUTPATIENT)
Dept: FAMILY MEDICINE | Facility: CLINIC | Age: 51
End: 2021-05-18

## 2021-05-23 DIAGNOSIS — M53.3 SI (SACROILIAC) JOINT DYSFUNCTION: ICD-10-CM

## 2021-05-23 DIAGNOSIS — M19.072 DJD (DEGENERATIVE JOINT DISEASE), ANKLE AND FOOT, LEFT: ICD-10-CM

## 2021-05-23 DIAGNOSIS — M51.369 DDD (DEGENERATIVE DISC DISEASE), LUMBAR: ICD-10-CM

## 2021-05-24 NOTE — TELEPHONE ENCOUNTER
Routing refill request to provider for review/approval because:  No CBC on file within past 12 months.    PAT [place]

## 2021-05-25 ENCOUNTER — OFFICE VISIT (OUTPATIENT)
Dept: FAMILY MEDICINE | Facility: CLINIC | Age: 51
End: 2021-05-25
Payer: COMMERCIAL

## 2021-05-25 ENCOUNTER — MEDICAL CORRESPONDENCE (OUTPATIENT)
Dept: HEALTH INFORMATION MANAGEMENT | Facility: CLINIC | Age: 51
End: 2021-05-25

## 2021-05-25 ENCOUNTER — MYC MEDICAL ADVICE (OUTPATIENT)
Dept: FAMILY MEDICINE | Facility: CLINIC | Age: 51
End: 2021-05-25

## 2021-05-25 VITALS
HEIGHT: 66 IN | DIASTOLIC BLOOD PRESSURE: 78 MMHG | TEMPERATURE: 97.6 F | BODY MASS INDEX: 44.84 KG/M2 | OXYGEN SATURATION: 96 % | SYSTOLIC BLOOD PRESSURE: 128 MMHG | WEIGHT: 279 LBS | HEART RATE: 94 BPM

## 2021-05-25 DIAGNOSIS — Z79.891 CHRONIC PRESCRIPTION OPIATE USE: ICD-10-CM

## 2021-05-25 DIAGNOSIS — Z00.00 ROUTINE GENERAL MEDICAL EXAMINATION AT A HEALTH CARE FACILITY: Primary | ICD-10-CM

## 2021-05-25 DIAGNOSIS — E66.01 MORBID OBESITY (H): ICD-10-CM

## 2021-05-25 DIAGNOSIS — E78.5 HYPERLIPIDEMIA LDL GOAL <130: ICD-10-CM

## 2021-05-25 DIAGNOSIS — F32.1 CURRENT MODERATE EPISODE OF MAJOR DEPRESSIVE DISORDER, UNSPECIFIED WHETHER RECURRENT (H): ICD-10-CM

## 2021-05-25 DIAGNOSIS — M19.072 DJD (DEGENERATIVE JOINT DISEASE), ANKLE AND FOOT, LEFT: ICD-10-CM

## 2021-05-25 DIAGNOSIS — Z11.51 SPECIAL SCREENING EXAMINATION FOR HUMAN PAPILLOMAVIRUS (HPV): ICD-10-CM

## 2021-05-25 DIAGNOSIS — M25.561 CHRONIC PAIN OF RIGHT KNEE: ICD-10-CM

## 2021-05-25 DIAGNOSIS — I10 ESSENTIAL HYPERTENSION: ICD-10-CM

## 2021-05-25 DIAGNOSIS — Z11.4 SCREENING FOR HIV (HUMAN IMMUNODEFICIENCY VIRUS): ICD-10-CM

## 2021-05-25 DIAGNOSIS — Z12.4 SCREENING FOR CERVICAL CANCER: ICD-10-CM

## 2021-05-25 DIAGNOSIS — M51.369 DDD (DEGENERATIVE DISC DISEASE), LUMBAR: ICD-10-CM

## 2021-05-25 DIAGNOSIS — Z12.11 SPECIAL SCREENING FOR MALIGNANT NEOPLASMS, COLON: ICD-10-CM

## 2021-05-25 DIAGNOSIS — Z12.31 ENCOUNTER FOR SCREENING MAMMOGRAM FOR BREAST CANCER: ICD-10-CM

## 2021-05-25 DIAGNOSIS — G89.29 CHRONIC PAIN OF RIGHT KNEE: ICD-10-CM

## 2021-05-25 DIAGNOSIS — M53.3 SI (SACROILIAC) JOINT DYSFUNCTION: ICD-10-CM

## 2021-05-25 DIAGNOSIS — Z11.59 ENCOUNTER FOR HEPATITIS C SCREENING TEST FOR LOW RISK PATIENT: ICD-10-CM

## 2021-05-25 LAB
ALBUMIN SERPL-MCNC: 3.9 G/DL (ref 3.4–5)
ALP SERPL-CCNC: 89 U/L (ref 40–150)
ALT SERPL W P-5'-P-CCNC: 66 U/L (ref 0–50)
AMPHETAMINES UR QL: NOT DETECTED NG/ML
ANION GAP SERPL CALCULATED.3IONS-SCNC: 8 MMOL/L (ref 3–14)
AST SERPL W P-5'-P-CCNC: 26 U/L (ref 0–45)
BARBITURATES UR QL SCN: NOT DETECTED NG/ML
BENZODIAZ UR QL SCN: NOT DETECTED NG/ML
BILIRUB SERPL-MCNC: 0.3 MG/DL (ref 0.2–1.3)
BUN SERPL-MCNC: 19 MG/DL (ref 7–30)
BUPRENORPHINE UR QL: NOT DETECTED NG/ML
CALCIUM SERPL-MCNC: 9.3 MG/DL (ref 8.5–10.1)
CANNABINOIDS UR QL: NOT DETECTED NG/ML
CHLORIDE SERPL-SCNC: 105 MMOL/L (ref 94–109)
CHOLEST SERPL-MCNC: 273 MG/DL
CO2 SERPL-SCNC: 24 MMOL/L (ref 20–32)
COCAINE UR QL SCN: NOT DETECTED NG/ML
CREAT SERPL-MCNC: 0.89 MG/DL (ref 0.52–1.04)
D-METHAMPHET UR QL: NOT DETECTED NG/ML
ERYTHROCYTE [DISTWIDTH] IN BLOOD BY AUTOMATED COUNT: 13.2 % (ref 10–15)
GFR SERPL CREATININE-BSD FRML MDRD: 75 ML/MIN/{1.73_M2}
GLUCOSE SERPL-MCNC: 89 MG/DL (ref 70–99)
HCT VFR BLD AUTO: 43.8 % (ref 35–47)
HCV AB SERPL QL IA: NONREACTIVE
HDLC SERPL-MCNC: 55 MG/DL
HGB BLD-MCNC: 14.6 G/DL (ref 11.7–15.7)
HIV 1+2 AB+HIV1 P24 AG SERPL QL IA: NONREACTIVE
LDLC SERPL CALC-MCNC: 183 MG/DL
MCH RBC QN AUTO: 31.7 PG (ref 26.5–33)
MCHC RBC AUTO-ENTMCNC: 33.3 G/DL (ref 31.5–36.5)
MCV RBC AUTO: 95 FL (ref 78–100)
METHADONE UR QL SCN: NOT DETECTED NG/ML
NONHDLC SERPL-MCNC: 218 MG/DL
OPIATES UR QL SCN: NOT DETECTED NG/ML
OXYCODONE UR QL SCN: ABNORMAL NG/ML
PCP UR QL SCN: NOT DETECTED NG/ML
PLATELET # BLD AUTO: 298 10E9/L (ref 150–450)
POTASSIUM SERPL-SCNC: 4.5 MMOL/L (ref 3.4–5.3)
PROPOXYPH UR QL: NOT DETECTED NG/ML
PROT SERPL-MCNC: 7.4 G/DL (ref 6.8–8.8)
RBC # BLD AUTO: 4.61 10E12/L (ref 3.8–5.2)
SODIUM SERPL-SCNC: 137 MMOL/L (ref 133–144)
TRICYCLICS UR QL SCN: NOT DETECTED NG/ML
TRIGL SERPL-MCNC: 175 MG/DL
WBC # BLD AUTO: 6.7 10E9/L (ref 4–11)

## 2021-05-25 PROCEDURE — 80061 LIPID PANEL: CPT | Performed by: FAMILY MEDICINE

## 2021-05-25 PROCEDURE — G0145 SCR C/V CYTO,THINLAYER,RESCR: HCPCS | Performed by: FAMILY MEDICINE

## 2021-05-25 PROCEDURE — 36415 COLL VENOUS BLD VENIPUNCTURE: CPT | Performed by: FAMILY MEDICINE

## 2021-05-25 PROCEDURE — 87624 HPV HI-RISK TYP POOLED RSLT: CPT | Performed by: FAMILY MEDICINE

## 2021-05-25 PROCEDURE — 99396 PREV VISIT EST AGE 40-64: CPT | Performed by: FAMILY MEDICINE

## 2021-05-25 PROCEDURE — 87389 HIV-1 AG W/HIV-1&-2 AB AG IA: CPT | Performed by: FAMILY MEDICINE

## 2021-05-25 PROCEDURE — 86803 HEPATITIS C AB TEST: CPT | Performed by: FAMILY MEDICINE

## 2021-05-25 PROCEDURE — 85027 COMPLETE CBC AUTOMATED: CPT | Performed by: FAMILY MEDICINE

## 2021-05-25 PROCEDURE — 99214 OFFICE O/P EST MOD 30 MIN: CPT | Mod: 25 | Performed by: FAMILY MEDICINE

## 2021-05-25 PROCEDURE — 80053 COMPREHEN METABOLIC PANEL: CPT | Performed by: FAMILY MEDICINE

## 2021-05-25 PROCEDURE — 80306 DRUG TEST PRSMV INSTRMNT: CPT | Performed by: FAMILY MEDICINE

## 2021-05-25 RX ORDER — IBUPROFEN 800 MG/1
TABLET, FILM COATED ORAL
Qty: 270 TABLET | Refills: 3 | Status: SHIPPED | OUTPATIENT
Start: 2021-05-25 | End: 2022-06-27

## 2021-05-25 ASSESSMENT — MIFFLIN-ST. JEOR: SCORE: 1897.29

## 2021-05-25 ASSESSMENT — PATIENT HEALTH QUESTIONNAIRE - PHQ9
5. POOR APPETITE OR OVEREATING: SEVERAL DAYS
SUM OF ALL RESPONSES TO PHQ QUESTIONS 1-9: 12

## 2021-05-25 ASSESSMENT — ANXIETY QUESTIONNAIRES
7. FEELING AFRAID AS IF SOMETHING AWFUL MIGHT HAPPEN: SEVERAL DAYS
GAD7 TOTAL SCORE: 10
3. WORRYING TOO MUCH ABOUT DIFFERENT THINGS: MORE THAN HALF THE DAYS
5. BEING SO RESTLESS THAT IT IS HARD TO SIT STILL: SEVERAL DAYS
1. FEELING NERVOUS, ANXIOUS, OR ON EDGE: MORE THAN HALF THE DAYS
6. BECOMING EASILY ANNOYED OR IRRITABLE: SEVERAL DAYS
2. NOT BEING ABLE TO STOP OR CONTROL WORRYING: MORE THAN HALF THE DAYS

## 2021-05-25 NOTE — PROGRESS NOTES
"   SUBJECTIVE:   CC: Arielle Pham is an 51 year old woman who presents for preventive health visit.       Patient has been advised of split billing requirements and indicates understanding: Yes     Healthy Habits:    Do you get at least three servings of calcium containing foods daily (dairy, green leafy vegetables, etc.)? yes    Amount of exercise or daily activities, outside of work: 5 day(s) per week    Problems taking medications regularly No    Medication side effects: No    Have you had an eye exam in the past two years? yes    Do you see a dentist twice per year? yes    Do you have sleep apnea, excessive snoring or daytime drowsiness?yes      Concerns:  * right leg pain     Still having random pain behind her R knee.  Notices it mostly when going up and down stairs or bending to get into a car.  Has been walking daily and bothers some with activity but not bad.  Feels it some when biking but not bad.  On occasional random, will feel cold on her toes or a 'tingling\".  Seems to resolve if she elevated the leg and puts ice on the back of it.      Randomly will feel a \"crack\" in her knee, can be both knees.  This is what happened the night before her acute pain episode in April.   Feels like it \"locks\" when this happens.  Pain when this occurs is excruciating.     Last MRI she had was difficult due to her back pain, has a hard time laying still.  Needs to do an open MRI.  Uses [facility] for imaging.  R knee    *  Weight management   Follow up from virtual visit to discuss.  Pt really not a candidate for any of the medication options.  Intolerant of phentermine and topiramate (as well as h/o kidney stones).  H/o pancreatitis so cannot use GLP1 and on chronic opiates so naltrexone is not an option.  Not interested in surgery.  Has been tryuing to exercie more which has been helping and continues to follow her strict diet.  Would be willing to visit with weight management.    * following with HA program at the [facility]. "  Has appointment upcoming in June.      *  Chronic pain:  Has been stable.  No side effects noted from opiate use.  Continues to follow with her podiatrist and neurology.  Pain medication helps her to function in activities of daily living.          Today's PHQ-2 Score:   PHQ-2 ( 1999 Pfizer) 5/25/2021 9/6/2018   Q1: Little interest or pleasure in doing things 1 2   Q2: Feeling down, depressed or hopeless 1 2   PHQ-2 Score 2 4       Abuse: Current or Past(Physical, Sexual or Emotional)- No  Do you feel safe in your environment? Yes    Have you ever done Advance Care Planning? (For example, a Health Directive, POLST, or a discussion with a medical provider or your loved ones about your wishes): no    Social History     Tobacco Use     Smoking status: Never Smoker     Smokeless tobacco: Never Used   Substance Use Topics     Alcohol use: No     If you drink alcohol do you typically have >3 drinks per day or >7 drinks per week? No                     Reviewed orders with patient.  Reviewed health maintenance and updated orders accordingly - Yes    FSH-7: No flowsheet data found.    Mammogram Screening: Recommended annual mammography  Pertinent mammograms are reviewed under the imaging tab.    Pertinent mammograms are reviewed under the imaging tab.  History of abnormal Pap smear: NO - age 30-65 PAP every 5 years with negative HPV co-testing recommended     Reviewed and updated as needed this visit by clinical staff  Tobacco  Allergies  Meds   Med Hx  Surg Hx  Fam Hx  Soc Hx        Reviewed and updated as needed this visit by Provider  Tobacco  Allergies  Meds   Med Hx  Surg Hx  Fam Hx  Soc Hx       Past Medical History:   Diagnosis Date     Kidney stones      TMJ (dislocation of temporomandibular joint)       Past Surgical History:   Procedure Laterality Date     CHOLECYSTECTOMY         ROS:  CONSTITUTIONAL: NEGATIVE for fever, chills, change in weight  INTEGUMENTARU/SKIN: NEGATIVE for worrisome rashes,  "moles or lesions  EYES: NEGATIVE for vision changes or irritation  ENT: NEGATIVE for ear, mouth and throat problems  RESP: NEGATIVE for significant cough or SOB  BREAST: NEGATIVE for masses, tenderness or discharge  CV: NEGATIVE for chest pain, palpitations or peripheral edema  GI: NEGATIVE for nausea, abdominal pain, heartburn, or change in bowel habits  : NEGATIVE for unusual urinary or vaginal symptoms. Periods have been irregular, might go 1-2 months between periods.  Has been ongoing for 1-2 years.  No hot flashes.night sweats.  Flow is normal.  No h/o abnormal pap smear.  MUSCULOSKELETAL:as above  NEURO: NEGATIVE for weakness, dizziness or paresthesias  PSYCHIATRIC:mood is \"okay\".  Improved now that they are getting out a bit more.  Has had side effectrs with multiple medications for mood in the past and has not had success with therapy.  Not interested in pursuing treatment at this time          OBJECTIVE:   /78   Pulse 94   Temp 97.6  F (36.4  C) (Tympanic)   Ht 1.676 m (5' 6\")   Wt 126.6 kg (279 lb)   LMP 04/01/2021 (Approximate)   SpO2 96%   Breastfeeding No   BMI 45.03 kg/m    EXAM:  GENERAL: healthy, alert and no distress  EYES: Eyes grossly normal to inspection, PERRL and conjunctivae and sclerae normal  HENT: normal cephalic/atraumatic and ear canals and TM's normal  NECK: no adenopathy, no asymmetry, masses, or scars and thyroid normal to palpation  RESP: lungs clear to auscultation - no rales, rhonchi or wheezes  BREAST: normal without masses, tenderness or nipple discharge and no palpable axillary masses or adenopathy  CV: regular rate and rhythm, normal S1 S2, no S3 or S4, no murmur, click or rub, no peripheral edema and peripheral pulses strong  ABDOMEN: soft, nontender, no hepatosplenomegaly, no masses and bowel sounds normal   (female): normal female external genitalia, normal urethral meatus, vaginal mucosa pink, moist, well rugated, and normal cervix/adnexa/uterus without " masses or discharge.  Exam limited by body habitus  MS: no gross musculoskeletal defects noted, no edema  NEURO: Normal strength and tone, mentation intact and speech normal  PSYCH: mentation appears normal, affect normal/bright    Diagnostic Test Results:  Labs reviewed in Epic    ASSESSMENT/PLAN:       ICD-10-CM    1. Routine general medical examination at a health care facility  Z00.00    2. Essential hypertension  I10 Comprehensive metabolic panel (BMP + Alb, Alk Phos, ALT, AST, Total. Bili, TP)   3. Hyperlipidemia LDL goal <130  E78.5 Lipid panel reflex to direct LDL Fasting     Comprehensive metabolic panel (BMP + Alb, Alk Phos, ALT, AST, Total. Bili, TP)   4. Current moderate episode of major depressive disorder, unspecified whether recurrent (H)  F32.1    5. Chronic prescription opiate use  Z79.891 Drug Abuse Screen Panel 13, Urine (Care Map)   6. Morbid obesity (H)  E66.01 COMPREHENSIVE WEIGHT MANAGEMENT   7. DJD (degenerative joint disease), ankle and foot, left  M19.072 **CBC with platelets FUTURE anytime   8. SI (sacroiliac) joint dysfunction  M53.3 **CBC with platelets FUTURE anytime   9. DDD (degenerative disc disease), lumbar  M51.36 **CBC with platelets FUTURE anytime   10. Screening for cervical cancer  Z12.4 Pap imaged thin layer screen with HPV - recommended age 30 - 65 years (select HPV order below)   11. Special screening examination for human papillomavirus (HPV)  Z11.51 HPV High Risk Types DNA Cervical   12. Encounter for hepatitis C screening test for low risk patient  Z11.59 **Hepatitis C Screen Reflex to RNA FUTURE anytime   13. Special screening for malignant neoplasms, colon  Z12.11 Fecal colorectal cancer screen (FIT)   14. Screening for HIV (human immunodeficiency virus)  Z11.4 HIV Antigen Antibody Combo   15. Encounter for screening mammogram for breast cancer  Z12.31 *MA Screening Digital Bilateral     HTN:  Controlled., continue meds.    Lipids:  Recheck today    Depression:   "Stable/improved.  Not interested in treatment, continue to follow.    Chronic pain/opiate use: Pain remains controlled.  Pain letter signed,  reviewed, UDS today.  F/u virtually in 3 months.    Obesity:  Referred to weight management    R knee pain:  Suspect meniscal injury.  MRI ordered for [facility] per pt request.  Patient has been advised of split billing requirements and indicates understanding: Yes  COUNSELING:   Reviewed preventive health counseling, as reflected in patient instructions    Estimated body mass index is 45.03 kg/m  as calculated from the following:    Height as of this encounter: 1.676 m (5' 6\").    Weight as of this encounter: 126.6 kg (279 lb).    Weight management plan: Patient referred to endocrine and/or weight management specialty    She reports that she has never smoked. She has never used smokeless tobacco.      Counseling Resources:  ATP IV Guidelines  Pooled Cohorts Equation Calculator  Breast Cancer Risk Calculator  BRCA-Related Cancer Risk Assessment: FHS-7 Tool  FRAX Risk Assessment  ICSI Preventive Guidelines  Dietary Guidelines for Americans, 2010  USDA's MyPlate  ASA Prophylaxis  Lung CA Screening    Huong Stahl DO  [facility]  "

## 2021-05-25 NOTE — PATIENT INSTRUCTIONS
You should get a sadia to schedule with the weight management clinic.    We'll get the MRI order placed through [facility] and they should call you to schedule for the knee.    You can call [phone] to schedule your mammogram.  This should be done yearly.    I'll get your results to you through [facility] when available.      Preventive Health Recommendations  Female Ages 50 - 64    Yearly exam: See your health care provider every year in order to  o Review health changes.   o Discuss preventive care.    o Review your medicines if your doctor has prescribed any.      Get a Pap test every three years (unless you have an abnormal result and your provider advises testing more often).    If you get Pap tests with HPV test, you only need to test every 5 years, unless you have an abnormal result.     You do not need a Pap test if your uterus was removed (hysterectomy) and you have not had cancer.    You should be tested each year for STDs (sexually transmitted diseases) if you're at risk.     Have a mammogram every 1 to 2 years.    Have a colonoscopy at age 50, or have a yearly FIT test (stool test). These exams screen for colon cancer.      Have a cholesterol test every 5 years, or more often if advised.    Have a diabetes test (fasting glucose) every three years. If you are at risk for diabetes, you should have this test more often.     If you are at risk for osteoporosis (brittle bone disease), think about having a bone density scan (DEXA).    Shots: Get a flu shot each year. Get a tetanus shot every 10 years.    Nutrition:     Eat at least 5 servings of fruits and vegetables each day.    Eat whole-grain bread, whole-wheat pasta and brown rice instead of white grains and rice.    Get adequate Calcium and Vitamin D.     Lifestyle    Exercise at least 150 minutes a week (30 minutes a day, 5 days a week). This will help you control your weight and prevent disease.    Limit alcohol to one drink per day.    No smoking.     Wear  sunscreen to prevent skin cancer.     See your dentist every six months for an exam and cleaning.    See your eye doctor every 1 to 2 years.

## 2021-05-25 NOTE — LETTER
Opioid / Opioid Plus Controlled Substance Agreement    This is an agreement between you and your provider about the safe and appropriate use of controlled substance/opioids prescribed by your care team. Controlled substances are medicines that can cause physical and mental dependence (abuse).    There are strict laws about having and using these medicines. We here at [facility] are committing to working with you in your efforts to get better. To support you in this work, we ll help you schedule regular office appointments for medicine refills. If we must cancel or change your appointment for any reason, we ll make sure you have enough medicine to last until your next appointment.     As a Provider, I will:    Listen carefully to your concerns and treat you with respect.     Recommend a treatment plan that I believe is in your best interest. This plan may involve therapies other than opioid pain medication.     Talk with you often about the possible benefits, and the risk of harm of any medicine that we prescribe for you.     Provide a plan on how to taper (discontinue or go off) using this medicine if the decision is made to stop its use.    As a Patient, I understand that opioid(s):     Are a controlled substance prescribed by my care team to help me function or work and manage my condition(s).     Are strong medicines and can cause serious side effects such as:    Drowsiness, which can seriously affect my driving ability    A lower breathing rate, enough to cause death    Harm to my thinking ability     Depression     Abuse of and addiction to this medicine    Need to be taken exactly as prescribed. Combining opioids with certain medicines or chemicals (such as illegal drugs, sedatives, sleeping pills, and benzodiazepines) can be dangerous or even fatal. If I stop opioids suddenly, I may have severe withdrawal symptoms.    Do not work for all types of pain nor for all patients. If they re not helpful, I may  be asked to stop them.        The risks, benefits and side effects of these medicine(s) were explained to me. I agree that:  1. I will take part in other treatments as advised by my care team. This may be psychiatry or counseling, physical therapy, behavioral therapy, group treatment or a referral to a specialist.     2. I will keep all my appointments. I understand that this is part of the monitoring of opioids. My care team may require an office visit for EVERY opioid/controlled substance refill. If I miss appointments or don t follow instructions, my care team may stop my medicine.    3. I will take my medicines as prescribed. I will not change the dose or schedule unless my care team tells me to. There will be no refills if I run out early.     4. I may be asked to come to the clinic and complete a urine drug test or complete a pill count at any time. If I don t give a urine sample or participate in a pill count, the care team may stop my medicine.    5. I will only receive prescriptions from this clinic for chronic pain. If I am treated by another provider for acute pain issues, I will tell them that I am taking opioid pain medication for chronic pain and that I have a treatment agreement with this provider. I will inform my [facility] care team within one business day if I am given a prescription for any pain medication by another healthcare provider. My [facility] care team can contact other providers and pharmacists about my use of any medicines.    6. It is up to me to make sure that I don t run out of my medicines on weekends or holidays. If my care team is willing to refill my opioid prescription without a visit, I must request refills only during office hours. Refills may take up to 3 business days to process. I will use one pharmacy to fill all my opioid and other controlled substance prescriptions. I will notify the clinic about any changes to my insurance or medication  availability.    7. I am responsible for my prescriptions. If the medicine/prescription is lost, stolen or destroyed, it will not be replaced. I also agree not to share controlled substance medicines with anyone.    8. I am aware I should not use any illegal or recreational drugs. I agree not to drink alcohol unless my care team says I can.       9. If I enroll in the [place] [facility] Cann[facility] program, I will tell my care team prior to my next refill.     10. I will tell my care team right away if I become pregnant, have a new medical problem treated outside of my regular clinic, or have a change in my medications.    11. I understand that this medicine can affect my thinking, judgment and reaction time. Alcohol and drugs affect the brain and body, which can affect the safety of my driving. Being under the influence of alcohol or drugs can affect my decision-making, behaviors, personal safety, and the safety of others. Driving while impaired (DWI) can occur if a person is driving, operating, or in physical control of a car, motorcycle, boat, snowmobile, ATV, motorbike, off-road vehicle, or any other motor vehicle ([place] Statute 169A.20). I understand the risk if I choose to drive or operate any vehicle or machinery.    I understand that if I do not follow any of the conditions above, my prescriptions or treatment may be stopped or changed.          Opioids  What You Need to Know    What are opioids?   Opioids are pain medicines that must be prescribed by a doctor. They are also known as narcotics.     Examples are:   1. morphine (MS Contin, Lucia)  2. oxycodone (Oxycontin)  3. oxycodone and acetaminophen (Percocet)  4. hydrocodone and acetaminophen (Vicodin, Norco)   5. fentanyl patch (Duragesic)   6. hydromorphone (Dilaudid)   7. methadone  8. codeine (Tylenol #3)     What do opioids do well?   Opioids are best for severe short-term pain such as after a surgery or injury. They may work well for cancer pain. They may  help some people with long-lasting (chronic) pain.     What do opioids NOT do well?   Opioids never get rid of pain entirely, and they don t work well for most patients with chronic pain. Opioids don t reduce swelling, one of the causes of pain.                                    Other ways to manage chronic pain and improve function include:       Treat the health problem that may be causing pain    Anti-inflammation medicines, which reduce swelling and tenderness, such as ibuprofen (Advil, Motrin) or naproxen (Aleve)    Acetaminophen (Tylenol)    Antidepressants and anti-seizure medicines, especially for nerve pain    Topical treatments such as patches or creams    Injections or nerve blocks    Chiropractic or osteopathic treatment    Acupuncture, massage, deep breathing, meditation, visual imagery, aromatherapy    Use heat or ice at the pain site    Physical therapy     Exercise    Stop smoking    Take part in therapy       Risks and side effects     Talk to your doctor before you start or decide to keep taking opioids. Possible side effects include:      Lowering your breathing rate enough to cause death    Overdose, including death, especially if taking higher than prescribed doses    Worse depression symptoms; less pleasure in things you usually enjoy    Feeling tired or sluggish    Slower thoughts or cloudy thinking    Being more sensitive to pain over time; pain is harder to control    Trouble sleeping or restless sleep    Changes in hormone levels (for example, less testosterone)    Changes in sex drive or ability to have sex    Constipation    Unsafe driving    Itching and sweating    Dizziness    Nausea, throwing up and dry mouth    What else should I know about opioids?    Opioids may lead to dependence, tolerance, or addiction.      Dependence means that if you stop or reduce the medicine too quickly, you will have withdrawal symptoms. These include loose poop (diarrhea), jitters, flu-like symptoms,  nervousness and tremors. Dependence is not the same as addiction.                       Tolerance means needing higher doses over time to get the same effect. This may increase the chance of serious side effects.      Addiction is when people improperly use a substance that harms their body, their mind or their relations with others. Use of opiates can cause a relapse of addiction if you have a history of drug or alcohol abuse.      People who have used opioids for a long time may have a lower quality of life, worse depression, higher levels of pain and more visits to doctors.    You can overdose on opioids. Take these steps to lower your risk of overdose:    1. Recognize the signs:  Signs of overdose include decrease or loss of consciousness (blackout), slowed breathing, trouble waking up and blue lips. If someone is worried about overdose, they should call 911.    2. Talk to your doctor about Narcan (naloxone).   If you are at risk for overdose, you may be given a prescription for Narcan. This medicine very quickly reverses the effects of opioids.   If you overdose, a friend or family member can give you Narcan while waiting for the ambulance. They need to know the signs of overdose and how to give Narcan.     3. Don't use alcohol or street drugs.   Taking them with opioids can cause death.    4. Do not take any of these medicines unless your doctor says it s OK. Taking these with opioids can cause death:    Benzodiazepines, such as lorazepam (Ativan), alprazolam (Xanax) or diazepam (Valium)    Muscle relaxers, such as cyclobenzaprine (Flexeril)    Sleeping pills like zolpidem (Ambien)     Other opioids      How to keep you and other people safe while taking opioids:    1. Never share your opioids with others.  Opioid medicines are regulated by the Drug Enforcement Agency ([employer]). Selling or sharing medications is a criminal act.    2. Be sure to store opioids in a secure place, locked up if possible. Young children  can easily swallow them and overdose.    3. When you are traveling with your medicines, keep them in the original bottles. If you use a pill box, be sure you also carry a copy of your medicine list from your clinic or pharmacy.    4. Safe disposal of opioids    Most pharmacies have places to get rid of medicine, called disposal kiosks. Medicine disposal options are also available in every [facility]. Search your county and  medication disposal  to find more options. You can find more details at:  [email or web address]-[email or web address]     I agree that my provider, clinic care team, and pharmacy may work with any city, state or federal law enforcement agency that investigates the misuse, sale, or other diversion of my controlled medicine. I will allow my provider to discuss my care with, or share a copy of, this agreement with any other treating provider, pharmacy or emergency room where I receive care.    I have read this agreement and have asked questions about anything I did not understand.    _______________________________________________________  Patient Signature - Arielle Pham _____________________                   Date     _______________________________________________________  Provider Signature - Huong Stahl DO   _____________________                   Date     _______________________________________________________  Witness Signature (required if provider not present while patient signing)   _____________________                   Date

## 2021-05-26 ENCOUNTER — MYC MEDICAL ADVICE (OUTPATIENT)
Dept: FAMILY MEDICINE | Facility: CLINIC | Age: 51
End: 2021-05-26

## 2021-05-26 DIAGNOSIS — E78.5 HYPERLIPIDEMIA LDL GOAL <130: Primary | ICD-10-CM

## 2021-05-26 RX ORDER — ROSUVASTATIN CALCIUM 10 MG/1
10 TABLET, COATED ORAL DAILY
Qty: 90 TABLET | Refills: 0 | Status: SHIPPED | OUTPATIENT
Start: 2021-05-26 | End: 2021-09-29

## 2021-05-26 ASSESSMENT — ANXIETY QUESTIONNAIRES: GAD7 TOTAL SCORE: 10

## 2021-05-27 ENCOUNTER — TELEPHONE (OUTPATIENT)
Dept: FAMILY MEDICINE | Facility: CLINIC | Age: 51
End: 2021-05-27

## 2021-05-27 LAB
COPATH REPORT: NORMAL
PAP: NORMAL

## 2021-05-27 NOTE — TELEPHONE ENCOUNTER
Left message for patient to return call.  She was interested in COVID vaccine when she was in clinic earlier this week.  We have vaccines available today if she can come in this afternoon before 5:00. If not she can schedule for another day.  Aileen Chao, CMA

## 2021-05-30 ENCOUNTER — RECORDS - HEALTHEAST (OUTPATIENT)
Dept: ADMINISTRATIVE | Facility: CLINIC | Age: 51
End: 2021-05-30

## 2021-05-30 NOTE — TELEPHONE ENCOUNTER
Patient called back to report that she has tried and not tolerated Lipitor in the past. She is willing to trial pravastatin if necessary but was wondering if change in diet and exercise would be enough.     Dr. Tracey, patient was advised that with her Calcium score, aggressive treatment included addition of highest tolerated dose of statin medication along with change in diet and exercise. She has agreed to initiate pravastatin and and has scheduled visit with Chime as you suggested. She will call if she develops side effects from statin.    Dr. Tracey, patient wanted to confirm that she was safe to begin water exercise program. -ejb

## 2021-05-30 NOTE — PATIENT INSTRUCTIONS - HE
Arielle Pham,    It was a pleasure to see you today at the [facility].     My recommendations after this visit include:    1.  We will order a CT angiogram to ascertain the status of your coronary arteries.  This will allow us to tell if there is plaque in your coronary arteries and if there is plaque, how significant.  We will call you with results and any further recommendations.        Sarabjit Tracey

## 2021-05-30 NOTE — TELEPHONE ENCOUNTER
===View-only below this line===    ----- Message -----  From: Sarabjit Tracey MD  Sent: 7/20/2019   9:11 PM  To: Jessica Sandoval RN    OK to start water exercise.    ThanksSarabjit

## 2021-05-30 NOTE — TELEPHONE ENCOUNTER
Incoming call from the patient. Informed ok to start ter exercise per [facility] recommendation. Pt wondering since she doesn't eat seafood or fish if she can start an OTC Omega 3/vitamin supplement. Encouraged to discuss at upcoming OV with CY, and reports that she will. Also patient informs writer that she will be returning to a better, heart healthy diet that she was previously on. Will continue to work on diet and exercise. Call back the clinic if any further questions. WALTER,RN

## 2021-05-30 NOTE — TELEPHONE ENCOUNTER
Spoke with patient regarding results/recs per Dr. Tracey. Patient verbalized understanding but reports prior intolerance to statins. She will check to see which medications she was previously intolerant to and report back to put office. Direct contact information reviewed. Patient transferred to scheduling line to arrange visit with Yeimi Gamble. -meg

## 2021-05-30 NOTE — TELEPHONE ENCOUNTER
----- Message from Sarabjit Tracey MD sent at 7/15/2019  5:50 PM CDT -----  Jessica,    The patient does have moderate coronary artery disease and a high calcium score.  The degree of stenosis should not be causing her symptoms but we should be very aggressive.  I would recommend pravastatin 40 mg each day with a repeat lipid panel in 3 months with liver function test.  I would also recommend a follow-up visit with myself or Yeimi to discuss risk factor modification and plaque stabilization.  I should see her in a year later as well.    Thanks,    Sarabjit

## 2021-05-31 ENCOUNTER — RECORDS - HEALTHEAST (OUTPATIENT)
Dept: ADMINISTRATIVE | Facility: CLINIC | Age: 51
End: 2021-05-31

## 2021-06-01 ENCOUNTER — RECORDS - HEALTHEAST (OUTPATIENT)
Dept: ADMINISTRATIVE | Facility: CLINIC | Age: 51
End: 2021-06-01

## 2021-06-02 ENCOUNTER — RECORDS - HEALTHEAST (OUTPATIENT)
Dept: ADMINISTRATIVE | Facility: CLINIC | Age: 51
End: 2021-06-02

## 2021-06-02 LAB
FINAL DIAGNOSIS: NORMAL
HPV HR 12 DNA CVX QL NAA+PROBE: NEGATIVE
HPV16 DNA SPEC QL NAA+PROBE: NEGATIVE
HPV18 DNA SPEC QL NAA+PROBE: NEGATIVE
SPECIMEN DESCRIPTION: NORMAL
SPECIMEN SOURCE CVX/VAG CYTO: NORMAL

## 2021-06-03 VITALS — HEIGHT: 69 IN | WEIGHT: 254 LBS | BODY MASS INDEX: 37.62 KG/M2

## 2021-06-03 VITALS — BODY MASS INDEX: 37.62 KG/M2 | HEIGHT: 69 IN | WEIGHT: 254 LBS

## 2021-06-03 VITALS — HEIGHT: 66 IN | WEIGHT: 252 LBS | BODY MASS INDEX: 40.5 KG/M2

## 2021-06-03 DIAGNOSIS — Z12.11 SPECIAL SCREENING FOR MALIGNANT NEOPLASMS, COLON: ICD-10-CM

## 2021-06-03 LAB — HEMOCCULT STL QL IA: NEGATIVE

## 2021-06-03 PROCEDURE — 82274 ASSAY TEST FOR BLOOD FECAL: CPT | Performed by: FAMILY MEDICINE

## 2021-06-03 NOTE — RESULT ENCOUNTER NOTE
Your FIT test for colon cancer screening was negative/normal.  This should be repeated yearly.    Huong Stahl, DO

## 2021-06-04 VITALS
OXYGEN SATURATION: 98 % | SYSTOLIC BLOOD PRESSURE: 132 MMHG | HEART RATE: 84 BPM | RESPIRATION RATE: 18 BRPM | WEIGHT: 248 LBS | DIASTOLIC BLOOD PRESSURE: 84 MMHG | BODY MASS INDEX: 41.27 KG/M2

## 2021-06-05 ENCOUNTER — TRANSFERRED RECORDS (OUTPATIENT)
Dept: HEALTH INFORMATION MANAGEMENT | Facility: CLINIC | Age: 51
End: 2021-06-05

## 2021-06-05 NOTE — TELEPHONE ENCOUNTER
Called patient and reviewed recommendation from Dr. Tracey to start 81 mg Aspirin. Patient verbalized understanding and agreement.   Med list updated. -ejb

## 2021-06-05 NOTE — TELEPHONE ENCOUNTER
----- Message from Yeimi Gamble NP sent at 1/22/2020 10:09 AM CST -----  Thank you!    Jessica,  Please f/u pt with rec from Dr. Tracey.    Thank you! CY  ----- Message -----  From: Sarabjit Tracey MD  Sent: 1/22/2020  10:02 AM CST  To: Yeimi Gamble NP    Yes on the aspirin  ----- Message -----  From: Yeimi Gamble NP  Sent: 1/21/2020  12:02 PM CST  To: MD Dr. Alexy Lomax,  Would you recommend this patient to be on low dose ASA 81 mg daily for? H/o abn stress test and moderate CAD per CTA with Ca+ score in 700's.. She did not tolerate Pravastatin 40 mg. I resumed on lower dose 20 mg daily. She will f/u with you in 8 weeks with lab work.  Yeimi

## 2021-06-06 NOTE — PROGRESS NOTES
This is a telephone clinic visit the coronavirus pandemic.  The patient had atypical chest discomfort but an abnormal nuclear study and ultimately underwent CT angiography which showed mild to moderate coronary artery disease and a branch vessel and minimal atherosclerosis in the other vessels.  She continues to get some shoulder discomfort but it is not consistent with angina.  She has not been as active because of chronic foot problem but is been looking into a membership to the [employer] so that she could swim which she likes to do.  She has not had any chest discomfort but more shoulder discomfort.  She denies unusual shortness of breath and also denies orthopnea, paroxysmal nocturnal dyspnea.  She does get some peripheral edema in the ankle where she has the foot problem.    She has not had syncope but she will get a feeling that her heart is racing at times.  She does not get syncopal with this and it often is when she is anxious about something else.    She was started on pravastatin and she developed very painful hemorrhoids and really could not do much with her hands and then discontinue the medication the symptom went away within 3 days and when she restarted it it came back immediately so she discontinued the medicine.  She is more than willing to try a different statin.    We talked about the statins and we reviewed her recent lipid panel.  I would like her LDL cholesterol to be below 100 at a minimum.  We will try her on Crestor 5 mg each day and if she gets the symptoms back she will call us and discontinue the medicine.  If she does not get the symptoms back I would like to have her get a fasting lipid panel in about 4 months.    I have strongly encouraged her to join the [facility] and start exercise program in the pool which would be excellent for her long-term.    I have not change any of her other medications.  We will plan on seeing her back in follow-up in about 1 year, but of course would be happy to see  her sooner if questions or problems arise.

## 2021-06-08 ENCOUNTER — OFFICE VISIT (OUTPATIENT)
Dept: FAMILY MEDICINE | Facility: CLINIC | Age: 51
End: 2021-06-08
Payer: COMMERCIAL

## 2021-06-08 ENCOUNTER — IMMUNIZATION (OUTPATIENT)
Dept: FAMILY MEDICINE | Facility: CLINIC | Age: 51
End: 2021-06-08
Payer: COMMERCIAL

## 2021-06-08 VITALS
SYSTOLIC BLOOD PRESSURE: 124 MMHG | BODY MASS INDEX: 44.74 KG/M2 | OXYGEN SATURATION: 97 % | TEMPERATURE: 97.2 F | DIASTOLIC BLOOD PRESSURE: 72 MMHG | WEIGHT: 277.2 LBS | HEART RATE: 90 BPM

## 2021-06-08 DIAGNOSIS — K14.8 TONGUE LESION: Primary | ICD-10-CM

## 2021-06-08 PROCEDURE — 99213 OFFICE O/P EST LOW 20 MIN: CPT | Performed by: FAMILY MEDICINE

## 2021-06-08 PROCEDURE — 91301 PR COVID VAC MODERNA 100 MCG/0.5 ML IM: CPT

## 2021-06-08 PROCEDURE — 0011A PR COVID VAC MODERNA 100 MCG/0.5 ML IM: CPT

## 2021-06-08 NOTE — PROGRESS NOTES
"    A/p:      ICD-10-CM    1. Tongue lesion  K14.8      Suspect related to \"geographic tongue\" .  No concerning lesions noted.  Advised avoiding spicy or acidic foods if tender.  Reviewed signs and symptoms that should prompt reeval including enlarging lesion, persistent pain or new symptoms       Pt will continue crestor and monitor for persistent muscular symptoms      Subjective   Arielle aguilar is a 51 year old who presents for the following health issues     HPI     * sore on tongue for the last 3-4 weeks, intermittent pain     Has been gargling with salt water, taking vitamin C and using mouthwash.  Feels like the red ezequiel comes and goes and sometimes looks \"okay\" and sometimes looks more red or \"worse\".    Has been painful at times but is usually not tender.  She spoke with her mother about it who said she needed to get it checked out.      *  Tried her MRI for her knee but did not complete due to back pain.  Planning to try closed MRI instead, scheduled.    *  Noticed that her arms felt \"tired\" when folding laundry  Worried it might be the crestor.  Only had 1 occurrence, no problems since or with other activities.      Review of Systems   Constitutional, HEENT, cardiovascular, pulmonary, gi and gu systems are negative, except as otherwise noted.      Objective    /72   Pulse 90   Temp 97.2  F (36.2  C) (Tympanic)   Wt 125.7 kg (277 lb 3.2 oz)   SpO2 97%   Breastfeeding No   BMI 44.74 kg/m    Body mass index is 44.74 kg/m .  Physical Exam   PE:  VS as above   Gen:  WN/WD/WH female in NAD   HEENT:  NC/AT, conjunctiva wnl, oropharynx clear without exudate/erythema, tongue with central smoother and slightly more red appearing mucosa, no leukoplakia or ulcers noted.  On palpation tongue is soft and lesion is not palpable.    Neck:  supple, no LAD appreciated         Epic reviewed          "

## 2021-06-08 NOTE — NURSING NOTE
"Initial /72   Pulse 90   Temp 97.2  F (36.2  C) (Tympanic)   Wt 125.7 kg (277 lb 3.2 oz)   SpO2 97%   Breastfeeding No   BMI 44.74 kg/m   Estimated body mass index is 44.74 kg/m  as calculated from the following:    Height as of 5/25/21: 1.676 m (5' 6\").    Weight as of this encounter: 125.7 kg (277 lb 3.2 oz). .      "

## 2021-06-09 ENCOUNTER — MYC REFILL (OUTPATIENT)
Dept: FAMILY MEDICINE | Facility: CLINIC | Age: 51
End: 2021-06-09

## 2021-06-09 DIAGNOSIS — G43.009 MIGRAINE WITHOUT AURA AND WITHOUT STATUS MIGRAINOSUS, NOT INTRACTABLE: ICD-10-CM

## 2021-06-09 DIAGNOSIS — M54.2 NECK PAIN: ICD-10-CM

## 2021-06-09 DIAGNOSIS — M51.369 DDD (DEGENERATIVE DISC DISEASE), LUMBAR: ICD-10-CM

## 2021-06-09 NOTE — TELEPHONE ENCOUNTER
Routing refill request to provider for review/approval because:  Drug not on the [facility] refill protocol   Malik Hillman RN

## 2021-06-10 RX ORDER — OXYCODONE AND ACETAMINOPHEN 5; 325 MG/1; MG/1
1 TABLET ORAL EVERY 6 HOURS PRN
Qty: 120 TABLET | Refills: 0 | Status: SHIPPED | OUTPATIENT
Start: 2021-08-09 | End: 2021-09-29

## 2021-06-10 RX ORDER — ZOLMITRIPTAN 5 MG/1
TABLET, FILM COATED ORAL
Qty: 9 TABLET | Refills: 2 | Status: SHIPPED | OUTPATIENT
Start: 2021-06-10 | End: 2021-09-09

## 2021-06-10 RX ORDER — OXYCODONE AND ACETAMINOPHEN 5; 325 MG/1; MG/1
1 TABLET ORAL EVERY 6 HOURS PRN
Qty: 120 TABLET | Refills: 0 | Status: SHIPPED | OUTPATIENT
Start: 2021-07-10 | End: 2021-10-04

## 2021-06-10 RX ORDER — OXYCODONE AND ACETAMINOPHEN 5; 325 MG/1; MG/1
1 TABLET ORAL EVERY 6 HOURS PRN
Qty: 120 TABLET | Refills: 0 | Status: SHIPPED | OUTPATIENT
Start: 2021-06-10 | End: 2021-09-05

## 2021-06-10 NOTE — TELEPHONE ENCOUNTER
Please call pt.  prescriptions sent.  Alee is now requiring in person visit every 6 months and virtual visits in between (so a visit every 3 months) for narcotic refills.  I have sent 3 refills to her pharmacy for the percocet and the zomig.  She can contact her pharmacies when refills are due.  I will need to see her (virtual is fine) in September when she is coming due for that refill    Huong Stahl, DO

## 2021-06-16 ENCOUNTER — TELEPHONE (OUTPATIENT)
Dept: FAMILY MEDICINE | Facility: CLINIC | Age: 51
End: 2021-06-16

## 2021-06-16 NOTE — TELEPHONE ENCOUNTER
Please get additional information?  I would expect her podiatrist would order an MRI if they felt it was necessary, I should not need to order it for them.    Huong Stahl, DO

## 2021-06-16 NOTE — TELEPHONE ENCOUNTER
Reason for Call: Request for an order or referral:    Order or referral being requested: Patient is calling asking for a order for MRI for foot and back she said her foot doctor thinks it might have to do with her back also.    Date needed: at your convenience    Has the patient been seen by the PCP for this problem? YES    Phone number Patient can be reached at:  Home number on file [phone] (home)    Best Time:  any    Can we leave a detailed message on this number?  YES    Call taken on 6/16/2021 at 10:10 AM by Namrata Masters

## 2021-06-16 NOTE — TELEPHONE ENCOUNTER
Call placed to patient  Relayed Dr. Stahl's message    Patient states she sees Dr. Crooks approx every 3 months  Dr. Crooks had told patient to contact her PCP in regards to ordering MRI of foot and back as Dr. Stahl had ordered the MRI of knee     Patient's states she will contact Dr. Crooks to request her to place the MRI orders and she will schedule appointment for MRIs       Will forward to Dr. Stahl to update    Shad Starks RN

## 2021-06-17 ENCOUNTER — NURSE TRIAGE (OUTPATIENT)
Dept: NURSING | Facility: CLINIC | Age: 51
End: 2021-06-17

## 2021-06-17 NOTE — PATIENT INSTRUCTIONS - HE
Patient Instructions by Yeimi Gamble NP at 1/21/2020  9:50 AM     Author: Yeimi Gamble NP Service: -- Author Type: Nurse Practitioner    Filed: 1/21/2020 10:10 AM Encounter Date: 1/21/2020 Status: Signed    : Yeimi Gamble NP (Nurse Practitioner)       Arielle Pham,    It was a pleasure to see you today at the [facility].     My recommendations after this visit include:    - Try lower dose of  Pravastatin 20 mg daily at bed time. Please call us if you develop worsening muscle and joint pain    - Recheck blood work in 2 months (please come in fasting for at least 8-12 hours)    - Low moderate exercise 15-30 minute 3 times a week    - Limit use of Non Steroid Anti inflammatory medication    - Please discuss with Dr. Stahl regarding alternative pain treatment option     - Follow up with Dr. Tracey in 2 months     - Please call [phone], if you have any questions or concerns    Yeimi Gamble CNP    Patient Education     Eating Heart-Healthy Foods  Eating has a big impact on your heart health. In fact, eating healthier can improve several of your heart risks at once. For instance, it helps you manage weight, cholesterol, and blood pressure. Here are ideas to help you make heart-healthy changes without giving up all the foods and flavors you love.  Getting started    Talk with your healthcare provider about eating plans, such as the DASH or Mediterranean diet. You may also be referred to a dietitian.    Change a few things at a time. Give yourself time to get used to a few eating changes before adding more.    Work to create a tasty, healthy eating plan that you can stick to for the rest of your life.    Goals for healthy eating  Below are some tips to improve your eating habits:    Limit saturated fats and trans fats. Saturated fats raise your levels of cholesterol, so keep these fats to a minimum. They are found in foods such as fatty meats, whole milk, cheese, and palm and coconut  oils. Avoid trans fats because they lower good cholesterol as well as raise bad cholesterol. Trans fats are most often found in processed foods.    Reduce sodium (salt) intake. Eating too much salt may increase your blood pressure. Limit your sodium intake to 2,300 milligrams (mg) per day (the amount in 1 teaspoon of salt), or less if your healthcare provider recommends it. Dining out less often and eating fewer processed foods are two great ways to decrease the amount of salt you consume.    Managing calories. A calorie is a unit of energy. Your body burns calories for fuel, but if you eat more calories than your body burns, the extras are stored as fat. Your healthcare provider can help you create a diet plan to manage your calories. This will likely include eating healthier foods as well as exercising regularly. To help you track your progress, keep a diary to record what you eat and how often you exercise.  Choose the right foods  Aim to make these foods staples of your diet. If you have diabetes, you may have different recommendations than what is listed here:    Fruits and vegetables provide plenty of nutrients without a lot of calories. At meals, fill half your plate with these foods. Split the other half of your plate between whole grains and lean protein.    Whole grains are high in fiber and rich in vitamins and nutrients. Good choices include whole-wheat bread, pasta, and brown rice.    Lean proteins give you nutrition with less fat. Good choices include fish, skinless chicken, and beans.    Low-fat or nonfat dairy provides nutrients without a lot of fat. Try low-fat or nonfat milk, cheese, or yogurt.    Healthy fats can be good for you in small amounts. These are unsaturated fats, such as olive oil, nuts, and fish. Try to have at least 2 servings per week of fatty fish, such as salmon, sardines, mackerel, rainbow trout, and albacore tuna. These contain omega-3 fatty acids, which are good for your heart.  Flaxseed is another source of a heart-healthy fat.  More on heart-healthy eating  Read food labels  Healthy eating starts at the grocery store. Be sure to pay attention to food labels on packaged foods. Look for products that are high in fiber and protein, and low in saturated fat, cholesterol, and sodium. Avoid products that contain trans fat. And pay close attention to serving size. For instance, if you plan to eat two servings, double all the numbers on the label.  Prepare food right  A key part of healthy cooking is cutting down on added fat and salt. Look on the internet for lower-fat, lower-sodium recipes. Also, try these tips:    Remove fat from meat and skin from poultry before cooking.    Skim fat from the surface of soups and sauces.    Broil, boil, bake, steam, grill, and microwave food without added fats.    Choose ingredients that spice up your food without adding calories, fat, or sodium. Try these items: horseradish, hot sauce, lemon, mustard, nonfat salad dressings, and vinegar. For salt-free herbs and spices, try basil, cilantro, cinnamon, pepper, and rosemary.  Date Last Reviewed: 10/1/2017    4077-2685 [employer]. [address] [place] [address], [place] [address]. All rights reserved. This information is not intended as a substitute for professional medical care. Always follow your healthcare professional's instructions.

## 2021-06-17 NOTE — PATIENT INSTRUCTIONS - HE
Patient Instructions by Yeimi Gamble NP at 8/13/2019 10:30 AM     Author: Yeimi Gamble NP Service: -- Author Type: Nurse Practitioner    Filed: 8/13/2019 11:24 AM Encounter Date: 8/13/2019 Status: Addendum    : Yeimi Gamble NP (Nurse Practitioner)    Related Notes: Original Note by Yeimi Gamble NP (Nurse Practitioner) filed at 8/13/2019 10:41 AM       Arielle Pham,    It was a pleasure to see you today at the [facility].     My recommendations after this visit include:    - No medications changes made today    - Please get your lipid check and liver function in 3 months - order placed. Come in fasting for 12 hours for lipid check    - Please seek medical attention if recurrent persistent worsening chest pain/tightness/pressure/ or shortness of breath    - Follow up with Dr. Tracey in 1 year as recommended    - Please call  [phone], if you have any questions or concerns    Yeimi Gamble CNP      Medication     o Take all your medications as prescribed  o Do not stop any medications without talking with a healthcare provider    Exercise      o Physical activity is important for overall health  o Set a goal of 150 minutes of exercise each week  o For example, 30 minutes of exercise 5 days each week.    o These 30 minutes can be broken into shorter periods of 15 minutes twice daily or 10 minutes three times daily  o Start any exercise program slowly and work towards the goal of 150 minutes each week  o For example, you may start with 10 minutes and plan to add a few minutes each week as you get stronger   o Examples of exercise include walking, swimming, or biking  o Remember to stretch and stay hydrated with exercise    Diet     o A heart healthy diet includes:  o A variety of fruits and vegetables  o Whole grains  o Low-fat dairy (fat-free, 1% fat, and low-fat)  o Lean meats and poultry without skin   o Fish (eat fish 2 times each week)  o Nuts  o Limit saturated fat to about  13 grams each day (based on a 2000 calorie diet)  o Limit red meat  o Limit sugars (sweets and sugary beverages)  o Limit your portion sizes  o Do not add salt to your food when cooking or at the table  o Limit alcohol intake (no more than 1 drink each day for women or 2 drinks each day for men)    Weight Loss     o Work on losing weight with diet and exercise  o You BMI (body mass index) should be between 18.5-24.9  o This is a calculation of your weight and height  o Please ask your healthcare provider for your BMI    Manage Other Chronic Health Conditions     o Control cholesterol  o Eat a diet low in saturated fat  o Exercise   o Take a statin medication as prescribed  o Manage blood pressure  o Eat a diet low in sodium  o Exercise  o Reduce stress  o Lose weight   o Take blood pressure medications as prescribed      Stress Reduction     o Find time each day to relax  o Reading, listening to music, yoga, meditation, exercise, spending time with friends and family, volunteering   o Get 6-8 hours of sleep each night    Information from the American Heart Association.  Please visit their website at www.heart.org    Patient Education   Eating Heart-Healthy Foods  Eating has a big impact on your heart health. In fact, eating healthier can improve several of your heart risks at once. For instance, it helps you manage weight, cholesterol, and blood pressure. Here are ideas to help you make heart-healthy changes without giving up all the foods and flavors you love.  Getting started    Talk with your healthcare provider about eating plans, such as the DASH or Mediterranean diet. You may also be referred to a dietitian.    Change a few things at a time. Give yourself time to get used to a few eating changes before adding more.    Work to create a tasty, healthy eating plan that you can stick to for the rest of your life.    Goals for healthy eating  Below are some tips to improve your eating habits:    Limit saturated  fats and trans fats. Saturated fats raise your levels of cholesterol, so keep these fats to a minimum. They are found in foods such as fatty meats, whole milk, cheese, and palm and coconut oils. Avoid trans fats because they lower good cholesterol as well as raise bad cholesterol. Trans fats are most often found in processed foods.    Reduce sodium (salt) intake. Eating too much salt may increase your blood pressure. Limit your sodium intake to 2,300 milligrams (mg) per day (the amount in 1 teaspoon of salt), or less if your healthcare provider recommends it. Dining out less often and eating fewer processed foods are two great ways to decrease the amount of salt you consume.    Managing calories. A calorie is a unit of energy. Your body burns calories for fuel, but if you eat more calories than your body burns, the extras are stored as fat. Your healthcare provider can help you create a diet plan to manage your calories. This will likely include eating healthier foods as well as exercising regularly. To help you track your progress, keep a diary to record what you eat and how often you exercise.  Choose the right foods  Aim to make these foods staples of your diet. If you have diabetes, you may have different recommendations than what is listed here:    Fruits and vegetables provide plenty of nutrients without a lot of calories. At meals, fill half your plate with these foods. Split the other half of your plate between whole grains and lean protein.    Whole grains are high in fiber and rich in vitamins and nutrients. Good choices include whole-wheat bread, pasta, and brown rice.    Lean proteins give you nutrition with less fat. Good choices include fish, skinless chicken, and beans.    Low-fat or nonfat dairy provides nutrients without a lot of fat. Try low-fat or nonfat milk, cheese, or yogurt.    Healthy fats can be good for you in small amounts. These are unsaturated fats, such as olive oil, nuts, and fish. Try  to have at least 2 servings per week of fatty fish, such as salmon, sardines, mackerel, rainbow trout, and albacore tuna. These contain omega-3 fatty acids, which are good for your heart. Flaxseed is another source of a heart-healthy fat.  More on heart-healthy eating  Read food labels  Healthy eating starts at the grocery store. Be sure to pay attention to food labels on packaged foods. Look for products that are high in fiber and protein, and low in saturated fat, cholesterol, and sodium. Avoid products that contain trans fat. And pay close attention to serving size. For instance, if you plan to eat two servings, double all the numbers on the label.  Prepare food right  A key part of healthy cooking is cutting down on added fat and salt. Look on the internet for lower-fat, lower-sodium recipes. Also, try these tips:    Remove fat from meat and skin from poultry before cooking.    Skim fat from the surface of soups and sauces.    Broil, boil, bake, steam, grill, and microwave food without added fats.    Choose ingredients that spice up your food without adding calories, fat, or sodium. Try these items: horseradish, hot sauce, lemon, mustard, nonfat salad dressings, and vinegar. For salt-free herbs and spices, try basil, cilantro, cinnamon, pepper, and rosemary.  Date Last Reviewed: 10/1/2017    8472-7952 [employer]. [address] [place] [address], [place] [address]. All rights reserved. This information is not intended as a substitute for professional medical care. Always follow your healthcare professional's instructions.

## 2021-06-18 NOTE — TELEPHONE ENCOUNTER
Call placed to patient  Relayed Dr. Stahl's message     Patient states she is more than positive she had been prescribed the Pravastatin in the past and had side effects   Patient states she experienced severe pain to hands while taking medication  Patient believes she has attempted the Pravastatin x2 and had side effects both times.     Patient states she had not picked up the Meloxicam from the pharmacy as her insurance did not cover medication  Patient states she was advised not to take Ibuprofen / NSAIDs while taking Meloxicam    Will route to Dr. Stahl to review    Shad Starks RN

## 2021-06-18 NOTE — TELEPHONE ENCOUNTER
Patient called today and is asking for Dr. Stahl's nurse to call her to talk about taking this cholesterol med.    Trinity Altamirano, JOE Love

## 2021-06-18 NOTE — TELEPHONE ENCOUNTER
"    Reason for Disposition    [1] Caller has NON-URGENT medication question about med that PCP prescribed AND [2] triager unable to answer question    Protocols used: MEDICATION QUESTION CALL-A-    \"I take   rosuvastatin (CRESTOR) 10 MG tablet 90 tablet 0 5/26/2021  --   Sig - Route: Take 1 tablet (10 mg) by mouth daily - Oral     . I have tried taking other statins in the past and couldn't due to the side effects.  I have been noticing for awhile now my arms feel a little heavier. I have to sit down to wash the dishes. I have leg pains. But last night I had pain all over, I almost went to the ER. I had leg, arm, back, teeth and hand pains. So tonight I didn't take it and I have hardly any pain anywhere.\"  Denies triage  Advised pt to call PCP in am 6/18 to discuss  Call back as needed,  Zoe Gross RN [facility]      "

## 2021-06-18 NOTE — TELEPHONE ENCOUNTER
It is fine for her to stop the crestor.  We can try an alternate, like pravastatin if she would like.  If she wants to give that a try I would recommend being off the crestor for a few weeks before trying the new medicine.    I just reviewed her podiatry visit as well.  Looks beena she was given a prescription for meloxicam.  Please be sure she knows that she should not be taking any ibuprofen while she is taking the meloxicam.  She can only take 1 or the other.  The meloxicam is a once daily medicine, if she takes it she should not take any ibuprofen until the next day    Huong Stahl, DO

## 2021-06-18 NOTE — TELEPHONE ENCOUNTER
Call placed to patient  Relayed Dr. Stalh's message    Patient verbalized understanding and agrees with plan  Encouraged healthy eating practices and exercising    No further questions/concerns    Shad Starks RN

## 2021-06-18 NOTE — TELEPHONE ENCOUNTER
Call placed to patient    Patient states she stopped taking the Crestor 2 days ago and is feeling much better being off the medication   States she continues to have mild achiness to bilateral arms    Patient states when she started the Crestor she felt as if the symptoms (muscle / joint achiness and pain) had been building up the longer she was on the medication    Patient reports she spoke with a Pharmacist who stated she could speak with her PCP about changing to another class of statin.     Will route to Dr. Stahl to review    Shad Starks RN

## 2021-06-18 NOTE — TELEPHONE ENCOUNTER
If she has tried the pravastatin then I think at this point we have likely exhausted our good options for cholesterol medicines.    Huong Stahl, DO

## 2021-06-23 ENCOUNTER — AMBULATORY - HEALTHEAST (OUTPATIENT)
Dept: FAMILY MEDICINE | Facility: CLINIC | Age: 51
End: 2021-06-23

## 2021-06-23 ENCOUNTER — VIRTUAL VISIT (OUTPATIENT)
Dept: FAMILY MEDICINE | Facility: CLINIC | Age: 51
End: 2021-06-23
Payer: COMMERCIAL

## 2021-06-23 ENCOUNTER — NURSE TRIAGE (OUTPATIENT)
Dept: FAMILY MEDICINE | Facility: CLINIC | Age: 51
End: 2021-06-23

## 2021-06-23 DIAGNOSIS — R11.0 NAUSEA: ICD-10-CM

## 2021-06-23 DIAGNOSIS — R19.7 DIARRHEA, UNSPECIFIED TYPE: ICD-10-CM

## 2021-06-23 DIAGNOSIS — Z20.822 ENCOUNTER FOR LABORATORY TESTING FOR COVID-19 VIRUS: Primary | ICD-10-CM

## 2021-06-23 DIAGNOSIS — Z20.822 ENCOUNTER FOR LABORATORY TESTING FOR COVID-19 VIRUS: ICD-10-CM

## 2021-06-23 PROCEDURE — 99213 OFFICE O/P EST LOW 20 MIN: CPT | Mod: 95 | Performed by: NURSE PRACTITIONER

## 2021-06-23 ASSESSMENT — ENCOUNTER SYMPTOMS
NAUSEA: 1
FEVER: 0
APPETITE CHANGE: 1
SHORTNESS OF BREATH: 0
DIARRHEA: 1
HEMATOCHEZIA: 0
COUGH: 0
FATIGUE: 1
SORE THROAT: 0
ABDOMINAL PAIN: 1
RESPIRATORY NEGATIVE: 1
CARDIOVASCULAR NEGATIVE: 1

## 2021-06-23 NOTE — TELEPHONE ENCOUNTER
Reason for Call:  Medication question:    Name of the medication requested: Covid vaccine    Other request: Patient got 1st Covid vaccine on 6/8/21, last week 6/17/21 started having diarrhea, feels sick when she eats.Patient thinks she already had Covid.    Can we leave a detailed message on this number? YES    Phone number patient can be reached at: Home number on file [phone] (home)    Best Time: Any    Call taken on 6/23/2021 at 9:34 AM by Trinity Klein

## 2021-06-23 NOTE — PATIENT INSTRUCTIONS
For diarrhea, I would recommend you continue the bland diet. BRAT diet (bananas, rice, apples, toast) and sometimes yogurt helps with diarrhea. Avoid spicy foods and most dairy products as they can irritate GI system. Continue to push fluids. Vitamin water and gatorade are helpful if you find water hard to drink.     ----------------  COVID testing ordered today. You will receive a call from a blocked number to schedule that appointment.     Please quarantine until you receive your results.     If the results are negative, you need to be fever free for 24 hours before ending quarantine.     If your results are negative, but you have had exposure with a COVID positive person, you need to quarantine for 7 days from the date of your last exposure to that person or 7 days from their last day of quarantine if unable to avoid exposure.     If results are positive, you need to quarantine for 10 days from start of symptoms AND you need to be fever free (without the use of fever reducing medications) for 24 hours before ending quarantine.     If results are positive, it is recommended that asymptomatic household contacts quarantine for 14 days, but CDC has recently approved only 10 days quarantine without a COVID test (while they still recommend the full 14 days).     If you develop worsening symptoms or difficulty breathing, please go to [facility]      [facility]: About COVID-19: [email or web address]    CDC: What to Do If You're Sick: www.cdc.gov/coronavirus/2019-ncov/about/steps-when-sick.html    CDC: Ending Home Isolation: www.cdc.gov/coronavirus/2019-ncov/hcp/disposition-in-home-patients.html     CDC: Caring for Someone: www.cdc.gov/coronavirus/2019-ncov/if-you-are-sick/care-for-someone.html     MD: Interim Guidance for Hospital Discharge to Home: www.health.[facility].[place]/diseases/coronavirus/hcp/hospdischarge.pdf    MD Close contacts and tracing:  www.health.[facility].mn.us/diseases/coronavirus/close.html    [facility] clinical trials (COVID-19 research studies): clinicalaffairs.[facility]/umn-clinical-trials     Below are the COVID-19 hotlines at the [place] Department of Health ([facility]). Interpreters are available.   o For health questions: Call 018-841-0397 or 1-663.709.3990 (7 a.m. to 7 p.m.)  o For questions about schools and childcare: Call 329-646-7043 or 1-770.707.8638 (7 a.m. to 7 p.m.)

## 2021-06-23 NOTE — PROGRESS NOTES
Arielle aguilar is a 51 year old who is being evaluated via a billable video visit.      How would you like to obtain your AVS? MyChart  If the video visit is dropped, the invitation should be resent by: Text to cell phone: [phone]  Will anyone else be joining your video visit? No    Video Start Time: 2:41 PM    Assessment & Plan     Encounter for laboratory testing for COVID-19 virus  Symptoms consistent with [facility] testing guidelines. Discussed quarantining recommendations. Discussed process for scheduling COVID testing. Recommended ER visit if symptoms worsen and/or can't be managed at home.     Has had first vaccine and is scheduled for 2nd vaccine in 2 weeks.     - Symptomatic COVID-19 Virus (Coronavirus) by PCR; Future    Diarrhea, unspecified type  On and off. Encouraged bland/BRAT diet, fluids. If COVID is negative, this is likely viral in nature and needs time to resolve.     Nausea  See above.                Patient Instructions   For diarrhea, I would recommend you continue the bland diet. BRAT diet (bananas, rice, apples, toast) and sometimes yogurt helps with diarrhea. Avoid spicy foods and most dairy products as they can irritate GI system. Continue to push fluids. Vitamin water and gatorade are helpful if you find water hard to drink.     ----------------  COVID testing ordered today. You will receive a call from a blocked number to schedule that appointment.     Please quarantine until you receive your results.     If the results are negative, you need to be fever free for 24 hours before ending quarantine.     If your results are negative, but you have had exposure with a COVID positive person, you need to quarantine for 7 days from the date of your last exposure to that person or 7 days from their last day of quarantine if unable to avoid exposure.     If results are positive, you need to quarantine for 10 days from start of symptoms AND you need to be fever free (without the use of fever reducing  medications) for 24 hours before ending quarantine.     If results are positive, it is recommended that asymptomatic household contacts quarantine for 14 days, but CDC has recently approved only 10 days quarantine without a COVID test (while they still recommend the full 14 days).     If you develop worsening symptoms or difficulty breathing, please go to ER.      Y[facility]    [facility]: About COVID-19: [email or web address]    CDC: What to Do If You're Sick: www.cdc.gov/coronavirus/2019-ncov/about/steps-when-sick.html    CDC: Ending Home Isolation: www.cdc.gov/coronavirus/2019-ncov/hcp/disposition-in-home-patients.html     CDC: Caring for Someone: www.cdc.gov/coronavirus/2019-ncov/if-you-are-sick/care-for-someone.html     [facility]: Interim Guidance for Hospital Discharge to Home: www.[facility]./diseases/coronavirus/hcp/hospdischarge.pdf    [facility] Close contacts and tracing: www.[facility]/diseases/coronavirus/close.html    [facility] clinical trials (COVID-19 research studies): clinicalaffairs.[facility]-clinical-trials     Below are the COVID-19 hotlines at the [facility] ([facility]). Interpreters are available.   o For health questions: Call 223-722-8922 or 1-679.602.9111 (7 a.m. to 7 p.m.)  o For questions about schools and childcare: Call 221-765-7319 or 1-280.264.8178 (7 a.m. to 7 p.m.)         Return if symptoms worsen or fail to improve.    DONN Mortensen CNP  [facility] DELIA aguilar is a 51 year old who presents for the following health issues     HPI     Diarrhea    Patient reports she has had the first COVID shot on 6-8 and due for second one on 7-6-21, patient is reporting mild diarrhea, states she has a change in taste, no fever, no sore throat, no cough or breathing problems, has mild stomach pain that is intermittent and rates 5/10, and is nauseated, no blood in stool or mucous. Just not feeling the same and  "wants to know how long this will last.     Onset/Duration: Last week, had a chest cold and then went away after 2 days.   Description:       Consistency of stool: loose       Blood in stool: no       Number of loose stools past 24 hours: 2-3  Progression of Symptoms: constant  Accompanying signs and symptoms:       Fever: no       Nausea/Vomiting: YES- Nausea       Abdominal pain: YES- Mild       Additional provider notes: Had \"bad allergy/cold symptoms\" for 2 days last week starting on Wednesday. Felt it was hard to breath at times. Is having residual fatigue. Diarrhea and upset stomach started over the weekend. Some nausea off and on. Taste is gone with some things. No known exposure to COVID.     Two days ago she was having chest pain and muscle aches. She went into UC and they did a full work up and r/o cardiac involvement.     She feels it has just been \"an odd cold\". Unsure if it's related to vaccine or if it is something else.     Allergies   Allergen Reactions     Hmg-Coa-R Inhibitors Other (See Comments)     Severe joint and muscle ache from Atorvastastin     Codeine Dizziness and Other (See Comments)     Gabapentin Palpitations     Current Outpatient Medications   Medication     aspirin 81 MG EC tablet     lisinopril (ZESTRIL) 5 MG tablet     loratadine (CLARITIN) 10 MG tablet     ZOLMitriptan (ZOMIG) 5 MG tablet     ibuprofen (ADVIL/MOTRIN) 800 MG tablet     order for DME     oxyCODONE-acetaminophen (PERCOCET) 5-325 MG tablet     [START ON 7/10/2021] oxyCODONE-acetaminophen (PERCOCET) 5-325 MG tablet     [START ON 8/9/2021] oxyCODONE-acetaminophen (PERCOCET) 5-325 MG tablet     rosuvastatin (CRESTOR) 10 MG tablet     No current facility-administered medications for this visit.      Past Medical History:   Diagnosis Date     Kidney stones      TMJ (dislocation of temporomandibular joint)          Review of Systems   Constitutional: Positive for appetite change (hungry, but due to nausea and diarrhea, not " eating as much) and fatigue. Negative for fever.   HENT: Negative.  Negative for congestion and sore throat.         Slight change in taste   Respiratory: Negative.  Negative for cough and shortness of breath.    Cardiovascular: Negative.    Gastrointestinal: Positive for abdominal pain, diarrhea and nausea. Negative for hematochezia.   Skin: Negative for rash.            Objective           Vitals:  No vitals were obtained today due to virtual visit.    alert and mild distress  PSYCH: Alert and oriented times 3; coherent speech, normal   rate and volume, able to articulate logical thoughts, able   to abstract reason, no tangential thoughts, no hallucinations   or delusions  Her affect is normal and pleasant  RESP: No cough, no audible wheezing, able to talk in full sentences  Remainder of exam unable to be completed due to telephone visits                 Video-Visit Details    Type of service:  Video Visit    Video End Time:2:51 PM    Originating Location (pt. Location): Home    Distant Location (provider location):  [facility]     Platform used for Video Visit: [employer]

## 2021-06-23 NOTE — TELEPHONE ENCOUNTER
"Patient called, she reports she has had the first COVID shot on 6-8 and due for second one on 7-6-21, patient is reporting mild diarrhea, states she has a change in taste, no fever, no sore throat, no cough or breathing problems, has mild stomach pain that is intermittent and rates 5/10, and is nauseated, no blood in stool or mucous.    Patient is advised to be seen, patient would like virtual appointment, this is scheduled for today to rule out possible COVID/ patient is told to seek the ER if abdominal pain, weakness or breathing problems occur. Patient agrees with the plan.          Reason for Disposition    MILD diarrhea (e.g., 1-3 or more stools than normal in past 24 hours) diarrhea without known cause and present > 7 days    Answer Assessment - Initial Assessment Questions  1. DIARRHEA SEVERITY: \"How bad is the diarrhea?\" \"How many extra stools have you had in the past 24 hours than normal?\"     - NO DIARRHEA (SCALE 0)    - MILD (SCALE 1-3): Few loose or mushy BMs; increase of 1-3 stools over normal daily number of stools; mild increase in ostomy output.    -  MODERATE (SCALE 4-7): Increase of 4-6 stools daily over normal; moderate increase in ostomy output.  * SEVERE (SCALE 8-10; OR 'WORST POSSIBLE'): Increase of 7 or more stools daily over normal; moderate increase in ostomy output; incontinence.      Mild 1-3 stools in the past 48 hours, 4-5 times past week   2. ONSET: \"When did the diarrhea begin?\"       Started last Wednesday  3. BM CONSISTENCY: \"How loose or watery is the diarrhea?\"       Watery, says happens after eating  4. VOMITING: \"Are you also vomiting?\" If so, ask: \"How many times in the past 24 hours?\"       Patient has not had an emesis, but feels nauseated  5. ABDOMINAL PAIN: \"Are you having any abdominal pain?\" If yes: \"What does it feel like?\" (e.g., crampy, dull, intermittent, constant)       Mild stomach pain 5/10, says comes and goes  6. ABDOMINAL PAIN SEVERITY: If present, ask: \"How bad " "is the pain?\"  (e.g., Scale 1-10; mild, moderate, or severe)    - MILD (1-3): doesn't interfere with normal activities, abdomen soft and not tender to touch     - MODERATE (4-7): interferes with normal activities or awakens from sleep, tender to touch     - SEVERE (8-10): excruciating pain, doubled over, unable to do any normal activities        Moderate 5.10, intermittent pain  7. ORAL INTAKE: If vomiting, \"Have you been able to drink liquids?\" \"How much fluids have you had in the past 24 hours?\"      Patient states she drinks at least 7 glasses per day  8. HYDRATION: \"Any signs of dehydration?\" (e.g., dry mouth [not just dry lips], too weak to stand, dizziness, new weight loss) \"When did you last urinate?\"      Patient says she feels tired, headache, no cough, no wheezing, no temp, but is nauseated  9. EXPOSURE: \"Have you traveled to a foreign country recently?\" \"Have you been exposed to anyone with diarrhea?\" \"Could you have eaten any food that was spoiled?\"      denies  10. ANTIBIOTIC USE: \"Are you taking antibiotics now or have you taken antibiotics in the past 2 months?\"        no  11. OTHER SYMPTOMS: \"Do you have any other symptoms?\" (e.g., fever, blood in stool)        See above, patient had first COVID shot on 6-8 and is due to get second one on 7-6-21. Patient states one day last week she felt like she had a bad cold with pain in chest when she took in deep breath, went in to ER on 6-21-21 to rule out cardiac problems, says has not had this chest pain since and denies today  12. PREGNANCY: \"Is there any chance you are pregnant?\" \"When was your last menstrual period?\"        na    Protocols used: DIARRHEA-A-OH      [facility] [place]    "

## 2021-06-24 ENCOUNTER — TELEPHONE (OUTPATIENT)
Dept: FAMILY MEDICINE | Facility: CLINIC | Age: 51
End: 2021-06-24

## 2021-06-24 ENCOUNTER — COMMUNICATION - HEALTHEAST (OUTPATIENT)
Dept: SCHEDULING | Facility: CLINIC | Age: 51
End: 2021-06-24

## 2021-06-24 ENCOUNTER — VIRTUAL VISIT (OUTPATIENT)
Dept: NEUROLOGY | Facility: CLINIC | Age: 51
End: 2021-06-24
Payer: COMMERCIAL

## 2021-06-24 ENCOUNTER — TELEPHONE (OUTPATIENT)
Dept: NEUROLOGY | Facility: CLINIC | Age: 51
End: 2021-06-24

## 2021-06-24 ENCOUNTER — NURSE TRIAGE (OUTPATIENT)
Dept: NURSING | Facility: CLINIC | Age: 51
End: 2021-06-24

## 2021-06-24 PROCEDURE — 99213 OFFICE O/P EST LOW 20 MIN: CPT | Mod: 95 | Performed by: NURSE PRACTITIONER

## 2021-06-24 NOTE — TELEPHONE ENCOUNTER
Coronavirus (COVID-19) Notification     Reason for call  Patient requesting results     Lab Result    Lab test 2019-nCoV rRt-PCR in process        RN Recommendations/Instructions per [facility]  Continue quarantee and following instructions until you receive the results     Please Contact your PCP clinic or return to the Emergency department if your:    Symptoms worsen or other concerning symptom's.     Patient informed that if test for COVID19 is POSITIVE,  you will receive a call typically within 48 hours from the test date (date lab collected).  If NEGATIVE result, you will receive a letter in the mail or "[employer]hart.      Deisi Mendoza RN    Reason for Disposition    COVID-19 Testing, questions about    Additional Information    Negative: SEVERE difficulty breathing (e.g., struggling for each breath, speaks in single words)    Negative: Difficult to awaken or acting confused (e.g., disoriented, slurred speech)    Negative: Bluish (or gray) lips or face now    Negative: Shock suspected (e.g., cold/pale/clammy skin, too weak to stand, low BP, rapid pulse)    Negative: Sounds like a life-threatening emergency to the triager    Negative: [1] COVID-19 exposure AND [2] has not completed COVID-19 vaccine series AND [3] no symptoms    Negative: [1] COVID-19 exposure AND [2] completed COVID-19 vaccine series (fully vaccinated) AND [3] no symptoms    Negative: COVID-19 vaccine reaction suspected (e.g., fever, headache, muscle aches) occurring during days 1-3 after getting vaccine    Negative: COVID-19 vaccine, questions about    Negative: [1] COVID-19 vaccine series completed (fully vaccinated) in past 3 months AND [2] new-onset of COVID-19 symptoms BUT [3] no known exposure    Negative: [1] Had lab test confirmed COVID-19 infection within last 3 monthsAND [2] new-onset of COVID-19 symptoms BUT [3] no known exposure    Negative: [1] Lives with someone known to have influenza (flu test positive) AND [2]  flu-like symptoms (e.g., cough, runny nose, sore throat, SOB; with or without fever)    Negative: [1] Adult with possible COVID-19 symptoms AND [2] triager concerned about severity of symptoms or other causes    Negative: COVID-19 and breastfeeding, questions about    Negative: SEVERE or constant chest pain or pressure (Exception: mild central chest pain, present only when coughing)    Negative: MODERATE difficulty breathing (e.g., speaks in phrases, SOB even at rest, pulse 100-120)    Negative: [1] Headache AND [2] stiff neck (can't touch chin to chest)    Negative: MILD difficulty breathing (e.g., minimal/no SOB at rest, SOB with walking, pulse <100)    Negative: Chest pain or pressure    Negative: Patient sounds very sick or weak to the triager    Negative: Fever > 103 F (39.4 C)    Negative: [1] Fever > 101 F (38.3 C) AND [2] age > 60 years    Negative: [1] Fever > 100.0 F (37.8 C) AND [2] bedridden (e.g., nursing home patient, CVA, chronic illness, recovering from surgery)    Negative: [1] HIGH RISK patient (e.g., age > 64 years, diabetes, heart or lung disease, weak immune system) AND [2] new or worsening symptoms    Negative: [1] HIGH RISK patient AND [2] influenza is widespread in the community AND [3] ONE OR MORE respiratory symptoms: cough, sore throat, runny or stuffy nose    Negative: [1] HIGH RISK patient AND [2] influenza exposure within the last 7 days AND [3] ONE OR MORE respiratory symptoms: cough, sore throat, runny or stuffy nose    Negative: Fever present > 3 days (72 hours)    Negative: [1] Fever returns after gone for over 24 hours AND [2] symptoms worse or not improved    Negative: [1] Continuous (nonstop) coughing interferes with work or school AND [2] no improvement using cough treatment per protocol    Negative: [1] COVID-19 infection suspected by caller or triager AND [2] mild symptoms (cough, fever, or others) AND [3] no complications or SOB    Negative: Cough present > 3 weeks     Negative: [1] COVID-19 diagnosed by positive lab test AND [2] NO symptoms (e.g., cough, fever, others)    Negative: [1] COVID-19 diagnosed by positive lab test AND [2] mild symptoms (e.g., cough, fever, others) AND [3] no complications or SOB    Negative: [1] COVID-19 diagnosed by HCP (doctor, NP or PA) AND [2] mild symptoms (e.g., cough, fever, others) AND [3] no complications or SOB    Negative: [1] COVID-19 diagnosed AND [2] has mild nausea, vomiting or diarrhea    Negative: COVID-19 Home Isolation, questions about    Protocols used: CORONAVIRUS (COVID-19) DIAGNOSED OR [id number]-K-GM 3.25

## 2021-06-24 NOTE — PROGRESS NOTES
MIGRAINE DISABILITY ASSESSMENT (MIDAS)    On how many days in the last 3 months did you miss work or school because of your headaches?  0    How many days in the last 3 months was your productivity at work or school reduced by half or more because of your headaches? (Do not include days you counted in question 1 where you missed work or school.)   0    On how many days in the last 3 months did you not do household work (such as housework, home repairs and maintenance, shopping, caring for children and relatives) because of your headaches?   80    How many days in the last 3 months was your productivity in household work reduced by half or more because of your headaches? (Do not include days you counted in question 3 where you did not do household work).  0    On how many days in the last 3 months did you miss family, social, or lesiure activities because of your headaches?  5    MIDAS Total Score: 85    On how many days in the last 3 months did you have a headache? (If a headache lasted more than 1 day, count each day.)    90    On a scale of 0 - 10, on average how painful were these headaches (where 0 = no pain at all, and 10 = pain as bad as it can be.)   8

## 2021-06-24 NOTE — TELEPHONE ENCOUNTER
Patient called stating that she had a covid test yesterday a [facility], she is looking for results.        [place] G[facility]  [facility]

## 2021-06-24 NOTE — TELEPHONE ENCOUNTER
"Prior Authorization Retail Medication Request    Medication/Dose: Rimegepant Sulfate 75 MG TBDP    ICD code (if different than what is on RX):    G43.709  Previously Tried and Failed:  Zomig as needed and helps but headaches   Sumatriptan   Stopped Excedrin and tylenol many years ago  Ibuprofen a couple times per day and no side effects  Percocet -for back pain and foot pain and prescribed by Dr Stahl but not for headaches  Currently on lisinopril for hypertension  Amitriptyline -just stopped working and was on it for a while  Nortriptyline  Protriptyline  Topiramate  Zonegran  Gabapentin-caused anxiety/panic attack  Verapamil  Propranol  Emgality -took for a couple of month and caused dizziness. Did not try any other CGRPs  escitalopram -does not recall side effects but it wasn't helpful with headaches  Losartan in the past-has been a while  Topiramate a long time ago and \"very bad dreams\". History of renal stones so contraindicated     Botox -fear of potential side effects-  Rationale:  Chronic migraine     Insurance Name:  [employer] plus  Insurance ID:  [id number]         Pharmacy Information (if different than what is on RX)  Name:    Phone:       "

## 2021-06-24 NOTE — TELEPHONE ENCOUNTER
Prior Authorization Approval    Authorization Effective Date: 3/24/2021  Authorization Expiration Date: 6/24/2022  Medication: Rimegepant Sulfate 75 MG TBDP-PA APPROVED   Approved Dose/Quantity: UP TO 48 TABLETS PER 90 DAYS   Reference #:     Insurance Company: [employer] - Phone [phone] Fax [phone]  Expected CoPay:       CoPay Card Available:      Foundation Assistance Needed:    Which Pharmacy is filling the prescription (Not needed for infusion/clinic administered): [id number] [address]  AT [facility]  Pharmacy Notified: Yes- **Instructed pharmacy to notify patient when script is ready to /ship.**   Patient Notified: Yes

## 2021-06-24 NOTE — TELEPHONE ENCOUNTER
Call placed to patient  Relayed Covid-19 test result (negative) from 6/23/2021    Patient verbalized understanding  No further questions/concerns    Shad Starks RN

## 2021-06-24 NOTE — TELEPHONE ENCOUNTER
"Prior Authorization Retail Medication Request    Medication/Dose: Fremanezumab-vfrm 225 MG/1.5ML SOAJ; Inject 225 mg Subcutaneous every 30 days   ICD code (if different than what is on RX):    G43.709  Previously Tried and Failed:  Zomig as needed and helps but headaches   Sumatriptan   Stopped Excedrin and tylenol many years ago  Ibuprofen a couple times per day and no side effects  Percocet -for back pain and foot pain and prescribed by Dr Stahl but not for headaches  Currently on lisinopril for hypertension  Amitriptyline -just stopped working and was on it for a while  Nortriptyline  Protriptyline  Topiramate  Zonegran  Gabapentin-caused anxiety/panic attack  Verapamil  Propranol  Emgality -took for a couple of month and caused dizziness. Did not try any other CGRPs  escitalopram -does not recall side effects but it wasn't helpful with headaches  Losartan in the past-has been a while  Topiramate a long time ago and \"very bad dreams\". History of renal stones so contraindicated     Botox -fear of potential side effects-  Rationale:  Chronic migraine    Insurance Name:  [employer]  Insurance ID:  [id number]       Pharmacy Information (if different than what is on RX)  Name:    Phone:      "
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
## 2021-06-24 NOTE — TELEPHONE ENCOUNTER
Central Prior Authorization Team   Phone: [phone]    PA Initiation    Medication: Fremanezumab-vfrm 225 MG/1.5ML SOAJ; Inject 225 mg Subcutaneous every 30 days   Insurance Company: [employer] [place] - Phone [phone] Fax [phone]  Pharmacy Filling the Rx: [id number] [place] [address] AT [facility]  Filling Pharmacy Phone: [phone]  Filling Pharmacy Fax: [phone]  Start Date: 6/24/2021
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
## 2021-06-24 NOTE — TELEPHONE ENCOUNTER
Central Prior Authorization Team   [phone]    PA Initiation    Medication: Rimegepant Sulfate 75 MG TBDP  Insurance Company: [employer] - Phone [phone] Fax [phone]  Pharmacy Filling the Rx: [id number] [place] [id number]  [facility]  Filling Pharmacy Phone: [phone]  Filling Pharmacy Fax: [phone]  Start Date: 6/24/2021

## 2021-06-27 NOTE — PROGRESS NOTES
Progress Notes by Yeimi Gamble NP at 8/13/2019 10:30 AM     Author: Yeimi Gamble NP Service: -- Author Type: Nurse Practitioner    Filed: 8/13/2019 11:54 AM Encounter Date: 8/13/2019 Status: Signed    : Yeimi Gamble NP (Nurse Practitioner)           Click to link to [facility]     [facility] HEART CARE NOTE      Assessment/Recommendations   Assessment:    1.  Mild to moderate coronary arthrosclerosis per CTA coronary in July 2019: History of abnormal stress test which was thought to be due to breast attenuation.  Recent CTA coronary showed calcium score of 752 with mild to moderate coronary arthrosclerosis with no high-grade stenosis and also noted 50-69 stenosis in ramus with no calcified plate.  Dr. Tracey has recommended patient to follow-up to discuss about risk factor modification.     Reports occasional sharp pain across his chest that lasts for a few seconds.  Denies any correlation of this sharp chest discomfort with exertion.  She occasionally feels heart palpitation or heart racing which she thinks is contributed by anxiety.  She takes ibuprofen 800 mg 3 times a day for her aches and pain.    2.  Hyperlipidemia: Most recent cholesterol level 222, triglyceride 141, HDL 50, and .  Patient had trouble picking up her pravastatin and has not started.    3. Hypertension: Blood pressure today is.  She is currently on lisinopril 5 mg daily    4.  Obesity: We discussed the correlation between obesity, coronary artery disease, and hypertension.  We discussed about the importance of heart healthy diet, weight loss, portion control, decreased calorie intake, and regular exercise    Plan:  1.  We discussed about the coronary arthrosclerosis, medication management, and lifestyle management including heart healthy diet, weight loss, and regular exercise as tolerated.  We will discuss with Dr. Tracey if patient needs to be started on a low-dose baby aspirin.    2.  Initiate pravastatin  40 mg daily as recommended by Dr. Tracey.  Instructed to call if experiences any side effects.    3.  Obtain fasting lipid and liver function test AST/ALT in 3 months    4. .  Continue current hypertension regimen    5.  Encourage heart healthy diet, weight loss, decreased calorie intake, portion control, and regular exercise as tolerated - 30 minutes 5 times a week or 45 minutes 3 times a week.    6.  Recommended to limit use of nonsteroidal anti-inflammatory with adverse effect on cardiovascular health and bleeding risk, GI ulcer and kidney injury.    Follow up with me in 3 months with lipid and liver function check.     History of Present Illness    Ms. Arielle Pham is a 49 y.o. female with a significant past medical history of hypertension, scoliosis, GERD, chronic pain syndrome, hyperlipidemia, obesity with a BMI of 40.74, family history of heart disease, recent abnormal stress test and abnormal CT coronary who is seen at [facility] heart failure clinic today for follow-up per Dr. Tracey to discuss about aggressive risk factor management.  Patient recently saw Dr. Tracey for her chest discomfort which was found to be more atypical.  She recently had a ED visit with similar symptoms.  Her cardiac enzymes were normal and EKG was found normal.  Her recent stress test was found abnormal which was thought to be due to breast attenuation.  Therefore, she underwent CTA coronary showed calcium score of 752 with mild to moderate coronary arthrosclerosis with no high-grade stenosis.  Also noted 50 to 69% stenosis in the ramus but no calcified plaque noted.  She was recommended to start on pravastatin 40 mg daily.    Today, patient reports that she continues to have dose sharp pain across her chest.  She says she has been experiencing chest discomfort for years.  She also reports chest tenderness.  She occasionally feels heart racing and heart palpitation when she feels anxious.  She thinks is due to  anxiety.  She does not exercise on a regular basis.    She denies fatigue, lightheadedness, shortness of breath, dyspnea on exertion, orthopnea, PND, abdominal fullness/bloating and lower extremity edema.  She uses cane for walking.    CTA coronary done on 7/11/2019-reviewed  Interpretation Summary     The total Agatston calcium score is 752. A calcium score in this range places the individual in the >90th percentile when compared to an age and gender matched control group and implies a very high risk of cardiac events in the next ten years.    Mild to moderate coronary atherosclerosis with no high-grade obstructions evident    Ramus lesion: The lesion has moderate luminal stenosis in the range of 50-69%. Non        Physical Examination Review of Systems   Vitals:    08/13/19 1043   BP: 126/86   Pulse: 60   Resp: 16     Body mass index is 40.74 kg/m .  Wt Readings from Last 3 Encounters:   08/13/19 (!) 252 lb (114.3 kg)   07/11/19 (!) 254 lb (115.2 kg)   07/02/19 (!) 254 lb (115.2 kg)       General Appearance:     Alert, cooperative and in no acute distress.   ENT/Mouth: membranes moist, no oral lesions or bleeding gums.      EYES:  no scleral icterus, normal conjunctivae   Neck: no carotid bruits or thyromegaly   Chest/Lungs:   lungs are clear to auscultation, no rales or wheezing, respirations unlabored   Cardiovascular:    Heart rate regular. Normal first and second heart sounds with no murmurs, rubs, or gallops; the carotid, radial and posterior tibial pulses are intact, Jugular venous pressure flat with no HJR, no edema bilateral lower extremities    Abdomen:   Large but soft, nontender, nondistended, bowel sounds present   Extremities: no cyanosis or clubbing   Skin: warm, dry.    Neurologic: mood and affect are appropriate, alert and oriented x3      General: WNL  Eyes: WNL  Ears/Nose/Throat: WNL  Lungs: WNL  Heart: Irregular Heartbeat  Stomach: Heartburn  Bladder: WNL  Muscle/Joints: Joint Pain, Muscle  Weakness, Muscle Pain  Skin: WNL  Nervous System: Daytime Sleepiness  Mental Health: Depression, Anxiety     Blood: WNL     Medical History  Surgical History Family History Social History   Past Medical History:   Diagnosis Date   ? Atypical chest pain 9/30/2015   ? Hyperlipidemia    ? Hypertension    ? Migraines     Past Surgical History:   Procedure Laterality Date   ? CHOLECYSTECTOMY      Family History   Problem Relation Age of Onset   ? Coronary artery disease Mother    ? Coronary artery disease Father     Social History     Socioeconomic History   ? Marital status: Single     Spouse name: Not on file   ? Number of children: Not on file   ? Years of education: Not on file   ? Highest education level: Not on file   Occupational History   ? Not on file   Social Needs   ? Financial resource strain: Not on file   ? Food insecurity:     Worry: Not on file     Inability: Not on file   ? Transportation needs:     Medical: Not on file     Non-medical: Not on file   Tobacco Use   ? Smoking status: Never Smoker   ? Smokeless tobacco: Never Used   Substance and Sexual Activity   ? Alcohol use: Not on file   ? Drug use: Not on file   ? Sexual activity: Not on file   Lifestyle   ? Physical activity:     Days per week: Not on file     Minutes per session: Not on file   ? Stress: Not on file   Relationships   ? Social connections:     Talks on phone: Not on file     Gets together: Not on file     Attends Anabaptist service: Not on file     Active member of club or organization: Not on file     Attends meetings of clubs or organizations: Not on file     Relationship status: Not on file   ? Intimate partner violence:     Fear of current or ex partner: Not on file     Emotionally abused: Not on file     Physically abused: Not on file     Forced sexual activity: Not on file   Other Topics Concern   ? Not on file   Social History Narrative   ? Not on file          Medications  Allergies   Current Outpatient Medications   Medication  Sig Dispense Refill   ? ibuprofen (ADVIL,MOTRIN) 800 MG tablet      ? lisinopril (PRINIVIL,ZESTRIL) 5 MG tablet Take 5 mg by mouth daily.     ? oxyCODONE-acetaminophen (PERCOCET) 5-325 mg per tablet      ? ZOLMitriptan (ZOMIG) 5 MG tablet      ? pravastatin (PRAVACHOL) 40 MG tablet Take 1 tablet (40 mg total) by mouth at bedtime. 30 tablet 3     No current facility-administered medications for this visit.       Allergies   Allergen Reactions   ? Statins-Hmg-Coa Reductase Inhibitors Other (See Comments)     Severe joint and muscle ache from Atorvastastin   ? Codeine Dizziness and Other (See Comments)   ? Gabapentin          Lab Results    Chemistry CBC BNP   Lab Results   Component Value Date    CREATININE 0.85 06/25/2019    BUN 25 (H) 06/25/2019     06/25/2019    K 4.9 06/25/2019     06/25/2019    CO2 23 06/25/2019     Creatinine (mg/dL)   Date Value   06/25/2019 0.85   06/24/2019 1.04   09/20/2014 0.85   06/12/2014 0.78    Lab Results   Component Value Date    WBC 10.9 06/25/2019    HGB 16.0 06/25/2019    HCT 47.3 (H) 06/25/2019    MCV 95 06/25/2019     06/25/2019    No results found for: BNP  No results found for: BNP     40  minutes were spent with the patient with greater than 50% spent on education and counseling.    Yeimi Gamble CNP  [facility]   Heart Failure Clinic         This note has been dictated using voice recognition software. Any grammatical, typographical, or context distortions are unintentional and inherent to the software

## 2021-06-27 NOTE — PROGRESS NOTES
Progress Notes by Sarabjit Tracey MD at 7/2/2019  8:50 AM     Author: Sarabjit Tracey MD Service: -- Author Type: Physician    Filed: 7/2/2019  9:24 AM Encounter Date: 7/2/2019 Status: Signed    : Sarabjit Tracey MD (Physician)           Click to link to [facility] HEART [facility] NOTE    Thank you, Dr. Westbrook, for asking us to see Arielle Pham at the [facility].      Assessment/Recommendations   Patient with chest discomfort which has some atypical features but also has some features which could be suggestive of angina as it goes up into her left shoulder and toward her left neck.  She does have risk factors with a family history of premature coronary artery disease, and hypertension and her LDL cholesterol is moderately elevated.  She is not particularly active because of orthopedic issues and therefore may not develop symptoms as quickly.    She also has an abnormal nuclear exercise test from the past which could be artifact from breast attenuation but certainly cannot exclude this abnormality being from coronary artery disease.    I have recommended that we do a CT angiogram.  This will give us a calcium score as well as any evidence of plaque in her coronary arteries.  I think this would be helpful before deciding how aggressive to be with her risk factor management as well.  Certainly if she has significant epicardial coronary artery disease she may warrant further evaluation with diagnostic angiogram as well.    She is agreeable to this and her most recent creatinine was normal.  We will obtain that in the near future.    We will follow-up with her regarding the results of the stress test and further recommendations.  If she does have some plaque, I would like to see her back to talk about strategies to stabilize plaque and reduce her risk of cardiac events in the future.    Thank you for allowing us to participate in her care.         History of Present  Illness    Ms. Arielle Pham is a 49 y.o. female with known history of hypertension and a distant history of hyperlipidemia.  She also has a family history of premature coronary artery disease with her mother having stents placed in her mid 50s.  Father also had heart problems but she is not certain exactly what type.    She has a history of chest discomfort which in the past is felt to be atypical.  In 2015 she had an abnormal nuclear stress test which suggested a fixed defect which could have been breast attenuation artifact.  Overall ejection fraction was normal.  She does have a chronic type chest discomfort which comes and goes which is been described as costochondritis to her.  This is been a problem for many years.  Recently about 1 week ago she went into the emergency department for a different type of discomfort.  She described it as somewhat sharp in nature in her left chest and it radiated up into her left shoulder and almost into the left neck.  It was not associate with shortness of breath or diaphoresis.  It seemed to be worse if she took a large breath and and even if she moved around a bit.  The discomfort improved in the emergency room but did not completely go away and she was discharged but came back later the next day with worsening discomfort and once again had normal troponins, normal ECG and was discharged with improvement in the discomfort.  She is had minimal discomfort since that time.  The discomfort can come on when she is more active although walking into the clinic today did not bring on the discomfort.  She denies orthopnea or paroxysmal nocturnal dyspnea but does sleep in her recliner because her back.  She has some chronic peripheral edema worse on the right but she has had a lot of foot problems and this requires her to use a cane and she is not very active.  She gets occasional fleeting palpitations but no syncopal or near syncopal episodes and no history of rheumatic fever,  cerebrovascular accident or TIA.    Family history for premature coronary artery disease as noted above.  She is treated for hypertension and is been told her cholesterol is high in the past but then seemed to get better.  A lipid panel on June 11, 2018 showed a total cholesterol of 222, triglycerides 141, HDL 50 and an LDL of 144.  She has never smoked cigarettes and is not diabetic.    She is  and has 2 children.  The younger child is along with her on the visit today.  Her older child has some behavioral issues which caused extra stress at home.  The patient is not currently working.    Four 12-lead ECGs are reviewed from the emergency department.  There are no ST-T wave changes that are concerning on these ECGs and no changes throughout the 4 ECGs at work at all clinically significant.         Physical Examination Review of Systems   Vitals:    07/02/19 0841   BP: 102/78   Pulse: 88   Resp: 18     Body mass index is 37.51 kg/m .  Wt Readings from Last 3 Encounters:   07/02/19 (!) 254 lb (115.2 kg)   06/25/19 (!) 250 lb (113.4 kg)   06/24/19 (!) 250 lb (113.4 kg)     General Appearance:   Alert, cooperative and in no acute distress.   ENT/Mouth: Oral mucuos membranes pink and moist .      EYES:  No scleral icterus. No Xanthelasma.    Neck: JVP normal. No Hepatojugular reflux. Thyroid not visualized   Chest/Lungs:   Lungs are clear to auscultation, equal chest wall expansion    Cardiovascular:   S1, S2 without murmur ,clicks or rubs. Brachial, radialpulses are intact and symetric. No carotid bruits noted   Abdomen:  Nontender. BS+.      Extremities: No cyanosis, clubbing.  Right ankle is wrapped.  Wrap/brace type device in left ankle.   Skin: no xanthelasma, warm.    Psych: Appropriate affect.   Neurologic:  Slow and deliberate gait, normal  bilateral, no tremors            Review of systems is reviewed and recorded on our intake form.  He will be placed under separate cover.                                       Medical History  Surgical History Family History Social History   Past Medical History:   Diagnosis Date   ? Hyperlipidemia    ? Hypertension    ? Migraines     Past Surgical History:   Procedure Laterality Date   ? CHOLECYSTECTOMY      Family History   Problem Relation Age of Onset   ? Coronary artery disease Mother    ? Coronary artery disease Father     Social History     Socioeconomic History   ? Marital status: Single     Spouse name: Not on file   ? Number of children: Not on file   ? Years of education: Not on file   ? Highest education level: Not on file   Occupational History   ? Not on file   Social Needs   ? Financial resource strain: Not on file   ? Food insecurity:     Worry: Not on file     Inability: Not on file   ? Transportation needs:     Medical: Not on file     Non-medical: Not on file   Tobacco Use   ? Smoking status: Never Smoker   ? Smokeless tobacco: Never Used   Substance and Sexual Activity   ? Alcohol use: Not on file   ? Drug use: Not on file   ? Sexual activity: Not on file   Lifestyle   ? Physical activity:     Days per week: Not on file     Minutes per session: Not on file   ? Stress: Not on file   Relationships   ? Social connections:     Talks on phone: Not on file     Gets together: Not on file     Attends Hinduism service: Not on file     Active member of club or organization: Not on file     Attends meetings of clubs or organizations: Not on file     Relationship status: Not on file   ? Intimate partner violence:     Fear of current or ex partner: Not on file     Emotionally abused: Not on file     Physically abused: Not on file     Forced sexual activity: Not on file   Other Topics Concern   ? Not on file   Social History Narrative   ? Not on file          Medications  Allergies   Current Outpatient Medications   Medication Sig Dispense Refill   ? ibuprofen (ADVIL,MOTRIN) 800 MG tablet      ? lisinopril (PRINIVIL,ZESTRIL) 5 MG tablet Take 5 mg by mouth daily.     ?  oxyCODONE-acetaminophen (PERCOCET) 5-325 mg per tablet      ? ZOLMitriptan (ZOMIG) 5 MG tablet      ? atenolol (TENORMIN) 25 MG tablet      ? cyclobenzaprine (FLEXERIL) 10 MG tablet      ? traMADol (ULTRAM) 50 mg tablet        No current facility-administered medications for this visit.       Allergies   Allergen Reactions   ? Codeine Dizziness and Other (See Comments)   ? Gabapentin    ? Statins-Hmg-Coa Reductase Inhibitors          Lab Results    Chemistry/lipid CBC Cardiac Enzymes/BNP/TSH/INR   Lab Results   Component Value Date    CHOL 249 (H) 12/01/2014    HDL 37 (L) 12/01/2014    LDLCALC 155 (H) 12/01/2014    TRIG 284 (H) 12/01/2014    CREATININE 0.85 06/25/2019    BUN 25 (H) 06/25/2019    K 4.9 06/25/2019     06/25/2019     06/25/2019    CO2 23 06/25/2019    Lab Results   Component Value Date    WBC 10.9 06/25/2019    HGB 16.0 06/25/2019    HCT 47.3 (H) 06/25/2019    MCV 95 06/25/2019     06/25/2019    Lab Results   Component Value Date    TROPONINI <0.01 06/25/2019    TSH 2.4 03/11/2011

## 2021-06-28 NOTE — PROGRESS NOTES
Progress Notes by Yeimi Gamble NP at 1/21/2020  9:50 AM     Author: Yeimi Gamble NP Service: -- Author Type: Nurse Practitioner    Filed: 1/21/2020 12:02 PM Encounter Date: 1/21/2020 Status: Signed    : Yeimi Gamble NP (Nurse Practitioner)             Assessment/Recommendations   Assessment:    1.  Mild to moderate coronary arthrosclerosis per CTA coronary in July 2019: History of abnormal stress test which was thought to be due to breast attenuation.  Recent CTA coronary showed calcium score of 752 with mild to moderate coronary arthrosclerosis with no high-grade stenosis and also noted 50-69 stenosis in ramus with no calcified plate.      She occasional gets sharp pain across her chest lasts for few seconds without further intervention.  This is unchanged since last clinic visit.      She continues to takes ibuprofen 800 mg 2-3 times a day for her aches and pain although she was informed about the adverse effect of NSAIDs on cardiovascular health.     2.  Hyperlipidemia: Most recent cholesterol level 222, triglyceride 141, HDL 50, and .   Currently not taking pravastatin due to pain in her hand.     3. Hypertension:  Blood pressure is 132/84. She is currently on lisinopril 5 mg daily     4.  Obesity: Her physical activity is limited from aches and pain.  She uses cane for walking.  She is waiting for a walker.     Plan:  1.   We will discuss with Dr. Tracey if patient needs to be started on a low-dose baby aspirin.      2.    Resume  pravastatin on a lower dose of 20 mg daily.  Encouraged to call if worsening muscle and joint pain.      3. .  Continue current hypertension regimen     4.    Encourage heart healthy diet, weight loss, decreased calorie intake, portion control, and regular exercise as tolerated - 15-30 minutes 3 times a week.     5.    Highly encouraged to  limit use of nonsteroidal anti-inflammatory and discussed with PC for alternative therapy.P     Follow up with Dr. Tracey  in 8 weeks with blood work     History of Present Illness/Subjective    Ms. Arielle Pham is a 49 y.o. female with a significant past medical history of hypertension, scoliosis, GERD, chronic pain syndrome, hyperlipidemia, obesity with a BMI of 40.74, family history of heart disease, recent abnormal stress test and abnormal CT coronary who is seen at [facility] heart failure clinic today for follow-up continued follow-up.      Patient recently saw Dr. Tracey in July 2019 for chest pain evaluation. Recent stress test was found abnormal which was thought to be due to breast attenuation.  Therefore, she underwent CTA coronary showed calcium score of 752 with mild to moderate coronary arthrosclerosis with no high-grade stenosis.  Also noted 50 to 69% stenosis in the ramus but no calcified plaque noted.  She was recommended to start on pravastatin 40 mg daily.    During last heart failure clinic visit, she was initiated on pravastatin 40 mg daily and recommended to follow-up in 3 months for fasting lipid profile.    Today, patient reports that she developed pain in her hand when she started taking pravastatin.  She also reported that she called in our office and nobody has returned her call.  She did see her PCP.  She resume her pravastatin again but developed hand pain and therefore stopped taking it.    She continues to have sharp pain across her chest occasionally which lasts for few seconds without further intervention.  She continues to take high dose of ibuprofen for her chronic aches and pain.  She uses cane for walking assistance.  She is hoping to get a walker soon so she can be more active.  Her physical activity is limited by chronic aches and pain.    She expressed her interest in participating in lipid research study.    CTA coronary done on 7/11/2019-reviewed  Interpretation Summary     The total Agatston calcium score is 752. A calcium score in this range places the individual in the >90th  percentile when compared to an age and gender matched control group and implies a very high risk of cardiac events in the next ten years.    Mild to moderate coronary atherosclerosis with no high-grade obstructions evident    Ramus lesion: The lesion has moderate luminal stenosis in the range of 50-69%. Non        Physical Examination Review of Systems   Vitals:    01/21/20 0931   BP: 132/84   Pulse: 84   Resp: 18   SpO2: 98%     Body mass index is 40.1 kg/m .  Wt Readings from Last 3 Encounters:   01/21/20 (!) 248 lb (112.5 kg)   08/13/19 (!) 252 lb (114.3 kg)   07/11/19 (!) 254 lb (115.2 kg)       General Appearance:   no distress, normal body habitus   ENT/Mouth: membranes moist, no oral lesions or bleeding gums.      EYES:  no scleral icterus, normal conjunctivae   Neck: no carotid bruits or thyromegaly   Chest/Lungs:   lungs are clear to auscultation, no rales or wheezing,  equal chest wall expansion    Cardiovascular:    Heart rate regular. Normal first and second heart sounds with no murmurs, rubs, or gallops; the carotid, radial and posterior tibial pulses are intact, no eedema bilaterally    Abdomen:   Large but soft, no organomegaly, masses, bruits, or tenderness; bowel sounds are present   Extremities: no cyanosis or clubbing   Skin: no xanthelasma, warm.    Neurologic: normal  bilateral, no tremors     Psychiatric: alert and oriented x3, calm     General: WNL  Eyes: WNL  Ears/Nose/Throat: WNL  Lungs: WNL  Heart: Chest Pain, Irregular Heartbeat  Stomach: WNL  Bladder: WNL  Muscle/Joints: Joint Pain  Skin: WNL  Nervous System: Daytime Sleepiness  Mental Health: WNL     Blood: Easy Bruising     Medical History  Surgical History Family History Social History   Past Medical History:   Diagnosis Date   ? Atypical chest pain 9/30/2015   ? Hyperlipidemia    ? Hypertension    ? Migraines     Past Surgical History:   Procedure Laterality Date   ? CHOLECYSTECTOMY      Family History   Problem Relation Age of  Onset   ? Coronary artery disease Mother    ? Coronary artery disease Father     Social History     Socioeconomic History   ? Marital status: Single     Spouse name: Not on file   ? Number of children: Not on file   ? Years of education: Not on file   ? Highest education level: Not on file   Occupational History   ? Not on file   Social Needs   ? Financial resource strain: Not on file   ? Food insecurity:     Worry: Not on file     Inability: Not on file   ? Transportation needs:     Medical: Not on file     Non-medical: Not on file   Tobacco Use   ? Smoking status: Never Smoker   ? Smokeless tobacco: Never Used   Substance and Sexual Activity   ? Alcohol use: Not on file   ? Drug use: Not on file   ? Sexual activity: Not on file   Lifestyle   ? Physical activity:     Days per week: Not on file     Minutes per session: Not on file   ? Stress: Not on file   Relationships   ? Social connections:     Talks on phone: Not on file     Gets together: Not on file     Attends Scientologist service: Not on file     Active member of club or organization: Not on file     Attends meetings of clubs or organizations: Not on file     Relationship status: Not on file   ? Intimate partner violence:     Fear of current or ex partner: Not on file     Emotionally abused: Not on file     Physically abused: Not on file     Forced sexual activity: Not on file   Other Topics Concern   ? Not on file   Social History Narrative   ? Not on file          Medications  Allergies   Current Outpatient Medications   Medication Sig Dispense Refill   ? ibuprofen (ADVIL,MOTRIN) 800 MG tablet      ? lisinopril (PRINIVIL,ZESTRIL) 5 MG tablet Take 5 mg by mouth daily.     ? methylPREDNISolone (MEDROL DOSEPACK) 4 mg tablet Take by mouth as instructed per packaging.     ? oxyCODONE-acetaminophen (PERCOCET) 5-325 mg per tablet      ? ZOLMitriptan (ZOMIG) 5 MG tablet      ? pravastatin (PRAVACHOL) 20 MG tablet Take 1 tablet (20 mg total) by mouth at bedtime. 30  tablet 1     No current facility-administered medications for this visit.     Allergies   Allergen Reactions   ? Statins-Hmg-Coa Reductase Inhibitors Other (See Comments)     Severe joint and muscle ache from Atorvastastin   ? Codeine Dizziness and Other (See Comments)   ? Gabapentin          Lab Results    Chemistry/lipid CBC Cardiac Enzymes/BNP/TSH/INR   Lab Results   Component Value Date    CHOL 249 (H) 12/01/2014    HDL 37 (L) 12/01/2014    LDLCALC 155 (H) 12/01/2014    TRIG 284 (H) 12/01/2014    CREATININE 0.85 06/25/2019    BUN 25 (H) 06/25/2019    K 4.9 06/25/2019     06/25/2019     06/25/2019    CO2 23 06/25/2019    Lab Results   Component Value Date    WBC 10.9 06/25/2019    HGB 16.0 06/25/2019    HCT 47.3 (H) 06/25/2019    MCV 95 06/25/2019     06/25/2019    Lab Results   Component Value Date    TROPONINI <0.01 06/25/2019    TSH 2.4 03/11/2011        This note has been dictated using voice recognition software. Any grammatical, typographical, or context distortions are unintentional and inherent to the software

## 2021-06-29 ENCOUNTER — MYC MEDICAL ADVICE (OUTPATIENT)
Dept: FAMILY MEDICINE | Facility: CLINIC | Age: 51
End: 2021-06-29

## 2021-06-29 NOTE — TELEPHONE ENCOUNTER
Prior Authorization Approval    Authorization Effective Date: 3/28/2021  Authorization Expiration Date: 12/28/2021  Medication: Fremanezumab-vfrm 225 MG/1.5ML-APPROVED  Approved Dose/Quantity:    Reference #:     Insurance Company: [employer] [place] - Phone [phone] Fax [phone]  Expected CoPay:       CoPay Card Available:      Foundation Assistance Needed:    Which Pharmacy is filling the prescription (Not needed for infusion/clinic administered): [facility] #[id number] [address]  AT [facility]  Pharmacy Notified: Yes  Patient Notified: Yes  **Instructed pharmacy to notify patient when script is ready to /ship.**

## 2021-07-06 ENCOUNTER — MYC REFILL (OUTPATIENT)
Dept: FAMILY MEDICINE | Facility: CLINIC | Age: 51
End: 2021-07-06

## 2021-07-06 ENCOUNTER — IMMUNIZATION (OUTPATIENT)
Dept: FAMILY MEDICINE | Facility: CLINIC | Age: 51
End: 2021-07-06
Attending: FAMILY MEDICINE
Payer: COMMERCIAL

## 2021-07-06 DIAGNOSIS — M54.2 NECK PAIN: ICD-10-CM

## 2021-07-06 DIAGNOSIS — M51.369 DDD (DEGENERATIVE DISC DISEASE), LUMBAR: ICD-10-CM

## 2021-07-06 PROCEDURE — 91301 PR COVID VAC MODERNA 100 MCG/0.5 ML IM: CPT

## 2021-07-06 PROCEDURE — 0012A PR COVID VAC MODERNA 100 MCG/0.5 ML IM: CPT

## 2021-07-06 RX ORDER — OXYCODONE AND ACETAMINOPHEN 5; 325 MG/1; MG/1
1 TABLET ORAL EVERY 6 HOURS PRN
Qty: 120 TABLET | Refills: 0 | Status: CANCELLED | OUTPATIENT
Start: 2021-07-06

## 2021-07-09 ENCOUNTER — HOSPITAL ENCOUNTER (OUTPATIENT)
Dept: MAMMOGRAPHY | Facility: CLINIC | Age: 51
Discharge: HOME | End: 2021-07-09
Payer: COMMERCIAL

## 2021-07-09 DIAGNOSIS — Z12.31 VISIT FOR SCREENING MAMMOGRAM: ICD-10-CM

## 2021-07-13 ENCOUNTER — MYC MEDICAL ADVICE (OUTPATIENT)
Dept: FAMILY MEDICINE | Facility: CLINIC | Age: 51
End: 2021-07-13

## 2021-07-14 ENCOUNTER — TELEPHONE (OUTPATIENT)
Dept: NEUROLOGY | Facility: CLINIC | Age: 51
End: 2021-07-14

## 2021-07-14 NOTE — TELEPHONE ENCOUNTER
Migraine headache flare up similar to patient's migraine headaches but long lasting. Ajovy injection last Wednesday. Headache worsening for 4 days Took zolmitriptan helps for 4-5 hours and headache re-occurring. Had steroid pack in the past and caused worsening headaches.   Aleve never works.   Plan:  Stop zolmitriptan   A trial of Nurtec as rescue medication for acute migraine headache flare up as needed.   Follow up -send [employer] message with updates.   Patient appreciates the call.

## 2021-08-06 ENCOUNTER — MYC REFILL (OUTPATIENT)
Dept: FAMILY MEDICINE | Facility: CLINIC | Age: 51
End: 2021-08-06

## 2021-08-06 DIAGNOSIS — M51.369 DDD (DEGENERATIVE DISC DISEASE), LUMBAR: ICD-10-CM

## 2021-08-06 DIAGNOSIS — M54.2 NECK PAIN: ICD-10-CM

## 2021-08-09 RX ORDER — OXYCODONE AND ACETAMINOPHEN 5; 325 MG/1; MG/1
1 TABLET ORAL EVERY 6 HOURS PRN
Qty: 120 TABLET | Refills: 0 | OUTPATIENT
Start: 2021-08-09

## 2021-09-05 ENCOUNTER — MYC REFILL (OUTPATIENT)
Dept: FAMILY MEDICINE | Facility: CLINIC | Age: 51
End: 2021-09-05

## 2021-09-05 DIAGNOSIS — M54.2 NECK PAIN: ICD-10-CM

## 2021-09-05 DIAGNOSIS — M51.369 DDD (DEGENERATIVE DISC DISEASE), LUMBAR: ICD-10-CM

## 2021-09-08 DIAGNOSIS — G43.009 MIGRAINE WITHOUT AURA AND WITHOUT STATUS MIGRAINOSUS, NOT INTRACTABLE: ICD-10-CM

## 2021-09-08 RX ORDER — OXYCODONE AND ACETAMINOPHEN 5; 325 MG/1; MG/1
1 TABLET ORAL EVERY 6 HOURS PRN
Qty: 120 TABLET | Refills: 0 | Status: SHIPPED | OUTPATIENT
Start: 2021-09-08 | End: 2021-09-29

## 2021-09-09 RX ORDER — ZOLMITRIPTAN 5 MG/1
TABLET, FILM COATED ORAL
Qty: 9 TABLET | Refills: 2 | Status: SHIPPED | OUTPATIENT
Start: 2021-09-09 | End: 2021-12-09

## 2021-09-09 NOTE — TELEPHONE ENCOUNTER
Routing zolmitriptan refill request to Provider to decide because 8/6/21 MyChart refill encounter; she was instructed to make a September visit. Chart indicates that she read the intructions  Thank you.  Malik Hillman RN

## 2021-09-13 ENCOUNTER — TELEPHONE (OUTPATIENT)
Dept: FAMILY MEDICINE | Facility: CLINIC | Age: 51
End: 2021-09-13

## 2021-09-13 NOTE — TELEPHONE ENCOUNTER
Prior Authorization Retail Medication Request    Medication/Dose:   ICD code (if different than what is on RX):  Migraine without aura and without status migrainosus, not intractable [G43.009]  Previously Tried and Failed:    Rationale:      Insurance Name:  [place]  Insurance ID:  [id number]  Covermymes:  Key- [id number]  Last Name-Vero DAVIS_ 1970      Pharmacy Information (if different than what is on RX)  Name:  [facility]  Phone:  [phone]

## 2021-09-14 NOTE — TELEPHONE ENCOUNTER
Prior Authorization Approval    Authorization Effective Date: 8/20/2021  Authorization Expiration Date: 9/14/2022  Medication: ZOLMitriptan (ZOMIG) 5 MG tablet- APPROVED  Approved Dose/Quantity: 9 TABS  Reference #: [id number]   Insurance Company: [employer] [facility] - Phone [phone] Fax [phone]  Expected CoPay:       CoPay Card Available:      Foundation Assistance Needed:    Which Pharmacy is filling the prescription (Not needed for infusion/clinic administered): [facility] #[id number] [address]  AT [facility]  Pharmacy Notified: Yes  Patient Notified: Yes        Central Prior Authorization Team  Phone: [phone]

## 2021-09-14 NOTE — TELEPHONE ENCOUNTER
PA Initiation    Medication: ZOLMitriptan (ZOMIG) 5 MG tablet- INITIATED  Insurance Company: [employer] - Phone [phone] Fax [phone]  Pharmacy Filling the Rx: [facility] #[id number] [address]  [facility]  Filling Pharmacy Phone: [phone]  Filling Pharmacy Fax: [phone]  Start Date: 9/14/2021

## 2021-09-14 NOTE — TELEPHONE ENCOUNTER
Additional information faxed to [facility] at [phone].    Beryl James Prior Authorization Team  Phone: [phone]

## 2021-09-25 ENCOUNTER — HEALTH MAINTENANCE LETTER (OUTPATIENT)
Age: 51
End: 2021-09-25

## 2021-09-29 ENCOUNTER — OFFICE VISIT (OUTPATIENT)
Dept: CARDIOLOGY | Facility: CLINIC | Age: 51
End: 2021-09-29
Payer: COMMERCIAL

## 2021-09-29 VITALS
SYSTOLIC BLOOD PRESSURE: 126 MMHG | DIASTOLIC BLOOD PRESSURE: 60 MMHG | RESPIRATION RATE: 16 BRPM | HEIGHT: 69 IN | WEIGHT: 282 LBS | HEART RATE: 76 BPM | BODY MASS INDEX: 41.77 KG/M2

## 2021-09-29 DIAGNOSIS — I25.10 CORONARY ARTERY DISEASE INVOLVING NATIVE CORONARY ARTERY OF NATIVE HEART WITHOUT ANGINA PECTORIS: ICD-10-CM

## 2021-09-29 DIAGNOSIS — E78.5 HYPERLIPIDEMIA LDL GOAL <130: Primary | ICD-10-CM

## 2021-09-29 PROCEDURE — 99213 OFFICE O/P EST LOW 20 MIN: CPT | Performed by: INTERNAL MEDICINE

## 2021-09-29 RX ORDER — EZETIMIBE 10 MG/1
10 TABLET ORAL DAILY
Qty: 90 TABLET | Refills: 3 | Status: SHIPPED | OUTPATIENT
Start: 2021-09-29 | End: 2022-07-12

## 2021-09-29 ASSESSMENT — MIFFLIN-ST. JEOR: SCORE: 1958.52

## 2021-09-29 NOTE — LETTER
"9/29/2021    Huong Stahl, DO  [address] [place] [address]    RE: Arielle Pham       Dear Colleague,    I had the pleasure of seeing Arielle Pham in the [facility].                  Assessment/Recommendations   Patient with known mild to moderate coronary artery disease on CT angiography and atypical chest discomfort.  She has been stable but has not been able to tolerate statins.  We will try her on Zetia 10 mg a day for 3 months recheck a fasting lipid panel.  We will also consider a PCSK9 inhibitor if we do not get much reduction in LDL cholesterol with Zetia.  She is agreeable.    Blood pressure is at goal today and she continues to take an antiplatelet agent.  Have encouraged her to maintain an active lifestyle to the degree her orthopedic issues will allow.    Thank you for allowing us to participate in her care.       History of Present Illness/Subjective    Ms. Arielle Pham is a 51 year old female with known mild to moderate coronary artery disease by CT angiography and history of atypical pain.  She continues to get some discomfort in her mid chest which is intermittent, unpredictable, and is worse when she pushes on it.  She reports that her breathing is stable.  She has difficulty with physical activity because of orthopedic issues and right leg pain.  She has had an evaluation for this.  She has not tolerated statins and had an aching and jaw pain which all went away when she discontinued the statin.  She has been on at least 2 statins.           Physical Examination Review of Systems   /60 (BP Location: Left arm, Patient Position: Sitting, Cuff Size: Adult Large)   Pulse 76   Resp 16   Ht 1.753 m (5' 9\")   Wt 127.9 kg (282 lb)   BMI 41.64 kg/m    Body mass index is 41.64 kg/m .  Wt Readings from Last 3 Encounters:   09/29/21 127.9 kg (282 lb)   06/08/21 125.7 kg (277 lb 3.2 oz)   05/25/21 126.6 kg (279 lb)     General " "Appearance:   Alert, cooperative and in no acute distress.   ENT/Mouth: Patient wearing a mask.      EYES:  no scleral icterus, normal conjunctivae   Neck: JVP normal. No Hepatojugular reflux. Thyroid not visualized.   Chest/Lungs:   Lungs are clear to auscultation, equal chest wall expansion.   Cardiovascular:   S1, S2 without murmur ,clicks or rubs. Brachial, radial pulses are intact and symetric. No carotid bruits noted   Abdomen:     Upper extremities: No cyanosis, clubbing or edema   Skin: no xanthelasma, warm.    Neurologic: normal arm movement bilateral, no tremors     Psychiatric: Appropriate affect.      Enc Vitals  BP: 126/60  Pulse: 76  Resp: 16  Weight: 127.9 kg (282 lb)  Height: 175.3 cm (5' 9\")                                           Medical History  Surgical History Family History Social History   Past Medical History:   Diagnosis Date     Atypical chest pain 9/30/2015     Atypical chest pain 9/30/2015     Hyperlipidemia      Hyperlipidemia      Hypertension      Hypertension      Kidney stones      Migraines      Migraines      TMJ (dislocation of temporomandibular joint)     Past Surgical History:   Procedure Laterality Date     CHOLECYSTECTOMY       CHOLECYSTECTOMY      Family History   Problem Relation Age of Onset     Hypertension Mother      Alcoholism Father      Colorectal Cancer No family hx of      Coronary Artery Disease Mother      Coronary Artery Disease Father     Social History     Socioeconomic History     Marital status: Single     Spouse name: Not on file     Number of children: Not on file     Years of education: Not on file     Highest education level: Not on file   Occupational History     Not on file   Tobacco Use     Smoking status: Never Smoker     Smokeless tobacco: Never Used   Substance and Sexual Activity     Alcohol use: No     Drug use: No     Sexual activity: Yes     Partners: Male   Other Topics Concern     Parent/sibling w/ CABG, MI or angioplasty before 65F 55M? No "   Social History Narrative     Not on file     Social Determinants of Health     Financial Resource Strain: Low Risk      Difficulty of Paying Living Expenses: Not hard at all   Food Insecurity: No Food Insecurity     Worried About Running Out of Food in the Last Year: Never true     Ran Out of Food in the Last Year: Never true   Transportation Needs:      Lack of Transportation (Medical):      Lack of Transportation (Non-Medical):    Physical Activity:      Days of Exercise per Week:      Minutes of Exercise per Session:    Stress:      Feeling of Stress :    Social Connections:      Frequency of Communication with Friends and Family:      Frequency of Social Gatherings with Friends and Family:      Attends Rastafarian Services:      Active Member of Clubs or Organizations:      Attends Club or Organization Meetings:      Marital Status:    Intimate Partner Violence:      Fear of Current or Ex-Partner:      Emotionally Abused:      Physically Abused:      Sexually Abused:           Medications  Allergies   Current Outpatient Medications   Medication Sig Dispense Refill     aspirin 81 MG EC tablet Take 81 mg by mouth daily        ezetimibe (ZETIA) 10 MG tablet Take 1 tablet (10 mg) by mouth daily 90 tablet 3     Fremanezumab-vfrm 225 MG/1.5ML SOAJ Inject 225 mg Subcutaneous every 30 days 1.5 mL 11     GARLIC PO Take 1 tablet by mouth 2 times daily       ibuprofen (ADVIL/MOTRIN) 800 MG tablet TAKE 1 TABLET BY MOUTH THREE TIMES DAILY AS NEEDED 270 tablet 3     lisinopril (ZESTRIL) 5 MG tablet Take 1 tablet (5 mg) by mouth daily 90 tablet 3     loratadine (CLARITIN) 10 MG tablet Take 1 tablet (10 mg) by mouth daily 90 tablet 1     order for DME Rolling walker with seat for home use 1 Device 0     oxyCODONE-acetaminophen (PERCOCET) 5-325 MG tablet Take 1 tablet by mouth every 6 hours as needed for moderate to severe pain Maximum of 4 tablets per day 120 tablet 0     ZOLMitriptan (ZOMIG) 5 MG tablet TAKE 1 TABLET BY MOUTH  AT ONSET OF HEADACHE 9 tablet 2    Allergies   Allergen Reactions     Hmg-Coa-R Inhibitors Other (See Comments)     Severe joint and muscle ache from Atorvastastin     Codeine Dizziness and Other (See Comments)     Gabapentin Palpitations         Lab Results    Chemistry/lipid CBC Cardiac Enzymes/BNP/TSH/INR   Lab Results   Component Value Date    CHOL 273 (H) 05/25/2021    HDL 55 05/25/2021    TRIG 175 (H) 05/25/2021    BUN 19 05/25/2021     05/25/2021    CO2 24 05/25/2021    Lab Results   Component Value Date    WBC 6.7 05/25/2021    HGB 14.6 05/25/2021    HCT 43.8 05/25/2021    MCV 95 05/25/2021     05/25/2021    Lab Results   Component Value Date    TROPONINI <0.01 06/25/2019    TSH 1.68 01/28/2021                                               Thank you for allowing me to participate in the care of your patient.      Sincerely,     Sarabjit Tracey MD     [facility]  cc:   No referring provider defined for this encounter.

## 2021-10-04 ENCOUNTER — MYC REFILL (OUTPATIENT)
Dept: FAMILY MEDICINE | Facility: CLINIC | Age: 51
End: 2021-10-04

## 2021-10-04 DIAGNOSIS — M51.369 DDD (DEGENERATIVE DISC DISEASE), LUMBAR: ICD-10-CM

## 2021-10-04 DIAGNOSIS — M54.2 NECK PAIN: ICD-10-CM

## 2021-10-05 RX ORDER — OXYCODONE AND ACETAMINOPHEN 5; 325 MG/1; MG/1
1 TABLET ORAL EVERY 6 HOURS PRN
Qty: 120 TABLET | Refills: 0 | Status: SHIPPED | OUTPATIENT
Start: 2021-10-05 | End: 2021-10-11

## 2021-10-11 ENCOUNTER — VIRTUAL VISIT (OUTPATIENT)
Dept: FAMILY MEDICINE | Facility: CLINIC | Age: 51
End: 2021-10-11
Payer: COMMERCIAL

## 2021-10-11 DIAGNOSIS — Z79.891 CHRONIC PRESCRIPTION OPIATE USE: ICD-10-CM

## 2021-10-11 DIAGNOSIS — M51.369 DDD (DEGENERATIVE DISC DISEASE), LUMBAR: ICD-10-CM

## 2021-10-11 DIAGNOSIS — M54.2 NECK PAIN: ICD-10-CM

## 2021-10-11 DIAGNOSIS — B37.2 YEAST DERMATITIS: Primary | ICD-10-CM

## 2021-10-11 PROCEDURE — 99213 OFFICE O/P EST LOW 20 MIN: CPT | Mod: 95 | Performed by: FAMILY MEDICINE

## 2021-10-11 RX ORDER — OXYCODONE AND ACETAMINOPHEN 5; 325 MG/1; MG/1
1 TABLET ORAL EVERY 6 HOURS PRN
Qty: 120 TABLET | Refills: 0 | Status: SHIPPED | OUTPATIENT
Start: 2021-12-03 | End: 2021-12-23

## 2021-10-11 RX ORDER — OXYCODONE AND ACETAMINOPHEN 5; 325 MG/1; MG/1
1 TABLET ORAL EVERY 6 HOURS PRN
Qty: 120 TABLET | Refills: 0 | Status: SHIPPED | OUTPATIENT
Start: 2022-01-02 | End: 2021-12-23

## 2021-10-11 RX ORDER — OXYCODONE AND ACETAMINOPHEN 5; 325 MG/1; MG/1
1 TABLET ORAL EVERY 6 HOURS PRN
Qty: 120 TABLET | Refills: 0 | Status: SHIPPED | OUTPATIENT
Start: 2021-11-03 | End: 2021-12-23

## 2021-10-11 NOTE — PROGRESS NOTES
Arielle aguilar is a 51 year old who is being evaluated via a billable video visit.      How would you like to obtain your AVS? MyChart  If the video visit is dropped, the invitation should be resent by: Send to e-mail at: [email or web address]  Will anyone else be joining your video visit? No    Video Start Time: 1:39 PM    A/P:      ICD-10-CM    1. Yeast dermatitis  B37.2    2. Chronic prescription opiate use  Z79.891      Chronic pain:  Stable, no concerning refills,  reviewed.  CSA up to date.  Plan 3 months worth of prescription and clinic visit in 3 months    Yeast dermatitis:    Patient Instructions   You can try an over the counter cream called clotrimazole for the rash.  This is applied twice daily for 7-10 days.   Most important is keeping the area very dry.  We discussed using your blow dryer set on the cool setting after showering and before bed.        Subjective   Arielle aguilar is a 51 year old who presents for the following health issues     HPI     Medication Followup of oxycodone    Taking Medication as prescribed: yes    Side Effects:  None    Medication Helping Symptoms:  yes     Chronic pain in back, legs and feet has been stable.  Percocet continues to be helpful in allowing her to be more active in caring for herself and her children.    Has some nasuea with the medication which is resolved by taking with food.  No constipation issues.  Has Miralax on hand which she uses rarely.    Has follow up established with the [facility] clinic which is helping to manage her chronic migraines.  Also has f/u today with ortho for ongoing R leg pain.  Continues to follow with her podiatrist regularly    * rash under breasts     Has had intermittent issues that she has been able to clear with corn starch and powders.  This time has not been able to get it cleared up.  Rash is red, itchy and painful.        Review of Systems   Constitutional, HEENT, cardiovascular, pulmonary, gi and gu systems are negative, except as  otherwise noted.      Objective           Vitals:  No vitals were obtained today due to virtual visit.    Physical Exam   GENERAL: Healthy, alert and no distress  EYES: Eyes grossly normal to inspection.  No discharge or erythema, or obvious scleral/conjunctival abnormalities.  RESP: No audible wheeze, cough, or visible cyanosis.  No visible retractions or increased work of breathing.    SKIN: beefy red rash with satellite lesions under breast c/w yeast dermatitis  NEURO: Cranial nerves grossly intact.  Mentation and speech appropriate for age.  PSYCH: Mentation appears normal, affect normal/bright, judgement and insight intact, normal speech and appearance well-groomed.    Epic reviewed            Video-Visit Details    Type of service:  Video Visit    Video End Time:  1:50 PM  Originating Location (pt. Location): Home    Distant Location (provider location):  [facility]     Platform used for Video Visit: [employer]

## 2021-10-11 NOTE — PATIENT INSTRUCTIONS
You can try an over the counter cream called clotrimazole for the rash.  This is applied twice daily for 7-10 days.   Most important is keeping the area very dry.  We discussed using your blow dryer set on the cool setting after showering and before bed.

## 2021-11-05 ENCOUNTER — MYC REFILL (OUTPATIENT)
Dept: FAMILY MEDICINE | Facility: CLINIC | Age: 51
End: 2021-11-05

## 2021-11-05 DIAGNOSIS — M51.369 DDD (DEGENERATIVE DISC DISEASE), LUMBAR: ICD-10-CM

## 2021-11-05 DIAGNOSIS — Z79.891 CHRONIC PRESCRIPTION OPIATE USE: ICD-10-CM

## 2021-11-05 DIAGNOSIS — M54.2 NECK PAIN: ICD-10-CM

## 2021-11-05 RX ORDER — OXYCODONE AND ACETAMINOPHEN 5; 325 MG/1; MG/1
1 TABLET ORAL EVERY 6 HOURS PRN
Qty: 120 TABLET | Refills: 0 | Status: CANCELLED | OUTPATIENT
Start: 2021-11-05

## 2021-11-05 NOTE — TELEPHONE ENCOUNTER
Please call pt.  She should have a prescription on file at her pharmacy with a start date of 11/5    Huong Stahl DO

## 2021-11-05 NOTE — TELEPHONE ENCOUNTER
Call placed to patient  Relayed Dr. Stahl's message    Patient verbalized understanding   No further questions/concerns    Shad Starks RN

## 2021-11-17 ENCOUNTER — VIRTUAL VISIT (OUTPATIENT)
Dept: INTERNAL MEDICINE | Facility: CLINIC | Age: 51
End: 2021-11-17
Payer: COMMERCIAL

## 2021-11-17 ENCOUNTER — MYC MEDICAL ADVICE (OUTPATIENT)
Dept: FAMILY MEDICINE | Facility: CLINIC | Age: 51
End: 2021-11-17
Payer: COMMERCIAL

## 2021-11-17 DIAGNOSIS — J01.10 ACUTE NON-RECURRENT FRONTAL SINUSITIS: ICD-10-CM

## 2021-11-17 DIAGNOSIS — J06.9 VIRAL UPPER RESPIRATORY TRACT INFECTION: ICD-10-CM

## 2021-11-17 DIAGNOSIS — R05.9 COUGH: Primary | ICD-10-CM

## 2021-11-17 PROCEDURE — 99214 OFFICE O/P EST MOD 30 MIN: CPT | Mod: 95 | Performed by: NURSE PRACTITIONER

## 2021-11-17 RX ORDER — DOXYCYCLINE HYCLATE 100 MG
100 TABLET ORAL 2 TIMES DAILY
Qty: 14 TABLET | Refills: 0 | Status: SHIPPED | OUTPATIENT
Start: 2021-11-17 | End: 2021-11-24

## 2021-11-17 RX ORDER — BENZONATATE 100 MG/1
100 CAPSULE ORAL 3 TIMES DAILY PRN
Qty: 20 CAPSULE | Refills: 0 | Status: SHIPPED | OUTPATIENT
Start: 2021-11-17 | End: 2021-12-23

## 2021-11-17 RX ORDER — ALBUTEROL SULFATE 90 UG/1
2 AEROSOL, METERED RESPIRATORY (INHALATION) EVERY 6 HOURS PRN
Qty: 18 G | Refills: 0 | Status: SHIPPED | OUTPATIENT
Start: 2021-11-17 | End: 2021-12-23

## 2021-11-17 NOTE — PROGRESS NOTES
"Arielle aguilar is a 51 year old who is being evaluated via a billable telephone visit.      What phone number would you like to be contacted at? [phone]  How would you like to obtain your AVS? MyChart    Assessment & Plan     Cough  - Sick for 1 week; has harsh, frequent cough noted during visit, but denies SOB.  Differentials include bronchitis, pneumonia, COVID, influenza.  States room mates all sick with similar symptoms and have tested negative for COVID.  If she had Influenza, would not be a good candidate for treatment given length of symptoms- so will not order influenza screen.  - COVID PCR ordered  - Doxycycline 100 mg BID x 7 days  - Tessalon perls  - Albuterol inhaler  - Discussed signs symptoms of when to return/seek care.  Return if symptoms worsen/fail to improve.  Patient verbalized an understanding  - Symptomatic COVID-19 Virus (Coronavirus) by PCR Nasopharyngeal  - doxycycline hyclate (VIBRA-TABS) 100 MG tablet  Dispense: 14 tablet; Refill: 0  - albuterol (PROAIR HFA/PROVENTIL HFA/VENTOLIN HFA) 108 (90 Base) MCG/ACT inhaler  Dispense: 18 g; Refill: 0  - benzonatate (TESSALON) 100 MG capsule  Dispense: 20 capsule; Refill: 0    Viral upper respiratory tract infection  - See above    Acute non-recurrent frontal sinusitis  - Endorses headache, thick green nasal discharge, fever > 1 week  - States she's allergic to amox-clavulante - causes GI upset so wishes other alternative- will Rx doxy 100 mg BID  - May continue Netti pot, other supportive measures  - doxycycline hyclate (VIBRA-TABS) 100 MG tablet  Dispense: 14 tablet; Refill: 0  [id number]}     BMI:   Estimated body mass index is 41.64 kg/m  as calculated from the following:    Height as of 9/29/21: 1.753 m (5' 9\").    Weight as of 9/29/21: 127.9 kg (282 lb).     See Patient Instructions    Return if symptoms worsen or fail to improve.    DONN Schofield CNP  [facility]    Subjective   Arielle aguilar is a 51 year " old who presents for the following health issues     HPI     Acute Illness  Acute illness concerns: Cough  Onset/Duration: 1 week   Symptoms:  Fever: YES  Chills/Sweats: no  Headache (location?): YES  Sinus Pressure: YES  Conjunctivitis:  no  Ear Pain: YES- Itchy  Rhinorrhea: YES  Congestion: YES  Sore Throat: YES  Cough: YES-non-productive  Wheeze: no  Decreased Appetite: YES  Nausea: no  Vomiting: no  Diarrhea: no  Dysuria/Freq.: no  Dysuria or Hematuria: no  Fatigue/Achiness: YES  No SOB   No loss of taste   No loss of smell  No rash  Sick/Strep Exposure: YES- People who have been sick   Therapies tried and outcome: None    Arielle reports feeling sick for over a week.  + headache, R>L, green nasal discharge, sinus pain, harsh cough.  Denies SOB; denies sputum production; not sure if she's wheezing but mom thinks she is.  Had a fever of 100 yesterday.  Slight sore throat, but not bad.  No loss of smell/tste.  Ear itching but no pain.  Slight body aches.  Increased head pain due to coughing.  Lives with several other people with similar symptoms- all have tested negative for COVID.  Has been using OTC Robitussin without effect; using a netti pot without much effect; hot showers.  Takes percocet 3-4 times daily chronically prescribed.   Vaccinated for COVID; did not get flu shot this year.     Review of Systems   CONSTITUTIONAL:POSITIVE  for fever - temp 100 yesterday  EYES: NEGATIVE for vision changes or irritation  ENT/MOUTH: POSITIVE for ear itching- + sinus pain, headache, green thick nasal discharge.    RESP:POSITIVE for cough-non productive and NEGATIVE for SOB/dyspnea  CV: NEGATIVE for chest pain, palpitations or peripheral edema  GI: NEGATIVE for nausea, abdominal pain, heartburn, or change in bowel habits  MUSCULOSKELETAL: Slight body aches      Objective           Vitals:  No vitals were obtained today due to virtual visit.    Physical Exam   Alert, appropriately interactive  PSYCH: Alert and oriented times  3; coherent speech, normal   rate and volume, able to articulate logical thoughts, able   to abstract reason, no tangential thoughts, no hallucinations   or delusions  Her affect is normal  RESP: Harsh frequent cough, sounds nasally congested          Phone call duration: 11:32 minutes

## 2021-11-17 NOTE — PATIENT INSTRUCTIONS
-Arielle, Let's get a COVID test.  This has been ordered- someone will call you to come in and get this done.  If you have COVID, I think you are high risk and would be a good candidate for monoclonal antibodies to prevent significant illness.  I'm glad you are vaccinated  -Let's start some antibiotics- doxycycline 1 pill twice daily for 1 week.    -For your cough, use the albuterol inhaler, 2 puffs every 4-6 hours as needed and the Tessalon perls 1 tablet every 8 hours as needed  -Drink plenty of fluids  -If you have worsening symptoms- shortness of breath, chest pain, ongoing fevers/chills, please be seen in person  -Let me know if you have any questions

## 2021-11-18 ENCOUNTER — LAB (OUTPATIENT)
Dept: FAMILY MEDICINE | Facility: CLINIC | Age: 51
End: 2021-11-18
Attending: NURSE PRACTITIONER
Payer: COMMERCIAL

## 2021-11-18 DIAGNOSIS — R05.9 COUGH: ICD-10-CM

## 2021-11-18 PROCEDURE — U0003 INFECTIOUS AGENT DETECTION BY NUCLEIC ACID (DNA OR RNA); SEVERE ACUTE RESPIRATORY SYNDROME CORONAVIRUS 2 (SARS-COV-2) (CORONAVIRUS DISEASE [COVID-19]), AMPLIFIED PROBE TECHNIQUE, MAKING USE OF HIGH THROUGHPUT TECHNOLOGIES AS DESCRIBED BY CMS-2020-01-R: HCPCS

## 2021-11-18 PROCEDURE — U0005 INFEC AGEN DETEC AMPLI PROBE: HCPCS

## 2021-11-19 LAB — SARS-COV-2 RNA RESP QL NAA+PROBE: NEGATIVE

## 2021-12-06 DIAGNOSIS — G43.009 MIGRAINE WITHOUT AURA AND WITHOUT STATUS MIGRAINOSUS, NOT INTRACTABLE: ICD-10-CM

## 2021-12-06 DIAGNOSIS — I10 ESSENTIAL HYPERTENSION: ICD-10-CM

## 2021-12-06 NOTE — TELEPHONE ENCOUNTER
Routing refill request to provider for review/approval because:  Lisinopril request is for 10 mg but his med list shows 5 mg   Thank you.  Malik Hillman RN'

## 2021-12-07 NOTE — TELEPHONE ENCOUNTER
Can you please just confirm dosing?  Does she have 5mg tabs or 1/2 of the 10mg?    Huong Stahl, DO

## 2021-12-08 NOTE — TELEPHONE ENCOUNTER
Call placed to patient  Relayed Dr. Stahl's message    Patient unsure as she is not home and unable to check the medication bottle  Patient states she will call back with information    Shad Starks RN

## 2021-12-09 RX ORDER — LISINOPRIL 10 MG/1
TABLET ORAL
Qty: 30 TABLET | Refills: 0 | OUTPATIENT
Start: 2021-12-09

## 2021-12-09 RX ORDER — ZOLMITRIPTAN 5 MG/1
TABLET, FILM COATED ORAL
Qty: 9 TABLET | Refills: 2 | Status: SHIPPED | OUTPATIENT
Start: 2021-12-09 | End: 2022-04-04

## 2021-12-09 NOTE — TELEPHONE ENCOUNTER
Patient called back she is taking 1 tablet of 5 mg of lisinopril-- patient was taking med all along from refill on 03/10/2021-- it appears pharmacy requesting refill on lisinopril 10mg.       Needs refill for zolmitiptan 5 mg

## 2021-12-23 ENCOUNTER — OFFICE VISIT (OUTPATIENT)
Dept: FAMILY MEDICINE | Facility: CLINIC | Age: 51
End: 2021-12-23
Payer: COMMERCIAL

## 2021-12-23 VITALS
TEMPERATURE: 97.6 F | WEIGHT: 286.1 LBS | BODY MASS INDEX: 42.25 KG/M2 | OXYGEN SATURATION: 99 % | HEART RATE: 81 BPM | SYSTOLIC BLOOD PRESSURE: 120 MMHG | DIASTOLIC BLOOD PRESSURE: 78 MMHG

## 2021-12-23 DIAGNOSIS — G89.29 CHRONIC LOW BACK PAIN WITHOUT SCIATICA, UNSPECIFIED BACK PAIN LATERALITY: ICD-10-CM

## 2021-12-23 DIAGNOSIS — Z79.891 CHRONIC PRESCRIPTION OPIATE USE: ICD-10-CM

## 2021-12-23 DIAGNOSIS — M51.369 DDD (DEGENERATIVE DISC DISEASE), LUMBAR: ICD-10-CM

## 2021-12-23 DIAGNOSIS — M53.3 SI (SACROILIAC) JOINT DYSFUNCTION: ICD-10-CM

## 2021-12-23 DIAGNOSIS — G43.009 MIGRAINE WITHOUT AURA AND WITHOUT STATUS MIGRAINOSUS, NOT INTRACTABLE: ICD-10-CM

## 2021-12-23 DIAGNOSIS — M54.2 NECK PAIN: ICD-10-CM

## 2021-12-23 DIAGNOSIS — G89.4 CHRONIC PAIN SYNDROME: Primary | ICD-10-CM

## 2021-12-23 DIAGNOSIS — M19.072 DJD (DEGENERATIVE JOINT DISEASE), ANKLE AND FOOT, LEFT: ICD-10-CM

## 2021-12-23 DIAGNOSIS — M54.50 CHRONIC LOW BACK PAIN WITHOUT SCIATICA, UNSPECIFIED BACK PAIN LATERALITY: ICD-10-CM

## 2021-12-23 PROCEDURE — 99213 OFFICE O/P EST LOW 20 MIN: CPT | Performed by: FAMILY MEDICINE

## 2021-12-23 RX ORDER — OXYCODONE AND ACETAMINOPHEN 5; 325 MG/1; MG/1
1 TABLET ORAL EVERY 6 HOURS PRN
Qty: 120 TABLET | Refills: 0 | Status: SHIPPED | OUTPATIENT
Start: 2022-03-04 | End: 2022-04-04

## 2021-12-23 RX ORDER — OXYCODONE AND ACETAMINOPHEN 5; 325 MG/1; MG/1
1 TABLET ORAL EVERY 6 HOURS PRN
Qty: 120 TABLET | Refills: 0 | Status: SHIPPED | OUTPATIENT
Start: 2022-01-05 | End: 2022-02-17

## 2021-12-23 RX ORDER — OXYCODONE AND ACETAMINOPHEN 5; 325 MG/1; MG/1
1 TABLET ORAL EVERY 6 HOURS PRN
Qty: 120 TABLET | Refills: 0 | Status: SHIPPED | OUTPATIENT
Start: 2022-02-03 | End: 2022-02-17

## 2021-12-23 ASSESSMENT — ANXIETY QUESTIONNAIRES
5. BEING SO RESTLESS THAT IT IS HARD TO SIT STILL: MORE THAN HALF THE DAYS
GAD7 TOTAL SCORE: 14
6. BECOMING EASILY ANNOYED OR IRRITABLE: NOT AT ALL
1. FEELING NERVOUS, ANXIOUS, OR ON EDGE: NEARLY EVERY DAY
7. FEELING AFRAID AS IF SOMETHING AWFUL MIGHT HAPPEN: SEVERAL DAYS
3. WORRYING TOO MUCH ABOUT DIFFERENT THINGS: NEARLY EVERY DAY
2. NOT BEING ABLE TO STOP OR CONTROL WORRYING: NEARLY EVERY DAY

## 2021-12-23 ASSESSMENT — PATIENT HEALTH QUESTIONNAIRE - PHQ9
SUM OF ALL RESPONSES TO PHQ QUESTIONS 1-9: 8
5. POOR APPETITE OR OVEREATING: MORE THAN HALF THE DAYS

## 2021-12-23 NOTE — NURSING NOTE
"Initial /78   Pulse 81   Temp 97.6  F (36.4  C) (Tympanic)   Wt 129.8 kg (286 lb 1.6 oz)   SpO2 99%   Breastfeeding No   BMI 42.25 kg/m   Estimated body mass index is 42.25 kg/m  as calculated from the following:    Height as of 9/29/21: 1.753 m (5' 9\").    Weight as of this encounter: 129.8 kg (286 lb 1.6 oz). .      "

## 2021-12-23 NOTE — PROGRESS NOTES
A/P:      ICD-10-CM    1. Chronic pain syndrome  G89.4    2. Chronic low back pain without sciatica, unspecified back pain laterality  M54.50     G89.29    3. DJD (degenerative joint disease), ankle and foot, left  M19.072    4. DDD (degenerative disc disease), lumbar  M51.36    5. SI (sacroiliac) joint dysfunction  M53.3    6. Migraine without aura and without status migrainosus, not intractable  G43.009      Chronic pain:  Pain remains adequately controlled.  Current pain medication allows pt to function with reduced pain.  No side effects noted.   reviewed without concerning refills.  3 months of prescriptions will be sent to pt's pharmacy.  F/u 3 months.    Migraine:  Improved on Ajovy.  Due for f/u with neurology    Subjective   Arielle aguilar is a 51 year old who presents for the following health issues     HPI     Medication Followup of oxycodone    Taking Medication as prescribed: yes    Side Effects:  None    Medication Helping Symptoms:  yes       Back pain has been stable.  Feels this remains adequately controlled on current medication.  Allows for daily function.  No side effects noted, denies constipation or sedation.      Has gotten injections for knees and seeing PT.  Still has pain with stair climbing but overall much better.        Foot pain is about the same, gets her shots regularly and feels they continue to work    HA has been okay.  Doing the Ajovy now.  Improvement in Am waking HA's.  Still getting migraines, feels them coing but not ass severe  As been worse now the last 3 days  Due for follow up with neurology  .    Review of Systems   Constitutional, HEENT, cardiovascular, pulmonary, gi and gu systems are negative, except as otherwise noted.      Objective    /78   Pulse 81   Temp 97.6  F (36.4  C) (Tympanic)   Wt 129.8 kg (286 lb 1.6 oz)   SpO2 99%   Breastfeeding No   BMI 42.25 kg/m    Body mass index is 42.25 kg/m .  Physical Exam   PE:  VS as above   Gen:  WN/WD/WH female  in NAD   Psych: Alert and oriented times 3; coherent speech, normal   rate and volume, able to articulate logical thoughts, able   to abstract reason, no tangential thoughts, no hallucinations   or delusions  Her affect is bright and appropriate      Epic reviewed

## 2021-12-24 ASSESSMENT — ANXIETY QUESTIONNAIRES: GAD7 TOTAL SCORE: 14

## 2022-01-05 ENCOUNTER — APPOINTMENT (OUTPATIENT)
Dept: URGENT CARE | Facility: CLINIC | Age: 52
End: 2022-01-05
Payer: COMMERCIAL

## 2022-01-06 ENCOUNTER — TELEPHONE (OUTPATIENT)
Dept: NEUROLOGY | Facility: CLINIC | Age: 52
End: 2022-01-06
Payer: COMMERCIAL

## 2022-01-06 NOTE — TELEPHONE ENCOUNTER
Central Prior Authorization Team   Phone: [phone]      PA Initiation    Medication: Fremanezumab-vfrm 225 MG/1.5ML SOAJ  Insurance Company: [employer] - Phone [phone] Fax [phone]  Pharmacy Filling the Rx: [id number] [place] [id number] AT [facility]  Filling Pharmacy Phone: [phone]  Filling Pharmacy Fax:    Start Date: 1/6/2022

## 2022-01-06 NOTE — TELEPHONE ENCOUNTER
"Prior Authorization Retail Medication Request    Medication/Dose: Fremanezumab-vfrm 225 MG/1.5ML SOAJ  ICD code (if different than what is on RX):    G43.709  Zomig as needed and helps but headaches   Sumatriptan   Stopped Excedrin and tylenol many years ago  Ibuprofen a couple times per day and no side effects  Percocet -for back pain and foot pain and prescribed by Dr Stahl but not for headaches  Currently on lisinopril for hypertension  Amitriptyline -just stopped working and was on it for a while  Nortriptyline  Protriptyline  Topiramate  Zonegran  Gabapentin-caused anxiety/panic attack  Verapamil  Propranol  Emgality -took for a couple of month and caused dizziness. Did not try any other CGRPs  escitalopram -does not recall side effects but it wasn't helpful with headaches  Losartan in the past-has been a while  Topiramate a long time ago and \"very bad dreams\". History of renal stones so contraindicated     Botox -fear of potential side effects-  Rationale:  Chronic migraine. Pt has used Ajovy since June 2021 with good results. Fewer migraine days and no side effects.      Insurance Name:  [employer]  Insurance ID:  [id number]         Pharmacy Information (if different than what is on RX)  Name:    Phone:       "

## 2022-01-07 NOTE — TELEPHONE ENCOUNTER
Prior Authorization Approval    Authorization Effective Date: 1/1/2022  Authorization Expiration Date: 1/7/2023  Medication: Fremanezumab-vfrm 225 MG/1.5ML SOAJ  Approved Dose/Quantity: up to 3 injections per 84 days  Reference #: [id number]   Insurance Company: [employer] - Phone [phone] Fax [phone]  Expected CoPay:       Which Pharmacy is filling the prescription (Not needed for infusion/clinic administered): [id number] - [place] [id number] AT [facility]  Pharmacy Notified: Yes  Patient Notified: No

## 2022-01-13 ENCOUNTER — E-VISIT (OUTPATIENT)
Dept: FAMILY MEDICINE | Facility: CLINIC | Age: 52
End: 2022-01-13
Payer: COMMERCIAL

## 2022-01-13 DIAGNOSIS — Z20.822 PERSON UNDER INVESTIGATION FOR COVID-19: Primary | ICD-10-CM

## 2022-01-13 PROCEDURE — 99421 OL DIG E/M SVC 5-10 MIN: CPT | Performed by: FAMILY MEDICINE

## 2022-01-28 ENCOUNTER — OFFICE VISIT (OUTPATIENT)
Dept: FAMILY MEDICINE | Facility: CLINIC | Age: 52
End: 2022-01-28
Payer: COMMERCIAL

## 2022-01-28 ENCOUNTER — TELEPHONE (OUTPATIENT)
Dept: FAMILY MEDICINE | Facility: CLINIC | Age: 52
End: 2022-01-28

## 2022-01-28 ENCOUNTER — NURSE TRIAGE (OUTPATIENT)
Dept: NURSING | Facility: CLINIC | Age: 52
End: 2022-01-28

## 2022-01-28 VITALS
DIASTOLIC BLOOD PRESSURE: 97 MMHG | OXYGEN SATURATION: 98 % | HEART RATE: 82 BPM | TEMPERATURE: 97.9 F | SYSTOLIC BLOOD PRESSURE: 143 MMHG

## 2022-01-28 DIAGNOSIS — U07.1 INFECTION DUE TO 2019 NOVEL CORONAVIRUS: Primary | ICD-10-CM

## 2022-01-28 PROCEDURE — 99213 OFFICE O/P EST LOW 20 MIN: CPT | Performed by: FAMILY MEDICINE

## 2022-01-28 NOTE — TELEPHONE ENCOUNTER
Reason for Call:  Other Website for IVs, etc    Detailed comments: Patient saw Dr Stahl today, and she was told there is a website to go to for IVs, etc. Patient is wondering what that is.    Phone Number Patient can be reached at: Home number on file [phone] (home)    Best Time: Any    Can we leave a detailed message on this number? YES    Call taken on 1/28/2022 at 12:02 PM by Trinity Klein

## 2022-01-28 NOTE — TELEPHONE ENCOUNTER
She can go to the [facility] website and search under Covid treatments.  There is a link at the bottom of the paget that will take her to the form to complete.    Huong Stahl DO

## 2022-01-28 NOTE — PROGRESS NOTES
A/P:      ICD-10-CM    1. Infection due to 2019 novel coronavirus  U07.1      Reviewed symptomatic care, how to access monoclonal antibody therapy and reasons to seek emergent care.    F/u as needed    Subjective   Arielle aguilar is a 51 year old who presents for the following health issues   HPI     Acute Illness  Acute illness concerns: cough - positive rapid COVID test yesterday   Onset/Duration: 3 weeks, improved last week, worsened x 2 days   Symptoms:  Fever: no  Chills/Sweats: no  Headache (location?): YES  Sinus Pressure: YES  Conjunctivitis:  YES  Ear Pain: YES: bilateral itching   Rhinorrhea: YES  Congestion: YES  Sore Throat: YES  Cough: YES-productive of green sputum, with shortness of breath  Wheeze: no  Decreased Appetite: no  Nausea: no  Vomiting: no  Diarrhea: no  Dysuria/Freq.: no  Dysuria or Hematuria: no  Fatigue/Achiness: YES  Sick/Strep Exposure: YES- COVID exposure with roommate   Therapies tried and outcome: sudafed, neti-pot, cough medicine       Had been ill with URI symptoms and cough.  Over the last week symptoms had improved and were resolving until she became sick again 2 days ago.  Room mate with Covid and both of her daughters are recently ill and pending test results as well.    Review of Systems   Constitutional, HEENT, cardiovascular, pulmonary, gi and gu systems are negative, except as otherwise noted.      Objective    BP (!) 143/97   Pulse 82   Temp 97.9  F (36.6  C)   SpO2 98%   There is no height or weight on file to calculate BMI.  Physical Exam   PE:  VS as above   Gen:  WN/WD/WH female in NAD   Heart:  RRR without murmur, nl S1, S2, no rubs or gallops   Lungs CTA hugo without rales/ronchi/wheezes      Epic reviewed

## 2022-01-28 NOTE — TELEPHONE ENCOUNTER
Patient was in to see the MD today and was told there is a treatment for covid through [facility].  FNA relayed telephone number for [facility].     COVID 19 Nurse Triage Plan/Patient Instructions    Please be aware that novel coronavirus (COVID-19) may be circulating in the community. If you develop symptoms such as fever, cough, or SOB or if you have concerns about the presence of another infection including coronavirus (COVID-19), please contact your health care provider or visit [email or web address].     Disposition/Instructions    Home care recommended. Follow home care protocol based instructions.    Thank you for taking steps to prevent the spread of this virus.  o Limit your contact with others.  o Wear a simple mask to cover your cough.  o Wash your hands well and often.    Resources    [facility]: About COVID-19: [email or web address]    CDC: What to Do If You're Sick: www.cdc.gov/coronavirus/2019-ncov/about/steps-when-sick.html    CDC: Ending Home Isolation: www.cdc.gov/coronavirus/2019-ncov/hcp/disposition-in-home-patients.html     CDC: Caring for Someone: www.cdc.gov/coronavirus/2019-ncov/if-you-are-sick/care-for-someone.html     [facility]: Interim Guidance for Hospital Discharge to Home: www.health.[facility].[place]/diseases/coronavirus/hcp/hospdischarge.pdf    [facility] clinical trials (COVID-19 research studies): clinicalaffairs.[facility]-clinical-trials     Below are the COVID-19 hotlines at the [place] [facility] ([facility]). Interpreters are available.   o For health questions: Call 226-982-6930 or 1-356.589.2677 (7 a.m. to 7 p.m.)  o For questions about schools and childcare: Call 930-075-2031 or 1-638.362.6565 (7 a.m. to 7 p.m.)

## 2022-02-02 ENCOUNTER — TELEPHONE (OUTPATIENT)
Dept: FAMILY MEDICINE | Facility: CLINIC | Age: 52
End: 2022-02-02
Payer: COMMERCIAL

## 2022-02-02 NOTE — TELEPHONE ENCOUNTER
Pt calls to state since appt on 1/28 pt continues to have sinus pressure/pain/headache and green mucus from nose. Wondering what she can do for symptoms. Has used neti pot without resolution.      Ashutosh Doe RN

## 2022-02-02 NOTE — TELEPHONE ENCOUNTER
If her symptoms have been stable since our visit it sounds like it is still related to her viral illness.  I agree with the Netti pot and nasal steroid sprays like floanse or nasonex.      When we visited her previous sinus symptoms had resolved and she became ill again with Covid.      Generally we are no concerned about a bacterial sinusitis until symptoms have been present without improvement for 10-14 days.  If things are not beginning to improve by Monday or if she feels like things are worsening (new fever) she should let us know.    Huong Stahl, DO

## 2022-02-02 NOTE — TELEPHONE ENCOUNTER
Call placed to patient  No answer; generic voicemail left requesting call back to Clinic RN at [phone]    Shad Starks RN

## 2022-02-17 ENCOUNTER — OFFICE VISIT (OUTPATIENT)
Dept: FAMILY MEDICINE | Facility: CLINIC | Age: 52
End: 2022-02-17
Payer: COMMERCIAL

## 2022-02-17 VITALS
BODY MASS INDEX: 42.06 KG/M2 | WEIGHT: 284 LBS | HEART RATE: 86 BPM | OXYGEN SATURATION: 97 % | HEIGHT: 69 IN | TEMPERATURE: 98 F | SYSTOLIC BLOOD PRESSURE: 132 MMHG | DIASTOLIC BLOOD PRESSURE: 78 MMHG

## 2022-02-17 DIAGNOSIS — E66.01 MORBID OBESITY (H): ICD-10-CM

## 2022-02-17 DIAGNOSIS — F32.1 CURRENT MODERATE EPISODE OF MAJOR DEPRESSIVE DISORDER, UNSPECIFIED WHETHER RECURRENT (H): ICD-10-CM

## 2022-02-17 DIAGNOSIS — R07.89 ATYPICAL CHEST PAIN: Primary | ICD-10-CM

## 2022-02-17 DIAGNOSIS — J01.00 SUBACUTE MAXILLARY SINUSITIS: ICD-10-CM

## 2022-02-17 DIAGNOSIS — E78.2 MIXED HYPERLIPIDEMIA: ICD-10-CM

## 2022-02-17 PROBLEM — Z79.891 CHRONIC PRESCRIPTION OPIATE USE: Status: ACTIVE | Noted: 2019-12-06

## 2022-02-17 LAB
ANION GAP SERPL CALCULATED.3IONS-SCNC: 8 MMOL/L (ref 5–18)
BUN SERPL-MCNC: 20 MG/DL (ref 8–22)
CALCIUM SERPL-MCNC: 9.8 MG/DL (ref 8.5–10.5)
CHLORIDE BLD-SCNC: 107 MMOL/L (ref 98–107)
CHOLEST SERPL-MCNC: 222 MG/DL
CO2 SERPL-SCNC: 26 MMOL/L (ref 22–31)
CREAT SERPL-MCNC: 0.82 MG/DL (ref 0.6–1.1)
D DIMER PPP FEU-MCNC: 0.29 UG/ML FEU (ref 0–0.5)
ERYTHROCYTE [DISTWIDTH] IN BLOOD BY AUTOMATED COUNT: 13.3 % (ref 10–15)
FASTING STATUS PATIENT QL REPORTED: YES
GFR SERPL CREATININE-BSD FRML MDRD: 86 ML/MIN/1.73M2
GLUCOSE BLD-MCNC: 87 MG/DL (ref 70–125)
HCT VFR BLD AUTO: 44.4 % (ref 35–47)
HDLC SERPL-MCNC: 54 MG/DL
HGB BLD-MCNC: 14.4 G/DL (ref 11.7–15.7)
LDLC SERPL CALC-MCNC: 137 MG/DL
MCH RBC QN AUTO: 31.4 PG (ref 26.5–33)
MCHC RBC AUTO-ENTMCNC: 32.4 G/DL (ref 31.5–36.5)
MCV RBC AUTO: 97 FL (ref 78–100)
PLATELET # BLD AUTO: 306 10E3/UL (ref 150–450)
POTASSIUM BLD-SCNC: 4.5 MMOL/L (ref 3.5–5)
RBC # BLD AUTO: 4.58 10E6/UL (ref 3.8–5.2)
SODIUM SERPL-SCNC: 141 MMOL/L (ref 136–145)
TRIGL SERPL-MCNC: 155 MG/DL
WBC # BLD AUTO: 6.4 10E3/UL (ref 4–11)

## 2022-02-17 PROCEDURE — 36415 COLL VENOUS BLD VENIPUNCTURE: CPT | Performed by: FAMILY MEDICINE

## 2022-02-17 PROCEDURE — 80061 LIPID PANEL: CPT | Performed by: FAMILY MEDICINE

## 2022-02-17 PROCEDURE — 99215 OFFICE O/P EST HI 40 MIN: CPT | Performed by: FAMILY MEDICINE

## 2022-02-17 PROCEDURE — 85027 COMPLETE CBC AUTOMATED: CPT | Performed by: FAMILY MEDICINE

## 2022-02-17 PROCEDURE — 85379 FIBRIN DEGRADATION QUANT: CPT | Performed by: FAMILY MEDICINE

## 2022-02-17 PROCEDURE — 80048 BASIC METABOLIC PNL TOTAL CA: CPT | Performed by: FAMILY MEDICINE

## 2022-02-17 RX ORDER — OMEPRAZOLE 40 MG/1
CAPSULE, DELAYED RELEASE ORAL
Qty: 30 CAPSULE | Refills: 1 | Status: SHIPPED | OUTPATIENT
Start: 2022-02-17 | End: 2022-04-04

## 2022-02-17 RX ORDER — AZITHROMYCIN 250 MG/1
TABLET, FILM COATED ORAL
Qty: 6 TABLET | Refills: 0 | Status: SHIPPED | OUTPATIENT
Start: 2022-02-17 | End: 2022-02-22

## 2022-02-17 RX ORDER — FLUTICASONE PROPIONATE 50 MCG
1 SPRAY, SUSPENSION (ML) NASAL DAILY
Qty: 15.8 ML | Refills: 1 | Status: SHIPPED | OUTPATIENT
Start: 2022-02-17 | End: 2022-04-04

## 2022-02-17 NOTE — PATIENT INSTRUCTIONS
Lab work and chest x-ray today.  We will also have you complete a stress test.    Please start the daily omeprazole for possible reflux component.    Consider cymbalta at one month up if no change in symptoms (for mood and chronic pain)

## 2022-02-17 NOTE — PROGRESS NOTES
Assessment & Plan     Atypical chest pain  History of chronic chest pain with worsening severity and frequency over the past 4 months.  Risk factors for cardiac cause include obesity, hypertension, hyperlipidemia, elevated CT calcium score.  She does have reproducible pain with palpation of the anterior chest wall today, however, given associated symptoms need to rule out cardiopulmonary cause.  No acute concerns on chest x-ray today.  Will await blood work and refer for stress testing.  Given reports of GERD symptoms, will also treat with preprandial PPI until her work-up can be completed.   - XR Chest 2 Views  - omeprazole (PRILOSEC) 40 MG DR capsule  Dispense: 30 capsule; Refill: 1  - NM Lexiscan stress test  - CBC with platelets  - Basic metabolic panel  (Ca, Cl, CO2, Creat, Gluc, K, Na, BUN)  - D dimer, quantitative    Current moderate episode of major depressive disorder, unspecified whether recurrent (H)  History of depression in the past.  Given concerns of chronic pain, should consider Cymbalta initiation at follow-up visit.    Morbid obesity (H)  Body mass index is 41.94 kg/m .  Likely contributing to her chronic pain.  Reinforced the importance of weight loss through diet and exercise.    Mixed hyperlipidemia  Initiated on Zetia in September by cardiology due to intolerance to statins.  Reassess fasting labs today.  - Lipid Profile (Chol, Trig, HDL, LDL calc)    Subacute maxillary sinusitis  Worsening sinusitis symptoms since recovering from COVID 3 weeks ago.  She will continue with symptomatic measures, but will also complete a course of azithromycin which has been helpful for her in the past.  - fluticasone (FLONASE) 50 MCG/ACT nasal spray  Dispense: 15.8 mL; Refill: 1  - azithromycin (ZITHROMAX) 250 MG tablet  Dispense: 6 tablet; Refill: 0    45 minutes spent on day of encounter discussing history, reviewing prior records/work-up, deciding on management and treatment plan, along with  dictating.    Return in about 4 weeks (around 3/17/2022) for Follow up.    Marcella Luis DO  [facility]   Arielle aguilar is a 51 year old who presents for the following health issues  Chief Complaint   Patient presents with     Nose Problem     chest congestion, had covid 2 weeks ago, cold weather makes chest feel tight and painful, yellow/green muccus from nose, built up/ productive cough     Nose Problem    History of Present Illness     Reason for visit:  Chest pain  Symptom onset:  More than a month  Symptoms include:  Chest congestion  Symptom intensity:  Severe  Symptom progression:  Worsening  Had these symptoms before:  Yes  Has tried/received treatment for these symptoms:  Yes  Previous treatment was successful:  No  What makes it worse:  Activity being outside in the cold  What makes it better:  Heat  my pain meds    She eats 2-3 servings of fruits and vegetables daily.She consumes 3 sweetened beverage(s) daily.She exercises with enough effort to increase her heart rate 9 or less minutes per day.  She exercises with enough effort to increase her heart rate 3 or less days per week.      Many year history of anterior chest pain.  She has had thorough work-up in the past but has become more concerned over the past 4 months.  Her symptoms now occur multiple times per day and last longer.  She describes them as a sharp, piercing pain that occur in the anterior chest.  They often come out of the blue with rest or exertion.  Exertion does not seem to make them worse.  She has no associated nausea, vomiting, diaphoresis.  Occasionally the pain can go into her neck.  She is having more upper back pain.  Coughing and deep breaths does not seem to worsen the pain.  She typically sleeps in a recliner due to her chronic joint pain and denies issues with her breathing when lying supine.  She does not snore.  She had a sleep study multiple years ago and showed only mild PRAVEEN.  Her  "pain can worsen with deep breaths.  She notes prior history of cholecystectomy around the age of 18 with multiple stones.  She is wondering if one of the stones could have been dislodged and travel to her chest.  She was unable to tolerate statins in the past so she was initiated on Zetia by cardiology in September 2021.    7/19/21 calcium score    The total Agatston calcium score is 752. A calcium score in this range places the individual in the >90th percentile when compared to an age and gender matched control group and implies a very high risk of cardiac events in the next ten years.    Mild to moderate coronary atherosclerosis with no high-grade obstructions evident    Ramus lesion: The lesion has moderate luminal stenosis in the range of 50-69%. Non-calcified plaque has been detected.    She admits to reflux symptoms when she is eating and afterwards.  She describes it as a burning sensation and it occasionally can be hard to swallow.  She had an EGD many years ago which was fine.    She had Covid over 2 weeks ago and is now experiencing worsening sinus pressure, sinus headaches, ongoing cough.  Has been using a Forbes pot.  Continues to lack taste.        Objective    /78   Pulse 86   Temp 98  F (36.7  C) (Oral)   Ht 1.753 m (5' 9\")   Wt 128.8 kg (284 lb)   SpO2 97%   BMI 41.94 kg/m    Body mass index is 41.94 kg/m .  Physical Exam   GENERAL: alert, no distress and obese, intermittent cough  EYES: Eyes grossly normal to inspection, PERRL and conjunctivae and sclerae normal  HENT: ear canals and TM's normal, nose and mouth without ulcers or lesions, maxillary and frontal sinuses tender to palpation  NECK: no adenopathy, no asymmetry, masses, or scars and thyroid normal to palpation  RESP: lungs clear to auscultation - no rales, rhonchi or wheezes  CV: regular rate and rhythm, normal S1 S2, no S3 or S4, no murmur  ABDOMEN: soft, nontender  MS: Anterior chest wall exquisitely tender to " palpation  NEURO: Normal strength and tone, mentation intact and speech normal  PSYCH: mentation appears normal, affect normal/bright    EXAM: XR CHEST 2 VW  LOCATION: [facility]  DATE/TIME: 2/17/2022 9:09 AM     INDICATION:  Atypical chest pain  COMPARISON: 03/10/2020                                                                      IMPRESSION: The cardiomediastinal silhouette and pulmonary vascularity are normal. Shallow inspiration. No focal consolidation, pneumothorax nor pleural effusion. Kyphosis at thoracolumbar junction unchanged.

## 2022-02-18 ENCOUNTER — TELEPHONE (OUTPATIENT)
Dept: CARDIOLOGY | Facility: CLINIC | Age: 52
End: 2022-02-18

## 2022-02-18 NOTE — TELEPHONE ENCOUNTER
[facility]    Phone Message    May a detailed message be left on voicemail: yes     Reason for Call: Other: please call to [facility] Lexiscan stress test     Action Taken: Other: [facility] pool    Travel Screening: Not Applicable

## 2022-02-24 ENCOUNTER — TELEPHONE (OUTPATIENT)
Dept: FAMILY MEDICINE | Facility: CLINIC | Age: 52
End: 2022-02-24
Payer: COMMERCIAL

## 2022-02-24 NOTE — TELEPHONE ENCOUNTER
Spoke with Dr. Favio Stahl states message needs to go to the ordering provider.     Stress test was ordered by Dr. Luis  Will forward encounter for review regarding prior authorization     Shad Starks RN

## 2022-02-24 NOTE — TELEPHONE ENCOUNTER
Call placed to patient   Relayed Dr. Stahl's message and that message was forwarded high priority to Dr. Luis    Patient verbalized understanding  No further questions/concerns    Shad Starks RN

## 2022-02-24 NOTE — TELEPHONE ENCOUNTER
Patient's call transferred to author    Patient reports she was advised to have a cardiac stress test done as she has been having chest discomfort     Patient scheduled for stress test tomorrow at 2/25/2022 at 0700   told patient she needed to contact her PCP to have a prior authorization completed prior to test     Will forward to Dr. Stahl to review    Shad Starks RN

## 2022-02-25 ENCOUNTER — HOSPITAL ENCOUNTER (OUTPATIENT)
Dept: NUCLEAR MEDICINE | Facility: HOSPITAL | Age: 52
End: 2022-02-25
Attending: FAMILY MEDICINE
Payer: COMMERCIAL

## 2022-02-25 ENCOUNTER — HOSPITAL ENCOUNTER (OUTPATIENT)
Dept: CARDIOLOGY | Facility: HOSPITAL | Age: 52
End: 2022-02-25
Attending: FAMILY MEDICINE
Payer: COMMERCIAL

## 2022-02-25 DIAGNOSIS — R07.89 ATYPICAL CHEST PAIN: ICD-10-CM

## 2022-02-25 LAB
CV STRESS CURRENT BP HE: NORMAL
CV STRESS CURRENT HR HE: 106
CV STRESS CURRENT HR HE: 107
CV STRESS CURRENT HR HE: 109
CV STRESS CURRENT HR HE: 112
CV STRESS CURRENT HR HE: 113
CV STRESS CURRENT HR HE: 116
CV STRESS CURRENT HR HE: 117
CV STRESS CURRENT HR HE: 125
CV STRESS CURRENT HR HE: 125
CV STRESS CURRENT HR HE: 130
CV STRESS CURRENT HR HE: 135
CV STRESS CURRENT HR HE: 137
CV STRESS CURRENT HR HE: 137
CV STRESS CURRENT HR HE: 98
CV STRESS CURRENT HR HE: 99
CV STRESS DEVIATION TIME HE: NORMAL
CV STRESS ECHO PERCENT HR HE: NORMAL
CV STRESS EXERCISE STAGE HE: NORMAL
CV STRESS FINAL RESTING BP HE: NORMAL
CV STRESS FINAL RESTING HR HE: 109
CV STRESS MAX HR HE: 137
CV STRESS MAX TREADMILL GRADE HE: 0
CV STRESS MAX TREADMILL SPEED HE: 0
CV STRESS PEAK DIA BP HE: NORMAL
CV STRESS PEAK SYS BP HE: NORMAL
CV STRESS PHASE HE: NORMAL
CV STRESS PROTOCOL HE: NORMAL
CV STRESS RESTING PT POSITION HE: NORMAL
CV STRESS ST DEVIATION AMOUNT HE: NORMAL
CV STRESS ST DEVIATION ELEVATION HE: NORMAL
CV STRESS ST EVELATION AMOUNT HE: NORMAL
CV STRESS TEST TYPE HE: NORMAL
CV STRESS TOTAL STAGE TIME MIN 1 HE: NORMAL
NUC STRESS EJECTION FRACTION: 70 %
RATE PRESSURE PRODUCT: NORMAL
STRESS ECHO BASELINE DIASTOLIC HE: 94
STRESS ECHO BASELINE HR: 100
STRESS ECHO BASELINE SYSTOLIC BP: 142
STRESS ECHO CALCULATED PERCENT HR: 82 %
STRESS ECHO LAST STRESS DIASTOLIC BP: 99
STRESS ECHO LAST STRESS HR: 125
STRESS ECHO LAST STRESS SYSTOLIC BP: 154
STRESS ECHO TARGET HR: 168

## 2022-02-25 PROCEDURE — 343N000001 HC RX 343: Performed by: FAMILY MEDICINE

## 2022-02-25 PROCEDURE — 93016 CV STRESS TEST SUPVJ ONLY: CPT | Performed by: INTERNAL MEDICINE

## 2022-02-25 PROCEDURE — 250N000011 HC RX IP 250 OP 636: Performed by: FAMILY MEDICINE

## 2022-02-25 PROCEDURE — 78452 HT MUSCLE IMAGE SPECT MULT: CPT

## 2022-02-25 PROCEDURE — A9500 TC99M SESTAMIBI: HCPCS | Performed by: FAMILY MEDICINE

## 2022-02-25 PROCEDURE — 93017 CV STRESS TEST TRACING ONLY: CPT | Performed by: INTERNAL MEDICINE

## 2022-02-25 PROCEDURE — 93018 CV STRESS TEST I&R ONLY: CPT | Performed by: INTERNAL MEDICINE

## 2022-02-25 PROCEDURE — 78452 HT MUSCLE IMAGE SPECT MULT: CPT | Mod: 26 | Performed by: INTERNAL MEDICINE

## 2022-02-25 RX ORDER — REGADENOSON 0.08 MG/ML
0.4 INJECTION, SOLUTION INTRAVENOUS ONCE
Status: COMPLETED | OUTPATIENT
Start: 2022-02-25 | End: 2022-02-25

## 2022-02-25 RX ORDER — AMINOPHYLLINE 25 MG/ML
50 INJECTION, SOLUTION INTRAVENOUS
Status: DISCONTINUED | OUTPATIENT
Start: 2022-02-25 | End: 2022-02-25 | Stop reason: HOSPADM

## 2022-02-25 RX ORDER — CAFFEINE CITRATE 20 MG/ML
60 SOLUTION INTRAVENOUS
Status: DISCONTINUED | OUTPATIENT
Start: 2022-02-25 | End: 2022-02-25 | Stop reason: HOSPADM

## 2022-02-25 RX ORDER — CAFFEINE 200 MG
200 TABLET ORAL
Status: DISCONTINUED | OUTPATIENT
Start: 2022-02-25 | End: 2022-02-25 | Stop reason: HOSPADM

## 2022-02-25 RX ORDER — ALBUTEROL SULFATE 0.83 MG/ML
2.5 SOLUTION RESPIRATORY (INHALATION)
Status: DISCONTINUED | OUTPATIENT
Start: 2022-02-25 | End: 2022-02-25 | Stop reason: HOSPADM

## 2022-02-25 RX ADMIN — Medication 39.9 MILLICURIE: at 09:56

## 2022-02-25 RX ADMIN — REGADENOSON 0.4 MG: 0.08 INJECTION, SOLUTION INTRAVENOUS at 08:06

## 2022-02-25 RX ADMIN — Medication 10.83 MCI.: at 07:00

## 2022-02-25 NOTE — TELEPHONE ENCOUNTER
The Central PA Team does not handle PA's that would go through the patient's medical benefits for procedures that take place in the clinic. These are handled at the clinic level. The Central PA Team handles PA's for the patient's pharmacy benefits for medications that are filled at retail locations.

## 2022-02-25 NOTE — TELEPHONE ENCOUNTER
Please forward to our prior authorization team, patient has stress test scheduled for today 2/25/2022 due to her ongoing chest pain.    Marcella Luis, DO

## 2022-02-26 ENCOUNTER — MYC MEDICAL ADVICE (OUTPATIENT)
Dept: FAMILY MEDICINE | Facility: CLINIC | Age: 52
End: 2022-02-26
Payer: COMMERCIAL

## 2022-02-28 NOTE — TELEPHONE ENCOUNTER
Please call patient and ask if she still needs a prior authorization completed?  If so is there a form I need to fill out for her?  Our prior authorization team only handles medication PAs.    Marcella Luis, DO

## 2022-03-02 ASSESSMENT — HEADACHE IMPACT TEST (HIT 6)
HIT6 TOTAL SCORE: 74
WHEN YOU HAVE A HEADACHE HOW OFTEN DO YOU WISH YOU COULD LIE DOWN: VERY OFTEN
HOW OFTEN DID HEADACHS LIMIT CONCENTRATION ON WORK OR DAILY ACTIVITY: ALWAYS
HOW OFTEN HAVE YOU FELT FED UP OR IRRITATED BECAUSE OF YOUR HEADACHES: ALWAYS
HOW OFTEN DO HEADACHES LIMIT YOUR DAILY ACTIVITIES: ALWAYS
WHEN YOU HAVE HEADACHES HOW OFTEN IS THE PAIN SEVERE: ALWAYS
HOW OFTEN HAVE YOU FELT TOO TIRED TO WORK BECAUSE OF YOUR HEADACHES: VERY OFTEN

## 2022-03-02 NOTE — PROGRESS NOTES
Clinic Care Coordination Contact  [facility]/Voicemail       Clinical Data: Care Coordinator Outreach  Outreach attempted x 1.  Left message on patient's voicemail with call back information and requested return call.  Plan:Care Coordinator will try to reach patient again in 10 business days.    Marcella Garay [facility] Care Coordination  [facility]  Tel: [phone]    
02-Mar-2022 02:30

## 2022-03-21 DIAGNOSIS — I10 ESSENTIAL HYPERTENSION: ICD-10-CM

## 2022-03-23 RX ORDER — LISINOPRIL 5 MG/1
5 TABLET ORAL DAILY
Qty: 90 TABLET | Refills: 3 | Status: SHIPPED | OUTPATIENT
Start: 2022-03-23 | End: 2022-04-04

## 2022-03-23 NOTE — TELEPHONE ENCOUNTER
Routing refill request to provider for review/approval because:  Labs out of range:  BUN: 21 & bun/creat ratio: 23 on 2/2022    Shad Starks RN

## 2022-04-04 ENCOUNTER — VIRTUAL VISIT (OUTPATIENT)
Dept: FAMILY MEDICINE | Facility: CLINIC | Age: 52
End: 2022-04-04
Payer: COMMERCIAL

## 2022-04-04 DIAGNOSIS — R07.89 ATYPICAL CHEST PAIN: ICD-10-CM

## 2022-04-04 DIAGNOSIS — M54.2 NECK PAIN: ICD-10-CM

## 2022-04-04 DIAGNOSIS — M51.369 DDD (DEGENERATIVE DISC DISEASE), LUMBAR: ICD-10-CM

## 2022-04-04 DIAGNOSIS — Z79.891 CHRONIC PRESCRIPTION OPIATE USE: ICD-10-CM

## 2022-04-04 DIAGNOSIS — G43.009 MIGRAINE WITHOUT AURA AND WITHOUT STATUS MIGRAINOSUS, NOT INTRACTABLE: ICD-10-CM

## 2022-04-04 DIAGNOSIS — J30.2 SEASONAL ALLERGIC RHINITIS, UNSPECIFIED TRIGGER: ICD-10-CM

## 2022-04-04 DIAGNOSIS — I10 ESSENTIAL HYPERTENSION: ICD-10-CM

## 2022-04-04 PROCEDURE — 99214 OFFICE O/P EST MOD 30 MIN: CPT | Mod: 95 | Performed by: FAMILY MEDICINE

## 2022-04-04 RX ORDER — OMEPRAZOLE 40 MG/1
CAPSULE, DELAYED RELEASE ORAL
Qty: 90 CAPSULE | Refills: 0 | Status: SHIPPED | OUTPATIENT
Start: 2022-04-04 | End: 2022-04-21

## 2022-04-04 RX ORDER — OXYCODONE AND ACETAMINOPHEN 5; 325 MG/1; MG/1
1 TABLET ORAL EVERY 6 HOURS PRN
Qty: 120 TABLET | Refills: 0 | Status: SHIPPED | OUTPATIENT
Start: 2022-05-04 | End: 2022-05-31

## 2022-04-04 RX ORDER — LORATADINE 10 MG/1
1 TABLET ORAL DAILY PRN
Qty: 90 TABLET | Refills: 3 | Status: SHIPPED | OUTPATIENT
Start: 2022-04-04

## 2022-04-04 RX ORDER — OXYCODONE AND ACETAMINOPHEN 5; 325 MG/1; MG/1
1 TABLET ORAL EVERY 6 HOURS PRN
Qty: 120 TABLET | Refills: 0 | Status: SHIPPED | OUTPATIENT
Start: 2022-04-04 | End: 2022-05-31

## 2022-04-04 RX ORDER — LISINOPRIL 5 MG/1
5 TABLET ORAL DAILY
Qty: 90 TABLET | Refills: 3 | Status: SHIPPED | OUTPATIENT
Start: 2022-04-04 | End: 2022-05-04

## 2022-04-04 RX ORDER — OXYCODONE AND ACETAMINOPHEN 5; 325 MG/1; MG/1
1 TABLET ORAL EVERY 6 HOURS PRN
Qty: 120 TABLET | Refills: 0 | Status: SHIPPED | OUTPATIENT
Start: 2022-06-03 | End: 2022-05-31

## 2022-04-04 RX ORDER — ZOLMITRIPTAN 5 MG/1
TABLET, FILM COATED ORAL
Qty: 9 TABLET | Refills: 2 | Status: SHIPPED | OUTPATIENT
Start: 2022-04-04 | End: 2022-07-07

## 2022-04-04 NOTE — PROGRESS NOTES
Arielle aguilar is a 52 year old who is being evaluated via a billable video visit.      How would you like to obtain your AVS? MyChart  If the video visit is dropped, the invitation should be resent by:   Will anyone else be joining your video visit?       Video Start Time: 9:25 AM      A/P:      ICD-10-CM    1. DDD (degenerative disc disease), lumbar  M51.36 oxyCODONE-acetaminophen (PERCOCET) 5-325 MG tablet     oxyCODONE-acetaminophen (PERCOCET) 5-325 MG tablet     oxyCODONE-acetaminophen (PERCOCET) 5-325 MG tablet   2. Chronic prescription opiate use  Z79.891 oxyCODONE-acetaminophen (PERCOCET) 5-325 MG tablet     oxyCODONE-acetaminophen (PERCOCET) 5-325 MG tablet     oxyCODONE-acetaminophen (PERCOCET) 5-325 MG tablet   3. Neck pain  M54.2 oxyCODONE-acetaminophen (PERCOCET) 5-325 MG tablet     oxyCODONE-acetaminophen (PERCOCET) 5-325 MG tablet     oxyCODONE-acetaminophen (PERCOCET) 5-325 MG tablet   4. Migraine without aura and without status migrainosus, not intractable  G43.009 ZOLMitriptan (ZOMIG) 5 MG tablet   5. Atypical chest pain  R07.89 omeprazole (PRILOSEC) 40 MG DR capsule   6. Seasonal allergic rhinitis, unspecified trigger  J30.2 loratadine (CLARITIN) 10 MG tablet   7. Essential hypertension  I10 lisinopril (ZESTRIL) 5 MG tablet     Chronic pain/opiate use:  Stable.   reviewed without concern.  Due for UDS and CSA next visit in person.  Medication remains effective with minimal side effects.    Migraine:  Follows with neurology,  Reviewed limitation of zomig to 9 per month.    Chest pain/GERD:  Improved, continue med and consider weaning to 20mg at next fill    HTN:  Controlled, continue meds    Subjective   Arielle aguilar is a 52 year old who presents for the following health issues   HPI     Hypertension Follow-up      Do you check your blood pressure regularly outside of the clinic? No     Are you following a low salt diet? Yes    Are your blood pressures ever more than 140 on the top number (systolic)  OR more   than 90 on the bottom number (diastolic), for example 140/90? Not checking      Medication Followup of oxycodone    Taking Medication as prescribed: yes    Side Effects:  None    Medication Helping Symptoms:  Yes    Remains effective for her pain.  Takes for back and leg/ankle/foot pain.  Uses it prior to walks.  Trying to get outside for walks with her walker.  Going at least once daily.  Limited in distance by back pain.  Worse when she stands or walks too long.      Continues to see her podiatrist every 3 months.  Will also be following up with her knees for another injection.  Needs to get back into physical therapy as well for these issues.    Occasional constipation that she manages with the Miralax.         *  Omeprazole feels like it has really helped with her chest pain.  Feels this has made a big difference.  Was having chest pain with eating and at times might feel like food would get stuck.  Had similar symptoms in the past with a negative EGD.  Had a negative Lexiscan.  Was started on omeprazole and feel symptoms have almost completley resolved.  feeling much better.  No further swallowing difficulties or pain.        * HA has been worse the last 2 weeks. In general feels the Ajovy has been helpful but not recently.  The zomig still continues to work well when she takes it.  Limits to 9 per month.  Does help with the pain around her eyes primarily.  Has follow up visit with neurology in May    *  Covid symptoms have resolved but continues to have alteration in taste and  Occasional productive cough.  Had Covid 2/2022.  Wonders if this is normal.  In general feeling much better      Review of Systems   Constitutional, HEENT, cardiovascular, pulmonary, gi and gu systems are negative, except as otherwise noted.      Objective           Vitals:  No vitals were obtained today due to virtual visit.    Physical Exam   GENERAL: Healthy, alert and no distress  RESP: No audible wheeze, cough   NEURO:    Mentation and speech appropriate for age.  PSYCH: Mentation appears normal, affect normal/bright, judgement and insight intact, normal speech    Epic reviewed            Video-Visit Details    Type of service:  Video Visit    Video End Time:9:38 AM    Originating Location (pt. Location): Home    Distant Location (provider location):  [facility]     Platform used for Video Visit: [employer]

## 2022-04-14 ENCOUNTER — VIRTUAL VISIT (OUTPATIENT)
Dept: FAMILY MEDICINE | Facility: CLINIC | Age: 52
End: 2022-04-14
Payer: COMMERCIAL

## 2022-04-14 ENCOUNTER — TELEPHONE (OUTPATIENT)
Dept: FAMILY MEDICINE | Facility: CLINIC | Age: 52
End: 2022-04-14

## 2022-04-14 DIAGNOSIS — K21.9 GASTROESOPHAGEAL REFLUX DISEASE WITHOUT ESOPHAGITIS: Primary | ICD-10-CM

## 2022-04-14 DIAGNOSIS — R07.89 ATYPICAL CHEST PAIN: Primary | ICD-10-CM

## 2022-04-14 PROCEDURE — 99214 OFFICE O/P EST MOD 30 MIN: CPT | Mod: 95 | Performed by: FAMILY MEDICINE

## 2022-04-14 RX ORDER — FAMOTIDINE 20 MG/1
20 TABLET, FILM COATED ORAL 2 TIMES DAILY
Qty: 60 TABLET | Refills: 0 | Status: SHIPPED | OUTPATIENT
Start: 2022-04-14 | End: 2022-05-20

## 2022-04-14 NOTE — TELEPHONE ENCOUNTER
Prior Authorization Retail Medication Request    Medication/Dose: Omeprazole  ICD code (if different than what is on RX):  Atypical chest pain [R07.89]  Previously Tried and Failed:    Rationale:      Insurance Name:  [employer] [place] [employer]  Insurance ID:  [id number]      Pharmacy Information (if different than what is on RX)  Name:  [facility]  Phone:  [phone]

## 2022-04-14 NOTE — PROGRESS NOTES
"Arielle aguilar is a 52 year old who is being evaluated via a billable video visit.      How would you like to obtain your AVS? MyChart  If the video visit is dropped, the invitation should be resent by:   Will anyone else be joining your video visit? No      Video Start Time:7:02 AM      A/P:      ICD-10-CM    1. Atypical chest pain  R07.89 Adult Gastro Ref - Procedure Only     famotidine (PEPCID) 20 MG tablet     Unclear etiology but seems most likely a GI source given complete initial response to the omeprazole and worsening with eating/drinking.  Pt also has had a negative cardiac evaluation including normal Lexiscan 1 month ago.    Reassurance given.  Will pursue GI etiology.  Begin famotidine in additional to omeprazole, schedule EGD.  Will follow up after procedure to review.      Subjective   Arielle aguilar is a 52 year old who presents for the following health issues     HPI     Feels like she has had return of the chest discomfort/symptoms she was having in Feb that lead to her ER visit.      Has difficulty describing what her symptoms are.  Feels like her heart is beating faster, maybe palpitations, just feels uncomfortable.  No acid sensation in the central chest, perhaps some heart palpitations.  Sensation can come and go but has been pretty consistent the last few days.  Seems to be worse with eating or drinking.  No nausea, no or vomiting    Felt like omeprazole was working great until 4 days ago.  Has had symptoms off and on since then, yesterday was not bad because she did not eat or drink all day.    This is the same thing she had in the past that prompted her ER visit.  No new symptoms.  Feels her exertional tolerance is unchanged.  Doing her normal activity.  Climbed a large staircase at [employer] over the weekend with no change in symptoms.      BM's have been normal.  Had a BM yesterday that looked \"pretty dark\". Has been taking some pepto bismol off and on for the last few days which helps her symptoms a " bit.       Also gets a burning sensation in the center of her back when driving in the car    Also has an intermittent cough that comes and goes which was present before but had resolved and now is returned.    Did make a cardiology appointment as well just to check.  Had a Lexiscan in March which was normal, no evidence of inducible ischemia      Review of Systems   Constitutional, HEENT, cardiovascular, pulmonary, gi and gu systems are negative, except as otherwise noted.      Objective           Vitals:  No vitals were obtained today due to virtual visit.    Physical Exam   GENERAL: Healthy, alert and no distress  EYES: Eyes grossly normal to inspection.  No discharge or erythema, or obvious scleral/conjunctival abnormalities.  RESP: No audible wheeze, cough, or visible cyanosis.  No visible retractions or increased work of breathing.    SKIN: Visible skin clear. No significant rash, abnormal pigmentation or lesions.  NEURO: Cranial nerves grossly intact.  Mentation and speech appropriate for age.  PSYCH: Mentation appears normal, affect normal/bright, judgement and insight intact, normal speech and appearance well-groomed.    Epic reviewed            Video-Visit Details    Type of service:  Video Visit    Video End Time:7:23 AM      Originating Location (pt. Location): Home    Distant Location (provider location):  [facility]     Platform used for Video Visit: [employer]

## 2022-04-19 NOTE — TELEPHONE ENCOUNTER
PA Initiation    Medication: Omeprazole  Insurance Company:    Pharmacy Filling the Rx: [id number] - [place], [place] - [address]  Filling Pharmacy Phone: [phone]  Filling Pharmacy Fax:    Start Date: 4/19/2022

## 2022-04-20 NOTE — TELEPHONE ENCOUNTER
PRIOR AUTHORIZATION DENIED    Medication: Omeprazole- denied    Denial Date: 4/19/2022    Denial Rational: CONDITION NOT COVERED:                Appeal Information:

## 2022-04-21 NOTE — TELEPHONE ENCOUNTER
Call placed to patient.  Relayed message per Dr Stahl.  Patient would like Dr Stahl to order the Omeprazole 20 mg daily as she does not have money to pay out of pocket for OTC omeprazole.  Dayami Cadena RN

## 2022-04-21 NOTE — TELEPHONE ENCOUNTER
Please let pt know abut denial.  Her condition does not qualify based on their list of covered conditions.  She can purchase over the counter and take 2 or I can prescribe the 20mg daily.    Huong Stahl, DO

## 2022-05-04 ENCOUNTER — OFFICE VISIT (OUTPATIENT)
Dept: CARDIOLOGY | Facility: CLINIC | Age: 52
End: 2022-05-04
Payer: COMMERCIAL

## 2022-05-04 VITALS
WEIGHT: 288 LBS | DIASTOLIC BLOOD PRESSURE: 89 MMHG | HEART RATE: 81 BPM | SYSTOLIC BLOOD PRESSURE: 139 MMHG | BODY MASS INDEX: 42.53 KG/M2 | OXYGEN SATURATION: 99 %

## 2022-05-04 DIAGNOSIS — R93.1 ELEVATED CORONARY ARTERY CALCIUM SCORE: ICD-10-CM

## 2022-05-04 DIAGNOSIS — I10 ESSENTIAL HYPERTENSION: ICD-10-CM

## 2022-05-04 DIAGNOSIS — E78.5 HYPERLIPIDEMIA LDL GOAL <70: ICD-10-CM

## 2022-05-04 DIAGNOSIS — I25.10 CORONARY ARTERY DISEASE INVOLVING NATIVE CORONARY ARTERY OF NATIVE HEART WITHOUT ANGINA PECTORIS: ICD-10-CM

## 2022-05-04 DIAGNOSIS — E66.01 MORBID OBESITY (H): ICD-10-CM

## 2022-05-04 DIAGNOSIS — R00.2 PALPITATIONS: Primary | ICD-10-CM

## 2022-05-04 DIAGNOSIS — I10 HYPERTENSION, UNCONTROLLED: ICD-10-CM

## 2022-05-04 LAB
FASTING STATUS PATIENT QL REPORTED: NORMAL
GLUCOSE BLD-MCNC: 94 MG/DL (ref 70–99)
HBA1C MFR BLD: 5.3 % (ref 0–5.6)

## 2022-05-04 PROCEDURE — 36415 COLL VENOUS BLD VENIPUNCTURE: CPT | Performed by: INTERNAL MEDICINE

## 2022-05-04 PROCEDURE — 99417 PROLNG OP E/M EACH 15 MIN: CPT | Performed by: INTERNAL MEDICINE

## 2022-05-04 PROCEDURE — 83036 HEMOGLOBIN GLYCOSYLATED A1C: CPT | Performed by: INTERNAL MEDICINE

## 2022-05-04 PROCEDURE — 82947 ASSAY GLUCOSE BLOOD QUANT: CPT | Performed by: INTERNAL MEDICINE

## 2022-05-04 PROCEDURE — 99215 OFFICE O/P EST HI 40 MIN: CPT | Performed by: INTERNAL MEDICINE

## 2022-05-04 RX ORDER — AMLODIPINE BESYLATE 5 MG/1
5 TABLET ORAL DAILY
Qty: 90 TABLET | Refills: 3 | Status: SHIPPED | OUTPATIENT
Start: 2022-05-04 | End: 2022-05-31

## 2022-05-04 RX ORDER — LISINOPRIL 10 MG/1
10 TABLET ORAL DAILY
Qty: 90 TABLET | Refills: 3 | Status: SHIPPED | OUTPATIENT
Start: 2022-05-04 | End: 2022-05-31

## 2022-05-04 RX ORDER — ROSUVASTATIN CALCIUM 10 MG/1
TABLET, COATED ORAL
Qty: 90 TABLET | Refills: 3 | Status: SHIPPED | OUTPATIENT
Start: 2022-05-04 | End: 2022-05-24 | Stop reason: SINTOL

## 2022-05-04 NOTE — PATIENT INSTRUCTIONS
Thank you for coming to the [facility] at [address]; please note the following instructions:    1. 5 days Zio place today    2. Labs today    3.Repeat labs in 3 months    4. Start taking Crestor 10 mg every other day    5. Lisinopril increase to 10 mg daily    6. Start taking Amlodipine 5 mg daily    5. Follow up with Dr. Scales as scheduled.        If you have any questions regarding your visit, please contact your care team:     CARDIOLOGY  TELEPHONE NUMBER   Claudia IBRAHIMOpal, Registered Nurse  Estela JONES, Registered Nurse  Matilde EATON, Registered Medical Assistant  Livia HAQUE, Visit Facilitator [phone] (select option 1)    *After hours: [phone]   For Scheduling Appts:     [phone] (select option 1)    *After hours: [phone]   For the Device Clinic (Pacemakers and ICD's)  Muna GARCAI, Registered Nurse   During business hours: [phone]    *After business hours:  [phone] (select option 4)      Normal test result notifications will be released via [employer] or mailed within 7 business days.  All other test results, will be communicated via telephone once reviewed by your cardiologist.    If you need a medication refill, please contact your pharmacy.  Please allow 3 business days for your refill to be completed.    As always, thank you for trusting us with your health care needs!

## 2022-05-04 NOTE — PROGRESS NOTES
[facility]      Referring provider:Huong Stahl DO    HPI: Ms. Arielle Pham is a 52 year old  female with PMH significant for    -Hypertension  -Hyperlipidemia  -COVID-19 infection in January 2022    Patient started having chest discomfort in February of this year which prompted recent ER visit on 2/29 to outside hospital.  CT angiogram of the chest showed no PE, mild to moderate sized esophageal hiatal hernia and calcified atheromatous plaque in proximal and mid LAD.  EKG and troponin were unremarkable.  She was subsequently seen in primary care and underwent Lexiscan stress test which showed no myocardial ischemia.      Patient reports no chest pain today.  Her major concern is palpitations which happens when she eats.  It can last for days.  When she was on omeprazole 40 mg she was not having palpitations.  Since she has cut down to 20 mg daily she tells me her symptoms came back.  Denies associated lightheadedness, dizziness, chest pain or shortness of breath.  She tells me she has stress in her life.  Sleep is good.  Tested 5 years ago for sleep apnea which was negative.    Patient had chest discomfort in 2019 and underwent CT coronary angiogram which showed elevated calcium score of 752 with nonobstructive coronary artery disease.    Patient is currently on lisinopril 5, aspirin, Zetia.  LDL is elevated at 137.    Lifetime non-smoker.  No history of diabetes.  Patient's mother had stents when she was in 50s.    Medications, personal, family, and social history reviewed with patient and revised.    PAST MEDICAL HISTORY:  Past Medical History:   Diagnosis Date     Atypical chest pain 9/30/2015     Atypical chest pain 9/30/2015     Hyperlipidemia      Hyperlipidemia      Hypertension      Hypertension      Kidney stones      Migraines      Migraines      TMJ (dislocation of temporomandibular  joint)        CURRENT MEDICATIONS:  Current Outpatient Medications   Medication Sig Dispense Refill     aspirin 81 MG EC tablet Take 81 mg by mouth daily        ezetimibe (ZETIA) 10 MG tablet Take 1 tablet (10 mg) by mouth daily 90 tablet 3     famotidine (PEPCID) 20 MG tablet Take 1 tablet (20 mg) by mouth 2 times daily 60 tablet 0     Fremanezumab-vfrm 225 MG/1.5ML SOAJ Inject 225 mg Subcutaneous every 30 days 1.5 mL 11     GARLIC PO Take 1 tablet by mouth 2 times daily       ibuprofen (ADVIL/MOTRIN) 800 MG tablet TAKE 1 TABLET BY MOUTH THREE TIMES DAILY AS NEEDED 270 tablet 3     lisinopril (ZESTRIL) 5 MG tablet Take 1 tablet (5 mg) by mouth daily 90 tablet 3     loratadine (CLARITIN) 10 MG tablet Take 1 tablet (10 mg) by mouth daily as needed for allergies 90 tablet 3     omeprazole (PRILOSEC) 20 MG DR capsule Take 1 capsule (20 mg) by mouth daily 90 capsule 3     order for DME Rolling walker with seat for home use 1 Device 0     oxyCODONE-acetaminophen (PERCOCET) 5-325 MG tablet Take 1 tablet by mouth every 6 hours as needed for moderate to severe pain Maximum of 4 tablets per day 120 tablet 0     oxyCODONE-acetaminophen (PERCOCET) 5-325 MG tablet Take 1 tablet by mouth every 6 hours as needed for moderate to severe pain Maximum of 4 tablets per day 120 tablet 0     [START ON 6/3/2022] oxyCODONE-acetaminophen (PERCOCET) 5-325 MG tablet Take 1 tablet by mouth every 6 hours as needed for moderate to severe pain Maximum of 4 tablets per day 120 tablet 0     ZOLMitriptan (ZOMIG) 5 MG tablet TAKE 1 TABLET BY MOUTH AT ONSET OF HEADACHE 9 tablet 2       PAST SURGICAL HISTORY:  Past Surgical History:   Procedure Laterality Date     CHOLECYSTECTOMY       CHOLECYSTECTOMY         ALLERGIES:     Allergies   Allergen Reactions     Hmg-Coa-R Inhibitors Other (See Comments)     Severe joint and muscle ache from Atorvastastin     Codeine Dizziness and Other (See Comments)     Gabapentin Palpitations       FAMILY  HISTORY:  Family History   Problem Relation Age of Onset     Hypertension Mother      Alcoholism Father      Colorectal Cancer No family hx of      Coronary Artery Disease Mother      Coronary Artery Disease Father          SOCIAL HISTORY:  Social History     Tobacco Use     Smoking status: Never Smoker     Smokeless tobacco: Never Used   Substance Use Topics     Alcohol use: No     Drug use: No       ROS:   Constitutional: No fever, chills, or sweats. Weight stable.   Cardiovascular: As per HPI.       Exam:  BP (!) 138/100 (BP Location: Right arm, Patient Position: Sitting, Cuff Size: Adult Regular)   Pulse 81   Wt 130.6 kg (288 lb)   SpO2 99%   BMI 42.53 kg/m    GENERAL APPEARANCE: alert and no distress  HEENT: no icterus, no central cyanosis  LYMPH/NECK: no adenopathy, no asymmetry, JVP not elevated, no carotid bruits.  RESPIRATORY: lungs clear to auscultation - no rales, rhonchi or wheezes, no use of accessory muscles, no retractions, respirations are unlabored, normal respiratory rate  CARDIOVASCULAR: regular rhythm, normal S1, S2, no S3 or S4 and no murmur, click or rub, precordium quiet with normal PMI.  GI: soft, non tender  EXTREMITIES: no edema  NEURO: alert, normal speech,and affect  SKIN: no ecchymoses, no rashes     I have reviewed the labs and personally reviewed the imaging below and made my comment in the assessment and plan.    Labs:  CBC RESULTS:   Lab Results   Component Value Date    WBC 6.4 02/17/2022    WBC 6.7 05/25/2021    RBC 4.58 02/17/2022    RBC 4.61 05/25/2021    HGB 14.4 02/17/2022    HGB 14.6 05/25/2021    HCT 44.4 02/17/2022    HCT 43.8 05/25/2021    MCV 97 02/17/2022    MCV 95 05/25/2021    MCH 31.4 02/17/2022    MCH 31.7 05/25/2021    MCHC 32.4 02/17/2022    MCHC 33.3 05/25/2021    RDW 13.3 02/17/2022    RDW 13.2 05/25/2021     02/17/2022     05/25/2021       BMP RESULTS:  Lab Results   Component Value Date     02/17/2022     05/25/2021    POTASSIUM 4.5  02/17/2022    POTASSIUM 4.5 05/25/2021    CHLORIDE 107 02/17/2022    CHLORIDE 105 05/25/2021    CO2 26 02/17/2022    CO2 24 05/25/2021    ANIONGAP 8 02/17/2022    ANIONGAP 8 05/25/2021    GLC 87 02/17/2022    GLC 89 05/25/2021    BUN 20 02/17/2022    BUN 19 05/25/2021    CR 0.82 02/17/2022    CR 0.89 05/25/2021    GFRESTIMATED 86 02/17/2022    GFRESTIMATED 75 05/25/2021    GFRESTBLACK 87 05/25/2021    DALJIT 9.8 02/17/2022    DALJIT 9.3 05/25/2021      LEXISCAN STRESS 2/25/2022  The nuclear stress test is negative for inducible myocardial ischemia or infarction.     The left ventricular ejection fraction at stress is 70%.     The patient is at a low risk of future cardiac ischemic events.     A prior study was conducted on 9/25/2015. The previously noted anterior and apical fixed perfusion defect is no longer present.    IMPRESSION:   1.  No pulmonary emboli. Normal thoracic aorta.   2.  Mild elevation right hemidiaphragm.   3.  Small to moderate-sized esophageal hiatal hernia.   4.  Small fat-containing left Bochdalek hernia.   5.  Dense calcified atheromatous plaque proximal and mid LAD and mild calcification proximal circumflex and right coronary arteries.    2019: CTA with calcium score:     The total Agatston calcium score is 752. A calcium score in this range   places the individual in the >90th percentile when compared to an age and   gender matched control group and implies a very high risk of cardiac   events in the next ten years.     Mild to moderate coronary atherosclerosis with no high-grade   obstructions evident     Ramus lesion: The lesion has moderate luminal stenosis in the range of   50-69%. Non-calcified plaque has been detected.      Echocardiogram  No results found for this or any previous visit (from the past 8760 hour(s)).    EKG from 2019 is available in chart however I am not able to review.      Assessment and Plan:     #Palpitations  -Reports symptoms for months triggered by eating.  She tells  me if she does not eat she does not get palpitations.  Recently resolved with 40 mg of omeprazole however insurance is not paying 40 mg now and has cut back to 20 mg and she tells me her symptoms came back.  No other associated symptoms.  -Recommended Zio patch for 5 days    #Hypertension uncontrolled  -Currently on lisinopril 5 mg.  Blood pressure elevated in clinic today.  -Recommend to increase lisinopril to 10 mg  -Start amlodipine 5 mg    #Nonobstructive coronary artery disease with elevated calcium score at 752 (CTA in 2019)  #Family history of premature CAD  #Hyperlipidemia  -Did not tolerate statin in the past (patient tried Crestor and pravastatin)  -Currently on ezetimibe however LDL is still elevated at 137.  -Patient willing to try Crestor every other day 10 mg (previously tried every day)  -Continue Zetia 10 mg daily  -Recommended lifestyle changes    #Morbid obesity  -Recommended lifestyle changes including intermittent fasting and to avoid simple carbs  -Check hemoglobin A1c and blood glucose today (I have not seen any recent hemoglobin A1c)    Return to clinic 6 months.    Total time spent today for this visit is 77 minutes including precharting, face-to-face clinic visit, review of labs/imaging and medical documentation.    Please donot hesitate to contact me if you have any questions or concerns. Again, thank you for allowing me to participate in the care of your patient.    Marybel SANCHEZ MD  [facility] Division of Cardiology  Pager [phone]

## 2022-05-04 NOTE — LETTER
5/4/2022      RE: Arielle Pham  [address]  [facility] [address]       Dear Colleague,    Thank you for the opportunity to participate in the care of your patient, Arielle Pham, at the [facility]. Please see a copy of my visit note below.                                                                                                [facility]      Referring provider:Huong Stahl DO    HPI: Ms. Arielle Pham is a 52 year old  female with PMH significant for    -Hypertension  -Hyperlipidemia  -COVID-19 infection in January 2022    Patient started having chest discomfort in February of this year which prompted recent ER visit on 2/29 to outside hospital.  CT angiogram of the chest showed no PE, mild to moderate sized esophageal hiatal hernia and calcified atheromatous plaque in proximal and mid LAD.  EKG and troponin were unremarkable.  She was subsequently seen in primary care and underwent Lexiscan stress test which showed no myocardial ischemia.      Patient reports no chest pain today.  Her major concern is palpitations which happens when she eats.  It can last for days.  When she was on omeprazole 40 mg she was not having palpitations.  Since she has cut down to 20 mg daily she tells me her symptoms came back.  Denies associated lightheadedness, dizziness, chest pain or shortness of breath.  She tells me she has stress in her life.  Sleep is good.  Tested 5 years ago for sleep apnea which was negative.    Patient had chest discomfort in 2019 and underwent CT coronary angiogram which showed elevated calcium score of 752 with nonobstructive coronary artery disease.    Patient is currently on lisinopril 5, aspirin, Zetia.  LDL is elevated at 137.    Lifetime non-smoker.  No history of diabetes.  Patient's mother had stents when she was in 50s.    Medications, personal, family, and social history reviewed  with patient and revised.    PAST MEDICAL HISTORY:  Past Medical History:   Diagnosis Date     Atypical chest pain 9/30/2015     Atypical chest pain 9/30/2015     Hyperlipidemia      Hyperlipidemia      Hypertension      Hypertension      Kidney stones      Migraines      Migraines      TMJ (dislocation of temporomandibular joint)        CURRENT MEDICATIONS:  Current Outpatient Medications   Medication Sig Dispense Refill     aspirin 81 MG EC tablet Take 81 mg by mouth daily        ezetimibe (ZETIA) 10 MG tablet Take 1 tablet (10 mg) by mouth daily 90 tablet 3     famotidine (PEPCID) 20 MG tablet Take 1 tablet (20 mg) by mouth 2 times daily 60 tablet 0     Fremanezumab-vfrm 225 MG/1.5ML SOAJ Inject 225 mg Subcutaneous every 30 days 1.5 mL 11     GARLIC PO Take 1 tablet by mouth 2 times daily       ibuprofen (ADVIL/MOTRIN) 800 MG tablet TAKE 1 TABLET BY MOUTH THREE TIMES DAILY AS NEEDED 270 tablet 3     lisinopril (ZESTRIL) 5 MG tablet Take 1 tablet (5 mg) by mouth daily 90 tablet 3     loratadine (CLARITIN) 10 MG tablet Take 1 tablet (10 mg) by mouth daily as needed for allergies 90 tablet 3     omeprazole (PRILOSEC) 20 MG DR capsule Take 1 capsule (20 mg) by mouth daily 90 capsule 3     order for DME Rolling walker with seat for home use 1 Device 0     oxyCODONE-acetaminophen (PERCOCET) 5-325 MG tablet Take 1 tablet by mouth every 6 hours as needed for moderate to severe pain Maximum of 4 tablets per day 120 tablet 0     oxyCODONE-acetaminophen (PERCOCET) 5-325 MG tablet Take 1 tablet by mouth every 6 hours as needed for moderate to severe pain Maximum of 4 tablets per day 120 tablet 0     [START ON 6/3/2022] oxyCODONE-acetaminophen (PERCOCET) 5-325 MG tablet Take 1 tablet by mouth every 6 hours as needed for moderate to severe pain Maximum of 4 tablets per day 120 tablet 0     ZOLMitriptan (ZOMIG) 5 MG tablet TAKE 1 TABLET BY MOUTH AT ONSET OF HEADACHE 9 tablet 2       PAST SURGICAL HISTORY:  Past Surgical  History:   Procedure Laterality Date     CHOLECYSTECTOMY       CHOLECYSTECTOMY         ALLERGIES:     Allergies   Allergen Reactions     Hmg-Coa-R Inhibitors Other (See Comments)     Severe joint and muscle ache from Atorvastastin     Codeine Dizziness and Other (See Comments)     Gabapentin Palpitations       FAMILY HISTORY:  Family History   Problem Relation Age of Onset     Hypertension Mother      Alcoholism Father      Colorectal Cancer No family hx of      Coronary Artery Disease Mother      Coronary Artery Disease Father          SOCIAL HISTORY:  Social History     Tobacco Use     Smoking status: Never Smoker     Smokeless tobacco: Never Used   Substance Use Topics     Alcohol use: No     Drug use: No       ROS:   Constitutional: No fever, chills, or sweats. Weight stable.   Cardiovascular: As per HPI.       Exam:  BP (!) 138/100 (BP Location: Right arm, Patient Position: Sitting, Cuff Size: Adult Regular)   Pulse 81   Wt 130.6 kg (288 lb)   SpO2 99%   BMI 42.53 kg/m    GENERAL APPEARANCE: alert and no distress  HEENT: no icterus, no central cyanosis  LYMPH/NECK: no adenopathy, no asymmetry, JVP not elevated, no carotid bruits.  RESPIRATORY: lungs clear to auscultation - no rales, rhonchi or wheezes, no use of accessory muscles, no retractions, respirations are unlabored, normal respiratory rate  CARDIOVASCULAR: regular rhythm, normal S1, S2, no S3 or S4 and no murmur, click or rub, precordium quiet with normal PMI.  GI: soft, non tender  EXTREMITIES: no edema  NEURO: alert, normal speech,and affect  SKIN: no ecchymoses, no rashes     I have reviewed the labs and personally reviewed the imaging below and made my comment in the assessment and plan.    Labs:  CBC RESULTS:   Lab Results   Component Value Date    WBC 6.4 02/17/2022    WBC 6.7 05/25/2021    RBC 4.58 02/17/2022    RBC 4.61 05/25/2021    HGB 14.4 02/17/2022    HGB 14.6 05/25/2021    HCT 44.4 02/17/2022    HCT 43.8 05/25/2021    MCV 97 02/17/2022     MCV 95 05/25/2021    MCH 31.4 02/17/2022    MCH 31.7 05/25/2021    MCHC 32.4 02/17/2022    MCHC 33.3 05/25/2021    RDW 13.3 02/17/2022    RDW 13.2 05/25/2021     02/17/2022     05/25/2021       BMP RESULTS:  Lab Results   Component Value Date     02/17/2022     05/25/2021    POTASSIUM 4.5 02/17/2022    POTASSIUM 4.5 05/25/2021    CHLORIDE 107 02/17/2022    CHLORIDE 105 05/25/2021    CO2 26 02/17/2022    CO2 24 05/25/2021    ANIONGAP 8 02/17/2022    ANIONGAP 8 05/25/2021    GLC 87 02/17/2022    GLC 89 05/25/2021    BUN 20 02/17/2022    BUN 19 05/25/2021    CR 0.82 02/17/2022    CR 0.89 05/25/2021    GFRESTIMATED 86 02/17/2022    GFRESTIMATED 75 05/25/2021    GFRESTBLACK 87 05/25/2021    DALJIT 9.8 02/17/2022    DALJIT 9.3 05/25/2021      LEXISCAN STRESS 2/25/2022  The nuclear stress test is negative for inducible myocardial ischemia or infarction.     The left ventricular ejection fraction at stress is 70%.     The patient is at a low risk of future cardiac ischemic events.     A prior study was conducted on 9/25/2015. The previously noted anterior and apical fixed perfusion defect is no longer present.    IMPRESSION:   1.  No pulmonary emboli. Normal thoracic aorta.   2.  Mild elevation right hemidiaphragm.   3.  Small to moderate-sized esophageal hiatal hernia.   4.  Small fat-containing left Bochdalek hernia.   5.  Dense calcified atheromatous plaque proximal and mid LAD and mild calcification proximal circumflex and right coronary arteries.    2019: CTA with calcium score:     The total Agatston calcium score is 752. A calcium score in this range   places the individual in the >90th percentile when compared to an age and   gender matched control group and implies a very high risk of cardiac   events in the next ten years.     Mild to moderate coronary atherosclerosis with no high-grade   obstructions evident     Ramus lesion: The lesion has moderate luminal stenosis in the range of    50-69%. Non-calcified plaque has been detected.      Echocardiogram  No results found for this or any previous visit (from the past 8760 hour(s)).    EKG from 2019 is available in chart however I am not able to review.      Assessment and Plan:     #Palpitations  -Reports symptoms for months triggered by eating.  She tells me if she does not eat she does not get palpitations.  Recently resolved with 40 mg of omeprazole however insurance is not paying 40 mg now and has cut back to 20 mg and she tells me her symptoms came back.  No other associated symptoms.  -Recommended Zio patch for 5 days    #Hypertension uncontrolled  -Currently on lisinopril 5 mg.  Blood pressure elevated in clinic today.  -Recommend to increase lisinopril to 10 mg  -Start amlodipine 5 mg    #Nonobstructive coronary artery disease with elevated calcium score at 752 (CTA in 2019)  #Family history of premature CAD  #Hyperlipidemia  -Did not tolerate statin in the past (patient tried Crestor and pravastatin)  -Currently on ezetimibe however LDL is still elevated at 137.  -Patient willing to try Crestor every other day 10 mg (previously tried every day)  -Continue Zetia 10 mg daily  -Recommended lifestyle changes    #Morbid obesity  -Recommended lifestyle changes including intermittent fasting and to avoid simple carbs  -Check hemoglobin A1c and blood glucose today (I have not seen any recent hemoglobin A1c)    Return to clinic 6 months.    Total time spent today for this visit is 77 minutes including precharting, face-to-face clinic visit, review of labs/imaging and medical documentation.    Please donot hesitate to contact me if you have any questions or concerns. Again, thank you for allowing me to participate in the care of your patient.    Marybel SANCHEZ MD  [facility] Division of Cardiology  Pager [phone]      Please do not hesitate to contact me if you have any questions/concerns.     Sincerely,     Marybel Sanchez MD

## 2022-05-04 NOTE — NURSING NOTE
"Chief Complaint   Patient presents with     New Patient     Pt reports racing heart, heart palpitations/fluttering       Initial BP (!) 138/100 (BP Location: Right arm, Patient Position: Sitting, Cuff Size: Adult Regular)   Pulse 81   Wt 130.6 kg (288 lb)   SpO2 99%   BMI 42.53 kg/m   Estimated body mass index is 42.53 kg/m  as calculated from the following:    Height as of 2/17/22: 1.753 m (5' 9\").    Weight as of this encounter: 130.6 kg (288 lb)..  BP completed using cuff size: regular    Livia Pinon, Visit Facilitator    "

## 2022-05-04 NOTE — LETTER
5/4/2022      RE: Arielle Pham  [address]  [facility] [address]                                                                                                   [facility]      Referring provider:Huong Stahl DO    HPI: Ms. Arielle Pham is a 52 year old  female with PMH significant for    -Hypertension  -Hyperlipidemia  -COVID-19 infection in January 2022    Patient started having chest discomfort in February of this year which prompted recent ER visit on 2/29 to outside hospital.  CT angiogram of the chest showed no PE, mild to moderate sized esophageal hiatal hernia and calcified atheromatous plaque in proximal and mid LAD.  EKG and troponin were unremarkable.  She was subsequently seen in primary care and underwent Lexiscan stress test which showed no myocardial ischemia.      Patient reports no chest pain today.  Her major concern is palpitations which happens when she eats.  It can last for days.  When she was on omeprazole 40 mg she was not having palpitations.  Since she has cut down to 20 mg daily she tells me her symptoms came back.  Denies associated lightheadedness, dizziness, chest pain or shortness of breath.  She tells me she has stress in her life.  Sleep is good.  Tested 5 years ago for sleep apnea which was negative.    Patient had chest discomfort in 2019 and underwent CT coronary angiogram which showed elevated calcium score of 752 with nonobstructive coronary artery disease.    Patient is currently on lisinopril 5, aspirin, Zetia.  LDL is elevated at 137.    Lifetime non-smoker.  No history of diabetes.  Patient's mother had stents when she was in 50s.    Medications, personal, family, and social history reviewed with patient and revised.    PAST MEDICAL HISTORY:  Past Medical History:   Diagnosis Date     Atypical chest pain 9/30/2015     Atypical chest pain 9/30/2015     Hyperlipidemia      Hyperlipidemia      Hypertension      Hypertension      Kidney stones       Migraines      Migraines      TMJ (dislocation of temporomandibular joint)        CURRENT MEDICATIONS:  Current Outpatient Medications   Medication Sig Dispense Refill     aspirin 81 MG EC tablet Take 81 mg by mouth daily        ezetimibe (ZETIA) 10 MG tablet Take 1 tablet (10 mg) by mouth daily 90 tablet 3     famotidine (PEPCID) 20 MG tablet Take 1 tablet (20 mg) by mouth 2 times daily 60 tablet 0     Fremanezumab-vfrm 225 MG/1.5ML SOAJ Inject 225 mg Subcutaneous every 30 days 1.5 mL 11     GARLIC PO Take 1 tablet by mouth 2 times daily       ibuprofen (ADVIL/MOTRIN) 800 MG tablet TAKE 1 TABLET BY MOUTH THREE TIMES DAILY AS NEEDED 270 tablet 3     lisinopril (ZESTRIL) 5 MG tablet Take 1 tablet (5 mg) by mouth daily 90 tablet 3     loratadine (CLARITIN) 10 MG tablet Take 1 tablet (10 mg) by mouth daily as needed for allergies 90 tablet 3     omeprazole (PRILOSEC) 20 MG DR capsule Take 1 capsule (20 mg) by mouth daily 90 capsule 3     order for DME Rolling walker with seat for home use 1 Device 0     oxyCODONE-acetaminophen (PERCOCET) 5-325 MG tablet Take 1 tablet by mouth every 6 hours as needed for moderate to severe pain Maximum of 4 tablets per day 120 tablet 0     oxyCODONE-acetaminophen (PERCOCET) 5-325 MG tablet Take 1 tablet by mouth every 6 hours as needed for moderate to severe pain Maximum of 4 tablets per day 120 tablet 0     [START ON 6/3/2022] oxyCODONE-acetaminophen (PERCOCET) 5-325 MG tablet Take 1 tablet by mouth every 6 hours as needed for moderate to severe pain Maximum of 4 tablets per day 120 tablet 0     ZOLMitriptan (ZOMIG) 5 MG tablet TAKE 1 TABLET BY MOUTH AT ONSET OF HEADACHE 9 tablet 2       PAST SURGICAL HISTORY:  Past Surgical History:   Procedure Laterality Date     CHOLECYSTECTOMY       CHOLECYSTECTOMY         ALLERGIES:     Allergies   Allergen Reactions     Hmg-Coa-R Inhibitors Other (See Comments)     Severe joint and muscle ache from Atorvastastin     Codeine Dizziness and Other  (See Comments)     Gabapentin Palpitations       FAMILY HISTORY:  Family History   Problem Relation Age of Onset     Hypertension Mother      Alcoholism Father      Colorectal Cancer No family hx of      Coronary Artery Disease Mother      Coronary Artery Disease Father          SOCIAL HISTORY:  Social History     Tobacco Use     Smoking status: Never Smoker     Smokeless tobacco: Never Used   Substance Use Topics     Alcohol use: No     Drug use: No       ROS:   Constitutional: No fever, chills, or sweats. Weight stable.   Cardiovascular: As per HPI.       Exam:  BP (!) 138/100 (BP Location: Right arm, Patient Position: Sitting, Cuff Size: Adult Regular)   Pulse 81   Wt 130.6 kg (288 lb)   SpO2 99%   BMI 42.53 kg/m    GENERAL APPEARANCE: alert and no distress  HEENT: no icterus, no central cyanosis  LYMPH/NECK: no adenopathy, no asymmetry, JVP not elevated, no carotid bruits.  RESPIRATORY: lungs clear to auscultation - no rales, rhonchi or wheezes, no use of accessory muscles, no retractions, respirations are unlabored, normal respiratory rate  CARDIOVASCULAR: regular rhythm, normal S1, S2, no S3 or S4 and no murmur, click or rub, precordium quiet with normal PMI.  GI: soft, non tender  EXTREMITIES: no edema  NEURO: alert, normal speech,and affect  SKIN: no ecchymoses, no rashes     I have reviewed the labs and personally reviewed the imaging below and made my comment in the assessment and plan.    Labs:  CBC RESULTS:   Lab Results   Component Value Date    WBC 6.4 02/17/2022    WBC 6.7 05/25/2021    RBC 4.58 02/17/2022    RBC 4.61 05/25/2021    HGB 14.4 02/17/2022    HGB 14.6 05/25/2021    HCT 44.4 02/17/2022    HCT 43.8 05/25/2021    MCV 97 02/17/2022    MCV 95 05/25/2021    MCH 31.4 02/17/2022    MCH 31.7 05/25/2021    MCHC 32.4 02/17/2022    MCHC 33.3 05/25/2021    RDW 13.3 02/17/2022    RDW 13.2 05/25/2021     02/17/2022     05/25/2021       BMP RESULTS:  Lab Results   Component Value Date      02/17/2022     05/25/2021    POTASSIUM 4.5 02/17/2022    POTASSIUM 4.5 05/25/2021    CHLORIDE 107 02/17/2022    CHLORIDE 105 05/25/2021    CO2 26 02/17/2022    CO2 24 05/25/2021    ANIONGAP 8 02/17/2022    ANIONGAP 8 05/25/2021    GLC 87 02/17/2022    GLC 89 05/25/2021    BUN 20 02/17/2022    BUN 19 05/25/2021    CR 0.82 02/17/2022    CR 0.89 05/25/2021    GFRESTIMATED 86 02/17/2022    GFRESTIMATED 75 05/25/2021    GFRESTBLACK 87 05/25/2021    DALJIT 9.8 02/17/2022    DALJIT 9.3 05/25/2021      LEXISCAN STRESS 2/25/2022  The nuclear stress test is negative for inducible myocardial ischemia or infarction.     The left ventricular ejection fraction at stress is 70%.     The patient is at a low risk of future cardiac ischemic events.     A prior study was conducted on 9/25/2015. The previously noted anterior and apical fixed perfusion defect is no longer present.    IMPRESSION:   1.  No pulmonary emboli. Normal thoracic aorta.   2.  Mild elevation right hemidiaphragm.   3.  Small to moderate-sized esophageal hiatal hernia.   4.  Small fat-containing left Bochdalek hernia.   5.  Dense calcified atheromatous plaque proximal and mid LAD and mild calcification proximal circumflex and right coronary arteries.    2019: CTA with calcium score:     The total Agatston calcium score is 752. A calcium score in this range   places the individual in the >90th percentile when compared to an age and   gender matched control group and implies a very high risk of cardiac   events in the next ten years.     Mild to moderate coronary atherosclerosis with no high-grade   obstructions evident     Ramus lesion: The lesion has moderate luminal stenosis in the range of   50-69%. Non-calcified plaque has been detected.      Echocardiogram  No results found for this or any previous visit (from the past 8760 hour(s)).    EKG from 2019 is available in chart however I am not able to review.      Assessment and Plan:      #Palpitations  -Reports symptoms for months triggered by eating.  She tells me if she does not eat she does not get palpitations.  Recently resolved with 40 mg of omeprazole however insurance is not paying 40 mg now and has cut back to 20 mg and she tells me her symptoms came back.  No other associated symptoms.  -Recommended Zio patch for 5 days    #Hypertension uncontrolled  -Currently on lisinopril 5 mg.  Blood pressure elevated in clinic today.  -Recommend to increase lisinopril to 10 mg  -Start amlodipine 5 mg    #Nonobstructive coronary artery disease with elevated calcium score at 752 (CTA in 2019)  #Family history of premature CAD  #Hyperlipidemia  -Did not tolerate statin in the past (patient tried Crestor and pravastatin)  -Currently on ezetimibe however LDL is still elevated at 137.  -Patient willing to try Crestor every other day 10 mg (previously tried every day)  -Continue Zetia 10 mg daily  -Recommended lifestyle changes    #Morbid obesity  -Recommended lifestyle changes including intermittent fasting and to avoid simple carbs  -Check hemoglobin A1c and blood glucose today (I have not seen any recent hemoglobin A1c)    Return to clinic 6 months.    Total time spent today for this visit is 77 minutes including precharting, face-to-face clinic visit, review of labs/imaging and medical documentation.    Please donot hesitate to contact me if you have any questions or concerns. Again, thank you for allowing me to participate in the care of your patient.    Marybel SANCHEZ MD  [facility] Division of Cardiology  Pager [phone]

## 2022-05-12 ENCOUNTER — VIRTUAL VISIT (OUTPATIENT)
Dept: NEUROLOGY | Facility: CLINIC | Age: 52
End: 2022-05-12
Payer: COMMERCIAL

## 2022-05-12 DIAGNOSIS — G43.709 CHRONIC MIGRAINE WITHOUT AURA WITHOUT STATUS MIGRAINOSUS, NOT INTRACTABLE: Primary | ICD-10-CM

## 2022-05-12 DIAGNOSIS — G43.009 MIGRAINE WITHOUT AURA AND WITHOUT STATUS MIGRAINOSUS, NOT INTRACTABLE: ICD-10-CM

## 2022-05-12 DIAGNOSIS — G89.4 CHRONIC PAIN SYNDROME: ICD-10-CM

## 2022-05-12 PROCEDURE — 99213 OFFICE O/P EST LOW 20 MIN: CPT | Mod: 95 | Performed by: NURSE PRACTITIONER

## 2022-05-12 ASSESSMENT — MIGRAINE DISABILITY ASSESSMENT (MIDAS)
HOW MANY DAYS WAS YOUR PRODUCTIVITY CUT IN HALF BECAUSE OF HEADACHES: 0
HOW MANY DAYS WAS HOUSEWORK PRODUCTIVITY CUT IN HALF DUE TO HEADACHES: 36
HOW MANY DAYS IN THE PAST 3 MONTHS HAVE YOU HAD A HEADACHE: 36
HOW OFTEN WERE SOCIAL ACTIVITIES MISSED DUE TO HEADACHES: 10
HOW MANY DAYS DID YOU MISS WORK OR SCHOOL BECAUSE OF HEADACHES: 0
HOW MANY DAYS DID YOU NOT DO HOUSEWORK BECAUSE OF HEADACHES: 45
TOTAL SCORE: 91

## 2022-05-12 ASSESSMENT — HEADACHE IMPACT TEST (HIT 6)
HOW OFTEN DO HEADACHES LIMIT YOUR DAILY ACTIVITIES: VERY OFTEN
WHEN YOU HAVE HEADACHES HOW OFTEN IS THE PAIN SEVERE: VERY OFTEN
HOW OFTEN HAVE YOU FELT TOO TIRED TO WORK BECAUSE OF YOUR HEADACHES: VERY OFTEN
HIT6 TOTAL SCORE: 70
WHEN YOU HAVE A HEADACHE HOW OFTEN DO YOU WISH YOU COULD LIE DOWN: VERY OFTEN
HOW OFTEN HAVE YOU FELT FED UP OR IRRITATED BECAUSE OF YOUR HEADACHES: ALWAYS
HOW OFTEN DID HEADACHS LIMIT CONCENTRATION ON WORK OR DAILY ACTIVITY: ALWAYS

## 2022-05-12 NOTE — PROGRESS NOTES
Arielle aguilar is a 52 year old who is being evaluated via a billable video visit.      How would you like to obtain your AVS? MyChart  If the video visit is dropped, the invitation should be resent by: Send to e-mail at: [email or web address]  Will anyone else be joining your video visit? No      Video Start Time: 9:09 AM  Video-Visit Details    Type of service:  Video Visit    Video End Time:9:28 AM    Originating Location (pt. Location): Home    Distant Location (provider location):  [facility] [place]     Platform used for Video Visit: [facility]     Headache Clinic follow up visit note:  Initial Headache Clinic on 3/15/2021, see note for details.   Has been going to [facility] and gets occipital nerve blocks which are helpful but do not help with headaches daily around her eyes.   Has been seen at [facility] for many years and tried treatments  Last visit with [facility] 6/24/2021 and was started on Ajovy monthly and Nurtec as needed.   Prior to starting Ajovy was waking up with a severe headaches and now wakes up with much milder headaches and Ajovy helped with severity of headaches.   Patient reports that headaches were horrible before starting Ajovy and headaches are milder. No side effects.   No problems with injections.   A severe headache 1-2/week and takes zolmitriptan and it seems to help and hydrated. Patient reports that zolmitriptan causes fatigue and goldstein feeling.   Ok to take nurtec as needed if zolmitriptan does not appear to be helpful.   Reports that she cut back on ibuprofen and mainly for the back pain.   Percocet daily for chronic pain management and did not try to reduce it. daily percocet use and should be ongoing discussion  with PCP about risks and benefits.     Headache treatment Plan reviewed with the patient:  Continue Ajovy for headache prevention and rescue treatment -zolmitriptan or Nurtec as needed.   Chronic pain-management per PCP,  ongoing discussion about risk and benefits of daily percocet use.   Follow up in Headache Clinic in 6-12 months or sonner if needed     Patient is alert and no in apparent acute distress,  mentation appears normal, judgement and insight intact, normal speech.    I discussed all my recommendations with Arielle Pham who verbalizes understanding and comfortable with the plan.  All of patient's questions were answered from the best of my knowledge.  Patient is in agreement with the plan.     21 minutes spent on the date of the encounter doing video access, chart  review, meds review, treatment plan, documentation and further activities as noted above    DONN Cronin, CNP [facility]  Headache certified  [facility]

## 2022-05-12 NOTE — LETTER
5/12/2022       RE: Arielle Pham  [address]  [place] [address]     Dear Colleague,    Thank you for referring your patient, Arielle Pham, to the [facility] [place] at [facility]. Please see a copy of my visit note below.    Headache Clinic follow up visit note:    Initial Headache Clinic on 3/15/2021, see note for details.   Has been going to [facility] and gets occipital nerve blocks which are helpful but do not help with headaches daily around her eyes.   Has been seen at [facility] for many years and tried treatments  Last visit with [facility] 6/24/2021 and was started on Ajovy monthly and Nurtec as needed.   Prior to starting Ajovy was waking up with a severe headaches and now wakes up with much milder headaches and Ajovy helped with severity of headaches.   Patient reports that headaches were horrible before starting Ajovy and headaches are milder. No side effects.   No problems with injections.   A severe headache 1-2/week and takes zolmitriptan and it seems to help and hydrated. Patient reports that zolmitriptan causes fatigue and goldstein feeling.   Ok to take nurtec as needed if zolmitriptan does not appear to be helpful.   Reports that she cut back on ibuprofen and mainly for the back pain.   Percocet daily for chronic pain management and did not try to reduce it. daily percocet use and should be ongoing discussion  with PCP about risks and benefits.     Headache treatment Plan reviewed with the patient:  Continue Ajovy for headache prevention and rescue treatment -zolmitriptan or Nurtec as needed.   Chronic pain-management per PCP, ongoing discussion about risk and benefits of daily percocet use.   Follow up in Headache Clinic in 6-12 months or [facility]ner if needed     Patient is alert and no in apparent acute distress,  mentation appears normal, judgement and insight intact, normal speech.    I discussed all my  recommendations with Arielle Pham who verbalizes understanding and comfortable with the plan.  All of patient's questions were answered from the best of my knowledge.  Patient is in agreement with the plan.     21 minutes spent on the date of the encounter doing video access, chart  review, meds review, treatment plan, documentation and further activities as noted above      DONN Cronin, CNP [facility]  Headache certified  [facility]

## 2022-05-19 ENCOUNTER — MYC MEDICAL ADVICE (OUTPATIENT)
Dept: CARDIOLOGY | Facility: CLINIC | Age: 52
End: 2022-05-19
Payer: COMMERCIAL

## 2022-05-19 NOTE — CONFIDENTIAL NOTE
Dr. Scales,    Please read [email or web address] message and advise.    Claudia Nicolas, RN  Cardiology Care Coordinator  [facility]  [phone] option 1

## 2022-05-20 DIAGNOSIS — R07.89 ATYPICAL CHEST PAIN: ICD-10-CM

## 2022-05-20 RX ORDER — FAMOTIDINE 20 MG/1
TABLET, FILM COATED ORAL
Qty: 60 TABLET | Refills: 0 | Status: SHIPPED | OUTPATIENT
Start: 2022-05-20 | End: 2022-06-13

## 2022-05-20 NOTE — TELEPHONE ENCOUNTER
Routing refill request to provider for review/approval because:  PCP to determine duration of treatment.  Dayami Cadena RN

## 2022-05-24 NOTE — TELEPHONE ENCOUNTER
"Writer called patient due to symptoms reported on mychart.  Writer reviewed patient's medications.  Patient did STOP rosuvastatin but did not hold amlodipine.  She misread the instructions.  She did restart Zetia (she thought she was to stop Zetia when she restarted the rosuvastatin on 5/4/22 OV Dr. Scales).      When asking details of her shortness of breath she states that she feels that it is \"harder to breath\" but she is able to walk for example to the mail box without difficulty.  States \"I just feel different breathing-sometimes feels harder when I walk\".  Is also complaining of fatigue this in ongoing but feels this has worsened. Lightheadedness/dizziness comes and goes.       Denies any orthopnea, chest pain.    Has not stopped amlodipine  Pt does not have a blood pressure machine at home    Writer advised patient to hold amlodipine.  Monitor symptoms.  Update in 1 week.  If shortness of breath worsens to go to the ED to be evaluated.  Patient verbalized understanding.    Dr. Scales, any recommendations?  Otherwise she is happy with this plan.    Claudia Nicolas RN  Cardiology Care Coordinator  [facility]  [phone] option 1                  "

## 2022-05-24 NOTE — TELEPHONE ENCOUNTER
Agree to hold amlodipine and watch symptoms.  Go to ER for worsening shortness of breath.    Dr.CAN

## 2022-05-27 ASSESSMENT — PATIENT HEALTH QUESTIONNAIRE - PHQ9
SUM OF ALL RESPONSES TO PHQ QUESTIONS 1-9: 8
SUM OF ALL RESPONSES TO PHQ QUESTIONS 1-9: 8
10. IF YOU CHECKED OFF ANY PROBLEMS, HOW DIFFICULT HAVE THESE PROBLEMS MADE IT FOR YOU TO DO YOUR WORK, TAKE CARE OF THINGS AT HOME, OR GET ALONG WITH OTHER PEOPLE: VERY DIFFICULT

## 2022-05-31 ENCOUNTER — OFFICE VISIT (OUTPATIENT)
Dept: FAMILY MEDICINE | Facility: CLINIC | Age: 52
End: 2022-05-31
Payer: COMMERCIAL

## 2022-05-31 VITALS
TEMPERATURE: 97.5 F | DIASTOLIC BLOOD PRESSURE: 96 MMHG | HEART RATE: 82 BPM | SYSTOLIC BLOOD PRESSURE: 148 MMHG | OXYGEN SATURATION: 97 %

## 2022-05-31 DIAGNOSIS — Z79.899 ENCOUNTER FOR LONG-TERM (CURRENT) USE OF MEDICATIONS: ICD-10-CM

## 2022-05-31 DIAGNOSIS — Z79.891 CHRONIC PRESCRIPTION OPIATE USE: Primary | ICD-10-CM

## 2022-05-31 DIAGNOSIS — M25.511 CHRONIC RIGHT SHOULDER PAIN: ICD-10-CM

## 2022-05-31 DIAGNOSIS — M54.2 NECK PAIN: ICD-10-CM

## 2022-05-31 DIAGNOSIS — G89.29 CHRONIC RIGHT SHOULDER PAIN: ICD-10-CM

## 2022-05-31 DIAGNOSIS — I10 ESSENTIAL HYPERTENSION: ICD-10-CM

## 2022-05-31 DIAGNOSIS — M51.369 DDD (DEGENERATIVE DISC DISEASE), LUMBAR: ICD-10-CM

## 2022-05-31 DIAGNOSIS — Z12.11 SCREEN FOR COLON CANCER: ICD-10-CM

## 2022-05-31 DIAGNOSIS — E66.01 MORBID OBESITY (H): ICD-10-CM

## 2022-05-31 LAB
AMPHETAMINES UR QL: NOT DETECTED
ANION GAP SERPL CALCULATED.3IONS-SCNC: 3 MMOL/L (ref 3–14)
BARBITURATES UR QL SCN: NOT DETECTED
BENZODIAZ UR QL SCN: NOT DETECTED
BUN SERPL-MCNC: 36 MG/DL (ref 7–30)
BUPRENORPHINE UR QL: NOT DETECTED
CALCIUM SERPL-MCNC: 10 MG/DL (ref 8.5–10.1)
CANNABINOIDS UR QL: NOT DETECTED
CHLORIDE BLD-SCNC: 109 MMOL/L (ref 94–109)
CO2 SERPL-SCNC: 27 MMOL/L (ref 20–32)
COCAINE UR QL SCN: NOT DETECTED
CREAT SERPL-MCNC: 0.89 MG/DL (ref 0.52–1.04)
D-METHAMPHET UR QL: NOT DETECTED
GFR SERPL CREATININE-BSD FRML MDRD: 78 ML/MIN/1.73M2
GLUCOSE BLD-MCNC: 90 MG/DL (ref 70–99)
METHADONE UR QL SCN: NOT DETECTED
OPIATES UR QL SCN: NOT DETECTED
OXYCODONE UR QL SCN: DETECTED
PCP UR QL SCN: NOT DETECTED
POTASSIUM BLD-SCNC: 4.6 MMOL/L (ref 3.4–5.3)
PROPOXYPH UR QL: NOT DETECTED
SODIUM SERPL-SCNC: 139 MMOL/L (ref 133–144)
TRICYCLICS UR QL SCN: NOT DETECTED

## 2022-05-31 PROCEDURE — 99214 OFFICE O/P EST MOD 30 MIN: CPT | Performed by: FAMILY MEDICINE

## 2022-05-31 PROCEDURE — 36415 COLL VENOUS BLD VENIPUNCTURE: CPT | Performed by: FAMILY MEDICINE

## 2022-05-31 PROCEDURE — 80048 BASIC METABOLIC PNL TOTAL CA: CPT | Performed by: FAMILY MEDICINE

## 2022-05-31 PROCEDURE — 80306 DRUG TEST PRSMV INSTRMNT: CPT | Performed by: FAMILY MEDICINE

## 2022-05-31 RX ORDER — OXYCODONE AND ACETAMINOPHEN 5; 325 MG/1; MG/1
1 TABLET ORAL EVERY 6 HOURS PRN
Qty: 120 TABLET | Refills: 0 | Status: SHIPPED | OUTPATIENT
Start: 2022-06-30 | End: 2022-07-05

## 2022-05-31 RX ORDER — OXYCODONE AND ACETAMINOPHEN 5; 325 MG/1; MG/1
1 TABLET ORAL EVERY 6 HOURS PRN
Qty: 120 TABLET | Refills: 0 | Status: SHIPPED | OUTPATIENT
Start: 2022-07-30 | End: 2022-09-09

## 2022-05-31 RX ORDER — LISINOPRIL 10 MG/1
20 TABLET ORAL DAILY
Qty: 90 TABLET | Refills: 3 | COMMUNITY
Start: 2022-05-31 | End: 2022-07-12

## 2022-05-31 RX ORDER — OXYCODONE AND ACETAMINOPHEN 5; 325 MG/1; MG/1
1 TABLET ORAL EVERY 6 HOURS PRN
Qty: 120 TABLET | Refills: 0 | Status: SHIPPED | OUTPATIENT
Start: 2022-06-03 | End: 2022-07-05

## 2022-05-31 ASSESSMENT — PATIENT HEALTH QUESTIONNAIRE - PHQ9
SUM OF ALL RESPONSES TO PHQ QUESTIONS 1-9: 8
10. IF YOU CHECKED OFF ANY PROBLEMS, HOW DIFFICULT HAVE THESE PROBLEMS MADE IT FOR YOU TO DO YOUR WORK, TAKE CARE OF THINGS AT HOME, OR GET ALONG WITH OTHER PEOPLE: VERY DIFFICULT

## 2022-05-31 NOTE — LETTER
Opioid / Opioid Plus Controlled Substance Agreement    This is an agreement between you and your provider about the safe and appropriate use of controlled substance/opioids prescribed by your care team. Controlled substances are medicines that can cause physical and mental dependence (abuse).    There are strict laws about having and using these medicines. We here at [facility] are committing to working with you in your efforts to get better. To support you in this work, we ll help you schedule regular office appointments for medicine refills. If we must cancel or change your appointment for any reason, we ll make sure you have enough medicine to last until your next appointment.     As a Provider, I will:    Listen carefully to your concerns and treat you with respect.     Recommend a treatment plan that I believe is in your best interest. This plan may involve therapies other than opioid pain medication.     Talk with you often about the possible benefits, and the risk of harm of any medicine that we prescribe for you.     Provide a plan on how to taper (discontinue or go off) using this medicine if the decision is made to stop its use.    As a Patient, I understand that opioid(s):     Are a controlled substance prescribed by my care team to help me function or work and manage my condition(s).     Are strong medicines and can cause serious side effects such as:    Drowsiness, which can seriously affect my driving ability    A lower breathing rate, enough to cause death    Harm to my thinking ability     Depression     Abuse of and addiction to this medicine    Need to be taken exactly as prescribed. Combining opioids with certain medicines or chemicals (such as illegal drugs, sedatives, sleeping pills, and benzodiazepines) can be dangerous or even fatal. If I stop opioids suddenly, I may have severe withdrawal symptoms.    Do not work for all types of pain nor for all patients. If they re not helpful, I may  be asked to stop them.        The risks, benefits and side effects of these medicine(s) were explained to me. I agree that:  1. I will take part in other treatments as advised by my care team. This may be psychiatry or counseling, physical therapy, behavioral therapy, group treatment or a referral to a specialist.     2. I will keep all my appointments. I understand that this is part of the monitoring of opioids. My care team may require an office visit for EVERY opioid/controlled substance refill. If I miss appointments or don t follow instructions, my care team may stop my medicine.    3. I will take my medicines as prescribed. I will not change the dose or schedule unless my care team tells me to. There will be no refills if I run out early.     4. I may be asked to come to the clinic and complete a urine drug test or complete a pill count at any time. If I don t give a urine sample or participate in a pill count, the care team may stop my medicine.    5. I will only receive prescriptions from this clinic for chronic pain. If I am treated by another provider for acute pain issues, I will tell them that I am taking opioid pain medication for chronic pain and that I have a treatment agreement with this provider. I will inform my [facility] care team within one business day if I am given a prescription for any pain medication by another healthcare provider. My [facility] care team can contact other providers and pharmacists about my use of any medicines.    6. It is up to me to make sure that I don t run out of my medicines on weekends or holidays. If my care team is willing to refill my opioid prescription without a visit, I must request refills only during office hours. Refills may take up to 3 business days to process. I will use one pharmacy to fill all my opioid and other controlled substance prescriptions. I will notify the clinic about any changes to my insurance or medication  availability.    7. I am responsible for my prescriptions. If the medicine/prescription is lost, stolen or destroyed, it will not be replaced. I also agree not to share controlled substance medicines with anyone.    8. I am aware I should not use any illegal or recreational drugs. I agree not to drink alcohol unless my care team says I can.       9. If I enroll in the [place] Medical Cannabis program, I will tell my care team prior to my next refill.     10. I will tell my care team right away if I become pregnant, have a new medical problem treated outside of my regular clinic, or have a change in my medications.    11. I understand that this medicine can affect my thinking, judgment and reaction time. Alcohol and drugs affect the brain and body, which can affect the safety of my driving. Being under the influence of alcohol or drugs can affect my decision-making, behaviors, personal safety, and the safety of others. Driving while impaired (DWI) can occur if a person is driving, operating, or in physical control of a car, motorcycle, boat, snowmobile, ATV, motorbike, off-road vehicle, or any other motor vehicle ([place] Statute 169A.20). I understand the risk if I choose to drive or operate any vehicle or machinery.    I understand that if I do not follow any of the conditions above, my prescriptions or treatment may be stopped or changed.          Opioids  What You Need to Know    What are opioids?   Opioids are pain medicines that must be prescribed by a doctor. They are also known as narcotics.     Examples are:   1. morphine (MS Contin, Lucia)  2. oxycodone (Oxycontin)  3. oxycodone and acetaminophen (Percocet)  4. hydrocodone and acetaminophen (Vicodin, Norco)   5. fentanyl patch (Duragesic)   6. hydromorphone (Dilaudid)   7. methadone  8. codeine (Tylenol #3)     What do opioids do well?   Opioids are best for severe short-term pain such as after a surgery or injury. They may work well for cancer pain. They may  help some people with long-lasting (chronic) pain.     What do opioids NOT do well?   Opioids never get rid of pain entirely, and they don t work well for most patients with chronic pain. Opioids don t reduce swelling, one of the causes of pain.                                    Other ways to manage chronic pain and improve function include:       Treat the health problem that may be causing pain    Anti-inflammation medicines, which reduce swelling and tenderness, such as ibuprofen (Advil, Motrin) or naproxen (Aleve)    Acetaminophen (Tylenol)    Antidepressants and anti-seizure medicines, especially for nerve pain    Topical treatments such as patches or creams    Injections or nerve blocks    Chiropractic or osteopathic treatment    Acupuncture, massage, deep breathing, meditation, visual imagery, aromatherapy    Use heat or ice at the pain site    Physical therapy     Exercise    Stop smoking    Take part in therapy       Risks and side effects     Talk to your doctor before you start or decide to keep taking opioids. Possible side effects include:      Lowering your breathing rate enough to cause death    Overdose, including death, especially if taking higher than prescribed doses    Worse depression symptoms; less pleasure in things you usually enjoy    Feeling tired or sluggish    Slower thoughts or cloudy thinking    Being more sensitive to pain over time; pain is harder to control    Trouble sleeping or restless sleep    Changes in hormone levels (for example, less testosterone)    Changes in sex drive or ability to have sex    Constipation    Unsafe driving    Itching and sweating    Dizziness    Nausea, throwing up and dry mouth    What else should I know about opioids?    Opioids may lead to dependence, tolerance, or addiction.      Dependence means that if you stop or reduce the medicine too quickly, you will have withdrawal symptoms. These include loose poop (diarrhea), jitters, flu-like symptoms,  nervousness and tremors. Dependence is not the same as addiction.                       Tolerance means needing higher doses over time to get the same effect. This may increase the chance of serious side effects.      Addiction is when people improperly use a substance that harms their body, their mind or their relations with others. Use of opiates can cause a relapse of addiction if you have a history of drug or alcohol abuse.      People who have used opioids for a long time may have a lower quality of life, worse depression, higher levels of pain and more visits to doctors.    You can overdose on opioids. Take these steps to lower your risk of overdose:    1. Recognize the signs:  Signs of overdose include decrease or loss of consciousness (blackout), slowed breathing, trouble waking up and blue lips. If someone is worried about overdose, they should call 911.    2. Talk to your doctor about Narcan (naloxone).   If you are at risk for overdose, you may be given a prescription for Narcan. This medicine very quickly reverses the effects of opioids.   If you overdose, a friend or family member can give you Narcan while waiting for the ambulance. They need to know the signs of overdose and how to give Narcan.     3. Don't use alcohol or street drugs.   Taking them with opioids can cause death.    4. Do not take any of these medicines unless your doctor says it s OK. Taking these with opioids can cause death:    Benzodiazepines, such as lorazepam (Ativan), alprazolam (Xanax) or diazepam (Valium)    Muscle relaxers, such as cyclobenzaprine (Flexeril)    Sleeping pills like zolpidem (Ambien)     Other opioids      How to keep you and other people safe while taking opioids:    1. Never share your opioids with others.  Opioid medicines are regulated by the Drug Enforcement Agency ([employer]). Selling or sharing medications is a criminal act.    2. Be sure to store opioids in a secure place, locked up if possible. Young children  can easily swallow them and overdose.    3. When you are traveling with your medicines, keep them in the original bottles. If you use a pill box, be sure you also carry a copy of your medicine list from your clinic or pharmacy.    4. Safe disposal of opioids    Most pharmacies have places to get rid of medicine, called disposal kiosks. Medicine disposal options are also available in every [facility]. Search your county and  medication disposal  to find more options. You can find more details at:  [email or web address]     I agree that my provider, clinic care team, and pharmacy may work with any city, state or federal law enforcement agency that investigates the misuse, sale, or other diversion of my controlled medicine. I will allow my provider to discuss my care with, or share a copy of, this agreement with any other treating provider, pharmacy or emergency room where I receive care.    I have read this agreement and have asked questions about anything I did not understand.    _______________________________________________________  Patient Signature - Arielle Pham _____________________                   Date     _______________________________________________________  Provider Signature - Huong Stahl DO   _____________________                   Date     _______________________________________________________  Witness Signature (required if provider not present while patient signing)   _____________________                   Date

## 2022-05-31 NOTE — PROGRESS NOTES
A/P;      ICD-10-CM    1. Chronic prescription opiate use  Z79.891 Drug Abuse Screen Panel 13, Urine (Pain Care Package) - lab collect     oxyCODONE-acetaminophen (PERCOCET) 5-325 MG tablet     oxyCODONE-acetaminophen (PERCOCET) 5-325 MG tablet     oxyCODONE-acetaminophen (PERCOCET) 5-325 MG tablet     Drug Abuse Screen Panel 13, Urine (Pain Care Package) - lab collect   2. Essential hypertension  I10 Basic metabolic panel  (Ca, Cl, CO2, Creat, Gluc, K, Na, BUN)     lisinopril (ZESTRIL) 10 MG tablet     Basic metabolic panel  (Ca, Cl, CO2, Creat, Gluc, K, Na, BUN)   3. Chronic right shoulder pain  M25.511 Physical Therapy Referral    G89.29    4. DDD (degenerative disc disease), lumbar  M51.36 Physical Therapy Referral     oxyCODONE-acetaminophen (PERCOCET) 5-325 MG tablet     oxyCODONE-acetaminophen (PERCOCET) 5-325 MG tablet     oxyCODONE-acetaminophen (PERCOCET) 5-325 MG tablet   5. Encounter for long-term (current) use of medications  Z79.899 Drug Abuse Screen Panel 13, Urine (Pain Care Package) - lab collect     Drug Abuse Screen Panel 13, Urine (Pain Care Package) - lab collect   6. Morbid obesity (H)  E66.01 Comprehensive Weight Management   7. Neck pain  M54.2 oxyCODONE-acetaminophen (PERCOCET) 5-325 MG tablet     oxyCODONE-acetaminophen (PERCOCET) 5-325 MG tablet     oxyCODONE-acetaminophen (PERCOCET) 5-325 MG tablet   8. Screen for colon cancer  Z12.11 Fecal colorectal cancer screen FIT - Future (S+30)     Patient Instructions   For your pain:  I sent 3 months worth of prescriptions for the percocet to your pharmacy.  We should visit virtually again in 3 months.  I also placed a new physical therapy referral for your shoulder and back.  They should be giving you a call to help you schedule    For your blood pressure:  please increase the lisinopril to 20mg daily.  Let's plan a nurse visit in 2-3 weeks for blood pressure check and nonfasting blood work    For your weight:  I placed a new weight management  "referral.  Please call to schedule            Subjective   Arielle aguilar is a 52 year old who presents for the following health issues  accompanied by her daughter.    History of Present Illness       Back Pain:  She presents for follow up of back pain. Patient's back pain is a chronic problem.  Location of back pain:  Right lower back, left lower back, right middle of back, right shoulder, left shoulder and other  Description of back pain: burning, dull ache, sharp and other  Back pain spreads: right foot and right shoulder    Since patient first noticed back pain, pain is: unchanged  Does back pain interfere with her job:  Not applicable      She eats 2-3 servings of fruits and vegetables daily.She consumes 3 sweetened beverage(s) daily.She exercises with enough effort to increase her heart rate 9 or less minutes per day.  She exercises with enough effort to increase her heart rate 3 or less days per week. She is missing 1 dose(s) of medications per week.  She is not taking prescribed medications regularly due to other.    Today's PHQ-9         PHQ-9 Total Score: 8    PHQ-9 Q9 Thoughts of better off dead/self-harm past 2 weeks :   Not at all    How difficult have these problems made it for you to do your work, take care of things at home, or get along with other people: Very difficult     *  Has been having more pain in the R shoulder for the last 2 months.  Feels like a tingling in the upper arm.  Seems to be off and on, more when she is driving.  No pain in the shoudler or neck pain.  Seems to come and go.  Seems to be positional.  Feels like pins and needles in the upper arm     R knee has been much better now that she is doing injections.      Has been trying to do exercise for the \"pinched nerve\" in her back.  Has done PT in the past and found it helpful.  Would be willing to consider again    Feels pain medication remains helpful.  Denies any side effects including no difficulty with constipation.  Not " "interested in a visit with the pain clinic to discuss possible alternatives.  States she had a \"bad reaction\" to a previous injection and not interested in any more.    *  Feels HA's are much better with the Ajovy.      * cough, sore throat, headache, congestion for the last 5 days, no fever. Negative  rapid COVID test 5/28/22.  Feels this is a cold and improving.  Not interested in discussing today    *  Saw cardiology who tried restarting the Crestor.  She felt she was having a lot of side effects as ended up stopping, remains on Zetia.  They also had started the amlodipine which caused fatigue and dizziness.  Called and they recommend stopping med which has resolved those symptoms.   Remains on 10mg of lisinopril which seems fine from a side effect standpoint but blood pressures have not been controlled.              Review of Systems   Constitutional, HEENT, cardiovascular, pulmonary, gi and gu systems are negative, except as otherwise noted.      Objective    BP (!) 148/96   Pulse 82   Temp 97.5  F (36.4  C) (Tympanic)   SpO2 97%   Breastfeeding No   There is no height or weight on file to calculate BMI.  Physical Exam   PE:  VS as above   Gen:  WN/WD/WH female in NAD   Heart:  RRR without murmur, nl S1, S2, no rubs or gallops   Lungs CTA hugo without rales/ronchi/wheezes   Ext:  No pedal edema      Epic reviewed            "

## 2022-05-31 NOTE — PATIENT INSTRUCTIONS
For your pain:  I sent 3 months worth of prescriptions for the percocet to your pharmacy.  We should visit virtually again in 3 months.  I also placed a new physical therapy referral for your shoulder and back.  They should be giving you a call to help you schedule    For your blood pressure:  please increase the lisinopril to 20mg daily.  Let's plan a nurse visit in 2-3 weeks for blood pressure check and nonfasting blood work    For your weight:  I placed a new weight management referral.  Please call to schedule

## 2022-06-12 DIAGNOSIS — R07.89 ATYPICAL CHEST PAIN: ICD-10-CM

## 2022-06-13 RX ORDER — FAMOTIDINE 20 MG/1
TABLET, FILM COATED ORAL
Qty: 180 TABLET | Refills: 0 | Status: SHIPPED | OUTPATIENT
Start: 2022-06-13 | End: 2022-07-05

## 2022-06-13 NOTE — TELEPHONE ENCOUNTER
Prescription approved per [facility] Refill Protocol.  Dayami Cadena RN

## 2022-06-17 ENCOUNTER — LAB (OUTPATIENT)
Dept: LAB | Facility: CLINIC | Age: 52
End: 2022-06-17

## 2022-06-17 ENCOUNTER — ALLIED HEALTH/NURSE VISIT (OUTPATIENT)
Dept: FAMILY MEDICINE | Facility: CLINIC | Age: 52
End: 2022-06-17
Payer: COMMERCIAL

## 2022-06-17 VITALS — DIASTOLIC BLOOD PRESSURE: 87 MMHG | SYSTOLIC BLOOD PRESSURE: 142 MMHG | HEART RATE: 86 BPM

## 2022-06-17 DIAGNOSIS — I10 ESSENTIAL HYPERTENSION: ICD-10-CM

## 2022-06-17 DIAGNOSIS — Z12.11 SCREEN FOR COLON CANCER: ICD-10-CM

## 2022-06-17 DIAGNOSIS — I10 ESSENTIAL HYPERTENSION: Primary | ICD-10-CM

## 2022-06-17 LAB
ANION GAP SERPL CALCULATED.3IONS-SCNC: 3 MMOL/L (ref 3–14)
BUN SERPL-MCNC: 23 MG/DL (ref 7–30)
CALCIUM SERPL-MCNC: 9.6 MG/DL (ref 8.5–10.1)
CHLORIDE BLD-SCNC: 109 MMOL/L (ref 94–109)
CO2 SERPL-SCNC: 27 MMOL/L (ref 20–32)
CREAT SERPL-MCNC: 0.8 MG/DL (ref 0.52–1.04)
GFR SERPL CREATININE-BSD FRML MDRD: 88 ML/MIN/1.73M2
GLUCOSE BLD-MCNC: 88 MG/DL (ref 70–99)
HEMOCCULT STL QL IA: NEGATIVE
POTASSIUM BLD-SCNC: 4.6 MMOL/L (ref 3.4–5.3)
SODIUM SERPL-SCNC: 139 MMOL/L (ref 133–144)

## 2022-06-17 PROCEDURE — 36415 COLL VENOUS BLD VENIPUNCTURE: CPT

## 2022-06-17 PROCEDURE — 99207 PR NO CHARGE NURSE ONLY: CPT

## 2022-06-17 PROCEDURE — 80048 BASIC METABOLIC PNL TOTAL CA: CPT

## 2022-06-17 PROCEDURE — 82274 ASSAY TEST FOR BLOOD FECAL: CPT

## 2022-06-17 NOTE — PROGRESS NOTES
Patient here for BP check and labs after Lisinopril dose increased 3 weeks ago.  BMP lab order placed per Dr Stahl visit note 5/31/22 advised recheck at 3 weeks.  Dayami Cadena RN

## 2022-06-17 NOTE — PROGRESS NOTES
BP Readings from Last 3 Encounters:   05/31/22 (!) 148/96   05/04/22 139/89   02/17/22 132/78     Patient is here today to follow up on blood pressure after increasing her Lisinopril to 20 mg daily. Consulted with RN regarding high reading  Maryuri Gay CMA

## 2022-06-20 ENCOUNTER — TELEPHONE (OUTPATIENT)
Dept: FAMILY MEDICINE | Facility: CLINIC | Age: 52
End: 2022-06-20

## 2022-06-20 DIAGNOSIS — K21.9 GASTROESOPHAGEAL REFLUX DISEASE WITHOUT ESOPHAGITIS: Primary | ICD-10-CM

## 2022-06-20 DIAGNOSIS — G43.709 CHRONIC MIGRAINE WITHOUT AURA: ICD-10-CM

## 2022-06-20 NOTE — LETTER
July 15, 2022      To whom it may concern,     Arielle Pham is currently under my medical care.  She has suffered from GERD symptoms causing chest pain which has prompted more then 1 visit to the emergency department.  She failed treatment with omeprazole 20mg with break through symptoms.  Omeprazole 40mg daily has completely controlled her reflux and chest pain symptoms.    Please reconsider your coverage of omeprazole 40mg once daily.    Sincerely,       Dr. Huong Stahl, DO

## 2022-06-20 NOTE — TELEPHONE ENCOUNTER
Rx Authorization:  Requested Medication/ Dose: Ajovy 225 MG/1.5ML Subcutaneous Soolution Auto-injector   Date last refill ordered: 6/24/21  Quantity ordered: 1.5ML  # refills: 11  Date of last clinic visit with ordering provider: 5/12/22  Date of next clinic visit with ordering provider: F/U 1 year  All pertinent protocol data (lab date/result):   Include pertinent information from patients message:

## 2022-06-20 NOTE — TELEPHONE ENCOUNTER
Prior Authorization Retail Medication Request    Medication/Dose: Omeprazole 20mg  ICD code (if different than what is on RX):  Gastroesophageal reflux disease without esophagitis [K21.9]  Previously Tried and Failed:    Rationale:      Covermymeds:  Key: [id number]  Last Name: Vero  : 1970    Pharmacy Information (if different than what is on RX)  Name:  [facility]  Phone:  [phone]

## 2022-06-22 ENCOUNTER — MYC MEDICAL ADVICE (OUTPATIENT)
Dept: FAMILY MEDICINE | Facility: CLINIC | Age: 52
End: 2022-06-22

## 2022-06-22 DIAGNOSIS — K21.9 GASTROESOPHAGEAL REFLUX DISEASE WITHOUT ESOPHAGITIS: Primary | ICD-10-CM

## 2022-06-22 NOTE — TELEPHONE ENCOUNTER
Central Prior Authorization Team  Phone: [phone]    PA Initiation    Medication: Omeprazole 20mg-INITIATED  Insurance Company: [employer] [place] - Phone [phone] Fax [phone]  Pharmacy Filling the Rx: [facility] [address] [place] [address]  Filling Pharmacy Phone: [phone]  Filling Pharmacy Fax:    Start Date: 6/22/2022

## 2022-06-23 ENCOUNTER — TELEPHONE (OUTPATIENT)
Dept: NEUROLOGY | Facility: CLINIC | Age: 52
End: 2022-06-23

## 2022-06-23 DIAGNOSIS — G43.009 MIGRAINE WITHOUT AURA AND WITHOUT STATUS MIGRAINOSUS, NOT INTRACTABLE: Primary | ICD-10-CM

## 2022-06-23 NOTE — TELEPHONE ENCOUNTER
Central Prior Authorization Team   Phone: [phone]      PA Initiation    Medication: nurtec  Insurance Company: [employer] - Phone [phone] Fax [phone]  Pharmacy Filling the Rx: [address]  Filling Pharmacy Phone: [phone]  Filling Pharmacy Fax:    Start Date: 6/23/2022

## 2022-06-23 NOTE — TELEPHONE ENCOUNTER
"Prior Authorization Retail Medication Request     Medication/Dose: Rimegepant Sulfate 75 MG TBDP     ICD code (if different than what is on RX):    G43.709  Previously Tried and Failed:  Zomig as needed and helps but headaches   Sumatriptan   Stopped Excedrin and tylenol many years ago  Ibuprofen a couple times per day and no side effects  Percocet -for back pain and foot pain and prescribed by Dr Stahl but not for headaches  Currently on lisinopril for hypertension  Amitriptyline -just stopped working and was on it for a while  Nortriptyline  Protriptyline  Topiramate  Zonegran  Gabapentin-caused anxiety/panic attack  Verapamil  Propranol  Emgality -took for a couple of month and caused dizziness. Did not try any other CGRPs  escitalopram -does not recall side effects but it wasn't helpful with headaches  Losartan in the past-has been a while  Topiramate a long time ago and \"very bad dreams\". History of renal stones so contraindicated     Botox -fear of potential side effects-  Rationale:  migraine - has used nurtec for a year for acute migraine with good relief.      Insurance Name:  [employer]  Insurance ID:  [id number]         Pharmacy Information (if different than what is on RX)  Name:    Phone:       "

## 2022-06-23 NOTE — TELEPHONE ENCOUNTER
Prior Authorization Approval    Authorization Effective Date: 3/25/2022  Authorization Expiration Date: 6/23/2023  Medication: Nurtec-APPROVED  Approved Dose/Quantity:   Reference #:     Insurance Company: [employer] - Phone [phone] Fax [phone]  Expected CoPay:       CoPay Card Available:      Foundation Assistance Needed:    Which Pharmacy is filling the prescription (Not needed for infusion/clinic administered): [facility] [address] [place] [address]  Pharmacy Notified: Yes  Patient Notified: No

## 2022-06-24 RX ORDER — FAMOTIDINE 20 MG/1
20 TABLET, FILM COATED ORAL 2 TIMES DAILY
Qty: 60 TABLET | Refills: 1 | Status: SHIPPED | OUTPATIENT
Start: 2022-06-24 | End: 2022-07-05

## 2022-06-25 DIAGNOSIS — M19.072 DJD (DEGENERATIVE JOINT DISEASE), ANKLE AND FOOT, LEFT: ICD-10-CM

## 2022-06-25 DIAGNOSIS — M51.369 DDD (DEGENERATIVE DISC DISEASE), LUMBAR: ICD-10-CM

## 2022-06-25 DIAGNOSIS — M53.3 SI (SACROILIAC) JOINT DYSFUNCTION: ICD-10-CM

## 2022-06-26 ENCOUNTER — HEALTH MAINTENANCE LETTER (OUTPATIENT)
Age: 52
End: 2022-06-26

## 2022-06-27 RX ORDER — IBUPROFEN 800 MG/1
TABLET, FILM COATED ORAL
Qty: 90 TABLET | Refills: 0 | Status: SHIPPED | OUTPATIENT
Start: 2022-06-27 | End: 2022-07-26

## 2022-06-27 NOTE — TELEPHONE ENCOUNTER
Routing refill request to provider for review/approval because:  Last recorded blood pressure does not meet RN protocol parameters    Shad Starks RN

## 2022-06-28 NOTE — TELEPHONE ENCOUNTER
PRIOR AUTHORIZATION DENIED    Medication: Omeprazole 20mg-INITIATED    Denial Date: 6/22/2022    Denial Rational: pt is receiving 2 different PPI's, she is filling them regularly. This PA was not needed, but because she has filled these, she will need a coverage exception giovannye 120 is the quantity limit,  Will need to submit all documentation of why patient needs 2 PPI's at the same time if a coverage exception is needed. Pt just filled on 6/17 for a 60 day supply.      Appeal Information:  If the provider would like to appeal, please provide a letter of medical necessity and route back to the team. Otherwise you can close the encounter. Thank you!

## 2022-06-29 NOTE — TELEPHONE ENCOUNTER
She is not on 2 different PPI's, she is on a PPI and an H2 blocker (omeprazole and famotidine).  She is on both because she could not get coverage for omeprazole 40mg daily.    Her GERD symptoms resulting in chest pain are not controlled on omeprazole 20mg daily.    What would be best, a letter of appeal for omeprazole 40mg daily or for the omeprazole plus famotidine?    Huong Stahl, DO

## 2022-06-30 ENCOUNTER — TELEPHONE (OUTPATIENT)
Dept: FAMILY MEDICINE | Facility: CLINIC | Age: 52
End: 2022-06-30

## 2022-06-30 ENCOUNTER — HOSPITAL ENCOUNTER (EMERGENCY)
Facility: HOSPITAL | Age: 52
Discharge: HOME | End: 2022-06-30
Attending: EMERGENCY MEDICINE | Admitting: EMERGENCY MEDICINE
Payer: COMMERCIAL

## 2022-06-30 VITALS
HEIGHT: 69 IN | OXYGEN SATURATION: 96 % | TEMPERATURE: 98.3 F | DIASTOLIC BLOOD PRESSURE: 74 MMHG | BODY MASS INDEX: 41.47 KG/M2 | HEART RATE: 112 BPM | SYSTOLIC BLOOD PRESSURE: 116 MMHG | RESPIRATION RATE: 27 BRPM | WEIGHT: 280 LBS

## 2022-06-30 DIAGNOSIS — I10 ESSENTIAL HYPERTENSION: Primary | ICD-10-CM

## 2022-06-30 DIAGNOSIS — K21.9 GASTROESOPHAGEAL REFLUX DISEASE WITHOUT ESOPHAGITIS: ICD-10-CM

## 2022-06-30 DIAGNOSIS — I48.0 PAROXYSMAL ATRIAL FIBRILLATION (H): ICD-10-CM

## 2022-06-30 LAB
ANION GAP SERPL CALCULATED.3IONS-SCNC: 10 MMOL/L (ref 5–18)
ATRIAL RATE - MUSE: 138 BPM
BASOPHILS # BLD AUTO: 0.1 10E3/UL (ref 0–0.2)
BASOPHILS NFR BLD AUTO: 1 %
BUN SERPL-MCNC: 31 MG/DL (ref 8–22)
CALCIUM SERPL-MCNC: 10.1 MG/DL (ref 8.5–10.5)
CHLORIDE BLD-SCNC: 110 MMOL/L (ref 98–107)
CO2 SERPL-SCNC: 23 MMOL/L (ref 22–31)
CREAT SERPL-MCNC: 1.02 MG/DL (ref 0.6–1.1)
DIASTOLIC BLOOD PRESSURE - MUSE: NORMAL MMHG
EOSINOPHIL # BLD AUTO: 0.3 10E3/UL (ref 0–0.7)
EOSINOPHIL NFR BLD AUTO: 3 %
ERYTHROCYTE [DISTWIDTH] IN BLOOD BY AUTOMATED COUNT: 14.1 % (ref 10–15)
GFR SERPL CREATININE-BSD FRML MDRD: 66 ML/MIN/1.73M2
GLUCOSE BLD-MCNC: 103 MG/DL (ref 70–125)
HCT VFR BLD AUTO: 44.1 % (ref 35–47)
HGB BLD-MCNC: 14.4 G/DL (ref 11.7–15.7)
HOLD SPECIMEN: NORMAL
HOLD SPECIMEN: NORMAL
IMM GRANULOCYTES # BLD: 0 10E3/UL
IMM GRANULOCYTES NFR BLD: 0 %
INTERPRETATION ECG - MUSE: NORMAL
LYMPHOCYTES # BLD AUTO: 2.9 10E3/UL (ref 0.8–5.3)
LYMPHOCYTES NFR BLD AUTO: 37 %
MAGNESIUM SERPL-MCNC: 2.2 MG/DL (ref 1.8–2.6)
MCH RBC QN AUTO: 31.3 PG (ref 26.5–33)
MCHC RBC AUTO-ENTMCNC: 32.7 G/DL (ref 31.5–36.5)
MCV RBC AUTO: 96 FL (ref 78–100)
MONOCYTES # BLD AUTO: 0.8 10E3/UL (ref 0–1.3)
MONOCYTES NFR BLD AUTO: 10 %
NEUTROPHILS # BLD AUTO: 3.9 10E3/UL (ref 1.6–8.3)
NEUTROPHILS NFR BLD AUTO: 49 %
NRBC # BLD AUTO: 0 10E3/UL
NRBC BLD AUTO-RTO: 0 /100
P AXIS - MUSE: NORMAL DEGREES
PLATELET # BLD AUTO: 284 10E3/UL (ref 150–450)
POTASSIUM BLD-SCNC: 4.2 MMOL/L (ref 3.5–5)
PR INTERVAL - MUSE: NORMAL MS
QRS DURATION - MUSE: 78 MS
QT - MUSE: 288 MS
QTC - MUSE: 462 MS
R AXIS - MUSE: 49 DEGREES
RBC # BLD AUTO: 4.6 10E6/UL (ref 3.8–5.2)
SODIUM SERPL-SCNC: 143 MMOL/L (ref 136–145)
SYSTOLIC BLOOD PRESSURE - MUSE: NORMAL MMHG
T AXIS - MUSE: -10 DEGREES
TROPONIN I SERPL-MCNC: <0.01 NG/ML (ref 0–0.29)
TSH SERPL DL<=0.005 MIU/L-ACNC: 1.75 UIU/ML (ref 0.3–5)
VENTRICULAR RATE- MUSE: 155 BPM
WBC # BLD AUTO: 7.9 10E3/UL (ref 4–11)

## 2022-06-30 PROCEDURE — 80048 BASIC METABOLIC PNL TOTAL CA: CPT | Performed by: EMERGENCY MEDICINE

## 2022-06-30 PROCEDURE — 85025 COMPLETE CBC W/AUTO DIFF WBC: CPT | Performed by: EMERGENCY MEDICINE

## 2022-06-30 PROCEDURE — 83735 ASSAY OF MAGNESIUM: CPT | Performed by: EMERGENCY MEDICINE

## 2022-06-30 PROCEDURE — 93005 ELECTROCARDIOGRAM TRACING: CPT | Performed by: EMERGENCY MEDICINE

## 2022-06-30 PROCEDURE — 250N000009 HC RX 250: Performed by: EMERGENCY MEDICINE

## 2022-06-30 PROCEDURE — 84484 ASSAY OF TROPONIN QUANT: CPT | Performed by: EMERGENCY MEDICINE

## 2022-06-30 PROCEDURE — 258N000003 HC RX IP 258 OP 636: Performed by: EMERGENCY MEDICINE

## 2022-06-30 PROCEDURE — 96361 HYDRATE IV INFUSION ADD-ON: CPT

## 2022-06-30 PROCEDURE — 99284 EMERGENCY DEPT VISIT MOD MDM: CPT | Mod: 25

## 2022-06-30 PROCEDURE — 96374 THER/PROPH/DIAG INJ IV PUSH: CPT

## 2022-06-30 PROCEDURE — 250N000013 HC RX MED GY IP 250 OP 250 PS 637: Performed by: EMERGENCY MEDICINE

## 2022-06-30 PROCEDURE — 36415 COLL VENOUS BLD VENIPUNCTURE: CPT | Performed by: EMERGENCY MEDICINE

## 2022-06-30 PROCEDURE — 84443 ASSAY THYROID STIM HORMONE: CPT | Performed by: EMERGENCY MEDICINE

## 2022-06-30 RX ORDER — SODIUM CHLORIDE 9 MG/ML
INJECTION, SOLUTION INTRAVENOUS CONTINUOUS
Status: DISCONTINUED | OUTPATIENT
Start: 2022-06-30 | End: 2022-06-30 | Stop reason: HOSPADM

## 2022-06-30 RX ORDER — DILTIAZEM HYDROCHLORIDE 5 MG/ML
20 INJECTION INTRAVENOUS ONCE
Status: COMPLETED | OUTPATIENT
Start: 2022-06-30 | End: 2022-06-30

## 2022-06-30 RX ORDER — DILTIAZEM HYDROCHLORIDE 120 MG/1
120 CAPSULE, EXTENDED RELEASE ORAL DAILY
Qty: 30 CAPSULE | Refills: 0 | Status: SHIPPED | OUTPATIENT
Start: 2022-06-30 | End: 2022-07-05

## 2022-06-30 RX ADMIN — RIVAROXABAN 20 MG: 10 TABLET, FILM COATED ORAL at 04:02

## 2022-06-30 RX ADMIN — SODIUM CHLORIDE 1000 ML: 9 INJECTION, SOLUTION INTRAVENOUS at 03:28

## 2022-06-30 RX ADMIN — DILTIAZEM HYDROCHLORIDE 20 MG: 5 INJECTION INTRAVENOUS at 03:56

## 2022-06-30 ASSESSMENT — ENCOUNTER SYMPTOMS
PALPITATIONS: 1
VOMITING: 0
SHORTNESS OF BREATH: 1
NAUSEA: 0

## 2022-06-30 NOTE — TELEPHONE ENCOUNTER
Patient's call transferred to author    Patient stating she had been taking the Pepcid and was getting headaches while taking the medication and was not noticing improvement with the medication     Patient states Omeprazole was ordered along with the Pepcid but the pharmacy will not fill the Omeprazole as she has the Pepcid ordered     Patient does not want to take the Pepcid as she had no relief and was getting headaches on the medication  Patient states she has gotten good relief from the Omeprazole in the past     Will forward to Dr. Stahl to review   Please see mychart encounter from 6/22/2022    Shad Starks RN

## 2022-06-30 NOTE — TELEPHONE ENCOUNTER
Patient is calling asking for a order for a blood pressure cuff also.     Namrata Roblero PSC on 6/30/2022 at 2:45 PM

## 2022-06-30 NOTE — LETTER
June 30, 2022    To whom it may concern,     Arielle Pham is currently under my medical care.  She has been suffering from GERD resulting in chest pain and palpitations.  Symptoms have been controlled with omeprazole and return when the medication is stopped.  Please all her to continue the omeprazole 20mg daily for control of these symptoms.    Sincerely,       Dr. Huong Stahl, DO

## 2022-06-30 NOTE — ED TRIAGE NOTES
"Pt arrives ambulatory w/ cane to triage observed to be short of breath, and \"feeling heart racing\" endorsing past 6 months on and off chest pain, short of breath. HR irregular in triage and 120s-150s.           "

## 2022-06-30 NOTE — ED PROVIDER NOTES
EMERGENCY DEPARTMENT ENCOUNTER      NAME: Arielel Pham  AGE: 52 year old female  YOB: 1970  MRN: [id number]  EVALUATION DATE & TIME: 6/30/2022  3:04 AM    PCP: Huong Stahl    ED PROVIDER: Mark Gonzalez M.D.      Chief Complaint   Patient presents with     Palpitations     Shortness of Breath         FINAL IMPRESSION:  1. Paroxysmal atrial fibrillation (H)          ED COURSE & MEDICAL DECISION MAKING:    Pertinent Labs & Imaging studies reviewed. (See chart for details)  52 year old female presents to the Emergency Department for evaluation of palpitations.  Patient been having intermittent palpitations for the last month.  Seems likely this is atrial fibrillation as she is in A. fib now.  Has been in and out of it.  Given this would not cardiovert her.  She does have a FIA1FR4-SVSb score of 2.  This puts her at risk of clot and therefore we will start her on Xarelto.  Given here.  Did discuss her risk of Xarelto including head bleed GI bleed and other signs of bleeding.  Given IV diltiazem with a heart rate coming down into the 90s.  We will start her on oral diltiazem as well.  Electrolytes are normal.  TSH is normal.  Troponins normal.  We will have her follow-up with the rapid access cardiology clinic in the next couple of days for recheck.  She will return for any worsening symptoms.    3:11 AM I met with the patient to gather history and to perform my initial exam. I discussed the plan for care while in the Emergency Department. PPE: N95 mask, eye shield, and gloves.   4:37 AM Rechecked and updated patient on her results. I discussed the plan for discharge with the patient, and patient is agreeable. We discussed supportive cares at home and reasons for return to the ER including new or worsening symptoms - all questions and concerns addressed. Patient to be discharged by RN.          At the conclusion of the encounter I discussed the results of all of the tests and the disposition. The  questions were answered. The patient or family acknowledged understanding and was agreeable with the care plan.           MEDICATIONS GIVEN IN THE EMERGENCY:  Medications   0.9% sodium chloride BOLUS (1,000 mLs Intravenous New Bag 6/30/22 7026)     Followed by   sodium chloride 0.9% infusion (has no administration in time range)   diltiazem (CARDIZEM) injection 20 mg (20 mg Intravenous Given 6/30/22 1261)   rivaroxaban ANTICOAGULANT (XARELTO) tablet 20 mg (20 mg Oral Given 6/30/22 0402)       NEW PRESCRIPTIONS STARTED AT TODAY'S ER VISIT  New Prescriptions    DILTIAZEM ER (DILT-XR) 120 MG 24 HR CAPSULE    Take 1 capsule (120 mg) by mouth daily    RIVAROXABAN ANTICOAGULANT (XARELTO ANTICOAGULANT) 20 MG TABS TABLET    Take 1 tablet (20 mg) by mouth daily (with dinner)          =================================================================    HPI    Patient information was obtained from: Patient     Use of : N/A       Arielle Pham is a 52 year old female with a pertinent history of HTN, CAD, HLD,  who presents to this ED by walk in for evaluation of palpitations and shortness of breath. Patient presents with ongoing palpitations for the past couple of months, worse after eating. She endorses associated intermittent right arm tingling. Tonight, patient reports waking up at 0200 with worsening racing heart with associated mild shortness of breath. Denies chest pain, nausea, or vomiting. She notes recently having had a heart monitor for a week that resulted normal. Denies new activity. Patient has history of hypertension on lisinopril, recently increased to 20 mg, and high cholesterol. Patient denies any additional complaints at this time.     REVIEW OF SYSTEMS   Review of Systems   Respiratory: Positive for shortness of breath.    Cardiovascular: Positive for palpitations. Negative for chest pain.   Gastrointestinal: Negative for nausea and vomiting.   All other systems reviewed and are negative.       PAST  MEDICAL HISTORY:  Past Medical History:   Diagnosis Date     Atypical chest pain 9/30/2015     Atypical chest pain 9/30/2015     Hyperlipidemia      Hyperlipidemia      Hypertension      Hypertension      Kidney stones      Migraines      Migraines      TMJ (dislocation of temporomandibular joint)        PAST SURGICAL HISTORY:  Past Surgical History:   Procedure Laterality Date     CHOLECYSTECTOMY       CHOLECYSTECTOMY             CURRENT MEDICATIONS:    Current Facility-Administered Medications   Medication     sodium chloride 0.9% infusion     Current Outpatient Medications   Medication     diltiazem ER (DILT-XR) 120 MG 24 hr capsule     rivaroxaban ANTICOAGULANT (XARELTO ANTICOAGULANT) 20 MG TABS tablet     aspirin 81 MG EC tablet     ezetimibe (ZETIA) 10 MG tablet     famotidine (PEPCID) 20 MG tablet     famotidine (PEPCID) 20 MG tablet     Fremanezumab-vfrm 225 MG/1.5ML SOAJ     GARLIC PO     ibuprofen (ADVIL/MOTRIN) 800 MG tablet     lisinopril (ZESTRIL) 10 MG tablet     loratadine (CLARITIN) 10 MG tablet     omeprazole (PRILOSEC) 20 MG DR capsule     order for DME     oxyCODONE-acetaminophen (PERCOCET) 5-325 MG tablet     oxyCODONE-acetaminophen (PERCOCET) 5-325 MG tablet     [START ON 7/30/2022] oxyCODONE-acetaminophen (PERCOCET) 5-325 MG tablet     Rimegepant Sulfate 75 MG TBDP     ZOLMitriptan (ZOMIG) 5 MG tablet         ALLERGIES:  Allergies   Allergen Reactions     Hmg-Coa-R Inhibitors Other (See Comments)     Severe joint and muscle ache from Atorvastastin     Codeine Dizziness and Other (See Comments)     Rosuvastatin Muscle Pain (Myalgia)     Gabapentin Palpitations       FAMILY HISTORY:  Family History   Problem Relation Age of Onset     Hypertension Mother      Alcoholism Father      Colorectal Cancer No family hx of      Coronary Artery Disease Mother      Coronary Artery Disease Father        SOCIAL HISTORY:   Social History     Socioeconomic History     Marital status: Single   Tobacco Use      "Smoking status: Never Smoker     Smokeless tobacco: Never Used   Substance and Sexual Activity     Alcohol use: No     Drug use: No     Sexual activity: Yes     Partners: Male   Other Topics Concern     Parent/sibling w/ CABG, MI or angioplasty before 65F 55M? No       VITALS:  /74   Pulse 116   Temp 98.3  F (36.8  C) (Oral)   Resp 23   Ht 1.753 m (5' 9\")   Wt 127 kg (280 lb)   SpO2 96%   BMI 41.35 kg/m      PHYSICAL EXAM    Physical Exam  Constitutional:       General: She is not in acute distress.     Appearance: She is not diaphoretic.   HENT:      Head: Atraumatic.      Mouth/Throat:      Pharynx: No oropharyngeal exudate.   Eyes:      General: No scleral icterus.     Pupils: Pupils are equal, round, and reactive to light.   Cardiovascular:      Rate and Rhythm: Tachycardia present. Rhythm irregular.      Heart sounds: Normal heart sounds.   Pulmonary:      Effort: No respiratory distress.      Breath sounds: Normal breath sounds.   Abdominal:      Palpations: Abdomen is soft.      Tenderness: There is no abdominal tenderness. There is no guarding or rebound.   Musculoskeletal:         General: No tenderness.   Skin:     General: Skin is warm.      Findings: No rash.   Neurological:      General: No focal deficit present.      Mental Status: She is alert.      Comments: 5 out of 5 strength in bilateral upper and lower extremities.  Sensation intact in all 4 extremes.  Cranial nerves intact.  No pronator drift             LAB:  All pertinent labs reviewed and interpreted.  Labs Ordered and Resulted from Time of ED Arrival to Time of ED Departure   BASIC METABOLIC PANEL - Abnormal       Result Value    Sodium 143      Potassium 4.2      Chloride 110 (*)     Carbon Dioxide (CO2) 23      Anion Gap 10      Urea Nitrogen 31 (*)     Creatinine 1.02      Calcium 10.1      Glucose 103      GFR Estimate 66     MAGNESIUM - Normal    Magnesium 2.2     TSH WITH FREE T4 REFLEX - Normal    TSH 1.75     TROPONIN I " - Normal    Troponin I <0.01     CBC WITH PLATELETS AND DIFFERENTIAL    WBC Count 7.9      RBC Count 4.60      Hemoglobin 14.4      Hematocrit 44.1      MCV 96      MCH 31.3      MCHC 32.7      RDW 14.1      Platelet Count 284      % Neutrophils 49      % Lymphocytes 37      % Monocytes 10      % Eosinophils 3      % Basophils 1      % Immature Granulocytes 0      NRBCs per 100 WBC 0      Absolute Neutrophils 3.9      Absolute Lymphocytes 2.9      Absolute Monocytes 0.8      Absolute Eosinophils 0.3      Absolute Basophils 0.1      Absolute Immature Granulocytes 0.0      Absolute NRBCs 0.0           EKG:    Performed at: 306  Impression: Atrial fibrillation with RVR.  No previous available.  Atrial fibrillation with ventricular to 155.  QRS 78.  QTc 462    I have independently reviewed and interpreted the EKG(s) documented above.          I, Fabiano Gray, am serving as a scribe to document services personally performed by Dr. Mark Gonzalez, based on my observation and the provider's statements to me. I, Mark Gonzalez MD attest that Fabiano Gray is acting in a scribe capacity, has observed my performance of the services and has documented them in accordance with my direction.    Mark Gonzalez M.D.  Emergency Medicine  [facility] EMERGENCY DEPARTMENT  [facility] [facility] [id number] [phone]  Dept: [phone]     Mark Gonzalez MD  06/30/22 0567

## 2022-07-01 ENCOUNTER — PATIENT OUTREACH (OUTPATIENT)
Dept: CARE COORDINATION | Facility: CLINIC | Age: 52
End: 2022-07-01

## 2022-07-01 DIAGNOSIS — Z71.89 OTHER SPECIFIED COUNSELING: ICD-10-CM

## 2022-07-01 NOTE — PROGRESS NOTES
Clinic Care Coordination Contact  [facility]/Voicemail       Clinical Data: Care Coordinator Outreach  Outreach attempted x 1.  Left message on patient's voicemail with call back information and requested return call.  Plan: Care Coordinator will try to reach patient again in 1-2 business days.        PEPE Santos  [phone]  [facility]

## 2022-07-01 NOTE — TELEPHONE ENCOUNTER
Call placed to patient   No answer; generic voicemail left requesting call back to Clinic RN at [phone]    Shad Starks RN

## 2022-07-01 NOTE — TELEPHONE ENCOUNTER
Blood pressure cuff order signed.    I do not know what I can do about the omeprazole.  My intent was for pt to have both pepcid and omeprazole.  I got a message from the PA team stating that she was on 2 PPI's although that is not the case.  I asked for direction on appealing but have not yet heard back      Her insurance will not cover omeprazole 40mg daily.  It seems like there is a plan limit where she can only get 120 tablets per year of a PPI.    I will send a new prescription for omeprazole but I worry it will not get covered.    I had asked pt previously if other PPI's might be covered better such as prevacid, protonix or nexium but never heard back    Please call pt and see if she has checked with insurance about coverage for alternatives to omeprazole as listed above.      If nothing is covered better, we'll need to write a letter of appeal.  I pended one in this encounter     Huong Stahl, DO

## 2022-07-01 NOTE — TELEPHONE ENCOUNTER
Called Pt and let her know what PCP wrote and read word for word. Pt was given the names and spelling of alternatives for Omeprazole and will call her insurance to see what is covered.     Chapis Gan RN

## 2022-07-02 NOTE — PROGRESS NOTES
"Clinic Care Coordination Contact  [facility]: Post-Discharge Note  SITUATION                                                      Admission:    Admission Date: 06/30/22   Reason for Admission: Palpitations   Shortness of Breath  Discharge:   Discharge Date: 06/30/22  Discharge Diagnosis: Paroxysmal atrial fibrillation    BACKGROUND                                                      Per hospital discharge summary and inpatient provider notes:    Arielle Pham is a 52 year old female with a pertinent history of HTN, CAD, HLD,  who presents to this ED by walk in for evaluation of palpitations and shortness of breath. Patient presents with ongoing palpitations for the past couple of months, worse after eating. She endorses associated intermittent right arm tingling. Tonight, patient reports waking up at 0200 with worsening racing heart with associated mild shortness of breath. Denies chest pain, nausea, or vomiting. She notes recently having had a heart monitor for a week that resulted normal. Denies new activity. Patient has history of hypertension on lisinopril, recently increased to 20 mg, and high cholesterol. Patient denies any additional complaints at this time  ASSESSMENT      Enrollment  Primary Care Care Coordination Status: Declined    Discharge Assessment  How are you doing now that you are home?: \" Okay I guess \"  How are your symptoms? (Red Flag symptoms escalate to triage hotline per guidelines): Unchanged  Do you feel your condition is stable enough to be safe at home until your provider visit?: Yes  Does the patient have their discharge instructions? : Yes  Does the patient have questions regarding their discharge instructions? : No  Were you started on any new medications or were there changes to any of your previous medications? : Yes  Does the patient have all of their medications?: Yes  Do you have questions regarding any of your medications? : No  Do you have all of your needed medical " supplies or equipment (DME)?  (i.e. oxygen tank, CPAP, cane, etc.): Yes  Discharge follow-up appointment scheduled within 14 calendar days? : Yes  Discharge Follow Up Appointment Date: 07/05/22  Discharge Follow Up Appointment Scheduled with?: Primary Care Provider    Post-op ([facility] Only)  If the patient had a surgery or procedure, do they have any questions for a nurse?: No             PLAN                                                      Outpatient Plan:  Read the attached information   these medications from any pharmacy with your printed prescription  diltiazem ER   rivaroxaban ANTICOAGULANT  [facility]  Referral    Future Appointments   Date Time Provider Department Center   7/5/2022  3:50 PM Daniel Brooks MD [facility]   8/4/2022  8:15 AM [facility]   11/9/2022  4:30 PM Marybel Scales MD [facility]         For any urgent concerns, please contact our 24 hour nurse triage line: [phone] ([phone])         Muna Hall MA

## 2022-07-05 ENCOUNTER — OFFICE VISIT (OUTPATIENT)
Dept: CARDIOLOGY | Facility: CLINIC | Age: 52
End: 2022-07-05
Attending: EMERGENCY MEDICINE
Payer: COMMERCIAL

## 2022-07-05 VITALS
SYSTOLIC BLOOD PRESSURE: 130 MMHG | RESPIRATION RATE: 18 BRPM | DIASTOLIC BLOOD PRESSURE: 75 MMHG | HEART RATE: 102 BPM | BODY MASS INDEX: 41.57 KG/M2 | WEIGHT: 281.5 LBS

## 2022-07-05 DIAGNOSIS — I48.0 PAROXYSMAL ATRIAL FIBRILLATION (H): ICD-10-CM

## 2022-07-05 DIAGNOSIS — E66.01 MORBID OBESITY (H): ICD-10-CM

## 2022-07-05 DIAGNOSIS — I10 ESSENTIAL HYPERTENSION: ICD-10-CM

## 2022-07-05 DIAGNOSIS — I25.10 CORONARY ARTERY DISEASE INVOLVING NATIVE CORONARY ARTERY OF NATIVE HEART WITHOUT ANGINA PECTORIS: Primary | ICD-10-CM

## 2022-07-05 PROCEDURE — 93000 ELECTROCARDIOGRAM COMPLETE: CPT | Performed by: INTERNAL MEDICINE

## 2022-07-05 PROCEDURE — 99214 OFFICE O/P EST MOD 30 MIN: CPT | Performed by: INTERNAL MEDICINE

## 2022-07-05 RX ORDER — DILTIAZEM HYDROCHLORIDE 240 MG/1
240 CAPSULE, EXTENDED RELEASE ORAL DAILY
Qty: 90 CAPSULE | Refills: 3 | Status: SHIPPED | OUTPATIENT
Start: 2022-07-05 | End: 2022-08-23

## 2022-07-05 NOTE — PROGRESS NOTES
CARDIOLOGY RAPID ACCESS CLINIC CONSULT NOTE     Assessment/Plan:   1.  Paroxysmal atrial fibrillation with rapid ventricular response.  Inadequate rate control with low-dose diltiazem.  We will increase dose to 240 mg daily.  We will check 24-hour Holter monitor in 1 week's time, as well as an echocardiogram to look for evidence of structural heart disease.  2.  Hypertension with reasonable control on today's medication regimen.  3.  Hyperlipidemia on treatment  4.  Morbid obesity with poorly restorative sleep.  May benefit from repeat sleep apnea evaluation    Follow-up in A. fib clinic in 2 to 4 weeks.     History of Present Illness:     It is my pleasure to see Arielle Pham at the [facility] RAPID ACCESS clinic for evaluation of paroxysmal atrial fibrillation.  She is accompanied by one of her daughters.    Arielle Pham is a 52 year old female with a past medical history of morbid obesity, and chronic pain syndrome.  She also has hypertension and hyperlipidemia.  Elevated calcium score was noted but stress testing showed no evidence of inducible ischemia.  For years she has had episodes of palpitations described as heart rate racing.  These frequently occur at night but can occur the daytime as well she notes that her heart rate increases with activity.  When she has sought evaluation she is never found to have an irregular heartbeat.  She had a sleep apnea evaluation about 8 years ago that was negative and she denies snoring, though she does sleep chronically in a chair because of back pain.    More recently she has noted increased frequency of the palpitation episodes occurring once or twice a day.  ECG performed confirmed the presence of atrial fibrillation 155 bpm.  They are frightening for her, and she attributes them to the increased stress in dealing with her daughter and her illnesses.  She has had no stroke and has no history of diabetes mellitus.  She has no history of vascular  disease.  This gives her [id number] score of 2.  She was started on Xarelto 20 mg daily along with diltiazem 120 mg daily.  She notes that since starting these medications the episodes have been much less bothersome for her.  She still fatigues quickly when she tries to walk with her walker, limiting her ability to do so.    Past Medical History:     Patient Active Problem List   Diagnosis     Low back pain     Neck pain     DJD (degenerative joint disease), ankle and foot, left     Migraine without aura and without status migrainosus, not intractable     Nonintractable episodic headache, unspecified headache type     DDD (degenerative disc disease), lumbar     SI (sacroiliac) joint dysfunction     Essential hypertension     Chronic pain syndrome     Chronic prescription opiate use     Current moderate episode of major depressive disorder, unspecified whether recurrent (H)     Hyperlipidemia LDL goal <130     Morbid obesity (H)     Coronary artery disease involving native coronary artery of native heart without angina pectoris     Paroxysmal atrial fibrillation (H)       Past Surgical History:     Past Surgical History:   Procedure Laterality Date     CHOLECYSTECTOMY       CHOLECYSTECTOMY         Family History:     Family History   Problem Relation Age of Onset     Hypertension Mother      Alcoholism Father      Colorectal Cancer No family hx of      Coronary Artery Disease Mother      Coronary Artery Disease Father      Family history reviewed and is not pertinent to the chief complaint or presenting problem    Social History:    reports that she has never smoked. She has never used smokeless tobacco. She reports that she does not drink alcohol and does not use drugs.   Tearfully relates increased stress because of behavior issues with her other daughter.    Exercise: Walks with a walker because of foot knee and back pain    Sleep: Sleep study negative about 8 years ago, she estimates she has gained about 40 or  50 pounds since then.    Meds:     Current Outpatient Medications   Medication Sig Dispense Refill     diltiazem ER (DILT-XR) 240 MG 24 hr ER beaded capsule Take 1 capsule (240 mg) by mouth daily 90 capsule 3     ezetimibe (ZETIA) 10 MG tablet Take 1 tablet (10 mg) by mouth daily 90 tablet 3     Fremanezumab-vfrm 225 MG/1.5ML SOAJ INJECT 225MG SUBCUTANEOUS EVERY 30 DAYS 1.5 mL 12     GARLIC PO Take 1 tablet by mouth 2 times daily       ibuprofen (ADVIL/MOTRIN) 800 MG tablet Take 1 tablet by mouth three times daily as needed 90 tablet 0     lisinopril (ZESTRIL) 10 MG tablet Take 2 tablets (20 mg) by mouth daily 90 tablet 3     loratadine (CLARITIN) 10 MG tablet Take 1 tablet (10 mg) by mouth daily as needed for allergies 90 tablet 3     order for DME Rolling walker with seat for home use 1 Device 0     [START ON 7/30/2022] oxyCODONE-acetaminophen (PERCOCET) 5-325 MG tablet Take 1 tablet by mouth every 6 hours as needed for moderate to severe pain Maximum of 4 tablets per day 120 tablet 0     Rimegepant Sulfate 75 MG TBDP Take 75 mg by mouth See Admin Instructions Take 75 mg orally per 24 hours placed on or under the tongue; MAX 75 mg/24 hours; 8 tablet 9     rivaroxaban ANTICOAGULANT (XARELTO ANTICOAGULANT) 20 MG TABS tablet Take 1 tablet (20 mg) by mouth daily (with dinner) 30 tablet 0     ZOLMitriptan (ZOMIG) 5 MG tablet TAKE 1 TABLET BY MOUTH AT ONSET OF HEADACHE 9 tablet 2       Allergies:   Hmg-coa-r inhibitors, Codeine, Rosuvastatin, and Gabapentin    Review of Systems:     Positive for chest pain, palpitations, heartburn, joint pains, depression, anxiety, easy bruising.  12 point review of systems otherwise negative     Please refer above for cardiac ROS details.       Objective:      Physical Exam  127.7 kg (281 lb 8 oz)     Body mass index is 41.57 kg/m .  /75 (BP Location: Other (Comment), Patient Position: Sitting, Cuff Size: Adult Regular)   Pulse 102   Resp 18   Wt 127.7 kg (281 lb 8 oz)   BMI  41.57 kg/m      120 pulse initially    General Appearance : Awake, Alert, No acute distress.  Tearful  HEENT: No Scleral icterus; the mucous membranes were pink and moist.  Conjunctivae not injected  Neck:  No cervical bruits, jugular venous distention, or thyromegaly   Chest: The spine was straight. Chest wall symmetric  Lungs: Respirations unlabored; the lungs are clear to auscultation.  No wheezing   Cardiovascular: Nonpalpable point of maximal impulse.  Auscultation reveals rapid, regular first and second heart sounds with no murmurs, rubs, or gallops.  Carotid, radial, and dorsalis pedal pulses are intact and symmetric.    Abdomen: Obese.  No organomegaly, masses, bruits, or tenderness. Bowels sounds are present  Extremities: Thick.  No pitting edema.  Skin: No xanthelasma. Warm, Dry.  Musculoskeletal: No tenderness.  Neurologic: Alert and oriented ×3. Speech is fluent.      EKG (personally reviewed):  30 June 2022: Atrial fibrillation at 155 bpm.  Today: Sinus rhythm 93 bpm    Cardiac Imaging Studies:  Pharmacologic nuclear stress test 2/2022:   The nuclear stress test is negative for inducible myocardial ischemia or infarction.     The left ventricular ejection fraction at stress is 70%.     The patient is at a low risk of future cardiac ischemic events.     A prior study was conducted on 9/25/2015. The previously noted anterior and apical fixed perfusion defect is no longer present.    2/23/22: CTA PE run:   1.  No pulmonary emboli. Normal thoracic aorta.   2.  Mild elevation right hemidiaphragm.   3.  Small to moderate-sized esophageal hiatal hernia.   4.  Small fat-containing left Bochdalek hernia.   5.  Dense calcified atheromatous plaque proximal and mid LAD and mild calcification proximal circumflex and right coronary arteries.    2019: Coronary CTA with calcium score:     The total Agatston calcium score is 752. A calcium score in this range   places the individual in the >90th percentile when compared  to an age and   gender matched control group and implies a very high risk of cardiac   events in the next ten years.     Mild to moderate coronary atherosclerosis with no high-grade   obstructions evident     Ramus lesion: The lesion has moderate luminal stenosis in the range of   50-69%. Non-calcified plaque has been detected.    Lab Review   Lab Results   Component Value Date     06/30/2022     05/25/2021    CO2 23 06/30/2022    CO2 24 05/25/2021    BUN 31 06/30/2022    BUN 19 05/25/2021     Lab Results   Component Value Date    WBC 7.9 06/30/2022    WBC 6.7 05/25/2021    HGB 14.4 06/30/2022    HGB 14.6 05/25/2021    HCT 44.1 06/30/2022    HCT 43.8 05/25/2021    MCV 96 06/30/2022    MCV 95 05/25/2021     06/30/2022     05/25/2021     Lab Results   Component Value Date    CHOL 222 02/17/2022    CHOL 273 05/25/2021    TRIG 155 02/17/2022    TRIG 175 05/25/2021    HDL 54 02/17/2022    HDL 55 05/25/2021     Lab Results   Component Value Date    TROPONINI <0.01 06/30/2022     No results found for: BNP  Lab Results   Component Value Date    TSH 1.75 06/30/2022    TSH 1.68 01/28/2021       Daniel Brooks MD, MD FACC      This note created using Dragon voice recognition software. Sound alike errors may have escaped editing.

## 2022-07-05 NOTE — PATIENT INSTRUCTIONS
Increase diltiazem to 240 mg daily.  We will check a 24-hour monitor in 1 week's time to make sure your heart rate is reasonably controlled  We will check an echocardiogram in 1 week to look for evidence of structural heart disease.  Consider repeat sleep apnea evaluation  Follow-up in A. fib clinic in 2 to 4 weeks.

## 2022-07-05 NOTE — LETTER
7/5/2022    Huong Stahl, DO  [address] [place] [address]    RE: Arielle Pham       Dear Colleague,     I had the pleasure of seeing Arielle Pham in the [facility].    CARDIOLOGY RAPID ACCESS CLINIC CONSULT NOTE     Assessment/Plan:   1.  Paroxysmal atrial fibrillation with rapid ventricular response.  Inadequate rate control with low-dose diltiazem.  We will increase dose to 240 mg daily.  We will check 24-hour Holter monitor in 1 week's time, as well as an echocardiogram to look for evidence of structural heart disease.  2.  Hypertension with reasonable control on today's medication regimen.  3.  Hyperlipidemia on treatment  4.  Morbid obesity with poorly restorative sleep.  May benefit from repeat sleep apnea evaluation    Follow-up in A. [facility] clinic in 2 to 4 weeks.     History of Present Illness:     It is my pleasure to see Arielle Pham at the [facility] RAPID ACCESS clinic for evaluation of paroxysmal atrial fibrillation.  She is accompanied by one of her daughters.    Arielle Pham is a 52 year old female with a past medical history of morbid obesity, and chronic pain syndrome.  She also has hypertension and hyperlipidemia.  Elevated calcium score was noted but stress testing showed no evidence of inducible ischemia.  For years she has had episodes of palpitations described as heart rate racing.  These frequently occur at night but can occur the daytime as well she notes that her heart rate increases with activity.  When she has sought evaluation she is never found to have an irregular heartbeat.  She had a sleep apnea evaluation about 8 years ago that was negative and she denies snoring, though she does sleep chronically in a chair because of back pain.    More recently she has noted increased frequency of the palpitation episodes occurring once or twice a day.  ECG performed confirmed the presence of atrial fibrillation 155 bpm.  They are frightening for her,  and she attributes them to the increased stress in dealing with her daughter and her illnesses.  She has had no stroke and has no history of diabetes mellitus.  She has no history of vascular disease.  This gives her [id number] score of 2.  She was started on Xarelto 20 mg daily along with diltiazem 120 mg daily.  She notes that since starting these medications the episodes have been much less bothersome for her.  She still fatigues quickly when she tries to walk with her walker, limiting her ability to do so.    Past Medical History:     Patient Active Problem List   Diagnosis     Low back pain     Neck pain     DJD (degenerative joint disease), ankle and foot, left     Migraine without aura and without status migrainosus, not intractable     Nonintractable episodic headache, unspecified headache type     DDD (degenerative disc disease), lumbar     SI (sacroiliac) joint dysfunction     Essential hypertension     Chronic pain syndrome     Chronic prescription opiate use     Current moderate episode of major depressive disorder, unspecified whether recurrent (H)     Hyperlipidemia LDL goal <130     Morbid obesity (H)     Coronary artery disease involving native coronary artery of native heart without angina pectoris     Paroxysmal atrial fibrillation (H)       Past Surgical History:     Past Surgical History:   Procedure Laterality Date     CHOLECYSTECTOMY       CHOLECYSTECTOMY         Family History:     Family History   Problem Relation Age of Onset     Hypertension Mother      Alcoholism Father      Colorectal Cancer No family hx of      Coronary Artery Disease Mother      Coronary Artery Disease Father      Family history reviewed and is not pertinent to the chief complaint or presenting problem    Social History:    reports that she has never smoked. She has never used smokeless tobacco. She reports that she does not drink alcohol and does not use drugs.   Tearfully relates increased stress because of behavior  issues with her other daughter.    Exercise: Walks with a walker because of foot knee and back pain    Sleep: Sleep study negative about 8 years ago, she estimates she has gained about 40 or 50 pounds since then.    Meds:     Current Outpatient Medications   Medication Sig Dispense Refill     diltiazem ER (DILT-XR) 240 MG 24 hr ER beaded capsule Take 1 capsule (240 mg) by mouth daily 90 capsule 3     ezetimibe (ZETIA) 10 MG tablet Take 1 tablet (10 mg) by mouth daily 90 tablet 3     Fremanezumab-vfrm 225 MG/1.5ML SOAJ INJECT 225MG SUBCUTANEOUS EVERY 30 DAYS 1.5 mL 12     GARLIC PO Take 1 tablet by mouth 2 times daily       ibuprofen (ADVIL/MOTRIN) 800 MG tablet Take 1 tablet by mouth three times daily as needed 90 tablet 0     lisinopril (ZESTRIL) 10 MG tablet Take 2 tablets (20 mg) by mouth daily 90 tablet 3     loratadine (CLARITIN) 10 MG tablet Take 1 tablet (10 mg) by mouth daily as needed for allergies 90 tablet 3     order for DME Rolling walker with seat for home use 1 Device 0     [START ON 7/30/2022] oxyCODONE-acetaminophen (PERCOCET) 5-325 MG tablet Take 1 tablet by mouth every 6 hours as needed for moderate to severe pain Maximum of 4 tablets per day 120 tablet 0     Rimegepant Sulfate 75 MG TBDP Take 75 mg by mouth See Admin Instructions Take 75 mg orally per 24 hours placed on or under the tongue; MAX 75 mg/24 hours; 8 tablet 9     rivaroxaban ANTICOAGULANT (XARELTO ANTICOAGULANT) 20 MG TABS tablet Take 1 tablet (20 mg) by mouth daily (with dinner) 30 tablet 0     ZOLMitriptan (ZOMIG) 5 MG tablet TAKE 1 TABLET BY MOUTH AT ONSET OF HEADACHE 9 tablet 2       Allergies:   Hmg-coa-r inhibitors, Codeine, Rosuvastatin, and Gabapentin    Review of Systems:     Positive for chest pain, palpitations, heartburn, joint pains, depression, anxiety, easy bruising.  12 point review of systems otherwise negative     Please refer above for cardiac ROS details.       Objective:      Physical Exam  127.7 kg (281 lb 8  oz)     Body mass index is 41.57 kg/m .  /75 (BP Location: Other (Comment), Patient Position: Sitting, Cuff Size: Adult Regular)   Pulse 102   Resp 18   Wt 127.7 kg (281 lb 8 oz)   BMI 41.57 kg/m      120 pulse initially    General Appearance : Awake, Alert, No acute distress.  Tearful  HEENT: No Scleral icterus; the mucous membranes were pink and moist.  Conjunctivae not injected  Neck:  No cervical bruits, jugular venous distention, or thyromegaly   Chest: The spine was straight. Chest wall symmetric  Lungs: Respirations unlabored; the lungs are clear to auscultation.  No wheezing   Cardiovascular: Nonpalpable point of maximal impulse.  Auscultation reveals rapid, regular first and second heart sounds with no murmurs, rubs, or gallops.  Carotid, radial, and dorsalis pedal pulses are intact and symmetric.    Abdomen: Obese.  No organomegaly, masses, bruits, or tenderness. Bowels sounds are present  Extremities: Thick.  No pitting edema.  Skin: No xanthelasma. Warm, Dry.  Musculoskeletal: No tenderness.  Neurologic: Alert and oriented ×3. Speech is fluent.      EKG (personally reviewed):  30 June 2022: Atrial fibrillation at 155 bpm.  Today: Sinus rhythm 93 bpm    Cardiac Imaging Studies:  Pharmacologic nuclear stress test 2/2022:   The nuclear stress test is negative for inducible myocardial ischemia or infarction.     The left ventricular ejection fraction at stress is 70%.     The patient is at a low risk of future cardiac ischemic events.     A prior study was conducted on 9/25/2015. The previously noted anterior and apical fixed perfusion defect is no longer present.    2/23/22: CTA PE run:   1.  No pulmonary emboli. Normal thoracic aorta.   2.  Mild elevation right hemidiaphragm.   3.  Small to moderate-sized esophageal hiatal hernia.   4.  Small fat-containing left Bochdalek hernia.   5.  Dense calcified atheromatous plaque proximal and mid LAD and mild calcification proximal circumflex and right  coronary arteries.    2019: Coronary CTA with calcium score:     The total Agatston calcium score is 752. A calcium score in this range   places the individual in the >90th percentile when compared to an age and   gender matched control group and implies a very high risk of cardiac   events in the next ten years.     Mild to moderate coronary atherosclerosis with no high-grade   obstructions evident     Ramus lesion: The lesion has moderate luminal stenosis in the range of   50-69%. Non-calcified plaque has been detected.    Lab Review   Lab Results   Component Value Date     06/30/2022     05/25/2021    CO2 23 06/30/2022    CO2 24 05/25/2021    BUN 31 06/30/2022    BUN 19 05/25/2021     Lab Results   Component Value Date    WBC 7.9 06/30/2022    WBC 6.7 05/25/2021    HGB 14.4 06/30/2022    HGB 14.6 05/25/2021    HCT 44.1 06/30/2022    HCT 43.8 05/25/2021    MCV 96 06/30/2022    MCV 95 05/25/2021     06/30/2022     05/25/2021     Lab Results   Component Value Date    CHOL 222 02/17/2022    CHOL 273 05/25/2021    TRIG 155 02/17/2022    TRIG 175 05/25/2021    HDL 54 02/17/2022    HDL 55 05/25/2021     Lab Results   Component Value Date    TROPONINI <0.01 06/30/2022     No results found for: BNP  Lab Results   Component Value Date    TSH 1.75 06/30/2022    TSH 1.68 01/28/2021       Daniel Brooks MD, MD [facility]      This note created using Dragon voice recognition software. Sound alike errors may have escaped editing.     Thank you for allowing me to participate in the care of your patient.      Sincerely,     Daniel Brooks MD     [facility]  cc:   Mark Gonzalez MD  [address] [place] [id number]

## 2022-07-06 DIAGNOSIS — G43.009 MIGRAINE WITHOUT AURA AND WITHOUT STATUS MIGRAINOSUS, NOT INTRACTABLE: ICD-10-CM

## 2022-07-06 LAB
ATRIAL RATE - MUSE: 93 BPM
DIASTOLIC BLOOD PRESSURE - MUSE: NORMAL MMHG
INTERPRETATION ECG - MUSE: NORMAL
P AXIS - MUSE: 31 DEGREES
PR INTERVAL - MUSE: 176 MS
QRS DURATION - MUSE: 88 MS
QT - MUSE: 338 MS
QTC - MUSE: 420 MS
R AXIS - MUSE: 2 DEGREES
SYSTOLIC BLOOD PRESSURE - MUSE: NORMAL MMHG
T AXIS - MUSE: 22 DEGREES
VENTRICULAR RATE- MUSE: 93 BPM

## 2022-07-07 ENCOUNTER — MYC MEDICAL ADVICE (OUTPATIENT)
Dept: FAMILY MEDICINE | Facility: CLINIC | Age: 52
End: 2022-07-07

## 2022-07-07 DIAGNOSIS — I48.0 PAROXYSMAL ATRIAL FIBRILLATION (H): Primary | ICD-10-CM

## 2022-07-07 RX ORDER — ZOLMITRIPTAN 5 MG/1
TABLET, FILM COATED ORAL
Qty: 9 TABLET | Refills: 0 | Status: SHIPPED | OUTPATIENT
Start: 2022-07-07 | End: 2022-07-27

## 2022-07-11 ENCOUNTER — MYC MEDICAL ADVICE (OUTPATIENT)
Dept: FAMILY MEDICINE | Facility: CLINIC | Age: 52
End: 2022-07-11

## 2022-07-11 DIAGNOSIS — I10 ESSENTIAL HYPERTENSION: ICD-10-CM

## 2022-07-11 DIAGNOSIS — E78.5 HYPERLIPIDEMIA LDL GOAL <130: ICD-10-CM

## 2022-07-11 DIAGNOSIS — I25.10 CORONARY ARTERY DISEASE INVOLVING NATIVE CORONARY ARTERY OF NATIVE HEART WITHOUT ANGINA PECTORIS: ICD-10-CM

## 2022-07-12 RX ORDER — LISINOPRIL 10 MG/1
20 TABLET ORAL DAILY
Qty: 180 TABLET | Refills: 2 | Status: SHIPPED | OUTPATIENT
Start: 2022-07-12

## 2022-07-12 RX ORDER — EZETIMIBE 10 MG/1
10 TABLET ORAL DAILY
Qty: 90 TABLET | Refills: 2 | Status: SHIPPED | OUTPATIENT
Start: 2022-07-12

## 2022-07-15 NOTE — TELEPHONE ENCOUNTER
Central Prior Authorization Team  Phone: [phone]    Medication Appeal Initiation    We have initiated an appeal for the requested medication:  Medication: Omeprazole 20mg-APPEAL  Appeal Start Date:  7/15/2022  Insurance Company: [employer] - Phone [phone] Fax [phone]  Comments:  Sent LMN to [employer] at [phone].

## 2022-07-19 NOTE — TELEPHONE ENCOUNTER
Central Prior Authorization Team  Phone: [phone]    Resent appeal letter to [employer]. Fax , carol with cust serv states that the first fax was not rec.

## 2022-07-22 DIAGNOSIS — R07.89 ATYPICAL CHEST PAIN: ICD-10-CM

## 2022-07-22 NOTE — TELEPHONE ENCOUNTER
Central Prior Authorization Team  Phone: [phone]    MEDICATION APPEAL APPROVED    Medication: Omeprazole 20mg-APPEAL APPROVED   Authorization Effective Date: 04/20/22   Authorization Expiration Date:  07/19/23  Reference #:   [id number]  Insurance Company: [employer] - Phone [phone] Fax [phone]  Expected CoPay:       CoPay Card Available:      Foundation Assistance Needed:    Which Pharmacy is filling the prescription (Not needed for infusion/clinic administered): [facility] [address] [place] [address]

## 2022-07-23 RX ORDER — OMEPRAZOLE 40 MG/1
CAPSULE, DELAYED RELEASE ORAL
Qty: 30 CAPSULE | Refills: 0 | OUTPATIENT
Start: 2022-07-23

## 2022-07-23 NOTE — TELEPHONE ENCOUNTER
omeprazole (PRILOSEC) 20 MG DR capsule (Discontinued) 90 capsule 3 6/30/2022 7/5/2022 --   Sig - Route: Take 1 capsule (20 mg) by mouth daily - Oral   Patient not taking: Reported on 7/5/2022        Sent to pharmacy as: Omeprazole 20 MG Oral Capsule Delayed Release (priLOSEC)   Class: E-Prescribe

## 2022-07-25 ENCOUNTER — MYC MEDICAL ADVICE (OUTPATIENT)
Dept: FAMILY MEDICINE | Facility: CLINIC | Age: 52
End: 2022-07-25

## 2022-07-25 DIAGNOSIS — G43.009 MIGRAINE WITHOUT AURA AND WITHOUT STATUS MIGRAINOSUS, NOT INTRACTABLE: ICD-10-CM

## 2022-07-25 DIAGNOSIS — I48.0 PAROXYSMAL ATRIAL FIBRILLATION (H): Primary | ICD-10-CM

## 2022-07-25 NOTE — TELEPHONE ENCOUNTER
Routing zolmitriptan refill request to provider to decide. Chart indicates that it was last ordered on 7/7/22.  Malik Hillman RN

## 2022-07-26 NOTE — TELEPHONE ENCOUNTER
I sent the letter of appeal for omeprazole 40mg, not 20mg.  20mg is not effective for her    Huong Stahl, DO

## 2022-07-26 NOTE — TELEPHONE ENCOUNTER
Central Prior Authorization Team  Phone: [phone]    [place] Initiation    Medication: Omeprazole 40 mg- INITIATED FOR 40MG   Insurance Company: [employer] [place] - Phone [phone] Fax [phone]  Pharmacy Filling the Rx: [facility] [address] [place] [address]  Filling Pharmacy Phone: [phone]  Filling Pharmacy Fax:    Start Date: 6/22/2022

## 2022-07-27 ENCOUNTER — TELEPHONE (OUTPATIENT)
Dept: CARDIOLOGY | Facility: CLINIC | Age: 52
End: 2022-07-27

## 2022-07-27 RX ORDER — ZOLMITRIPTAN 5 MG/1
TABLET, FILM COATED ORAL
Qty: 9 TABLET | Refills: 0 | Status: SHIPPED | OUTPATIENT
Start: 2022-07-27 | End: 2022-09-09

## 2022-07-27 NOTE — TELEPHONE ENCOUNTER
Health Call Center    Phone Message    May a detailed message be left on voicemail: yes     Reason for Call: Medication Question or concern regarding medication   Prescription Clarification  Name of Medication: diltiazem ER (DILT-XR) 240 MG 24 hr ER beaded capsule  Prescribing Provider: Dr Brooks   Pharmacy: N/A   What on the order needs clarification?     Arielle called because she started her diltiazem ER (DILT-XR) 240 MG 24 hr ER beaded capsule but still feels like her heart is racing. She though this medication was supposed to treat that symptoms but it's not. Please give her a call back to discuss. Thank you!    Action Taken: Other: Cardiology    Travel Screening: Not Applicable

## 2022-07-28 RX ORDER — OMEPRAZOLE 40 MG/1
40 CAPSULE, DELAYED RELEASE ORAL DAILY
Qty: 90 CAPSULE | Refills: 3 | Status: SHIPPED | OUTPATIENT
Start: 2022-07-28 | End: 2023-06-14

## 2022-07-28 NOTE — TELEPHONE ENCOUNTER
Central Prior Authorization Team  Phone: [phone]    Prior Authorization Approval    Authorization Effective Date: 7/26/2022  Authorization Expiration Date: 7/26/2023  Medication: Omeprazole 40MG- APPROVED   Approved Dose/Quantity:   Reference #:     Insurance Company: [employer] [place] - Phone [phone] Fax [phone]  Expected CoPay:       CoPay Card Available:      Foundation Assistance Needed:    Which Pharmacy is filling the prescription (Not needed for infusion/clinic administered): [facility] [address] [place] [address]  Pharmacy Notified: Yes  Patient Notified: Yes

## 2022-07-28 NOTE — TELEPHONE ENCOUNTER
"PC with patient and discussion. Pt reports that she had a bad day and felt her heart racing. Started at 1700, and then progressed. She was relaxing and at a movie, left early and presented to [facility] for evaluation on 7/27/22. She has noted at times of stress, that she has a breakthrough episode of feeling her heart beating fast, but nothing like this. She was worried and concerned.   Pt is on diltiazem 240 mg daily and takes in the evening around 2000. Patient doesn't take her b/p machine/or check at home. Denies chest pain, LE edema, dizziness or lightheadedness. Review of other symptoms and pt mentioned \"nasal stuffiness\" which then revealed that the patient had taken some Sudafed earlier in the day. Discussion with patient and discouraged use of decongestants. BP elevated in [facility], which could have been from worry/concern. ECG at [facility] room shows ST, . HR's in [facility] . Pt reports at time of call, no heart racing.     Reviewed with patient recommendations, continue current medications, proper hydration, limit/no caffeine, no decongestants or NSAID use. Ok to use Claritin or Benadryl. Cautious with Benadryl as can make you drowsy. Tylenol if discomfort. Reduced stress, and promote relaxation, and make sure getting enough sleep each night.     [place] [facility] [place] alternate recommendation. Keep upcoming echo and HM as scheduled. Afib clinic on 8/23 (checked and no sooner appts available). Will return call with any further recommendations, once obtained.     Dr Conti any further recs at this time? Tangela,RN     "

## 2022-08-04 ENCOUNTER — LAB (OUTPATIENT)
Dept: LAB | Facility: CLINIC | Age: 52
End: 2022-08-04
Payer: COMMERCIAL

## 2022-08-04 DIAGNOSIS — R00.2 PALPITATIONS: ICD-10-CM

## 2022-08-04 LAB
CHOLEST SERPL-MCNC: 238 MG/DL
FASTING STATUS PATIENT QL REPORTED: YES
HDLC SERPL-MCNC: 53 MG/DL
LDLC SERPL CALC-MCNC: 157 MG/DL
NONHDLC SERPL-MCNC: 185 MG/DL
TRIGL SERPL-MCNC: 139 MG/DL

## 2022-08-04 PROCEDURE — 36415 COLL VENOUS BLD VENIPUNCTURE: CPT

## 2022-08-04 PROCEDURE — 80061 LIPID PANEL: CPT

## 2022-08-04 NOTE — TELEPHONE ENCOUNTER
Noted.  -----------------------------------  Trupti Caldera, RN routed conversation to You 33 minutes ago (7:35 AM)      Daniel Brooks MD Murphy, Christian, RN 15 hours ago (4:37 PM)     CC    Thank you, no changes.  Daniel Brooks MD

## 2022-08-05 ENCOUNTER — HOSPITAL ENCOUNTER (OUTPATIENT)
Dept: CARDIOLOGY | Facility: HOSPITAL | Age: 52
Discharge: HOME | End: 2022-08-05
Attending: INTERNAL MEDICINE
Payer: COMMERCIAL

## 2022-08-05 DIAGNOSIS — I48.0 PAROXYSMAL ATRIAL FIBRILLATION (H): ICD-10-CM

## 2022-08-05 DIAGNOSIS — E66.01 MORBID OBESITY (H): ICD-10-CM

## 2022-08-05 DIAGNOSIS — I10 ESSENTIAL HYPERTENSION: ICD-10-CM

## 2022-08-05 DIAGNOSIS — I25.10 CORONARY ARTERY DISEASE INVOLVING NATIVE CORONARY ARTERY OF NATIVE HEART WITHOUT ANGINA PECTORIS: ICD-10-CM

## 2022-08-05 PROCEDURE — 93306 TTE W/DOPPLER COMPLETE: CPT

## 2022-08-05 PROCEDURE — 93306 TTE W/DOPPLER COMPLETE: CPT | Mod: 26 | Performed by: INTERNAL MEDICINE

## 2022-08-05 PROCEDURE — 93226 XTRNL ECG REC<48 HR SCAN A/R: CPT

## 2022-08-08 PROCEDURE — 93227 XTRNL ECG REC<48 HR R&I: CPT | Performed by: INTERNAL MEDICINE

## 2022-08-16 ENCOUNTER — OFFICE VISIT (OUTPATIENT)
Dept: CARDIOLOGY | Facility: CLINIC | Age: 52
End: 2022-08-16
Payer: COMMERCIAL

## 2022-08-16 ENCOUNTER — TELEPHONE (OUTPATIENT)
Dept: CARDIOLOGY | Facility: CLINIC | Age: 52
End: 2022-08-16

## 2022-08-16 VITALS
DIASTOLIC BLOOD PRESSURE: 83 MMHG | RESPIRATION RATE: 16 BRPM | HEART RATE: 86 BPM | WEIGHT: 288 LBS | SYSTOLIC BLOOD PRESSURE: 152 MMHG | HEIGHT: 69 IN | BODY MASS INDEX: 42.65 KG/M2

## 2022-08-16 DIAGNOSIS — E78.5 HYPERLIPIDEMIA LDL GOAL <70: ICD-10-CM

## 2022-08-16 DIAGNOSIS — R93.1 ELEVATED CORONARY ARTERY CALCIUM SCORE: ICD-10-CM

## 2022-08-16 DIAGNOSIS — Z78.9 STATIN INTOLERANCE: ICD-10-CM

## 2022-08-16 DIAGNOSIS — I48.0 PAROXYSMAL ATRIAL FIBRILLATION (H): Primary | ICD-10-CM

## 2022-08-16 DIAGNOSIS — I10 ESSENTIAL HYPERTENSION: ICD-10-CM

## 2022-08-16 PROCEDURE — 99214 OFFICE O/P EST MOD 30 MIN: CPT | Performed by: INTERNAL MEDICINE

## 2022-08-16 NOTE — PROGRESS NOTES
Thank you, Dr. Huong Stahl, for asking the [facility] team to see Ms. Arielle Pham to follow-up on paroxysmal atrial fibrillation, hypercholesterolemia.    Assessment/Recommendations   Assessment:    1.  Paroxysmal atrial fibrillation, better suppressed with increased dose of diltiazem to 240 mg daily.  Continues to report occasional episodes of palpitations.  Her Holter monitor demonstrated no evidence of recurrent atrial fibrillation and a previous event monitor demonstrated PACs.  I suspect this may be the source of her current symptoms.  At this point, would continue with current medications.  She is on anticoagulation to minimize risks of thromboembolic events.  She does have a scheduled appointment in our A. fib clinic which I encouraged her to follow through with.  2.  Hypercholesterolemia, intolerant of statins.  She did try rosuvastatin 10 mg every other day but developed muscle aches.  Her most recent lipid profile demonstrated a total cholesterol 238 and an LDL of 157 which is well above the goal level of less than 70 given her documented coronary artery disease.  I have recommended initiation of a PCSK9 inhibitor.  I did submit a prescription for Repatha to her pharmacy in the hopes that this will trigger a prior authorization.  If she is successful in obtaining it, we will want to check lipid profile in 3 months.  3.  Poorly restorative sleep.  My colleague who saw her in the rapid access clinic felt she might benefit from formal sleep evaluation.  She will be seeing them in the near future.  4.  Essential hypertension, borderline controlled.  Did not make any change in medication today.  Reevaluate on future visit.    Plan:  1.  Continue current medications including Zetia 10 mg daily  2.  Prescription submitted to pharmacy for Repatha.  If approved, we will want to repeat lipids in 3 months  3.  Follow-up in A. fib clinic as planned and with me in 4 months       History of Present  "Illness    Ms. Arielle Pham is a 52 year old female with history of morbid obesity, chronic pain syndrome, hypertension, hypercholesterolemia, coronary artery disease by CT calcium scoring and paroxysmal atrial fibrillation who I am seeing back in the clinic today for a follow-up visit.  Was recently seen by my colleague in our rapid access clinic for paroxysmal atrial fibrillation.  He advised increase in her dose of diltiazem from 120 to 240 mg daily.  Subsequent Holter monitor showed no evidence of recurrent atrial fibrillation.  Echocardiogram was also unrevealing.  He did recommend follow-up visit in our A. fib clinic which she is scheduled for next week.  Does continue to note occasional palpitations which feels like skipping beats.  No prolonged racing.  Denies any exertional chest discomfort.  Also tells me the plan had been to start a different cholesterol medication.  She had been advised to try Crestor 10 mg every other day but tells me within a week she developed muscle aches.  Is currently on Zetia 10 mg daily.    ECG (personally reviewed): No ECG today    Cardiac Imaging Studies (personally reviewed):   Procedure  Complete Echo Adult.  ______________________________________________________________________________  Interpretation Summary     1. Normal left ventricular size and systolic performance with a visually  estimated ejection fraction of 60-65%.  2. No significant valvular heart disease is identified on this study.  3. Normal right ventricular size and systolic performance.       Physical Examination Review of Systems   BP (!) 152/83 (BP Location: Left arm, Patient Position: Sitting, Cuff Size: Adult Large)   Pulse 86   Resp 16   Ht 1.753 m (5' 9\")   Wt 130.6 kg (288 lb)   BMI 42.53 kg/m    Body mass index is 42.53 kg/m .  Wt Readings from Last 3 Encounters:   08/16/22 130.6 kg (288 lb)   07/05/22 127.7 kg (281 lb 8 oz)   06/30/22 127 kg (280 lb)     General Appearance:   Awake, Alert, No " "acute distress.   HEENT:  No scleral icterus; the mucous membranes were pink and moist.   Neck: No cervical bruits or jugular venous distention    Chest: The spine was straight. The chest was symmetric.   Lungs:   Respirations unlabored; the lungs are clear to auscultation. No wheezing   Cardiovascular:    Regular rate and rhythm.  S1, S2 normal.  No murmur or gallop   Abdomen:  No organomegaly, masses, bruits, or tenderness. Bowels sounds are present   Extremities:  No peripheral edema bilaterally   Skin: No xanthelasma. Warm, Dry.   Musculoskeletal: No tenderness.   Neurologic: Mood and affect are appropriate.    Enc Vitals  BP: (!) 152/83  Pulse: 86  Resp: 16  Weight: 130.6 kg (288 lb)  Height: 175.3 cm (5' 9\")                                         Medical History  Surgical History Family History Social History   Past Medical History:   Diagnosis Date     Atypical chest pain 9/30/2015     Atypical chest pain 9/30/2015     Hyperlipidemia      Hyperlipidemia      Hypertension      Hypertension      Kidney stones      Migraines      Migraines      TMJ (dislocation of temporomandibular joint)     Past Surgical History:   Procedure Laterality Date     CHOLECYSTECTOMY       CHOLECYSTECTOMY      Family History   Problem Relation Age of Onset     Hypertension Mother      Alcoholism Father      Colorectal Cancer No family hx of      Coronary Artery Disease Mother      Coronary Artery Disease Father     Social History     Socioeconomic History     Marital status: Single     Spouse name: Not on file     Number of children: Not on file     Years of education: Not on file     Highest education level: Not on file   Occupational History     Not on file   Tobacco Use     Smoking status: Never Smoker     Smokeless tobacco: Never Used   Substance and Sexual Activity     Alcohol use: No     Drug use: No     Sexual activity: Yes     Partners: Male   Other Topics Concern     Parent/sibling w/ CABG, MI or angioplasty before 65F 55M? " No   Social History Narrative     Not on file     Social Determinants of Health     Financial Resource Strain: Not on file   Food Insecurity: Not on file   Transportation Needs: Not on file   Physical Activity: Not on file   Stress: Not on file   Social Connections: Not on file   Intimate Partner Violence: Not on file   Housing Stability: Not on file          Medications  Allergies   Current Outpatient Medications   Medication Sig Dispense Refill     diltiazem ER (DILT-XR) 240 MG 24 hr ER beaded capsule Take 1 capsule (240 mg) by mouth daily 90 capsule 3     evolocumab (REPATHA) 140 MG/ML prefilled autoinjector Inject 1 mL (140 mg) Subcutaneous every 14 days for 24 doses 6 mL 3     ezetimibe (ZETIA) 10 MG tablet Take 1 tablet (10 mg) by mouth daily 90 tablet 2     Fremanezumab-vfrm 225 MG/1.5ML SOAJ INJECT 225MG SUBCUTANEOUS EVERY 30 DAYS 1.5 mL 12     GARLIC PO Take 1 tablet by mouth 2 times daily       lisinopril (ZESTRIL) 10 MG tablet Take 2 tablets (20 mg) by mouth daily 180 tablet 2     loratadine (CLARITIN) 10 MG tablet Take 1 tablet (10 mg) by mouth daily as needed for allergies 90 tablet 3     omeprazole (PRILOSEC) 40 MG DR capsule Take 1 capsule (40 mg) by mouth daily 90 capsule 3     order for DME Rolling walker with seat for home use 1 Device 0     oxyCODONE-acetaminophen (PERCOCET) 5-325 MG tablet Take 1 tablet by mouth every 6 hours as needed for moderate to severe pain Maximum of 4 tablets per day 120 tablet 0     rivaroxaban ANTICOAGULANT (XARELTO ANTICOAGULANT) 20 MG TABS tablet Take 1 tablet (20 mg) by mouth daily (with dinner) 90 tablet 3     ZOLMitriptan (ZOMIG) 5 MG tablet TAKE ONE TABLET BY MOUTH AT ONSET OF HEADACHE. 9 tablet 0     Rimegepant Sulfate 75 MG TBDP Take 75 mg by mouth See Admin Instructions Take 75 mg orally per 24 hours placed on or under the tongue; MAX 75 mg/24 hours; (Patient not taking: Reported on 8/16/2022) 8 tablet 9      Allergies   Allergen Reactions     Hmg-Coa-R  Inhibitors Other (See Comments)     Severe joint and muscle ache from Atorvastastin     Codeine Dizziness and Other (See Comments)     Rosuvastatin Muscle Pain (Myalgia)     Gabapentin Palpitations         Lab Results    Chemistry/lipid CBC Cardiac Enzymes/BNP/TSH/INR   Recent Labs   Lab Test 08/04/22  0752 06/30/22  0322   TRIG 139  --    *  --    BUN  --  31*   NA  --  143   CO2  --  23    Recent Labs   Lab Test 06/30/22  0322   WBC 7.9   HGB 14.4   HCT 44.1   MCV 96       Recent Labs   Lab Test 06/30/22  0322   TROPONINI <0.01   TSH 1.75        A total of 40 minutes was spent reviewing patient's medical records, obtaining history and performing examination, as well as discussing diagnoses/ recommendations with patient and answering all questions.

## 2022-08-16 NOTE — LETTER
8/16/2022    Huong Stahl, DO  [address] [place] [address]    RE: Arielle AGUDELO Vero       Dear Colleague,     I had the pleasure of seeing Arielle Pham in the [facility].      Thank you, Dr. Huong Stahl, for asking the [facility] team to see Ms. Arielle Pham to follow-up on paroxysmal atrial fibrillation, hypercholesterolemia.    Assessment/Recommendations   Assessment:    1.  Paroxysmal atrial fibrillation, better suppressed with increased dose of diltiazem to 240 mg daily.  Continues to report occasional episodes of palpitations.  Her Holter monitor demonstrated no evidence of recurrent atrial fibrillation and a previous event monitor demonstrated PACs.  I suspect this may be the source of her current symptoms.  At this point, would continue with current medications.  She is on anticoagulation to minimize risks of thromboembolic events.  She does have a scheduled appointment in our A. fib clinic which I encouraged her to follow through with.  2.  Hypercholesterolemia, intolerant of statins.  She did try rosuvastatin 10 mg every other day but developed muscle aches.  Her most recent lipid profile demonstrated a total cholesterol 238 and an LDL of 157 which is well above the goal level of less than 70 given her documented coronary artery disease.  I have recommended initiation of a PCSK9 inhibitor.  I did submit a prescription for Repatha to her pharmacy in the hopes that this will trigger a prior authorization.  If she is successful in obtaining it, we will want to check lipid profile in 3 months.  3.  Poorly restorative sleep.  My colleague who saw her in the rapid access clinic felt she might benefit from formal sleep evaluation.  She will be seeing them in the near future.  4.  Essential hypertension, borderline controlled.  Did not make any change in medication today.  Reevaluate on future visit.    Plan:  1.  Continue current medications including Zetia 10 mg daily  2.  " Prescription submitted to pharmacy for Repatha.  If approved, we will want to repeat lipids in 3 months  3.  Follow-up in A. fib clinic as planned and with me in 4 months       History of Present Illness    Ms. Arielle Pham is a 52 year old female with history of morbid obesity, chronic pain syndrome, hypertension, hypercholesterolemia, coronary artery disease by CT calcium scoring and paroxysmal atrial fibrillation who I am seeing back in the clinic today for a follow-up visit.  Was recently seen by my colleague in our rapid access clinic for paroxysmal atrial fibrillation.  He advised increase in her dose of diltiazem from 120 to 240 mg daily.  Subsequent Holter monitor showed no evidence of recurrent atrial fibrillation.  Echocardiogram was also unrevealing.  He did recommend follow-up visit in our A. fib clinic which she is scheduled for next week.  Does continue to note occasional palpitations which feels like skipping beats.  No prolonged racing.  Denies any exertional chest discomfort.  Also tells me the plan had been to start a different cholesterol medication.  She had been advised to try Crestor 10 mg every other day but tells me within a week she developed muscle aches.  Is currently on Zetia 10 mg daily.    ECG (personally reviewed): No ECG today    Cardiac Imaging Studies (personally reviewed):   Procedure  Complete Echo Adult.  ______________________________________________________________________________  Interpretation Summary     1. Normal left ventricular size and systolic performance with a visually  estimated ejection fraction of 60-65%.  2. No significant valvular heart disease is identified on this study.  3. Normal right ventricular size and systolic performance.       Physical Examination Review of Systems   BP (!) 152/83 (BP Location: Left arm, Patient Position: Sitting, Cuff Size: Adult Large)   Pulse 86   Resp 16   Ht 1.753 m (5' 9\")   Wt 130.6 kg (288 lb)   BMI 42.53 kg/m    Body " "mass index is 42.53 kg/m .  Wt Readings from Last 3 Encounters:   08/16/22 130.6 kg (288 lb)   07/05/22 127.7 kg (281 lb 8 oz)   06/30/22 127 kg (280 lb)     General Appearance:   Awake, Alert, No acute distress.   HEENT:  No scleral icterus; the mucous membranes were pink and moist.   Neck: No cervical bruits or jugular venous distention    Chest: The spine was straight. The chest was symmetric.   Lungs:   Respirations unlabored; the lungs are clear to auscultation. No wheezing   Cardiovascular:    Regular rate and rhythm.  S1, S2 normal.  No murmur or gallop   Abdomen:  No organomegaly, masses, bruits, or tenderness. Bowels sounds are present   Extremities:  No peripheral edema bilaterally   Skin: No xanthelasma. Warm, Dry.   Musculoskeletal: No tenderness.   Neurologic: Mood and affect are appropriate.    Enc Vitals  BP: (!) 152/83  Pulse: 86  Resp: 16  Weight: 130.6 kg (288 lb)  Height: 175.3 cm (5' 9\")                                         Medical History  Surgical History Family History Social History   Past Medical History:   Diagnosis Date     Atypical chest pain 9/30/2015     Atypical chest pain 9/30/2015     Hyperlipidemia      Hyperlipidemia      Hypertension      Hypertension      Kidney stones      Migraines      Migraines      TMJ (dislocation of temporomandibular joint)     Past Surgical History:   Procedure Laterality Date     CHOLECYSTECTOMY       CHOLECYSTECTOMY      Family History   Problem Relation Age of Onset     Hypertension Mother      Alcoholism Father      Colorectal Cancer No family hx of      Coronary Artery Disease Mother      Coronary Artery Disease Father     Social History     Socioeconomic History     Marital status: Single     Spouse name: Not on file     Number of children: Not on file     Years of education: Not on file     Highest education level: Not on file   Occupational History     Not on file   Tobacco Use     Smoking status: Never Smoker     Smokeless tobacco: Never Used "   Substance and Sexual Activity     Alcohol use: No     Drug use: No     Sexual activity: Yes     Partners: Male   Other Topics Concern     Parent/sibling w/ CABG, MI or angioplasty before 65F 55M? No   Social History Narrative     Not on file     Social Determinants of Health     Financial Resource Strain: Not on file   Food Insecurity: Not on file   Transportation Needs: Not on file   Physical Activity: Not on file   Stress: Not on file   Social Connections: Not on file   Intimate Partner Violence: Not on file   Housing Stability: Not on file          Medications  Allergies   Current Outpatient Medications   Medication Sig Dispense Refill     diltiazem ER (DILT-XR) 240 MG 24 hr ER beaded capsule Take 1 capsule (240 mg) by mouth daily 90 capsule 3     evolocumab (REPATHA) 140 MG/ML prefilled autoinjector Inject 1 mL (140 mg) Subcutaneous every 14 days for 24 doses 6 mL 3     ezetimibe (ZETIA) 10 MG tablet Take 1 tablet (10 mg) by mouth daily 90 tablet 2     Fremanezumab-vfrm 225 MG/1.5ML SOAJ INJECT 225MG SUBCUTANEOUS EVERY 30 DAYS 1.5 mL 12     GARLIC PO Take 1 tablet by mouth 2 times daily       lisinopril (ZESTRIL) 10 MG tablet Take 2 tablets (20 mg) by mouth daily 180 tablet 2     loratadine (CLARITIN) 10 MG tablet Take 1 tablet (10 mg) by mouth daily as needed for allergies 90 tablet 3     omeprazole (PRILOSEC) 40 MG DR capsule Take 1 capsule (40 mg) by mouth daily 90 capsule 3     order for DME Rolling walker with seat for home use 1 Device 0     oxyCODONE-acetaminophen (PERCOCET) 5-325 MG tablet Take 1 tablet by mouth every 6 hours as needed for moderate to severe pain Maximum of 4 tablets per day 120 tablet 0     rivaroxaban ANTICOAGULANT (XARELTO ANTICOAGULANT) 20 MG TABS tablet Take 1 tablet (20 mg) by mouth daily (with dinner) 90 tablet 3     ZOLMitriptan (ZOMIG) 5 MG tablet TAKE ONE TABLET BY MOUTH AT ONSET OF HEADACHE. 9 tablet 0     Rimegepant Sulfate 75 MG TBDP Take 75 mg by mouth See Admin  Instructions Take 75 mg orally per 24 hours placed on or under the tongue; MAX 75 mg/24 hours; (Patient not taking: Reported on 8/16/2022) 8 tablet 9      Allergies   Allergen Reactions     Hmg-Coa-R Inhibitors Other (See Comments)     Severe joint and muscle ache from Atorvastastin     Codeine Dizziness and Other (See Comments)     Rosuvastatin Muscle Pain (Myalgia)     Gabapentin Palpitations         Lab Results    Chemistry/lipid CBC Cardiac Enzymes/BNP/TSH/INR   Recent Labs   Lab Test 08/04/22  0752 06/30/22 0322   TRIG 139  --    *  --    BUN  --  31*   NA  --  143   CO2  --  23    Recent Labs   Lab Test 06/30/22 0322   WBC 7.9   HGB 14.4   HCT 44.1   MCV 96       Recent Labs   Lab Test 06/30/22 0322   TROPONINI <0.01   TSH 1.75        A total of 40 minutes was spent reviewing patient's medical records, obtaining history and performing examination, as well as discussing diagnoses/ recommendations with patient and answering all questions.                  Thank you for allowing me to participate in the care of your patient.      Sincerely,     Trinity Herrera MD     [facility]  cc:   Referred Self

## 2022-08-16 NOTE — TELEPHONE ENCOUNTER
PA Initiation    Medication:  Repatha  Insurance Company:  [facility] ([facility]  Pharmacy Filling the Rx:  [id number]   Filling Pharmacy Phone:  [phone]  Filling Pharmacy Fax:  [phone]  Start Date:  8-16-22    Key: [id number]  Rx #: [id number]  Member #:  [id number]  [id number]

## 2022-08-16 NOTE — TELEPHONE ENCOUNTER
Central Prior Authorization Team   Phone: [phone]    PA Initiation    Medication: Repatha  Insurance Company: [employer] - Phone [phone] Fax [phone]  Pharmacy Filling the Rx: [address]  Filling Pharmacy Phone: [phone]  Filling Pharmacy Fax:    Start Date: 8/16/2022

## 2022-08-16 NOTE — PATIENT INSTRUCTIONS
Continue current medications  Begin Repatha every two week injections if approved to help lower cholesterol levels. Will want to repeat lipids in 3 months after starting.  Follow up in A Fib clinic as scheduled  Follow up with me in 4 months

## 2022-08-17 NOTE — TELEPHONE ENCOUNTER
Insurance denied stating patient needs a agatston calcium score of 300 or greater.   Clinic notes that were sent in from 8/16 did not have the calcium score documented.     Re-submitted request with calcium score from 2019 showing score of 752.   Asked for an urgent re-review

## 2022-08-17 NOTE — TELEPHONE ENCOUNTER
Prior Authorization Approval    Authorization Effective Date: 5/19/2022  Authorization Expiration Date: 8/17/2023  Medication: Repatha  Approved Dose/Quantity:   Reference #:     Insurance Company: [employer] - Phone [phone] Fax [phone]  Expected CoPay:       CoPay Card Available:      Foundation Assistance Needed:    Which Pharmacy is filling the prescription (Not needed for infusion/clinic administered): [facility] [address] [place] [address]  Pharmacy Notified: Yes  Patient Notified: Yes

## 2022-08-23 ENCOUNTER — OFFICE VISIT (OUTPATIENT)
Dept: CARDIOLOGY | Facility: CLINIC | Age: 52
End: 2022-08-23
Payer: COMMERCIAL

## 2022-08-23 VITALS
RESPIRATION RATE: 16 BRPM | SYSTOLIC BLOOD PRESSURE: 132 MMHG | DIASTOLIC BLOOD PRESSURE: 78 MMHG | BODY MASS INDEX: 42.68 KG/M2 | HEART RATE: 86 BPM | WEIGHT: 289 LBS

## 2022-08-23 DIAGNOSIS — I48.0 PAROXYSMAL ATRIAL FIBRILLATION (H): Primary | ICD-10-CM

## 2022-08-23 DIAGNOSIS — I10 ESSENTIAL HYPERTENSION: ICD-10-CM

## 2022-08-23 DIAGNOSIS — I25.10 CORONARY ARTERY DISEASE INVOLVING NATIVE CORONARY ARTERY OF NATIVE HEART WITHOUT ANGINA PECTORIS: ICD-10-CM

## 2022-08-23 PROCEDURE — 99215 OFFICE O/P EST HI 40 MIN: CPT | Performed by: NURSE PRACTITIONER

## 2022-08-23 PROCEDURE — 99417 PROLNG OP E/M EACH 15 MIN: CPT | Performed by: NURSE PRACTITIONER

## 2022-08-23 RX ORDER — SOTALOL HYDROCHLORIDE 80 MG/1
80 TABLET ORAL 2 TIMES DAILY
Qty: 60 TABLET | Refills: 11 | Status: SHIPPED | OUTPATIENT
Start: 2022-08-23 | End: 2022-09-15

## 2022-08-23 NOTE — LETTER
8/23/2022    Huong Stahl,   [address] [place] [address]    RE: Arielle AGUDELO Vero       Dear Colleague,     I had the pleasure of seeing Arielle Pham in the [facility].    HEART CARE ELECTROPHYSIOLOGY CONSULTATON NOTE      [facility]  [phone]    Thank you, Dr. Herrera, for asking the [facility] Electrophysiology team to see Ms. Arielle Pham to evaluate atrial fibrillation.    Assessment/Recommendations   Assessment/Plan:  1.  Paroxysmal Atrial Fibrillation: History of intermittent palpitations, documented with A. fib in June.  Symptoms consist of palpitations and dyspnea on exertion, though she also has palpitations associated with PVCs.  Episodes have significantly decreased since starting diltiazem, but she continues to have some breakthrough.    We had a lengthy discussion of the physiology and natural progression of atrial fibrillation and treatment options including rate control versus rhythm control depending upon the presence of symptoms.  We further discussed rhythm control with medications or pulmonary vein isolation ablation.  We discussed avoidance of triggers.  She was given information to review at home.  Discontinue diltiazem.  Start sotalol 80 mg twice daily.  EKG on Friday for QT monitoring.    She was reassured that atrial fibrillation is not life-threatening, but carries an increased risk for stroke.  She has a [id number] score of 3 for female gender, CAD, and hypertension; per current guidelines anticoagulation is recommended, unless risks outweighed the benefits.  We discussed the risks and benefits of oral anticoagulation.  Continue Xarelto 20 mg daily for stroke prophylaxis.  We discussed the importance of taking this with a full meal to ensure proper absorption.    2.  Hypertension: Blood pressure at target.  Continue lisinopril.  She was instructed to check her blood pressure daily with initiation of sotalol as above, may  "need decrease in lisinopril dose.    3.  Coronary artery disease: Denies anginal symptoms.  Will be starting Repatha now that it has been approved by her insurance.  We discussed the importance of risk factor modification.    4.  Possible sleep apnea: We discussed the correlation between untreated sleep apnea and atrial fibrillation.  She is scheduled for consult with sleep medicine on 11/2/2022.  Recommend treatment if indicated.    Follow up in 6 to 8 weeks     History of Present Illness/Subjective    HPI: Arielle Pham is a 52 year old female who comes in today for EP consultation of atrial fibrillation.  She has a history of paroxysmal atrial fibrillation, coronary artery disease by CTA, hypertension, hyperlipidemia, chronic pain syndrome, and morbid obesity.  Evaluation for sleep apnea was recommended, consult scheduled for 11/2/2022.    She has a history of intermittent palpitations that increased in frequency and duration.  She had a more \"severe\" episode and was documented with atrial fibrillation with rapid ventricular response on 6/30/2022.  She was started on diltiazem, subsequently increased to 240 mg daily.  She had symptomatic improvement, but continued to have occasional brief palpitations.  Holter monitor showed occasional PACs, but no A. fib.  She is on Xarelto for stroke prophylaxis.    Arielle states that she feels pretty well.  Her primary limitation to activity is orthopedic and deconditioning; she ambulates with a cane or walker, but has been trying to walk farther more frequently.  She notes a decrease in palpitations since starting on diltiazem, mostly just a few skipped beats.  However, she had an episode of A. fib lasting a couple of hours last night, though symptom severity was not as severe as previous.  She denies exertional chest discomfort, abdominal fullness/bloating or peripheral edema, shortness of breath, paroxysmal nocturnal dyspnea, orthopnea, lightheadedness, dizziness, pre-syncope, " or syncope.  She has eliminated caffeine and alcohol and has been drinking more water.  She has also been following a low-sodium, low-carb diet.    Cardiographics (EKGs, Holter monitor, and Zio patch monitor personally reviewed):  EKG done 7/5/2022 shows sinus rhythm at 93 bpm, QRS 88 ms, QT/QTc 338/420 ms  EKG done 6/30/2022 shows atrial fibrillation with rapid ventricular response at 155 bpm    24-hour Holter monitor worn 8/5/2022 shows sinus rhythm with an average heart rate of 78 bpm and a range of 54 to 131 bpm.  No significant bradycardia or pauses.  No atrial fibrillation.  Rare PVCs.  Rare PVCs, though intervals of more frequent PVCs including bigeminy, but overall PVC burden less than 1%.  Symptoms of palpitations correlated to sinus rhythm with PVCs.  No significant ventricular ectopy.    Zio patch monitor worn 5/4/2022 to 5/8/2022 shows sinus rhythm with an average heart rate of 78 bpm and a range of 49 to 151 bpm.  No significant bradycardia or pauses.  2 brief episodes of atrial tachycardia, longest 7 beats.  No atrial fibrillation.  Rare PACs and PVCs.  No significant ventricular ectopy.    ECHO done 8/5/2022:  1. Normal left ventricular size and systolic performance with a visually  estimated ejection fraction of 60-65%.  2. No significant valvular heart disease is identified on this study.  3. Normal right ventricular size and systolic performance.  The left atrium is of normal size.    NM stress test done 2/25/2022:     The nuclear stress test is negative for inducible myocardial ischemia or infarction.     The left ventricular ejection fraction at stress is 70%.     The patient is at a low risk of future cardiac ischemic events.     A prior study was conducted on 9/25/2015. The previously noted anterior and apical fixed perfusion defect is no longer present.    Cardiac CTA done 7/11/2019:    The total Agatston calcium score is 752. A calcium score in this range places the individual in the >90th  percentile when compared to an age and gender matched control group and implies a very high risk of cardiac events in the next ten years.    Mild to moderate coronary atherosclerosis with no high-grade obstructions evident    Ramus lesion: The lesion has moderate luminal stenosis in the range of 50-69%. Non-calcified plaque has been detected.    I have reviewed and updated the patient's Past Medical History, Social History, Family History and Medication List.  Outside records reviewed.     Physical Examination  Review of Systems   Vitals: /78 (BP Location: Left arm, Patient Position: Sitting, Cuff Size: Adult Regular)   Pulse 86   Resp 16   Wt 131.1 kg (289 lb)   BMI 42.68 kg/m    BMI= Body mass index is 42.68 kg/m .  Wt Readings from Last 3 Encounters:   08/23/22 131.1 kg (289 lb)   08/16/22 130.6 kg (288 lb)   07/05/22 127.7 kg (281 lb 8 oz)       General Appearance:   Alert, well-appearing and in no acute distress.   HEENT: Atraumatic, normocephalic.  No scleral icterus, normal conjunctivae, EOMs intact, PERRL.  Wearing a mask.   Chest/Lungs:   Chest symmetric, spine straight.  Respirations unlabored.  Lungs are clear to auscultation.   Cardiovascular:   Regular rate and rhythm.  Normal first and second heart sounds with no murmurs, rubs, or gallops; radial and posterior tibial pulses are intact, No edema.   Abdomen:  Soft, nondistended, bowel sounds present.   Extremities: No cyanosis or clubbing.   Musculoskeletal: Moves all extremities.     Skin: Warm, dry, intact.    Neurologic: Mood and affect are appropriate.  Alert and oriented to person, place, time, and situation.     ROS: 10 point ROS neg other than the symptoms noted above in the HPI.        Medical History  Surgical History Family History Social History   Past Medical History:   Diagnosis Date     Atypical chest pain 9/30/2015     Atypical chest pain 9/30/2015     Coronary artery disease involving native coronary artery of native heart  without angina pectoris 9/29/2021     Hyperlipidemia      Hyperlipidemia      Hypertension      Hypertension      Kidney stones      Migraines      Migraines      Paroxysmal atrial fibrillation (H) 7/5/2022     TMJ (dislocation of temporomandibular joint)      Past Surgical History:   Procedure Laterality Date     CHOLECYSTECTOMY       CHOLECYSTECTOMY       Family History   Problem Relation Age of Onset     Hypertension Mother      Alcoholism Father      Colorectal Cancer No family hx of      Coronary Artery Disease Mother      Coronary Artery Disease Father         Social History     Socioeconomic History     Marital status: Single     Spouse name: Not on file     Number of children: Not on file     Years of education: Not on file     Highest education level: Not on file   Occupational History     Not on file   Tobacco Use     Smoking status: Never Smoker     Smokeless tobacco: Never Used   Substance and Sexual Activity     Alcohol use: No     Drug use: No     Sexual activity: Yes     Partners: Male   Other Topics Concern     Parent/sibling w/ CABG, MI or angioplasty before 65F 55M? No   Social History Narrative     Not on file     Social Determinants of Health     Financial Resource Strain: Not on file   Food Insecurity: Not on file   Transportation Needs: Not on file   Physical Activity: Not on file   Stress: Not on file   Social Connections: Not on file   Intimate Partner Violence: Not on file   Housing Stability: Not on file           Medications  Allergies   Current Outpatient Medications   Medication Sig Dispense Refill     evolocumab (REPATHA) 140 MG/ML prefilled autoinjector Inject 1 mL (140 mg) Subcutaneous every 14 days for 24 doses 6 mL 3     ezetimibe (ZETIA) 10 MG tablet Take 1 tablet (10 mg) by mouth daily 90 tablet 2     Fremanezumab-vfrm 225 MG/1.5ML SOAJ INJECT 225MG SUBCUTANEOUS EVERY 30 DAYS 1.5 mL 12     GARLIC PO Take 1 tablet by mouth 2 times daily       lisinopril (ZESTRIL) 10 MG tablet Take  2 tablets (20 mg) by mouth daily 180 tablet 2     loratadine (CLARITIN) 10 MG tablet Take 1 tablet (10 mg) by mouth daily as needed for allergies 90 tablet 3     omeprazole (PRILOSEC) 40 MG DR capsule Take 1 capsule (40 mg) by mouth daily 90 capsule 3     order for DME Rolling walker with seat for home use 1 Device 0     oxyCODONE-acetaminophen (PERCOCET) 5-325 MG tablet Take 1 tablet by mouth every 6 hours as needed for moderate to severe pain Maximum of 4 tablets per day 120 tablet 0     Rimegepant Sulfate 75 MG TBDP Take 75 mg by mouth See Admin Instructions Take 75 mg orally per 24 hours placed on or under the tongue; MAX 75 mg/24 hours; 8 tablet 9     rivaroxaban ANTICOAGULANT (XARELTO ANTICOAGULANT) 20 MG TABS tablet Take 1 tablet (20 mg) by mouth daily (with dinner) 90 tablet 3     sotalol (BETAPACE) 80 MG tablet Take 1 tablet (80 mg) by mouth 2 times daily 60 tablet 11     ZOLMitriptan (ZOMIG) 5 MG tablet TAKE ONE TABLET BY MOUTH AT ONSET OF HEADACHE. 9 tablet 0       Allergies   Allergen Reactions     Hmg-Coa-R Inhibitors Other (See Comments)     Severe joint and muscle ache from Atorvastastin     Codeine Dizziness and Other (See Comments)     Rosuvastatin Muscle Pain (Myalgia)     Gabapentin Palpitations          Lab Results    Chemistry/lipid CBC Cardiac Enzymes/BNP/TSH/INR   Recent Labs   Lab Test 08/04/22  0752   CHOL 238*   HDL 53   *   TRIG 139     Recent Labs   Lab Test 08/04/22  0752 02/17/22  0920 05/25/21  1031   * 137* 183*     Recent Labs   Lab Test 06/30/22  0322      POTASSIUM 4.2   CHLORIDE 110*   CO2 23      BUN 31*   CR 1.02   GFRESTIMATED 66   DALJIT 10.1     Recent Labs   Lab Test 06/30/22  0322 06/17/22  1034 05/31/22  0950   CR 1.02 0.80 0.89     Recent Labs   Lab Test 05/04/22  0918   A1C 5.3          Recent Labs   Lab Test 06/30/22  0322   WBC 7.9   HGB 14.4   HCT 44.1   MCV 96        Recent Labs   Lab Test 06/30/22  0322 02/17/22  0920 05/25/21  1031    HGB 14.4 14.4 14.6    Recent Labs   Lab Test 06/30/22  0322 06/25/19  0420 06/25/19  0118   TROPONINI <0.01 <0.01 <0.01     No results for input(s): BNP, NTBNPI, NTBNP in the last 38438 hours.  Recent Labs   Lab Test 06/30/22  0322   TSH 1.75     No results for input(s): INR in the last 71391 hours.   55 minutes were spent on the date of encounter performing chart review, history and exam, documentation, and further activities as noted above.          Thank you for allowing me to participate in the care of your patient.      Sincerely,     DONN Kebede [facility]  cc:   No referring provider defined for this encounter.

## 2022-08-23 NOTE — PROGRESS NOTES
HEART CARE ELECTROPHYSIOLOGY CONSULTATON NOTE      [facility]  [phone]    Thank you, Dr. Herrera, for asking the [facility] Electrophysiology team to see Ms. Arielle Pham to evaluate atrial fibrillation.    Assessment/Recommendations   Assessment/Plan:  1.  Paroxysmal Atrial Fibrillation: History of intermittent palpitations, documented with DEVON daly in June.  Symptoms consist of palpitations and dyspnea on exertion, though she also has palpitations associated with PVCs.  Episodes have significantly decreased since starting diltiazem, but she continues to have some breakthrough.    We had a lengthy discussion of the physiology and natural progression of atrial fibrillation and treatment options including rate control versus rhythm control depending upon the presence of symptoms.  We further discussed rhythm control with medications or pulmonary vein isolation ablation.  We discussed avoidance of triggers.  She was given information to review at home.  Discontinue diltiazem.  Start sotalol 80 mg twice daily.  EKG on Friday for QT monitoring.    She was reassured that atrial fibrillation is not life-threatening, but carries an increased risk for stroke.  She has a [id number] score of 3 for female gender, CAD, and hypertension; per current guidelines anticoagulation is recommended, unless risks outweighed the benefits.  We discussed the risks and benefits of oral anticoagulation.  Continue Xarelto 20 mg daily for stroke prophylaxis.  We discussed the importance of taking this with a full meal to ensure proper absorption.    2.  Hypertension: Blood pressure at target.  Continue lisinopril.  She was instructed to check her blood pressure daily with initiation of sotalol as above, may need decrease in lisinopril dose.    3.  Coronary artery disease: Denies anginal symptoms.  Will be starting Repatha now that it has been approved by her insurance.  We discussed the importance of  "risk factor modification.    4.  Possible sleep apnea: We discussed the correlation between untreated sleep apnea and atrial fibrillation.  She is scheduled for consult with sleep medicine on 11/2/2022.  Recommend treatment if indicated.    Follow up in 6 to 8 weeks     History of Present Illness/Subjective    HPI: Arielle Pham is a 52 year old female who comes in today for EP consultation of atrial fibrillation.  She has a history of paroxysmal atrial fibrillation, coronary artery disease by CTA, hypertension, hyperlipidemia, chronic pain syndrome, and morbid obesity.  Evaluation for sleep apnea was recommended, consult scheduled for 11/2/2022.    She has a history of intermittent palpitations that increased in frequency and duration.  She had a more \"severe\" episode and was documented with atrial fibrillation with rapid ventricular response on 6/30/2022.  She was started on diltiazem, subsequently increased to 240 mg daily.  She had symptomatic improvement, but continued to have occasional brief palpitations.  Holter monitor showed occasional PACs, but no A. fib.  She is on Xarelto for stroke prophylaxis.    Arielle states that she feels pretty well.  Her primary limitation to activity is orthopedic and deconditioning; she ambulates with a cane or walker, but has been trying to walk farther more frequently.  She notes a decrease in palpitations since starting on diltiazem, mostly just a few skipped beats.  However, she had an episode of A. fib lasting a couple of hours last night, though symptom severity was not as severe as previous.  She denies exertional chest discomfort, abdominal fullness/bloating or peripheral edema, shortness of breath, paroxysmal nocturnal dyspnea, orthopnea, lightheadedness, dizziness, pre-syncope, or syncope.  She has eliminated caffeine and alcohol and has been drinking more water.  She has also been following a low-sodium, low-carb diet.    Cardiographics (EKGs, Holter monitor, and Rylano " patch monitor personally reviewed):  EKG done 7/5/2022 shows sinus rhythm at 93 bpm, QRS 88 ms, QT/QTc 338/420 ms  EKG done 6/30/2022 shows atrial fibrillation with rapid ventricular response at 155 bpm    24-hour Holter monitor worn 8/5/2022 shows sinus rhythm with an average heart rate of 78 bpm and a range of 54 to 131 bpm.  No significant bradycardia or pauses.  No atrial fibrillation.  Rare PVCs.  Rare PVCs, though intervals of more frequent PVCs including bigeminy, but overall PVC burden less than 1%.  Symptoms of palpitations correlated to sinus rhythm with PVCs.  No significant ventricular ectopy.    Zio patch monitor worn 5/4/2022 to 5/8/2022 shows sinus rhythm with an average heart rate of 78 bpm and a range of 49 to 151 bpm.  No significant bradycardia or pauses.  2 brief episodes of atrial tachycardia, longest 7 beats.  No atrial fibrillation.  Rare PACs and PVCs.  No significant ventricular ectopy.    ECHO done 8/5/2022:  1. Normal left ventricular size and systolic performance with a visually  estimated ejection fraction of 60-65%.  2. No significant valvular heart disease is identified on this study.  3. Normal right ventricular size and systolic performance.  The left atrium is of normal size.    NM stress test done 2/25/2022:     The nuclear stress test is negative for inducible myocardial ischemia or infarction.     The left ventricular ejection fraction at stress is 70%.     The patient is at a low risk of future cardiac ischemic events.     A prior study was conducted on 9/25/2015. The previously noted anterior and apical fixed perfusion defect is no longer present.    Cardiac CTA done 7/11/2019:    The total Agatston calcium score is 752. A calcium score in this range places the individual in the >90th percentile when compared to an age and gender matched control group and implies a very high risk of cardiac events in the next ten years.    Mild to moderate coronary atherosclerosis with no  high-grade obstructions evident    Ramus lesion: The lesion has moderate luminal stenosis in the range of 50-69%. Non-calcified plaque has been detected.    I have reviewed and updated the patient's Past Medical History, Social History, Family History and Medication List.  Outside records reviewed.     Physical Examination  Review of Systems   Vitals: /78 (BP Location: Left arm, Patient Position: Sitting, Cuff Size: Adult Regular)   Pulse 86   Resp 16   Wt 131.1 kg (289 lb)   BMI 42.68 kg/m    BMI= Body mass index is 42.68 kg/m .  Wt Readings from Last 3 Encounters:   08/23/22 131.1 kg (289 lb)   08/16/22 130.6 kg (288 lb)   07/05/22 127.7 kg (281 lb 8 oz)       General Appearance:   Alert, well-appearing and in no acute distress.   HEENT: Atraumatic, normocephalic.  No scleral icterus, normal conjunctivae, EOMs intact, PERRL.  Wearing a mask.   Chest/Lungs:   Chest symmetric, spine straight.  Respirations unlabored.  Lungs are clear to auscultation.   Cardiovascular:   Regular rate and rhythm.  Normal first and second heart sounds with no murmurs, rubs, or gallops; radial and posterior tibial pulses are intact, No edema.   Abdomen:  Soft, nondistended, bowel sounds present.   Extremities: No cyanosis or clubbing.   Musculoskeletal: Moves all extremities.     Skin: Warm, dry, intact.    Neurologic: Mood and affect are appropriate.  Alert and oriented to person, place, time, and situation.     ROS: 10 point ROS neg other than the symptoms noted above in the HPI.        Medical History  Surgical History Family History Social History   Past Medical History:   Diagnosis Date     Atypical chest pain 9/30/2015     Atypical chest pain 9/30/2015     Coronary artery disease involving native coronary artery of native heart without angina pectoris 9/29/2021     Hyperlipidemia      Hyperlipidemia      Hypertension      Hypertension      Kidney stones      Migraines      Migraines      Paroxysmal atrial fibrillation  (H) 7/5/2022     TMJ (dislocation of temporomandibular joint)      Past Surgical History:   Procedure Laterality Date     CHOLECYSTECTOMY       CHOLECYSTECTOMY       Family History   Problem Relation Age of Onset     Hypertension Mother      Alcoholism Father      Colorectal Cancer No family hx of      Coronary Artery Disease Mother      Coronary Artery Disease Father         Social History     Socioeconomic History     Marital status: Single     Spouse name: Not on file     Number of children: Not on file     Years of education: Not on file     Highest education level: Not on file   Occupational History     Not on file   Tobacco Use     Smoking status: Never Smoker     Smokeless tobacco: Never Used   Substance and Sexual Activity     Alcohol use: No     Drug use: No     Sexual activity: Yes     Partners: Male   Other Topics Concern     Parent/sibling w/ CABG, MI or angioplasty before 65F 55M? No   Social History Narrative     Not on file     Social Determinants of Health     Financial Resource Strain: Not on file   Food Insecurity: Not on file   Transportation Needs: Not on file   Physical Activity: Not on file   Stress: Not on file   Social Connections: Not on file   Intimate Partner Violence: Not on file   Housing Stability: Not on file           Medications  Allergies   Current Outpatient Medications   Medication Sig Dispense Refill     evolocumab (REPATHA) 140 MG/ML prefilled autoinjector Inject 1 mL (140 mg) Subcutaneous every 14 days for 24 doses 6 mL 3     ezetimibe (ZETIA) 10 MG tablet Take 1 tablet (10 mg) by mouth daily 90 tablet 2     Fremanezumab-vfrm 225 MG/1.5ML SOAJ INJECT 225MG SUBCUTANEOUS EVERY 30 DAYS 1.5 mL 12     GARLIC PO Take 1 tablet by mouth 2 times daily       lisinopril (ZESTRIL) 10 MG tablet Take 2 tablets (20 mg) by mouth daily 180 tablet 2     loratadine (CLARITIN) 10 MG tablet Take 1 tablet (10 mg) by mouth daily as needed for allergies 90 tablet 3     omeprazole (PRILOSEC) 40 MG   capsule Take 1 capsule (40 mg) by mouth daily 90 capsule 3     order for DME Rolling walker with seat for home use 1 Device 0     oxyCODONE-acetaminophen (PERCOCET) 5-325 MG tablet Take 1 tablet by mouth every 6 hours as needed for moderate to severe pain Maximum of 4 tablets per day 120 tablet 0     Rimegepant Sulfate 75 MG TBDP Take 75 mg by mouth See Admin Instructions Take 75 mg orally per 24 hours placed on or under the tongue; MAX 75 mg/24 hours; 8 tablet 9     rivaroxaban ANTICOAGULANT (XARELTO ANTICOAGULANT) 20 MG TABS tablet Take 1 tablet (20 mg) by mouth daily (with dinner) 90 tablet 3     sotalol (BETAPACE) 80 MG tablet Take 1 tablet (80 mg) by mouth 2 times daily 60 tablet 11     ZOLMitriptan (ZOMIG) 5 MG tablet TAKE ONE TABLET BY MOUTH AT ONSET OF HEADACHE. 9 tablet 0       Allergies   Allergen Reactions     Hmg-Coa-R Inhibitors Other (See Comments)     Severe joint and muscle ache from Atorvastastin     Codeine Dizziness and Other (See Comments)     Rosuvastatin Muscle Pain (Myalgia)     Gabapentin Palpitations          Lab Results    Chemistry/lipid CBC Cardiac Enzymes/BNP/TSH/INR   Recent Labs   Lab Test 08/04/22  0752   CHOL 238*   HDL 53   *   TRIG 139     Recent Labs   Lab Test 08/04/22  0752 02/17/22  0920 05/25/21  1031   * 137* 183*     Recent Labs   Lab Test 06/30/22  0322      POTASSIUM 4.2   CHLORIDE 110*   CO2 23      BUN 31*   CR 1.02   GFRESTIMATED 66   DALJIT 10.1     Recent Labs   Lab Test 06/30/22  0322 06/17/22  1034 05/31/22  0950   CR 1.02 0.80 0.89     Recent Labs   Lab Test 05/04/22  0918   A1C 5.3          Recent Labs   Lab Test 06/30/22  0322   WBC 7.9   HGB 14.4   HCT 44.1   MCV 96        Recent Labs   Lab Test 06/30/22  0322 02/17/22  0920 05/25/21  1031   HGB 14.4 14.4 14.6    Recent Labs   Lab Test 06/30/22  0322 06/25/19  0420 06/25/19  0118   TROPONINI <0.01 <0.01 <0.01     No results for input(s): BNP, NTBNPI, NTBNP in the last 72000  hours.  Recent Labs   Lab Test 06/30/22  0322   TSH 1.75     No results for input(s): INR in the last 01084 hours.   55 minutes were spent on the date of encounter performing chart review, history and exam, documentation, and further activities as noted above.

## 2022-08-23 NOTE — PATIENT INSTRUCTIONS
Arielle Pham,    It was a pleasure to see you today at the [facility].     My recommendations after this visit include:    Continue Xarelto 20 mg daily with your evening meal to ensure adequate absorption (decrease stroke risk)    Stop taking diltiazem.  Start sotalol 80 mg every 12 hours to suppress A fib.  EKG on Friday    Check blood pressure daily, at least 1 hour after medications and at rest for at least 5 minutes.    Keep a log of A fib episodes (frequency, duration, symptoms)    Follow up in 6-8 weeks    Angella Rivera, CNP  [facility], Electrophysiology  [phone]  EP nurses [phone]

## 2022-08-29 ENCOUNTER — TELEPHONE (OUTPATIENT)
Dept: CARDIOLOGY | Facility: CLINIC | Age: 52
End: 2022-08-29

## 2022-08-29 ENCOUNTER — ALLIED HEALTH/NURSE VISIT (OUTPATIENT)
Dept: CARDIOLOGY | Facility: CLINIC | Age: 52
End: 2022-08-29
Attending: NURSE PRACTITIONER
Payer: COMMERCIAL

## 2022-08-29 VITALS
RESPIRATION RATE: 16 BRPM | HEIGHT: 69 IN | SYSTOLIC BLOOD PRESSURE: 132 MMHG | HEART RATE: 56 BPM | DIASTOLIC BLOOD PRESSURE: 100 MMHG | WEIGHT: 281 LBS | BODY MASS INDEX: 41.62 KG/M2

## 2022-08-29 DIAGNOSIS — I48.0 PAROXYSMAL ATRIAL FIBRILLATION (H): ICD-10-CM

## 2022-08-29 PROCEDURE — 99207 PR NO CHARGE NURSE ONLY: CPT

## 2022-08-29 PROCEDURE — 93000 ELECTROCARDIOGRAM COMPLETE: CPT | Performed by: INTERNAL MEDICINE

## 2022-08-29 NOTE — TELEPHONE ENCOUNTER
Prior Authorization Approval    Authorization Effective Date: 5/31/2022  Authorization Expiration Date: 8/29/2023  Medication: Repatha Autoinjector 140mg/ml  Approved Dose/Quantity:   Reference #:     Insurance Company: [employer] - Phone [phone] Fax [phone]  Expected CoPay:       CoPay Card Available:      Foundation Assistance Needed:    Which Pharmacy is filling the prescription (Not needed for infusion/clinic administered): [facility] [address] [place] [address]  Pharmacy Notified: Yes  Patient Notified: Yes

## 2022-08-29 NOTE — TELEPHONE ENCOUNTER
Pharmacy accidentally requested the pre-filled syringes instead of the repatha autoinjector that was prescribed.  CMM that was sent from pharmacy was for the syringes that got approved.      Re-submitting PA for the autoinjector.

## 2022-08-29 NOTE — PROGRESS NOTES
EKG Visit    Pt here for EKG, QTc check after starting 80mg Sotalol BID on 8/26/22 evening    EKG shows SB with HR of 56 bpm, QT of 424 ms, and QTc of 409 ms  QTc within acceptable limits, pts EKG was compared to previous EKG in EMR, EKG is stable, and there has been minimal change in QTc  Vital signes were reviewed and are stable     Pt states the medication seems to have helped with her AFIB and heart racing, but she is experiencing multiple other side effects since starting medication.  Slight headache, nervousness, sweaty, fogginess, starving all the time even after eating, and tremors.  She states she is willing to wait out the 2 week period to see if they go away with time but would like to send to Angella in case there is something else she should try instead.    reviewed with pt reasoning for above medication, reviewed and confirmed pt understands current dose, administration, and frequency of medication, most common potential side effects from the medication, s/s to call the clinic with and when to seek emergency medical care    Pt was instructed to continue current medication as prescribed, results would be sent to ordering provider for review, pt will be contacted with further changes if necessary and pt verbalized understanding     Results sent to  Angella Rivera NP for further review and recommendations if necessary  8/29/2022 11:00 AM  Marisel Li RN

## 2022-08-29 NOTE — TELEPHONE ENCOUNTER
Confirmed with pharmacy they have paid claim for Repatha Sureclick autoinjector.  Max quantity is 2 per 28 days since patient is on a PMAP plan.

## 2022-08-29 NOTE — TELEPHONE ENCOUNTER
Central Prior Authorization Team   Phone: [phone]    PA Initiation    Medication: Repatha Autoinjector 140mg/ml  Insurance Company: [employer] [facility] - Phone [phone] Fax [phone]  Pharmacy Filling the Rx: [address]  Filling Pharmacy Phone: [phone]  Filling Pharmacy Fax:    Start Date: 8/29/2022

## 2022-08-29 NOTE — TELEPHONE ENCOUNTER
Additional questions received, answered and faxed back. Let insurance know that the Repatha has already been approved but for the prefilled syringe.  Patient wants the autoinjector

## 2022-08-30 LAB
ATRIAL RATE - MUSE: 56 BPM
DIASTOLIC BLOOD PRESSURE - MUSE: NORMAL MMHG
INTERPRETATION ECG - MUSE: NORMAL
P AXIS - MUSE: 28 DEGREES
PR INTERVAL - MUSE: 178 MS
QRS DURATION - MUSE: 84 MS
QT - MUSE: 424 MS
QTC - MUSE: 409 MS
R AXIS - MUSE: 7 DEGREES
SYSTOLIC BLOOD PRESSURE - MUSE: NORMAL MMHG
T AXIS - MUSE: 6 DEGREES
VENTRICULAR RATE- MUSE: 56 BPM

## 2022-08-30 NOTE — PROGRESS NOTES
Spoke to pt regarding recommendations. She states she was having issues with her daughter yesterday morning and thinks that is why her BP was a little more elevated than normal.    Previous BP at office visit with Angella on 8/23 was 132/78, and at Dr. Herrera's appt 8/16 152/83.  Pt states she will monitor her BP this week and will call us with an update regardless either Friday if her symptoms are not tolerable, or Tuesday if they appear to be getting better.    No further questions or concerns at this time.  Will postpone note to call pt on Tuesday to reassess if we haven't heard from her.    Marisel

## 2022-08-30 NOTE — PROGRESS NOTES
Angella Rivera, DONN CNP  Marisel Li, RN  AAD medication options are limited.  Was her BP really 132/100?  If BP is elevated, could be causing symptoms.  Please have her monitor BP at home.  If symptoms do not improve in a week, will try decreasing sotalol dose.   ThanksAngella

## 2022-09-06 ENCOUNTER — MYC MEDICAL ADVICE (OUTPATIENT)
Dept: CARDIOLOGY | Facility: CLINIC | Age: 52
End: 2022-09-06

## 2022-09-06 DIAGNOSIS — I48.0 PAROXYSMAL ATRIAL FIBRILLATION (H): Primary | ICD-10-CM

## 2022-09-06 NOTE — PROGRESS NOTES
"Pc back from patient.  As far as Bps go, she is using her moms cuff and feels it is \"old and unreliable\" as her BPS have been all over the place.  She is agreeable to invest in a new cuff so we can better determine if her symptoms are being caused by BP.    She still complains of fatigue, sweatiness and now acne since starting the sotalol.  She also had a 4 hour episode of her heart racing last night.    Angella, further changes/recommendations?    Thank you  Katja"

## 2022-09-06 NOTE — PROGRESS NOTES
1st Attempt to contact pt, voicemail message was left with contact information and instructing pt to call back.  9/6/2022 10:17 AM  Eliza Blunt RN

## 2022-09-07 NOTE — TELEPHONE ENCOUNTER
Please have her decrease sotalol to 40 mg twice daily.  Antiarrhythmic medication options are limited due to CAD.  Consider PVI.  Please have her see Dr. Gutierrez to further discuss AF treatment options including PVI.  Thanks,  Angella

## 2022-09-09 ENCOUNTER — TELEPHONE (OUTPATIENT)
Dept: CARDIOLOGY | Facility: CLINIC | Age: 52
End: 2022-09-09

## 2022-09-09 ENCOUNTER — VIRTUAL VISIT (OUTPATIENT)
Dept: FAMILY MEDICINE | Facility: CLINIC | Age: 52
End: 2022-09-09
Payer: COMMERCIAL

## 2022-09-09 DIAGNOSIS — M51.369 DDD (DEGENERATIVE DISC DISEASE), LUMBAR: ICD-10-CM

## 2022-09-09 DIAGNOSIS — Z79.891 CHRONIC PRESCRIPTION OPIATE USE: Primary | ICD-10-CM

## 2022-09-09 DIAGNOSIS — M54.2 NECK PAIN: ICD-10-CM

## 2022-09-09 DIAGNOSIS — Z12.31 VISIT FOR SCREENING MAMMOGRAM: ICD-10-CM

## 2022-09-09 DIAGNOSIS — G43.009 MIGRAINE WITHOUT AURA AND WITHOUT STATUS MIGRAINOSUS, NOT INTRACTABLE: ICD-10-CM

## 2022-09-09 PROCEDURE — 99213 OFFICE O/P EST LOW 20 MIN: CPT | Mod: 95 | Performed by: FAMILY MEDICINE

## 2022-09-09 RX ORDER — OXYCODONE AND ACETAMINOPHEN 5; 325 MG/1; MG/1
1 TABLET ORAL EVERY 6 HOURS PRN
Qty: 117 TABLET | Refills: 0 | Status: SHIPPED | OUTPATIENT
Start: 2022-09-09 | End: 2022-10-07

## 2022-09-09 RX ORDER — ZOLMITRIPTAN 5 MG/1
TABLET, FILM COATED ORAL
Qty: 9 TABLET | Refills: 5 | Status: SHIPPED | OUTPATIENT
Start: 2022-09-09 | End: 2024-07-16

## 2022-09-09 ASSESSMENT — PATIENT HEALTH QUESTIONNAIRE - PHQ9
10. IF YOU CHECKED OFF ANY PROBLEMS, HOW DIFFICULT HAVE THESE PROBLEMS MADE IT FOR YOU TO DO YOUR WORK, TAKE CARE OF THINGS AT HOME, OR GET ALONG WITH OTHER PEOPLE: SOMEWHAT DIFFICULT
SUM OF ALL RESPONSES TO PHQ QUESTIONS 1-9: 7
SUM OF ALL RESPONSES TO PHQ QUESTIONS 1-9: 7

## 2022-09-09 NOTE — TELEPHONE ENCOUNTER
PC back from pt, corresponding information/recommendations reviewed, verbalized understanding, has no further questions at this time, contact information was given for further concerns/questions, scheduling notified to contact pt, and medication list updated    Angella, patient wanted to update you that she went back to the 80mg BID sotalol on her own.   Feeling a little better and noting less AF.  She will keep her upcoming apt with Dr. Barrera.   9/9/2022 3:57 PM  Eliza Washburn RN

## 2022-09-09 NOTE — PROGRESS NOTES
Arielle is a 52 year old who is being evaluated via a billable video visit.      How would you like to obtain your AVS?   If the video visit is dropped, the invitation should be resent by:   Will anyone else be joining your video visit?         A/P:      ICD-10-CM    1. Chronic prescription opiate use  Z79.891 oxyCODONE-acetaminophen (PERCOCET) 5-325 MG tablet   2. DDD (degenerative disc disease), lumbar  M51.36 oxyCODONE-acetaminophen (PERCOCET) 5-325 MG tablet   3. Neck pain  M54.2 oxyCODONE-acetaminophen (PERCOCET) 5-325 MG tablet   4. Migraine without aura and without status migrainosus, not intractable  G43.009 ZOLMitriptan (ZOMIG) 5 MG tablet   5. Visit for screening mammogram  Z12.31 MA SCREENING DIGITAL BILAT - Future  (s+30)     Chronic pain:  Pain control has been fairly stable.  Had some worsening when she had to cut out ibuprofen due to her blood thinner but now closer to baseline.  Recent nausea after percocet dosing.  Concern for this as a side effects of the opiate itself.  Discussed trial of a slow wean with goal of decreasing to 3 tabs per day.  Pt agreeable but concenred about worsening pain.  Reviewed need for continued exercise which she is doing and consideration of return to pain clinic if needed.  Emphasized that we would try to decrease med very slowly to reduce risk of worsening pain    Migraine:  Continues to follow with [facility] neurology, stable    Subjective   Arielle is a 52 year old, presenting for the following health issues:    Back Pain      History of Present Illness       Back Pain:  She presents for follow up of back pain. Patient's back pain is a chronic problem.  Location of back pain:  Right lower back, left lower back, right upper back, left upper back, right shoulder, left shoulder, left buttock, right hip and left hip  Description of back pain: burning, dull ache, sharp, shooting, stabbing and other  Back pain spreads: left buttocks, left knee, left foot, right shoulder, left shoulder  and right side of neck    Since patient first noticed back pain, pain is: unchanged  Does back pain interfere with her job:  Yes      Reason for visit:  Medicine check    She eats 0-1 servings of fruits and vegetables daily.She consumes 3 sweetened beverage(s) daily.She exercises with enough effort to increase her heart rate 10 to 19 minutes per day.  She exercises with enough effort to increase her heart rate 3 or less days per week.   She is taking medications regularly.    Today's PHQ-9         PHQ-9 Total Score: 7    PHQ-9 Q9 Thoughts of better off dead/self-harm past 2 weeks :   Not at all    How difficult have these problems made it for you to do your work, take care of things at home, or get along with other people: Somewhat difficult     Was attending PT and was told that there was not a lot more that they could do.  They recommended home exercises and water therapy/exercise.  Has been doing water therapy, walking in the pool a few times a week recently and does find it helpful    Has also been trying to walk more with her walker and finds this helpful as well.  Notes she was a lot more achy when she was moving less after starting the sotalol     Feels pain has been pretty stable overall and pain medication continue to be effective.  Takes the percocet pretty much every 6 hours.    Has been getting a bit more nauseated after taking her pain pills.  Just feels like her stomach is upset.  Always takes med with food.  This has been a newer symptom for her.  Wonders about the change       Review of Systems   Constitutional, HEENT, cardiovascular, pulmonary, gi and gu systems are negative, except as otherwise noted.      Objective           Vitals:  No vitals were obtained today due to virtual visit.    Physical Exam   GENERAL: Healthy, alert and no distress  EYES: Eyes grossly normal to inspection.  No discharge or erythema, or obvious scleral/conjunctival abnormalities.  RESP: No audible wheeze, cough, or  visible cyanosis.  No visible retractions or increased work of breathing.    SKIN: Visible skin clear. No significant rash, abnormal pigmentation or lesions.  NEURO: Cranial nerves grossly intact.  Mentation and speech appropriate for age.  PSYCH: Mentation appears normal, affect normal/bright, judgement and insight intact, normal speech and appearance well-groomed.    Epic reviewed            Video-Visit Details    Video Start Time: 9:07 AM      Type of service:  Video Visit    Video End Time:9:24 AM    Originating Location (pt. Location): Home    Distant Location (provider location):  [facility]     Platform used for Video Visit: [employer]

## 2022-09-09 NOTE — TELEPHONE ENCOUNTER
Message received from Neil Guzmán,     Thank your for your response back.  I will make a note of your blood pressure.  I will also let Dr. Herrera's/Angella nurse nurse about the racing heart .          Kind Regards,       LARRY Pedraza       My blood pressure was 126/76  Also I went back to a whole pill and I I've had more episodes of the racing heart off and on so I was just letting you know    Will send to Angella's nurse to address   LARRY Pedraza

## 2022-09-09 NOTE — TELEPHONE ENCOUNTER
Last Blood Pressure: 132/100  Last Heart Rate: 56  Date: 8/29/22  Location: [facility]    Today's Blood Pressure: 126/76  Today's Heart Rate: n/a   Location: Home BP      Blood pressure received from LARRY Dodson   Patient reported blood pressure updated in Epic. Blood pressure falls within [place] Community Measures guidelines.  Patient will follow up as previously advised.

## 2022-09-09 NOTE — TELEPHONE ENCOUNTER
1st Attempt to contact pt, voicemail message was left with contact information and instructing pt to call back.      To note, patient recently had sotalol decreased to 40mg BID due to side effects.  Per Angella,  Antiarrhythmic medication options are limited due to CAD.  Consider PVI.  Please have her see Dr. Gutierrez to further discuss AF treatment options including PVI.    She has an appointment with Dr. Gutierrez on 9/15.  FELIPE    9/9/2022 3:56 PM  Eliza Washburn RN

## 2022-09-09 NOTE — PATIENT INSTRUCTIONS
We'll plan to try a slow taper of the pain medication as I am concerne the medicine itself and your long term use is actually causing the upset stomach.  We'll start by reducing the number of pills by 3 per month and see how it goes.  Please message me with an update before your next refill.

## 2022-09-13 ENCOUNTER — TELEPHONE (OUTPATIENT)
Dept: NURSING | Facility: CLINIC | Age: 52
End: 2022-09-13

## 2022-09-13 NOTE — TELEPHONE ENCOUNTER
Bernie calling from [facility] ([phone]) in [place]. Looking for PT order to start therapy on patient's knee. Requests order be faxed to [phone]. May call if any questions or concerns.    Nicci Colon RN, BSN  [facility]   Triage Nurse Advisor

## 2022-09-14 NOTE — PROGRESS NOTES
HEART CARE ENCOUNTER CONSULTATON NOTE      [facility]  [phone]      Assessment/Recommendations   Assessment/Plan:    Arielle Pham is a very pleasant 52 year old lady with PMH of paroxysmal atrial fibrillation, chronic pain, who presents to EP clinic for further management of atrial fibrillation.    1. Paroxysmal Atrial fibrillation  - We reviewed the pathophysiology of atrial fibrillation and management considerations including stroke risk and anticoagulation, rate control, cardioversion, antiarrhythmic drug therapy, and catheter ablation. We discussed atrial fibrillation ablation procedures, anticipated success rates, the potential need for re-do ablation vs addition of anti-arrhythmic drugs, procedural risks (including groin bleeding, tamponade, phrenic or esophageal injury, stroke, pulmonary vein stenosis) and recovery expectations.  - She would like to continue Sotalol 80 mg BID for now.   - She has a sleep study appointment in November and was encouraged to keep it.  - Importance of life style modification discussed and she is working on increasing her physical activity level. Emphasized that ablation outcomes are greatly improved with weight loss.  - If she has break through episodes with Sotalol will pursue an ablation.     2. Anticoagulation  - CHADSVASC score 3  - On Xarelto and no tolerating it well    F/U in 3 months           History of Present Illness/Subjective    HPI: Arielle Pham is a very pleasant 52 year old lady with PMH of paroxysmal atrial fibrillation, chronic pain due to degenerative disc disease, migraines who presents to EP clinic for further management of atrial fibrillation.    She was started on Sotalol 80 mg BID in August 2022 which had to reduced to 40 mg BID due to side effects of headaches, nervousness, fogginess and increased sweating and occasional tremors. She has noted an improved tolerance of Sotalol since and has gone back to the 80 mg BID regimen.  "    She reports a lot of stress in her personal life and was tearful as she discussed it with me today.     Recent Echocardiogram Results: Personally reviewed, normal systolic function         Physical Examination  Review of Systems   Vitals: /82 (BP Location: Right arm, Patient Position: Sitting, Cuff Size: Adult Regular)   Pulse 67   Resp 16   Ht 1.753 m (5' 9\")   Wt 128.4 kg (283 lb)   BMI 41.79 kg/m    BMI= Body mass index is 41.79 kg/m .  Wt Readings from Last 3 Encounters:   09/15/22 128.4 kg (283 lb)   08/29/22 127.5 kg (281 lb)   08/23/22 131.1 kg (289 lb)       General Appearance:   no distress, normal body habitus   ENT/Mouth: membranes moist, no oral lesions or bleeding gums.      EYES:  no scleral icterus, normal conjunctivae   Neck: no carotid bruits or thyromegaly   Chest/Lungs:   lungs are clear to auscultation, no rales or wheezing,  sternal scar, equal chest wall expansion    Cardiovascular:   Regular. Normal first and second heart sounds with no murmurs, rubs, or gallops; the carotid, radial and posterior tibial pulses are intact, no edema bilaterally    Abdomen:  no organomegaly, masses, bruits, or tenderness; bowel sounds are present   Extremities: no cyanosis or clubbing   Skin: no xanthelasma, warm.    Neurologic: normal  bilateral, no tremors     Psychiatric: alert and oriented x3, calm        Please refer above for cardiac ROS details.        Medical History  Surgical History Family History Social History   Past Medical History:   Diagnosis Date     Atypical chest pain 9/30/2015     Atypical chest pain 9/30/2015     Coronary artery disease involving native coronary artery of native heart without angina pectoris 9/29/2021     Hyperlipidemia      Hyperlipidemia      Hypertension      Hypertension      Kidney stones      Migraines      Migraines      Paroxysmal atrial fibrillation (H) 7/5/2022     TMJ (dislocation of temporomandibular joint)      Past Surgical History:   Procedure " Laterality Date     CHOLECYSTECTOMY       CHOLECYSTECTOMY       Family History   Problem Relation Age of Onset     Hypertension Mother      Alcoholism Father      Colorectal Cancer No family hx of      Coronary Artery Disease Mother      Coronary Artery Disease Father         Social History     Socioeconomic History     Marital status: Single     Spouse name: Not on file     Number of children: Not on file     Years of education: Not on file     Highest education level: Not on file   Occupational History     Not on file   Tobacco Use     Smoking status: Never Smoker     Smokeless tobacco: Never Used   Substance and Sexual Activity     Alcohol use: No     Drug use: No     Sexual activity: Yes     Partners: Male   Other Topics Concern     Parent/sibling w/ CABG, MI or angioplasty before 65F 55M? No   Social History Narrative     Not on file     Social Determinants of Health     Financial Resource Strain: Not on file   Food Insecurity: Not on file   Transportation Needs: Not on file   Physical Activity: Not on file   Stress: Not on file   Social Connections: Not on file   Intimate Partner Violence: Not on file   Housing Stability: Not on file           Medications  Allergies   Current Outpatient Medications   Medication Sig Dispense Refill     evolocumab (REPATHA) 140 MG/ML prefilled autoinjector Inject 1 mL (140 mg) Subcutaneous every 14 days for 24 doses 6 mL 3     ezetimibe (ZETIA) 10 MG tablet Take 1 tablet (10 mg) by mouth daily 90 tablet 2     Fremanezumab-vfrm 225 MG/1.5ML SOAJ INJECT 225MG SUBCUTANEOUS EVERY 30 DAYS 1.5 mL 12     GARLIC PO Take 1 tablet by mouth 2 times daily       lisinopril (ZESTRIL) 10 MG tablet Take 2 tablets (20 mg) by mouth daily 180 tablet 2     loratadine (CLARITIN) 10 MG tablet Take 1 tablet (10 mg) by mouth daily as needed for allergies 90 tablet 3     omeprazole (PRILOSEC) 40 MG DR capsule Take 1 capsule (40 mg) by mouth daily 90 capsule 3     order for DME Rolling walker with seat  for home use 1 Device 0     oxyCODONE-acetaminophen (PERCOCET) 5-325 MG tablet Take 1 tablet by mouth every 6 hours as needed for moderate to severe pain Maximum of 4 tablets per day 117 tablet 0     Rimegepant Sulfate 75 MG TBDP Take 75 mg by mouth See Admin Instructions Take 75 mg orally per 24 hours placed on or under the tongue; MAX 75 mg/24 hours; 8 tablet 9     rivaroxaban ANTICOAGULANT (XARELTO ANTICOAGULANT) 20 MG TABS tablet Take 1 tablet (20 mg) by mouth daily (with dinner) 90 tablet 3     sotalol (BETAPACE) 80 MG tablet Take 1 tablet (80 mg) by mouth 2 times daily 60 tablet 11     ZOLMitriptan (ZOMIG) 5 MG tablet TAKE ONE TABLET BY MOUTH AT ONSET OF HEADACHE. 9 tablet 5       Allergies   Allergen Reactions     Hmg-Coa-R Inhibitors Other (See Comments)     Severe joint and muscle ache from Atorvastastin     Codeine Dizziness and Other (See Comments)     Rosuvastatin Muscle Pain (Myalgia)     Gabapentin Palpitations          Lab Results    Chemistry/lipid CBC Cardiac Enzymes/BNP/TSH/INR   Recent Labs   Lab Test 08/04/22  0752   CHOL 238*   HDL 53   *   TRIG 139     Recent Labs   Lab Test 08/04/22  0752 02/17/22  0920 05/25/21  1031   * 137* 183*     Recent Labs   Lab Test 06/30/22  0322      POTASSIUM 4.2   CHLORIDE 110*   CO2 23      BUN 31*   CR 1.02   GFRESTIMATED 66   DALJIT 10.1     Recent Labs   Lab Test 06/30/22  0322 06/17/22  1034 05/31/22  0950   CR 1.02 0.80 0.89     Recent Labs   Lab Test 05/04/22  0918   A1C 5.3          Recent Labs   Lab Test 06/30/22  0322   WBC 7.9   HGB 14.4   HCT 44.1   MCV 96        Recent Labs   Lab Test 06/30/22  0322 02/17/22  0920 05/25/21  1031   HGB 14.4 14.4 14.6    Recent Labs   Lab Test 06/30/22  0322 06/25/19  0420 06/25/19  0118   TROPONINI <0.01 <0.01 <0.01     No results for input(s): BNP, NTBNPI, NTBNP in the last 28559 hours.  Recent Labs   Lab Test 06/30/22  0322   TSH 1.75     No results for input(s): INR in the last [id number]  fanny.     Romario Gutierrez MD

## 2022-09-14 NOTE — TELEPHONE ENCOUNTER
I believe she was seeing ortho for her knee pain?  Was the referral to come from them?    Huong Stahl, DO

## 2022-09-15 ENCOUNTER — OFFICE VISIT (OUTPATIENT)
Dept: CARDIOLOGY | Facility: CLINIC | Age: 52
End: 2022-09-15
Payer: COMMERCIAL

## 2022-09-15 VITALS
BODY MASS INDEX: 41.92 KG/M2 | HEIGHT: 69 IN | HEART RATE: 67 BPM | SYSTOLIC BLOOD PRESSURE: 132 MMHG | WEIGHT: 283 LBS | RESPIRATION RATE: 16 BRPM | DIASTOLIC BLOOD PRESSURE: 82 MMHG

## 2022-09-15 DIAGNOSIS — I48.0 PAROXYSMAL ATRIAL FIBRILLATION (H): Primary | ICD-10-CM

## 2022-09-15 LAB
ATRIAL RATE - MUSE: 67 BPM
DIASTOLIC BLOOD PRESSURE - MUSE: NORMAL MMHG
INTERPRETATION ECG - MUSE: NORMAL
P AXIS - MUSE: 9 DEGREES
PR INTERVAL - MUSE: 202 MS
QRS DURATION - MUSE: 86 MS
QT - MUSE: 392 MS
QTC - MUSE: 414 MS
R AXIS - MUSE: -5 DEGREES
SYSTOLIC BLOOD PRESSURE - MUSE: NORMAL MMHG
T AXIS - MUSE: -15 DEGREES
VENTRICULAR RATE- MUSE: 67 BPM

## 2022-09-15 PROCEDURE — 99204 OFFICE O/P NEW MOD 45 MIN: CPT | Performed by: INTERNAL MEDICINE

## 2022-09-15 PROCEDURE — 93000 ELECTROCARDIOGRAM COMPLETE: CPT | Performed by: GENERAL ACUTE CARE HOSPITAL

## 2022-09-15 RX ORDER — SOTALOL HYDROCHLORIDE 80 MG/1
80 TABLET ORAL 2 TIMES DAILY
Qty: 60 TABLET | Refills: 11 | Status: SHIPPED | OUTPATIENT
Start: 2022-09-15 | End: 2023-10-02

## 2022-09-15 NOTE — PATIENT INSTRUCTIONS
[facility]  Cardiac Electrophysiology  [address]  [place] [address]   Office: [phone]  Fax: [phone]       Thank you for seeing us in clinic today - it is a pleasure to be a part of your care team.  Below is a summary of our plan from today's visit.       You have paroxysmal atrial fibrillation, presently being managed with Sotalol. We reviewed physiology and management options including antiarrhythmic drug therapy, catheter ablation and lifestyle modification.  We will plan for the following:  - consider options further, and let us know with any questions or decision as to how you would like to proceed  - continue Sotalol 80 mg BID for now  - continue Xarelto.  - We will meet in 3 months to decide on continuation of medical therapy vs ablation.  - Will pursue sleep study in  November      Please do not hesitate to be in touch with our office at [phone] with any questions that may arise.       Thank you for trusting us with your care,    Romario Gutierrez MD  Clinical Cardiac Electrophysiology  [facility]  [address]  [place] [address]   Office: [phone]  Fax: [phone]                 ATRIAL FIBRILLATION: Patient Information     What is atrial fibrillation?  Atrial fibrillation (AF, A-fib) is a common heart rhythm problem (arrhythmia) occurring within the upper chambers of the heart (the atria).  In normal rhythm, the upper and lower chambers of the heart are electrically driven to contract in a coordinated sequence.  In atrial fibrillation, the atria lose their ability to contract because of rapid and chaotic electrical activity.  The lower chambers of the heart (the ventricles) continue to pump blood throughout the body, though with irregular and often faster rate due to the chaotic activity within the atria.          How do I know if I have atrial fibrillation?   Some people may feel their heart beating  faster, harder, or irregularly while in atrial fibrillation.  Others may be lightheaded, fatigued, feel weak or tired or become more short of breath especially with activities.  Some patients have no symptoms at all.  Atrial fibrillation may be found due to an irregular pulse or on an electrocardiogram (ECG). Atrial fibrillation can start and stop on its own, and episodes can last from seconds to several months.       How common is atrial fibrillation?   An estimated 3-6 million people in the [place] have atrial fibrillation.  Atrial fibrillation is a common heart rhythm problem for older persons, affecting as estimated 12-15% of people over the age of 65 years of age.     What causes atrial fibrillation?   Age is the most important risk factor for atrial fibrillation.  Atrial fibrillation is more common in people with other heart disease, high blood pressure, diabetes, obesity, sleep apnea and in people who regularly consume alcohol.  Surgery, lung disease, or thyroid problems can lead to atrial fibrillation.  Atrial fibrillation has multiple possible causes, and in most cases a single cause cannot be found.  Atrial fibrillation is a progressive condition, usually starting with at an early stage with short and infrequent episodes.  In later stages of disease, more frequent and longer lasting episodes of atrial fibrillation occur, ultimately culminating in episodes which do not spontaneously terminate.  Generally, more enlargement and scarring within the upper chambers of the heart is observed as atrial fibrillation progresses from early to late-stage disease.     How is atrial fibrillation diagnosed and evaluated?    Because of its start-stop nature, atrial fibrillation can be challenging to diagnose.  Atrial fibrillation is most commonly diagnosed via cardiac rhythm recordings - either an ECG or wearable cardiac rhythm monitor.  For patients with pacemakers, defibrillators or implantable loop recorders,  atrial fibrillation may be recorded via these devices.  Recently, commercially available devices (eg. Apple Watch, Orthodata device, certain FitBit devices, others) can allow patients to take 30 second cardiac rhythm recordings which may document atrial fibrillation.  Once atrial fibrillation is diagnosed, additional tests include blood tests and an echocardiogram.  The echocardiogram uses ultrasound to look at your heart to assess your cardiac function and evaluate for other heart disease.  Additional evaluation may include CT or MRI studies.     Is atrial fibrillation dangerous?   Atrial fibrillation is not usually a life-threatening arrhythmia.  The most serious consequences of atrial fibrillation including stroke and worsening of overall cardiac function.  While in atrial fibrillation, the upper cardiac chambers do not contract normally, resulting in slower blood flow and increased risk of clot formation.  If this blood clot becomes detached from the heart a stroke can occur.  Unfortunately, stroke can be the first sign of atrial fibrillation for some people.  With a stroke, you may notice abnormal sensation, weakness on one side of the body or face, changes in your vision or speech.  If you have any of these signs, you should contact EMS and be evaluated in an emergency room as soon as possible.       How is atrial fibrillation treated?     Several treatment options exist for suppressing atrial fibrillation - however, it is not an easily curable arrhythmia.  The first goal in managing atrial fibrillation is to minimize stroke risk.  The second goal is to improve symptoms associated with atrial fibrillation.  Finally, in patients with reduced cardiac function, maintaining normal rhythm can help improve cardiac function.       Blood thinners are used to reduce the risk of stroke in patients with high estimated stroke risk related to atrial fibrillation.  For patients at higher risk of bleeding related to blood  thinner use, implantable devices may be an option to reduce stroke risk without the need for long term blood thinner use.       Atrial fibrillation can be managed via two strategies: rate control and rhythm control.  In a rate control strategy, continued atrial fibrillation is accepted and medications (eg. beta-blockers or calcium channel blockers) are used to control the lower chamber rate.  In a rhythm control strategy, anti-arrhythmic medications or catheter ablation are used to maintain normal cardiac rhythm and slow disease progression by suppressing atrial fibrillation.  A procedure called a cardioversion, in which an electric shock is delivered through patches placed on the chest wall while under deep sedation, can be performed to temporarily restore normal cardiac rhythm, though does not address the chance of atrial fibrillation recurrence.  Treatments are more effective for earlier rather than later stage atrial fibrillation.  Lifestyle modifications (maintaining a healthy weight, aerobic exercise, diagnosing and treating sleep apnea, and minimizing alcohol intake) are important elements of atrial fibrillation rhythm control.      What is catheter ablation for atrial fibrillation?  Cardiac catheter ablation is a commonly performed, minimally invasive procedure performed by a cardiac electrophysiologist to treat many different cardiac rhythm abnormalities.  During catheter ablation, long, thin, flexible tubes are advanced into the heart via small sheaths inserted into the femoral veins and thermal energy (either heating or cooling) is applied within the heart to disrupt abnormal electrical activity.  Atrial fibrillation ablation is performed under general anesthesia, with procedures generally taking approximately 2-3 hours.  Patients are typically observed for 3-5 hours after the ablation, and in most cases can be discharged home the same day.  Atrial fibrillation ablation is associated with better outcomes  (mortality, cardiovascular hospitalizations, atrial arrhythmia recurrences) compared to antiarrhythmic drug therapy.  However, atrial fibrillation recurrences are not uncommon, and repeat catheter ablation may be offered.  Your electrophysiology team can review atrial fibrillation ablation, anticipated success rates, risks, and recovery expectations with you.     What are anti-arrhythmic medications?  Anti-arrhythmic medications are specialized drugs which alter cardiac electrical functioning to suppress arrhythmias.  There are several anti-arrhythmic medications available, each with its own success rate and side effects.  Some anti-arrhythmic medications are less effective though safer to use, others are more effective though have serious potential toxicities.  Atrial fibrillation recurrences are common and may require dose adjustment or change in antiarrhythmic therapy.  Your electrophysiology team will carefully consider which medication would be the best and safest for your particular case.       Can I live a normal life?    The goal of atrial fibrillation management is for patients to live normal lives without being limited by symptoms related to atrial fibrillation.     Are any additional educational resources available?  There are a number of excellent atrial fibrillation education resources available to you online.  A few options you may wish to review include:  hrsonline.org/guide-atrial-fibrillation  afibmatters.org  getsmartaboutafib.com  stopaf.com     What comes next?    Consider your management options and let us know how we can help in your decision process.  Please take medications as they have been prescribed.  You should also get any tests that may have been ordered for you.       When to Call Your Doctor or seek emergency care:  Call your doctor or seek emergency care if you have any significant changes with the following:  Weakness  Dizziness  Fainting  Fatigue  Shortness of breath  Chest pain  with increased activity  If you are concerned that your heart rate is too fast or too slow  Bleeding that does not stop in 10 minutes  Coughing or throwing up blood  Bloody diarrhea or bleeding hemorrhoids  Dark-colored urine or black stool  Allergic reactions:  Rash  Itching  Swelling  Trouble breathing or swallowing        Please call the [facility] at [phone] if you have concerns about your symptoms, your medicines, or your follow-up appointments.

## 2022-09-15 NOTE — LETTER
9/15/2022    Huong Stahl, DO  [address] [place] [address]    RE: Arielle Pham       Dear Colleague,     I had the pleasure of seeing Arielle Pham in the [facility].    HEART CARE ENCOUNTER CONSULTATON NOTE      [facility]  [phone]      Assessment/Recommendations   Assessment/Plan:    Arielle Pham is a very pleasant 52 year old lady with PMH of paroxysmal atrial fibrillation, chronic pain, who presents to EP clinic for further management of atrial fibrillation.    1. Paroxysmal Atrial fibrillation  - We reviewed the pathophysiology of atrial fibrillation and management considerations including stroke risk and anticoagulation, rate control, cardioversion, antiarrhythmic drug therapy, and catheter ablation. We discussed atrial fibrillation ablation procedures, anticipated success rates, the potential need for re-do ablation vs addition of anti-arrhythmic drugs, procedural risks (including groin bleeding, tamponade, phrenic or esophageal injury, stroke, pulmonary vein stenosis) and recovery expectations.  - She would like to continue Sotalol 80 mg BID for now.   - She has a sleep study appointment in November and was encouraged to keep it.  - Importance of life style modification discussed and she is working on increasing her physical activity level. Emphasized that ablation outcomes are greatly improved with weight loss.  - If she has break through episodes with Sotalol will pursue an ablation.     2. Anticoagulation  - CHADSVASC score 3  - On Xarelto and no tolerating it well    F/U in 3 months           History of Present Illness/Subjective    HPI: Arielle Pham is a very pleasant 52 year old lady with PMH of paroxysmal atrial fibrillation, chronic pain due to degenerative disc disease, migraines who presents to EP clinic for further management of atrial fibrillation.    She was started on Sotalol 80 mg BID in August 2022 which had to reduced to 40 mg BID due to  "side effects of headaches, nervousness, fogginess and increased sweating and occasional tremors. She has noted an improved tolerance of Sotalol since and has gone back to the 80 mg BID regimen.     She reports a lot of stress in her personal life and was tearful as she discussed it with me today.     Recent Echocardiogram Results: Personally reviewed, normal systolic function         Physical Examination  Review of Systems   Vitals: /82 (BP Location: Right arm, Patient Position: Sitting, Cuff Size: Adult Regular)   Pulse 67   Resp 16   Ht 1.753 m (5' 9\")   Wt 128.4 kg (283 lb)   BMI 41.79 kg/m    BMI= Body mass index is 41.79 kg/m .  Wt Readings from Last 3 Encounters:   09/15/22 128.4 kg (283 lb)   08/29/22 127.5 kg (281 lb)   08/23/22 131.1 kg (289 lb)       General Appearance:   no distress, normal body habitus   ENT/Mouth: membranes moist, no oral lesions or bleeding gums.      EYES:  no scleral icterus, normal conjunctivae   Neck: no carotid bruits or thyromegaly   Chest/Lungs:   lungs are clear to auscultation, no rales or wheezing,  sternal scar, equal chest wall expansion    Cardiovascular:   Regular. Normal first and second heart sounds with no murmurs, rubs, or gallops; the carotid, radial and posterior tibial pulses are intact, no edema bilaterally    Abdomen:  no organomegaly, masses, bruits, or tenderness; bowel sounds are present   Extremities: no cyanosis or clubbing   Skin: no xanthelasma, warm.    Neurologic: normal  bilateral, no tremors     Psychiatric: alert and oriented x3, calm        Please refer above for cardiac ROS details.        Medical History  Surgical History Family History Social History   Past Medical History:   Diagnosis Date     Atypical chest pain 9/30/2015     Atypical chest pain 9/30/2015     Coronary artery disease involving native coronary artery of native heart without angina pectoris 9/29/2021     Hyperlipidemia      Hyperlipidemia      Hypertension      " Hypertension      Kidney stones      Migraines      Migraines      Paroxysmal atrial fibrillation (H) 7/5/2022     TMJ (dislocation of temporomandibular joint)      Past Surgical History:   Procedure Laterality Date     CHOLECYSTECTOMY       CHOLECYSTECTOMY       Family History   Problem Relation Age of Onset     Hypertension Mother      Alcoholism Father      Colorectal Cancer No family hx of      Coronary Artery Disease Mother      Coronary Artery Disease Father         Social History     Socioeconomic History     Marital status: Single     Spouse name: Not on file     Number of children: Not on file     Years of education: Not on file     Highest education level: Not on file   Occupational History     Not on file   Tobacco Use     Smoking status: Never Smoker     Smokeless tobacco: Never Used   Substance and Sexual Activity     Alcohol use: No     Drug use: No     Sexual activity: Yes     Partners: Male   Other Topics Concern     Parent/sibling w/ CABG, MI or angioplasty before 65F 55M? No   Social History Narrative     Not on file     Social Determinants of Health     Financial Resource Strain: Not on file   Food Insecurity: Not on file   Transportation Needs: Not on file   Physical Activity: Not on file   Stress: Not on file   Social Connections: Not on file   Intimate Partner Violence: Not on file   Housing Stability: Not on file           Medications  Allergies   Current Outpatient Medications   Medication Sig Dispense Refill     evolocumab (REPATHA) 140 MG/ML prefilled autoinjector Inject 1 mL (140 mg) Subcutaneous every 14 days for 24 doses 6 mL 3     ezetimibe (ZETIA) 10 MG tablet Take 1 tablet (10 mg) by mouth daily 90 tablet 2     Fremanezumab-vfrm 225 MG/1.5ML SOAJ INJECT 225MG SUBCUTANEOUS EVERY 30 DAYS 1.5 mL 12     GARLIC PO Take 1 tablet by mouth 2 times daily       lisinopril (ZESTRIL) 10 MG tablet Take 2 tablets (20 mg) by mouth daily 180 tablet 2     loratadine (CLARITIN) 10 MG tablet Take 1  tablet (10 mg) by mouth daily as needed for allergies 90 tablet 3     omeprazole (PRILOSEC) 40 MG DR capsule Take 1 capsule (40 mg) by mouth daily 90 capsule 3     order for DME Rolling walker with seat for home use 1 Device 0     oxyCODONE-acetaminophen (PERCOCET) 5-325 MG tablet Take 1 tablet by mouth every 6 hours as needed for moderate to severe pain Maximum of 4 tablets per day 117 tablet 0     Rimegepant Sulfate 75 MG TBDP Take 75 mg by mouth See Admin Instructions Take 75 mg orally per 24 hours placed on or under the tongue; MAX 75 mg/24 hours; 8 tablet 9     rivaroxaban ANTICOAGULANT (XARELTO ANTICOAGULANT) 20 MG TABS tablet Take 1 tablet (20 mg) by mouth daily (with dinner) 90 tablet 3     sotalol (BETAPACE) 80 MG tablet Take 1 tablet (80 mg) by mouth 2 times daily 60 tablet 11     ZOLMitriptan (ZOMIG) 5 MG tablet TAKE ONE TABLET BY MOUTH AT ONSET OF HEADACHE. 9 tablet 5       Allergies   Allergen Reactions     Hmg-Coa-R Inhibitors Other (See Comments)     Severe joint and muscle ache from Atorvastastin     Codeine Dizziness and Other (See Comments)     Rosuvastatin Muscle Pain (Myalgia)     Gabapentin Palpitations          Lab Results    Chemistry/lipid CBC Cardiac Enzymes/BNP/TSH/INR   Recent Labs   Lab Test 08/04/22  0752   CHOL 238*   HDL 53   *   TRIG 139     Recent Labs   Lab Test 08/04/22  0752 02/17/22  0920 05/25/21  1031   * 137* 183*     Recent Labs   Lab Test 06/30/22  0322      POTASSIUM 4.2   CHLORIDE 110*   CO2 23      BUN 31*   CR 1.02   GFRESTIMATED 66   DALJIT 10.1     Recent Labs   Lab Test 06/30/22  0322 06/17/22  1034 05/31/22  0950   CR 1.02 0.80 0.89     Recent Labs   Lab Test 05/04/22  0918   A1C 5.3          Recent Labs   Lab Test 06/30/22  0322   WBC 7.9   HGB 14.4   HCT 44.1   MCV 96        Recent Labs   Lab Test 06/30/22  0322 02/17/22  0920 05/25/21  1031   HGB 14.4 14.4 14.6    Recent Labs   Lab Test 06/30/22  0322 06/25/19  0420 06/25/19  0118    TROPONINI <0.01 <0.01 <0.01     No results for input(s): BNP, NTBNPI, NTBNP in the last 46077 hours.  Recent Labs   Lab Test 06/30/22  0322   TSH 1.75     No results for input(s): INR in the last 20809 hours.     Romario Gutierrez MD    Thank you for allowing me to participate in the care of your patient.    Sincerely,   Romario Gutierrez MD   [facility]  cc: No referring provider defined for this encounter.

## 2022-10-06 ENCOUNTER — TELEPHONE (OUTPATIENT)
Dept: FAMILY MEDICINE | Facility: CLINIC | Age: 52
End: 2022-10-06

## 2022-10-06 DIAGNOSIS — Z79.891 CHRONIC PRESCRIPTION OPIATE USE: ICD-10-CM

## 2022-10-06 DIAGNOSIS — M54.2 NECK PAIN: ICD-10-CM

## 2022-10-06 DIAGNOSIS — M51.369 DDD (DEGENERATIVE DISC DISEASE), LUMBAR: ICD-10-CM

## 2022-10-06 NOTE — TELEPHONE ENCOUNTER
Patient called and says last month went fine with the pain med, but patient says she believes it was her headache medication that was making her feel sick.      Trinity Altamirano, JOE Love

## 2022-10-07 ENCOUNTER — TELEPHONE (OUTPATIENT)
Dept: FAMILY MEDICINE | Facility: CLINIC | Age: 52
End: 2022-10-07

## 2022-10-07 RX ORDER — OXYCODONE AND ACETAMINOPHEN 5; 325 MG/1; MG/1
1 TABLET ORAL EVERY 6 HOURS PRN
Qty: 115 TABLET | Refills: 0 | Status: SHIPPED | OUTPATIENT
Start: 2022-10-07

## 2022-10-07 NOTE — TELEPHONE ENCOUNTER
Great! I'm glad it went well and I hope her stomach is feeling better.  I think overall the goal of gradually reducing her narcotic use is still a good one!  I would recommend we stick with our goal of a gradual dose reduction to reduce her risk of long term harm form her narcotics.    I sent new script for 115 tablets to be a 30 days supply.  We'll continue going down 2 per month and follow up at her next chronic pain visit in 2 months    Huong Stahl, DO

## 2022-10-07 NOTE — TELEPHONE ENCOUNTER
Prior Authorization Retail Medication Request    Medication/Dose: ZOLMitriptan (ZOMIG) 5 MG tablet  ICD code (if different than what is on RX):  Migraine without aura and without status migrainosus, not intractable   Previously Tried and Failed:    Rationale:      Insurance Name:    Insurance ID:        Pharmacy Information (if different than what is on RX)  Name:    Phone:

## 2022-10-11 NOTE — TELEPHONE ENCOUNTER
Central Prior Authorization Team   Phone: [phone]      Prior Authorization Approval    Authorization Effective Date: 7/13/2022  Authorization Expiration Date: 10/11/2023  Medication: ZOLMitriptan (ZOMIG) 5 MG tablet--APPROVED  Approved Dose/Quantity:    Reference #:     Insurance Company: [employer] - Phone [phone] Fax [phone]  Expected CoPay:       CoPay Card Available:      Foundation Assistance Needed:    Which Pharmacy is filling the prescription (Not needed for infusion/clinic administered): [facility] [address] [place] [address]  Pharmacy Notified: Yes  Patient Notified: Yes PHARMACY WILL CONTACT WHEN FILLED

## 2022-10-11 NOTE — TELEPHONE ENCOUNTER
Central Prior Authorization Team   Phone: [phone]      PA Initiation    Medication: ZOLMitriptan (ZOMIG) 5 MG tablet--INITIATED  Insurance Company: [employer] - Phone [phone] Fax [phone]  Pharmacy Filling the Rx: [facility] [address] [place] [address]  Filling Pharmacy Phone: [phone]  Filling Pharmacy Fax:    Start Date: 10/11/2022

## 2022-10-12 DIAGNOSIS — G43.009 MIGRAINE WITHOUT AURA AND WITHOUT STATUS MIGRAINOSUS, NOT INTRACTABLE: ICD-10-CM

## 2022-10-13 RX ORDER — ZOLMITRIPTAN 5 MG/1
TABLET, FILM COATED ORAL
Qty: 9 TABLET | Refills: 5 | OUTPATIENT
Start: 2022-10-13

## 2022-10-26 ENCOUNTER — TELEPHONE (OUTPATIENT)
Dept: CARDIOLOGY | Facility: CLINIC | Age: 52
End: 2022-10-26

## 2022-10-26 NOTE — TELEPHONE ENCOUNTER
Left msg for patient requesting call back to address questions teofilo Messina - noted EP follow-up sched on 12-7-22.  mg

## 2022-11-01 ASSESSMENT — SLEEP AND FATIGUE QUESTIONNAIRES
HOW LIKELY ARE YOU TO NOD OFF OR FALL ASLEEP WHILE WATCHING TV: SLIGHT CHANCE OF DOZING
HOW LIKELY ARE YOU TO NOD OFF OR FALL ASLEEP WHILE SITTING AND TALKING TO SOMEONE: WOULD NEVER DOZE
HOW LIKELY ARE YOU TO NOD OFF OR FALL ASLEEP WHILE SITTING QUIETLY AFTER LUNCH WITHOUT ALCOHOL: SLIGHT CHANCE OF DOZING
HOW LIKELY ARE YOU TO NOD OFF OR FALL ASLEEP IN A CAR, WHILE STOPPED FOR A FEW MINUTES IN TRAFFIC: WOULD NEVER DOZE
HOW LIKELY ARE YOU TO NOD OFF OR FALL ASLEEP WHILE SITTING INACTIVE IN A PUBLIC PLACE: SLIGHT CHANCE OF DOZING
HOW LIKELY ARE YOU TO NOD OFF OR FALL ASLEEP WHILE LYING DOWN TO REST IN THE AFTERNOON WHEN CIRCUMSTANCES PERMIT: SLIGHT CHANCE OF DOZING
HOW LIKELY ARE YOU TO NOD OFF OR FALL ASLEEP WHEN YOU ARE A PASSENGER IN A CAR FOR AN HOUR WITHOUT A BREAK: WOULD NEVER DOZE
HOW LIKELY ARE YOU TO NOD OFF OR FALL ASLEEP WHILE SITTING AND READING: SLIGHT CHANCE OF DOZING

## 2022-11-02 ENCOUNTER — VIRTUAL VISIT (OUTPATIENT)
Dept: SLEEP MEDICINE | Facility: CLINIC | Age: 52
End: 2022-11-02
Attending: FAMILY MEDICINE
Payer: COMMERCIAL

## 2022-11-02 ENCOUNTER — LAB REQUISITION (OUTPATIENT)
Dept: LAB | Facility: CLINIC | Age: 52
End: 2022-11-02
Payer: COMMERCIAL

## 2022-11-02 VITALS — HEIGHT: 69 IN | WEIGHT: 280 LBS | BODY MASS INDEX: 41.47 KG/M2

## 2022-11-02 DIAGNOSIS — I48.0 PAROXYSMAL ATRIAL FIBRILLATION (H): ICD-10-CM

## 2022-11-02 DIAGNOSIS — I10 ESSENTIAL (PRIMARY) HYPERTENSION: ICD-10-CM

## 2022-11-02 DIAGNOSIS — E66.01 MORBID OBESITY (H): ICD-10-CM

## 2022-11-02 DIAGNOSIS — G47.09 OTHER INSOMNIA: Primary | ICD-10-CM

## 2022-11-02 DIAGNOSIS — I48.11 LONGSTANDING PERSISTENT ATRIAL FIBRILLATION (H): ICD-10-CM

## 2022-11-02 DIAGNOSIS — Z91.89 RISK FACTORS FOR OBSTRUCTIVE SLEEP APNEA: ICD-10-CM

## 2022-11-02 LAB — TSH SERPL DL<=0.005 MIU/L-ACNC: 0.03 UIU/ML (ref 0.3–4.2)

## 2022-11-02 PROCEDURE — 80061 LIPID PANEL: CPT | Mod: ORL | Performed by: PHYSICIAN ASSISTANT

## 2022-11-02 PROCEDURE — 84443 ASSAY THYROID STIM HORMONE: CPT | Mod: ORL | Performed by: PHYSICIAN ASSISTANT

## 2022-11-02 PROCEDURE — 99204 OFFICE O/P NEW MOD 45 MIN: CPT | Mod: 95 | Performed by: INTERNAL MEDICINE

## 2022-11-02 PROCEDURE — 80048 BASIC METABOLIC PNL TOTAL CA: CPT | Mod: ORL | Performed by: PHYSICIAN ASSISTANT

## 2022-11-02 NOTE — PROGRESS NOTES
Arielle is a 52 year old who is being evaluated via a billable video visit.      How would you like to obtain your AVS? MyChart  If the video visit is dropped, the invitation should be resent by: Send to e-mail at: [email or web address]  Will anyone else be joining your video visit? No        Video-Visit Details    Video Start Time: 1:01 PM    Type of service:  Video Visit    Video End Time:1:26 PM    Originating Location (pt. Location): Other car        Distant Location (provider location):  Off-site    Platform used for Video Visit: [employer]     Chief complaint: Consultation requested by Huong Stahl MD for the evaluation of possible obstructive sleep apnea    History of Present Illness: 52-year-old female with history of obesity, chronic pain in and recent atrial fibrillation and atypical chest pain.  She was recommended to have sleep evaluation due to the atrial fibrillation.  She reports years of difficulty with her sleep.  She associates this mostly to her chaotic sleep environment.  She has a daughter with oppositional defiant disorder and ADHD.  She has an irregular sleep schedule and this impacts patient's ability to get sleep.  She is also doing online schooling and needs to help her with that.  Patient takes oxycodone before bed usually around 11-12 midnight.  It often takes her over an hour to fall asleep.  She also has difficulty returning to sleep after middle the night awakenings.  She is usually out of bed for the day at 7 AM.  On occasion she will take a second dose of oxycodone around 5 AM.  She does not take naps.  She in fact has difficulty falling asleep during the day.  She has not been told about snoring.  She does have dry mouth.  She also has stuffy nose.  Sometimes she uses over-the-counter nasal spray.  She has not found fluticasone helpful, causes headaches.  Patient sleeps in a recliner for her back pain.  She has been doing this for a while now.  She cannot sleep lying flat due to the SI  joint pain.  She avoids drinking any caffeinated beverages.  She does not drink alcohol.  She not taking any over-the-counter or prescribed sleep aids.    No restless legs, sleepwalking, sleep talking or dream enactment behavior.  Patient had a sleep study years ago for snoring and was found to have primary snoring without sleep apnea.  It was a good quality in lab PSG.    She also has a history of TMJ disorder.  She does not wear a splint.  No current pain issues of the TMJ joint.      [place] Sleepiness Scale   Sitting and reading: Slight chance of dozing   Watching TV: Slight chance of dozing   Sitting, inactive in a public place (e.g. a theatre or a meeting): Slight chance of dozing   As a passenger in a car for an hour without a break: Would never doze   Lying down to rest in the afternoon when circumstances permit: Slight chance of dozing   Sitting and talking to someone: Would never doze   Sitting quietly after a lunch without alcohol: Slight chance of dozing   In a car, while stopped for a few minutes in traffic: Would never doze   Total score - [place]: 5   (Less than 10 normal)    Insomnia Severity Scale  MICAH Total Score: 21  Total score categories:  0-7 = No clinically significant insomnia   8-14 = Subthreshold insomnia   15-21 = Clinical insomnia (moderate severity)  22-28 = Clinical insomnia (severe)    STOP-BANG  Loud Snore   0  Excessively Tired/Sleepy   0  Observed apnea   0  Hypertension   1  BMI> 35 kg/m2   1  Age >50   1  Neck >16 in/40cm   1  Male Gender   0  Total =   4  (0-2 low, 3-4 intermediate, 5-8 high risk of PRAVEEN)      Past Medical History:   Diagnosis Date     Atypical chest pain 09/30/2015     Coronary artery disease involving native coronary artery of native heart without angina pectoris 09/29/2021     Hyperlipidemia      Hypertension      Kidney stones      Migraines      Paroxysmal atrial fibrillation (H) 07/05/2022     TMJ (dislocation of temporomandibular joint)        Allergies    Allergen Reactions     Hmg-Coa-R Inhibitors Other (See Comments)     Severe joint and muscle ache from Atorvastastin     Codeine Dizziness and Other (See Comments)     Rosuvastatin Muscle Pain (Myalgia)     Gabapentin Palpitations       Current Outpatient Medications   Medication     evolocumab (REPATHA) 140 MG/ML prefilled autoinjector     ezetimibe (ZETIA) 10 MG tablet     Fremanezumab-vfrm 225 MG/1.5ML SOAJ     GARLIC PO     lisinopril (ZESTRIL) 10 MG tablet     loratadine (CLARITIN) 10 MG tablet     omeprazole (PRILOSEC) 40 MG DR capsule     order for DME     oxyCODONE-acetaminophen (PERCOCET) 5-325 MG tablet     Rimegepant Sulfate 75 MG TBDP     rivaroxaban ANTICOAGULANT (XARELTO ANTICOAGULANT) 20 MG TABS tablet     sotalol (BETAPACE) 80 MG tablet     ZOLMitriptan (ZOMIG) 5 MG tablet     No current facility-administered medications for this visit.       Social History     Socioeconomic History     Marital status: Single     Spouse name: Not on file     Number of children: Not on file     Years of education: Not on file     Highest education level: Not on file   Occupational History     Not on file   Tobacco Use     Smoking status: Never     Smokeless tobacco: Never   Substance and Sexual Activity     Alcohol use: No     Drug use: No     Sexual activity: Yes     Partners: Male   Other Topics Concern     Parent/sibling w/ CABG, MI or angioplasty before 65F 55M? No   Social History Narrative     Not on file     Social Determinants of Health     Financial Resource Strain: Not on file   Food Insecurity: Not on file   Transportation Needs: Not on file   Physical Activity: Not on file   Stress: Not on file   Social Connections: Not on file   Intimate Partner Violence: Not on file   Housing Stability: Not on file       Family History   Problem Relation Age of Onset     Hypertension Mother      Coronary Artery Disease Mother      Alcoholism Father      Coronary Artery Disease Father      Prostate Cancer Father       "Atrial fibrillation Father      Kidney Disease Sister      Morbid Obesity Sister      Colorectal Cancer No family hx of          EXAM:  Ht 1.753 m (5' 9\")   Wt 127 kg (280 lb)   BMI 41.35 kg/m    GENERAL: Alert and no distress  EYES: Eyes grossly normal to inspection.  No discharge or erythema, or obvious scleral/conjunctival abnormalities.  RESP: No audible wheeze, cough, or visible cyanosis.  No visible retractions or increased work of breathing.    SKIN: Visible skin clear. No significant rash, abnormal pigmentation or lesions.  NEURO: Cranial nerves grossly intact.  Mentation and speech appropriate for age.  PSYCH: Mentation appears normal, affect normal, judgement and insight intact, normal speech and appearance well-groomed.       [facility] 8/14/2014 [facility]  Weight 296 lbs, BMI 44.98 ESS 11  AHI 1.1, RDI 2.5, Lowest O2 sat 91%    TSH   Date Value Ref Range Status   06/30/2022 1.75 0.30 - 5.00 uIU/mL Final   01/28/2021 1.68 0.40 - 4.00 mU/L Final       ASSESSMENT:  52-year-old female with history of chronic pain on opioids, obesity, paroxysmal atrial fibrillation and insomnia.  Insomnia appears to be somewhat psychophysiologic as well as by her mental.  She is active lost weight from her previous sleep study which showed primary snoring.  However, given weight gain and recent atrial fibrillation reassessment is warranted.    PLAN:  Recommended home sleep apnea testing to be done in her usual sleep position in her recliner.  Patient is going to make arrangements to have her daughter sleep at her mother's house at night to ensure optimal sleep testing.  I will be contacting patient with results when they become available.  We reviewed the treatment options for sleep apnea.  I am not sure should be a good candidate for an oral appliance given her history of TMJ disorder.  Also recommended further treatment of insomnia with nonmedication based strategies. Patient was provided " information and referral regarding comfort cognitive behavioral therapy for insomnia.  She was counseled on the importance of keeping a detailed sleep log prior to the consultation.  Urged her to continue to work with her daughters providers to optimize her daughter's sleep schedule and minimize chaos in the home.  She is agreeable with this plan.      46 minutes spent on the date of the encounter doing chart review, history and exam, documentation and further activities per the note    Mita Dickey M.D.  Pulmonary/Critical Care/Sleep Medicine    [facility]   Floor 1, [address]   [address] [place] [address]. [place] [address]   Appointments: [phone]    The above note was dictated using voice recognition software and may include typographical errors. Please contact the author for any clarifications.

## 2022-11-02 NOTE — TELEPHONE ENCOUNTER
2nd msg left for patient requesting call back if she continues to have questions / concerns r.e. Repatha injections.  mg

## 2022-11-02 NOTE — LETTER
11/2/2022         RE: Arielle Pham  [address] [place] [address]        Dear Colleague,    Thank you for referring your patient, Arielle Pham, to the [facility] [place]. Please see a copy of my visit note below.    Arielle is a 52 year old who is being evaluated via a billable video visit.      How would you like to obtain your AVS? MyChart  If the video visit is dropped, the invitation should be resent by: Send to e-mail at: [email or web address]  Will anyone else be joining your video visit? No        Video-Visit Details    Video Start Time: 1:01 PM    Type of service:  Video Visit    Video End Time:1:26 PM    Originating Location (pt. Location): Other car        Distant Location (provider location):  Off-site    Platform used for Video Visit: [employer]     Chief complaint: Consultation requested by Huong Stahl MD for the evaluation of possible obstructive sleep apnea    History of Present Illness: 52-year-old female with history of obesity, chronic pain in and recent atrial fibrillation and atypical chest pain.  She was recommended to have sleep evaluation due to the atrial fibrillation.  She reports years of difficulty with her sleep.  She associates this mostly to her chaotic sleep environment.  She has a daughter with oppositional defiant disorder and ADHD.  She has an irregular sleep schedule and this impacts patient's ability to get sleep.  She is also doing online schooling and needs to help her with that.  Patient takes oxycodone before bed usually around 11-12 midnight.  It often takes her over an hour to fall asleep.  She also has difficulty returning to sleep after middle the night awakenings.  She is usually out of bed for the day at 7 AM.  On occasion she will take a second dose of oxycodone around 5 AM.  She does not take naps.  She in fact has difficulty falling asleep during the day.  She has not been told about snoring.  She does have dry mouth.  She also has stuffy nose.   Sometimes she uses over-the-counter nasal spray.  She has not found fluticasone helpful, causes headaches.  Patient sleeps in a recliner for her back pain.  She has been doing this for a while now.  She cannot sleep lying flat due to the SI joint pain.  She avoids drinking any caffeinated beverages.  She does not drink alcohol.  She not taking any over-the-counter or prescribed sleep aids.    No restless legs, sleepwalking, sleep talking or dream enactment behavior.  Patient had a sleep study years ago for snoring and was found to have primary snoring without sleep apnea.  It was a good quality in lab PSG.    She also has a history of TMJ disorder.  She does not wear a splint.  No current pain issues of the TMJ joint.      Buckner Sleepiness Scale   Sitting and reading: Slight chance of dozing   Watching TV: Slight chance of dozing   Sitting, inactive in a public place (e.g. a theatre or a meeting): Slight chance of dozing   As a passenger in a car for an hour without a break: Would never doze   Lying down to rest in the afternoon when circumstances permit: Slight chance of dozing   Sitting and talking to someone: Would never doze   Sitting quietly after a lunch without alcohol: Slight chance of dozing   In a car, while stopped for a few minutes in traffic: Would never doze   Total score - Buckner: 5   (Less than 10 normal)    Insomnia Severity Scale  MICAH Total Score: 21  Total score categories:  0-7 = No clinically significant insomnia   8-14 = Subthreshold insomnia   15-21 = Clinical insomnia (moderate severity)  22-28 = Clinical insomnia (severe)    STOP-BANG  Loud Snore   0  Excessively Tired/Sleepy   0  Observed apnea   0  Hypertension   1  BMI> 35 kg/m2   1  Age >50   1  Neck >16 in/40cm   1  Male Gender   0  Total =   4  (0-2 low, 3-4 intermediate, 5-8 high risk of PRAVEEN)      Past Medical History:   Diagnosis Date     Atypical chest pain 09/30/2015     Coronary artery disease involving native coronary artery  of native heart without angina pectoris 09/29/2021     Hyperlipidemia      Hypertension      Kidney stones      Migraines      Paroxysmal atrial fibrillation (H) 07/05/2022     TMJ (dislocation of temporomandibular joint)        Allergies   Allergen Reactions     Hmg-Coa-R Inhibitors Other (See Comments)     Severe joint and muscle ache from Atorvastastin     Codeine Dizziness and Other (See Comments)     Rosuvastatin Muscle Pain (Myalgia)     Gabapentin Palpitations       Current Outpatient Medications   Medication     evolocumab (REPATHA) 140 MG/ML prefilled autoinjector     ezetimibe (ZETIA) 10 MG tablet     Fremanezumab-vfrm 225 MG/1.5ML SOAJ     GARLIC PO     lisinopril (ZESTRIL) 10 MG tablet     loratadine (CLARITIN) 10 MG tablet     omeprazole (PRILOSEC) 40 MG DR capsule     order for DME     oxyCODONE-acetaminophen (PERCOCET) 5-325 MG tablet     Rimegepant Sulfate 75 MG TBDP     rivaroxaban ANTICOAGULANT (XARELTO ANTICOAGULANT) 20 MG TABS tablet     sotalol (BETAPACE) 80 MG tablet     ZOLMitriptan (ZOMIG) 5 MG tablet     No current facility-administered medications for this visit.       Social History     Socioeconomic History     Marital status: Single     Spouse name: Not on file     Number of children: Not on file     Years of education: Not on file     Highest education level: Not on file   Occupational History     Not on file   Tobacco Use     Smoking status: Never     Smokeless tobacco: Never   Substance and Sexual Activity     Alcohol use: No     Drug use: No     Sexual activity: Yes     Partners: Male   Other Topics Concern     Parent/sibling w/ CABG, MI or angioplasty before 65F 55M? No   Social History Narrative     Not on file     Social Determinants of Health     Financial Resource Strain: Not on file   Food Insecurity: Not on file   Transportation Needs: Not on file   Physical Activity: Not on file   Stress: Not on file   Social Connections: Not on file   Intimate Partner Violence: Not on file  "  Housing Stability: Not on file       Family History   Problem Relation Age of Onset     Hypertension Mother      Coronary Artery Disease Mother      Alcoholism Father      Coronary Artery Disease Father      Prostate Cancer Father      Atrial fibrillation Father      Kidney Disease Sister      Morbid Obesity Sister      Colorectal Cancer No family hx of          EXAM:  Ht 1.753 m (5' 9\")   Wt 127 kg (280 lb)   BMI 41.35 kg/m    GENERAL: Alert and no distress  EYES: Eyes grossly normal to inspection.  No discharge or erythema, or obvious scleral/conjunctival abnormalities.  RESP: No audible wheeze, cough, or visible cyanosis.  No visible retractions or increased work of breathing.    SKIN: Visible skin clear. No significant rash, abnormal pigmentation or lesions.  NEURO: Cranial nerves grossly intact.  Mentation and speech appropriate for age.  PSYCH: Mentation appears normal, affect normal, judgement and insight intact, normal speech and appearance well-groomed.       [facility]  Weight 296 lbs, BMI 44.98 ESS 11  AHI 1.1, RDI 2.5, Lowest O2 sat 91%    TSH   Date Value Ref Range Status   06/30/2022 1.75 0.30 - 5.00 uIU/mL Final   01/28/2021 1.68 0.40 - 4.00 mU/L Final       ASSESSMENT:  52-year-old female with history of chronic pain on opioids, obesity, paroxysmal atrial fibrillation and insomnia.  Insomnia appears to be somewhat psychophysiologic as well as by her mental.  She is active lost weight from her previous sleep study which showed primary snoring.  However, given weight gain and recent atrial fibrillation reassessment is warranted.    PLAN:  Recommended home sleep apnea testing to be done in her usual sleep position in her recliner.  Patient is going to make arrangements to have her daughter sleep at her mother's house at night to ensure optimal sleep testing.  I will be contacting patient with results when they become available.  We reviewed the treatment " options for sleep apnea.  I am not sure should be a good candidate for an oral appliance given her history of TMJ disorder.  Also recommended further treatment of insomnia with nonmedication based strategies. Patient was provided information and referral regarding comfort cognitive behavioral therapy for insomnia.  She was counseled on the importance of keeping a detailed sleep log prior to the consultation.  Urged her to continue to work with her daughters providers to optimize her daughter's sleep schedule and minimize chaos in the home.  She is agreeable with this plan.      46 minutes spent on the date of the encounter doing chart review, history and exam, documentation and further activities per the note    Mita Dickey M.D.  Pulmonary/Critical Care/Sleep Medicine    [facility]   Floor 1, [address]   [address] [place] [address]. [place] [address]   Appointments: [phone]    The above note was dictated using voice recognition software and may include typographical errors. Please contact the author for any clarifications.                Again, thank you for allowing me to participate in the care of your patient.        Sincerely,        Mita Dickey MD

## 2022-11-02 NOTE — PATIENT INSTRUCTIONS
"      MY TREATMENT INFORMATION FOR SLEEP APNEA-  Arielle Pham    DOCTOR : Mita Dickey MD    Am I having a sleep study at a sleep center?  --->Due to normal delays, you will be contacted within 2-4 weeks to schedule    Am I having a home sleep study?  --->Watch the video for the device you are using:    -/drop off device-   https://www."[employer].com/watch?v=yGGFBdELGhk    -Disposable device sent out require phone/computer application-   https://www."[employer].com/watch?v=BCce_vbiwxE      Frequently asked questions:  1. What is Obstructive Sleep Apnea (PRAVEEN)? PRAVEEN is the most common type of sleep apnea. Apnea means, \"without breath.\"  Apnea is most often caused by narrowing or collapse of the upper airway as muscles relax during sleep.   Almost everyone has occasional apneas. Most people with sleep apnea have had brief interruptions at night frequently for many years.  The severity of sleep apnea is related to how frequent and severe the events are.   2. What are the consequences of PRAVEEN? Symptoms include: feeling sleepy during the day, snoring loudly, gasping or stopping of breathing, trouble sleeping, and occasionally morning headaches or heartburn at night.  Sleepiness can be serious and even increase the risk of falling asleep while driving. Other health consequences may include development of high blood pressure and other cardiovascular disease in persons who are susceptible. Untreated PRAVEEN  can contribute to heart disease, stroke and diabetes.   3. What are the treatment options? In most situations, sleep apnea is a lifelong disease that must be managed with daily therapy. Medications are not effective for sleep apnea and surgery is generally not considered until other therapies have been tried. Your treatment is your choice . Continuous Positive Airway (CPAP) works right away and is the therapy that is effective in nearly everyone. An oral device to hold your jaw forward is usually the next most " reliable option. Other options include postioning devices (to keep you off your back), weight loss, and surgery including a tongue pacing device. There is more detail about some of these options below.  4. Are my sleep studies covered by insurance? Although we will request verification of coverage, we advise you also check in advance of the study to ensure there is coverage.    Important tips for those choosing CPAP and similar devices   Know your equipment:  CPAP is continuous positive airway pressure that prevents obstructive sleep apnea by keeping the throat from collapsing while you are sleeping. In most cases, the device is  smart  and can slowly self-adjusts if your throat collapses and keeps a record every day of how well you are treated-this information is available to you and your care team.  BPAP is bilevel positive airway pressure that keeps your throat open and also assists each breath with a pressure boost to maintain adequate breathing.  Special kinds of BPAP are used in patients who have inadequate breathing from lung or heart disease. In most cases, the device is  smart  and can slowly self-adjusts to assist breathing. Like CPAP, the device keeps a record of how well you are treated.  Your mask is your connection to the device. You get to choose what feels most comfortable and the staff will help to make sure if fits. Here: are some examples of the different masks that are available:       Key points to remember on your journey with sleep apnea:  Sleep study.  PAP devices often need to be adjusted during a sleep study to show that they are effective and adjusted right.  Good tips to remember: Try wearing just the mask during a quiet time during the day so your body adapts to wearing it. A humidifier is recommended for comfort in most cases to prevent drying of your nose and throat. Allergy medication from your provider may help you if you are having nasal congestion.  Getting settled-in. It takes  more than one night for most of us to get used to wearing a mask. Try wearing just the mask during a quiet time during the day so your body adapts to wearing it. A humidifier is recommended for comfort in most cases. Our team will work with you carefully on the first day and will be in contact within 4 days and again at 2 and 4 weeks for advice and remote device adjustments. Your therapy is evaluated by the device each day.   Use it every night. The more you are able to sleep naturally for 7-8 hours, the more likely you will have good sleep and to prevent health risks or symptoms from sleep apnea. Even if you use it 4 hours it helps. Occasionally all of us are unable to use a medical therapy, in sleep apnea, it is not dangerous to miss one night.   Communicate. Call our skilled team on the number provided on the first day if your visit for problems that make it difficult to wear the device. Over 2 out of 3 patients can learn to wear the device long-term with help from our team. Remember to call our team or your sleep providers if you are unable to wear the device as we may have other solutions for those who cannot adapt to mask CPAP therapy. It is recommended that you sleep your sleep provider within the first 3 months and yearly after that if you are not having problems.   Use it for your health. We encourage use of CPAP masks during daytime quiet periods to allow your face and brain to adapt to the sensation of CPAP so that it will be a more natural sensation to awaken to at night or during naps. This can be very useful during the first few weeks or months of adapting to CPAP though it does not help medically to wear CPAP during wakefulness and  should not be used as a strategy just to meet guidelines.  Take care of your equipment. Make sure you clean your mask and tubing using directions every day and that your filter and mask are replaced as recommended or if they are not working.     BESIDES CPAP, WHAT OTHER  THERAPIES ARE THERE?    Positioning Device  Positioning devices are generally used when sleep apnea is mild and only occurs on your back.This example shows a pillow that straps around the waist. It may be appropriate for those whose sleep study shows milder sleep apnea that occurs primarily when lying flat on one's back. Preliminary studies have shown benefit but effectiveness at home may need to be verified by a home sleep test. These devices are generally not covered by medical insurance.  Examples of devices that maintain sleeping on the back to prevent snoring and mild sleep apnea.    Belt type body positioner  http://[employer]/    Electronic reminder  http://nightshifttherapy.com/            Oral Appliance  What is oral appliance therapy?  An oral appliance device fits on your teeth at night like a retainer used after having braces. The device is made by a specialized dentist and requires several visits over 1-2 months before a manufactured device is made to fit your teeth and is adjusted to prevent your sleep apnea. Once an oral device is working properly, snoring should be improved. A home sleep test may be recommended at that time if to determine whether the sleep apnea is adequately treated.       Some things to remember:  -Oral devices are often, but not always, covered by your medical insurance. Be sure to check with your insurance provider.   -If you are referred for oral therapy, you will be given a list of specialized dentists to consider or you may choose to visit the Web site of the American Academy of Dental Sleep Medicine  -Oral devices are less likely to work if you have severe sleep apnea or are extremely overweight.     More detailed information  An oral appliance is a small acrylic device that fits over the upper and lower teeth  (similar to a retainer or a mouth guard). This device slightly moves jaw forward, which moves the base of the tongue forward, opens the airway, improves breathing  for effective treat snoring and obstructive sleep apnea in perhaps 7 out of 10 people .  The best working devices are custom-made by a dental device  after a mold is made of the teeth 1, 2, 3.  When is an oral appliance indicated?  Oral appliance therapy is recommended as a first-line treatment for patients with primary snoring, mild sleep apnea, and for patients with moderate sleep apnea who prefer appliance therapy to use of CPAP4, 5. Severity of sleep apnea is determined by sleep testing and is based on the number of respiratory events per hour of sleep.   How successful is oral appliance therapy?  The success rate of oral appliance therapy in patients with mild sleep apnea is 75-80% while in patients with moderate sleep apnea it is 50-70%. The chance of success in patients with severe sleep apnea is 40-50%. The research also shows that oral appliances have a beneficial effect on the cardiovascular health of PRAVEEN patients at the same magnitude as CPAP therapy7.  Oral appliances should be a second-line treatment in cases of severe sleep apnea, but if not completely successful then a combination therapy utilizing CPAP plus oral appliance therapy may be effective. Oral appliances tend to be effective in a broad range of patients although studies show that the patients who have the highest success are females, younger patients, those with milder disease, and less severe obesity. 3, 6.   Finding a dentist that practices dental sleep medicine  Specific training is available through the American Academy of Dental Sleep Medicine for dentists interested in working in the field of sleep. To find a dentist who is educated in the field of sleep and the use of oral appliances, near you, visit the Web site of the American Academy of Dental Sleep Medicine.    References  1. Fatou et al. Objectively measured vs self-reported compliance during oral appliance therapy for sleep-disordered breathing. Chest 2013;  144(5): 9014-7649.  2. Morgan et al. Objective measurement of compliance during oral appliance therapy for sleep-disordered breathing. Thorax 2013; 68(1): 91-96.  3. Naveen et al. Mandibular advancement devices in 620 men and women with PRAVEEN and snoring: tolerability and predictors of treatment success. Chest 2004; 125: 2449-1339.  4. Eddy, et al. Oral appliances for snoring and PRAVEEN: a review. Sleep 2006; 29: 244-262.  5. Angélica et al. Oral appliance treatment for PRAVEEN: an update. J Clin Sleep Med 2014; 10(2): 215-227.  6. Arnaldo et al. Predictors of OSAH treatment outcome. J Dent Res 2007; 86: 3275-6623.      Weight Loss:    Weight loss is a long-term strategy that may improve sleep apnea in some patients.    Weight management is a personal decision and the decision should be based on your interest and the potential benefits.  If you are interested in exploring weight loss strategies, the following discussion covers the impact on weight loss on sleep apnea and the approaches that may be successful.    Being overweight does not necessarily mean you will have health consequences.  Those who have BMI over 35 or over 27 with existing medical conditions carries greater risk.   Weight loss decreases severity of sleep apnea in most people with obesity. For those with mild obesity who have developed snoring with weight gain, even 15-30 pound weight loss can improve and occasionally eliminate sleep apnea.  Structured and life-long dietary and health habits are necessary to lose weight and keep healthier weight levels.     Though there may be significant health benefits from weight loss, long-term weight loss is very difficult to achieve- studies show success with dietary management in less than 10% of people. In addition, substantial weight loss may require years of dietary control and may be difficult if patients have severe obesity. In these cases, surgical management may be considered.  Finally, older  individuals who have tolerated obesity without health complications may be less likely to benefit from weight loss strategies.        Surgery:    Surgery for obstructive sleep apnea is considered generally only when other therapies fail to work. Surgery may be discussed with you if you are having a difficult time tolerating CPAP and or when there is an abnormal structure that requires surgical correction.  Nose and throat surgeries often enlarge the airway to prevent collapse.  Most of these surgeries create pain for 1-2 weeks and up to half of the most common surgeries are not effective throughout life.  You should carefully discuss the benefits and drawbacks to surgery with your sleep provider and surgeon to determine if it is the best solution for you.   More information  Surgery for PRAVEEN is directed at areas that are responsible for narrowing or complete obstruction of the airway during sleep.  There are a wide range of procedures available to enlarge and/or stabilize the airway to prevent blockage of breathing in the three major areas where it can occur: the palate, tongue, and nasal regions.  Successful surgical treatment depends on the accurate identification of the factors responsible for obstructive sleep apnea in each person.  A personalized approach is required because there is no single treatment that works well for everyone.  Because of anatomic variation, consultation with an examination by a sleep surgeon is a critical first step in determining what surgical options are best for each patient.  In some cases, examination during sedation may be recommended in order to guide the selection of procedures.  Patients will be counseled about risks and benefits as well as the typical recovery course after surgery. Surgery is typically not a cure for a person s PRAVEEN.  However, surgery will often significantly improve one s PRAVEEN severity (termed  success rate ).  Even in the absence of a cure, surgery will decrease  the cardiovascular risk associated with OSA7; improve overall quality of life8 (sleepiness, functionality, sleep quality, etc).      Palate Procedures:  Patients with PRAVEEN often have narrowing of their airway in the region of their tonsils and uvula.  The goals of palate procedures are to widen the airway in this region as well as to help the tissues resist collapse.  Modern palate procedure techniques focus on tissue conservation and soft tissue rearrangement, rather than tissue removal.  Often the uvula is preserved in this procedure. Residual sleep apnea is common in patient after pharyngoplasty with an average reduction in sleep apnea events of 33%2.      Tongue Procedures:  ExamWhile patients are awake, the muscles that surround the throat are active and keep this region open for breathing. These muscles relax during sleep, allowing the tongue and other structures to collapse and block breathing.  There are several different tongue procedures available.  Selection of a tongue base procedure depends on characteristics seen on physical exam.  Generally, procedures are aimed at removing bulky tissues in this area or preventing the back of the tongue from falling back during sleep.  Success rates for tongue surgery range from 50-62%3.    Hypoglossal Nerve Stimulation:  Hypoglossal nerve stimulation has recently received approval from the [place] Food and Drug Administration for the treatment of obstructive sleep apnea.  This is based on research showing that the system was safe and effective in treating sleep apnea6.  Results showed that the median AHI score decreased 68%, from 29.3 to 9.0. This therapy uses an implant system that senses breathing patterns and delivers mild stimulation to airway muscles, which keeps the airway open during sleep.  The system consists of three fully implanted components: a small generator (similar in size to a pacemaker), a breathing sensor, and a stimulation lead.  Using a  small handheld remote, a patient turns the therapy on before bed and off upon awakening.    Candidates for this device must be greater than 18 years of age, have moderate to severe PRAVEEN (AHI between 15-65), BMI less than 35, have tried CPAP/oral appliance without success, and have appropriate upper airway anatomy (determined by a sleep endoscopy performed by Dr. Ruddy Dowell).    Hypoglossal Nerve Stimulation Pathway:    The sleep surgeon s office will work with the patient through the insurance prior-authorization process (including communications and appeals).    Nasal Procedures:  Nasal obstruction can interfere with nasal breathing during the day and night.  Studies have shown that relief of nasal obstruction can improve the ability of some patients to tolerate positive airway pressure therapy for obstructive sleep apnea1.  Treatment options include medications such as nasal saline, topical corticosteroid and antihistamine sprays, and oral medications such as antihistamines or decongestants. Non-surgical treatments can include external nasal dilators for selected patients. If these are not successful by themselves, surgery can improve the nasal airway either alone or in combination with these other options.      Combination Procedures:  Combination of surgical procedures and other treatments may be recommended, particularly if patients have more than one area of narrowing or persistent positional disease.  The success rate of combination surgery ranges from 66-80%2,3.    References  Pati HERNANDEZ. The Role of the Nose in Snoring and Obstructive Sleep Apnoea: An Update.  Eur Arch Otorhinolaryngol. 2011; 268: 1365-73.   Sharmaine SM; David JA; Radha JR; Pallanch JF; Modesto MB; Dahlia SG; Rudolph CISNEROS. Surgical modifications of the upper airway for obstructive sleep apnea in adults: a systematic review and meta-analysis. SLEEP 2010;33(10):9531-1620. Romel DAMIAN. Hypopharyngeal surgery in obstructive sleep apnea: an  evidence-based medicine review.  Arch Otolaryngol Head Neck Surg. 2006 Feb;132(2):206-13.  Michael YH1, Morro Y, Woodrow KULDEEP. The efficacy of anatomically based multilevel surgery for obstructive sleep apnea. Otolaryngol Head Neck Surg. 2003 Oct;129(4):327-35.  Romel DAMIAN, Goldberg A. Hypopharyngeal Surgery in Obstructive Sleep Apnea: An Evidence-Based Medicine Review. Arch Otolaryngol Head Neck Surg. 2006 Feb;132(2):206-13.  Nohelia TORRES et al. Upper-Airway Stimulation for Obstructive Sleep Apnea.  N Engl J Med. 2014 Jan 9;370(2):139-49.  Duglas Y et al. Increased Incidence of Cardiovascular Disease in Middle-aged Men with Obstructive Sleep Apnea. Am J Respir Crit Care Med; 2002 166: 159-165  Daviscyril MARTINEZ et al. Studying Life Effects and Effectiveness of Palatopharyngoplasty (SLEEP) study: Subjective Outcomes of Isolated Uvulopalatopharyngoplasty. Otolaryngol Head Neck Surg. 2011; 144: 623-631.        WHAT IF I ONLY HAVE SNORING?    Mandibular advancement devices, lateral sleep positioning, long-term weight loss and treatment of nasal allergies have been shown to improve snoring.  Exercising tongue muscles with a game (Hitlantisttps://apps."[employer]/us/bradley/soundly-reduce-snoring/du8426870127) or stimulating the tongue during the day with a device (https://doi.org/10.3390/ifv57689713) have improved snoring in some individuals.    Remember to Drive Safe... Drive Alive     Sleep health profoundly affects your health, mood, and your safety.  Thirty three percent of the population (one in three of us) is not getting enough sleep and many have a sleep disorder. Not getting enough sleep or having an untreated / undertreated sleep condition may make us sleepy without even knowing it. In fact, our driving could be dramatically impaired due to our sleep health. As your provider, here are some things I would like you to know about driving:     Here are some warning signs for impairment and dangerous drowsy driving:              -Having been  awake more than 16 hours               -Looking tired               -Eyelid drooping              -Head nodding (it could be too late at this point)              -Driving for more than 30 minutes     Some things you could do to make the driving safer if you are experiencing some drowsiness:              -Stop driving and rest              -Call for transportation              -Make sure your sleep disorder is adequately treated     Some things that have been shown NOT to work when experiencing drowsiness while driving:              -Turning on the radio              -Opening windows              -Eating any  distracting  /  entertaining  foods (e.g., sunflower seeds, candy, or any other)              -Talking on the phone      Your decision may not only impact your life, but also the life of others. Please, remember to drive safe for yourself and all of us.        Insomnia and Behavioral Sleep Medicine Program    The [facility] Insomnia and Behavioral Sleep Medicine Program provides non-drug treatment for sleep problems including:    Cognitive-behavioral Therapies for Insomnia (CBT-I)  Management of Shift-work and Jet Lag  Management of Delayed, Advanced and Irregular Circadian Rhythm Sleep Disorders  Imagery Rehearsal Therapy (IRT) for Nightmare Disorder  PAP Therapy Desensitization    You have been referred for consultation with a sleep psychologist who specializes in behavioral sleep medicine and treatment of insomnia.  The [facility] Insomnia and Behavioral Sleep Medicine Program offers individualized telehealth services through our [facility] and online CBT-I.    Preparing for your Consultation    You will need to keep a Sleep Diary for at least a week prior to your visit. Complete the sleep diary each day first thing after you get up by answering a few key questions about your sleep using our convenient mobile bradley or paper sleep diary.  Your answers should be based on your  recall of the past 24 hours.  Avoid watching the clock or recording data during the night.     Insomnia  Shamika    The Insomnia  mobile shamika  is a convenient way to keep track of your sleep prior to your sleep consultation.  Simply download the free shamika on your Apple or Android phone and record your information each morning.  The shamika includes training, self-assessment, and sleep schedule recommendations.  Prior to your consultation we recommend you use only the sleep diary function. You can e-mail yourself a copy of your sleep diary data by going to the Settings section and using the [place] User Data function.  During your consultation your provider will review the data with you.          Mobileye Sleep Diary    You can also track your sleep using the Mobile[employer] paper sleep diary.  You can upload your sleep diary and send it via a [employer] message, fax it to [phone], or have it with you at the time of your consultation.            CBT-I:  Frequently Asked Questions    What is CBT-I?    Cognitive Behavioral Therapy for Insomnia, also known as CBT-I, is a highly effective non-drug treatment for insomnia. The American College of Physicians recommends CBT-I as the first treatment for chronic insomnia.  Research has shown CBT-I to be safer and more effective long term than sleeping pills.    What does CBT-I involve?     CBT-I targets behaviors that lead to chronic insomnia:  Habits that weaken the bed as a cue for sleep  Habits that weaken your body's sleep drive and sleep/wake clock   Unhelpful sleep thoughts that increase sleep-related worry and arousal.    The process involves 3-6 telehealth visits that guide you to implement proven strategies to get a better night's sleep.    People often see improvement in their sleep within a few weeks. Research shows if you keep practicing the skills you learn your sleep is likely to continue to improve 6-12 months after treatment.    Does this program  prescribe or manage sleep medication?    No.  Your prescribing provider is responsible to assist you in managing your sleep medications.  Some people choose to stop using sleep medication prior to or during CBT-I.  Our program can work with your prescribing provider to help reduce or eliminate use of sleep medications.     Getting Started Today!    If you haven't already done so, we recommend you consider making the following changes to your sleep habits prior to your sleep consultation:     Reduce your consumption of caffeine and alcohol.  Both can disrupt sleep and make strengthening your sleep more difficult.  Specifically:    - Avoid caffeine within 6 hours of bedtime   - No more than 3 caffeinated beverages per day (e.g. 8 oz. cup coffee or 12 oz. cup soda)            - No alcohol within 3 hours of bedtime    Make sure your bedroom is quiet, comfortable and dark.  Noise, light and an uncomfortable sleep space can harm your sleep.      Keep the same sleep schedule 7 days a week.unless you do shift work.      Online CBT-I     If you want to get started today, research indicates that online CBT-I can be effective for some individuals. These programs requires comfort with bradley-based or online learning.  However, digital CBT-I programs are not for everyone.  Contraindications include:    Seizure disorders,   Bipolar disorder,   Unstable medical or mental health conditions,   Frailty or risk of falling  Pregnancy    You should consult a sleep specialist before using these resources if you have:    Sleep Apnea  Restless Leg Syndrome  Sleep Walking  REM behavior disorder  Night Terrors  Excessive Daytime Sleepiness  Are engaged in shift work  Use prescription sleep medication    Our Online CBT-I program    If your sleep provider recommends online CBT-I for you , the cost for an entire 6-week program is $40.    To get started, copy and paste the link below which will take you to the landing page to register:                            [email or web address]               If you wish to complete the online CBT-I program but do not plan to follow-up with a sleep provider, you are set to begin the program.    If you are planning to work with an [facility] sleep provider, there are a couple of extra steps you can take to share your sleep data with your sleep provider.  To share sleep log data, go to the left side navigation and click on the  share sleep log  button:         You will be taken to the page below where you will enter  the provider code [employer] into the box.          Once you press the locate button, the information for  [employer] will pop up as below.  By pressing the Submit button your data will be sent to our  secure [facility] sleep program portal for review by your sleep provider. You will only need to do this step once.                                  Self-help Workbooks for Insomnia    If you have found self-help books useful in the past, you may want to consider reading one of the following books prior to your consultation:    Say Zeynep to Insomnia: The Six-Week, Drug-Free Program Developed at [employer].  Juan Luis Escobar MD. Available in paperback, Tom, and audiobook.    Overcoming Insomnia: A Cognitive-Behavioral Therapy Approach, Workbook.  Cuong Shankar, PhD  and Yennifer Montesinos, PhD.  Available in paperback and Tom.    Quiet Your Mind and Get to Sleep: Solutions to Insomnia for Those with Depression, Anxiety, or Chronic Pain.  Shanita Blackburn, PhD and Yennifer Montesinos, PhD.  Available in paperback and Tom

## 2022-11-03 ENCOUNTER — MEDICAL CORRESPONDENCE (OUTPATIENT)
Dept: HEALTH INFORMATION MANAGEMENT | Facility: CLINIC | Age: 52
End: 2022-11-03

## 2022-11-03 LAB
ANION GAP SERPL CALCULATED.3IONS-SCNC: 15 MMOL/L (ref 7–15)
BUN SERPL-MCNC: 20.5 MG/DL (ref 6–20)
CALCIUM SERPL-MCNC: 9.9 MG/DL (ref 8.6–10)
CHLORIDE SERPL-SCNC: 106 MMOL/L (ref 98–107)
CHOLEST SERPL-MCNC: 176 MG/DL
CREAT SERPL-MCNC: 0.74 MG/DL (ref 0.51–0.95)
DEPRECATED HCO3 PLAS-SCNC: 18 MMOL/L (ref 22–29)
GFR SERPL CREATININE-BSD FRML MDRD: >90 ML/MIN/1.73M2
GLUCOSE SERPL-MCNC: 99 MG/DL (ref 70–99)
HDLC SERPL-MCNC: 51 MG/DL
LDLC SERPL CALC-MCNC: 80 MG/DL
NONHDLC SERPL-MCNC: 125 MG/DL
POTASSIUM SERPL-SCNC: 4.4 MMOL/L (ref 3.4–5.3)
SODIUM SERPL-SCNC: 139 MMOL/L (ref 136–145)
TRIGL SERPL-MCNC: 225 MG/DL

## 2022-11-03 NOTE — TELEPHONE ENCOUNTER
11-1-22 - Rec'd fax from [employer] with urgent patient request to complete Product Replacement Rx Verification form.    Form completed / signed by Dr. Herrera and successfully faxed back to [facility] pharmacy ([phone]).  mg

## 2022-11-11 ENCOUNTER — LAB REQUISITION (OUTPATIENT)
Dept: LAB | Facility: CLINIC | Age: 52
End: 2022-11-11
Payer: COMMERCIAL

## 2022-11-11 DIAGNOSIS — I48.11 LONGSTANDING PERSISTENT ATRIAL FIBRILLATION (H): ICD-10-CM

## 2022-11-11 LAB
T3 SERPL-MCNC: 92 NG/DL (ref 85–202)
T4 FREE SERPL-MCNC: 1.11 NG/DL (ref 0.9–1.7)

## 2022-11-11 PROCEDURE — 84439 ASSAY OF FREE THYROXINE: CPT | Mod: ORL | Performed by: PHYSICIAN ASSISTANT

## 2022-11-11 PROCEDURE — 86376 MICROSOMAL ANTIBODY EACH: CPT | Mod: ORL | Performed by: PHYSICIAN ASSISTANT

## 2022-11-11 PROCEDURE — 84480 ASSAY TRIIODOTHYRONINE (T3): CPT | Mod: ORL | Performed by: PHYSICIAN ASSISTANT

## 2022-11-14 LAB — THYROPEROXIDASE AB SERPL-ACNC: <10 IU/ML

## 2022-11-29 NOTE — NURSING NOTE
My Chart message sent to patient:  Favian Guzmán,    This is a quick message following up on your 11/2/2022 appointment with Dr. Dickey.  It looks like she ordered a sleep study and a follow-up appointment with her to go over the results of sleep study. She also placed a referral for you to see our Sleep Psychologist, Dr. Jaramillo. If you would like to move forward in scheduling any of these appointments, please call us at [phone].      Thank you!    [facility]  Katja Salmeron CMA

## 2022-12-01 NOTE — PROGRESS NOTES
HEART CARE ENCOUNTER CONSULTATON NOTE      [facility]  [phone]      Assessment/Recommendations   Assessment/Plan:    Arielle Pham is a very pleasant 52 year old lady with PMH of paroxysmal atrial fibrillation, chronic pain, who presents to EP clinic for further management of atrial fibrillation.    1. Paroxysmal Atrial fibrillation  - We reviewed the pathophysiology of atrial fibrillation and management considerations including stroke risk and anticoagulation, rate control, cardioversion, antiarrhythmic drug therapy, and catheter ablation. We discussed atrial fibrillation ablation procedures, anticipated success rates, the potential need for re-do ablation vs addition of anti-arrhythmic drugs, procedural risks (including groin bleeding, tamponade, phrenic or esophageal injury, stroke, pulmonary vein stenosis) and recovery expectations.  - She would like to continue Sotalol 80 mg BID for now.   - She has a sleep study appointment was encouraged to keep it.  - Importance of life style modification discussed and she is working on increasing her physical activity level. Emphasized that afib control is greatly improved with weight loss.  - If she has prolonged break through episodes with Sotalol will pursue an ablation.   - follow up with EP ASHUTOSH in 6 months    2. Anticoagulation  - CHADSVASC score 3  - On Xarelto and tolerating it well               History of Present Illness/Subjective    HPI: Arielle Pham is a very pleasant 52 year old lady with PMH of paroxysmal atrial fibrillation, chronic pain due to degenerative disc disease, migraines who presents to EP clinic for further management of atrial fibrillation.    She was started on Sotalol 80 mg BID in August 2022 which had to reduced to 40 mg BID due to side effects of headaches, nervousness, fogginess and increased sweating and occasional tremors. She has noted an improved tolerance of Sotalol since and has gone back to the 80 mg BID  "regimen.     Since the last visit, the patient reports having two episodes of atrial fibrillation lasting about 40 minutes. She has been compliant with the medication and reports she is tolerating them well. She would like to continue meds for now.    Recent Echocardiogram Results: Personally reviewed, normal systolic function         Physical Examination  Review of Systems   Vitals: /81 (BP Location: Other (Comment), Patient Position: Sitting, Cuff Size: Adult Large)   Pulse 74   Resp 16   Ht 1.753 m (5' 9\")   Wt 130.6 kg (288 lb)   BMI 42.53 kg/m    BMI= Body mass index is 42.53 kg/m .  Wt Readings from Last 3 Encounters:   12/07/22 130.6 kg (288 lb)   11/02/22 127 kg (280 lb)   09/15/22 128.4 kg (283 lb)       General Appearance:   no distress, normal body habitus   ENT/Mouth: membranes moist, no oral lesions or bleeding gums.      EYES:  no scleral icterus, normal conjunctivae   Neck: no carotid bruits or thyromegaly   Chest/Lungs:   lungs are clear to auscultation, no rales or wheezing,  sternal scar, equal chest wall expansion    Cardiovascular:   Regular. Normal first and second heart sounds with no murmurs, rubs, or gallops; the carotid, radial and posterior tibial pulses are intact, no edema bilaterally    Abdomen:  no organomegaly, masses, bruits, or tenderness; bowel sounds are present   Extremities: no cyanosis or clubbing   Skin: no xanthelasma, warm.    Neurologic: normal  bilateral, no tremors     Psychiatric: alert and oriented x3, calm        Please refer above for cardiac ROS details.        Medical History  Surgical History Family History Social History   Past Medical History:   Diagnosis Date     Atypical chest pain 09/30/2015     Coronary artery disease involving native coronary artery of native heart without angina pectoris 09/29/2021     Hyperlipidemia      Hypertension      Kidney stones      Migraines      Paroxysmal atrial fibrillation (H) 07/05/2022     TMJ (dislocation of " temporomandibular joint)      Past Surgical History:   Procedure Laterality Date     CHOLECYSTECTOMY       Family History   Problem Relation Age of Onset     Hypertension Mother      Coronary Artery Disease Mother      Alcoholism Father      Coronary Artery Disease Father      Prostate Cancer Father      Atrial fibrillation Father      Kidney Disease Sister      Morbid Obesity Sister      Colorectal Cancer No family hx of         Social History     Socioeconomic History     Marital status: Single     Spouse name: Not on file     Number of children: Not on file     Years of education: Not on file     Highest education level: Not on file   Occupational History     Not on file   Tobacco Use     Smoking status: Never     Smokeless tobacco: Never   Substance and Sexual Activity     Alcohol use: No     Drug use: No     Sexual activity: Yes     Partners: Male   Other Topics Concern     Parent/sibling w/ CABG, MI or angioplasty before 65F 55M? No   Social History Narrative     Not on file     Social Determinants of Health     Financial Resource Strain: Not on file   Food Insecurity: Not on file   Transportation Needs: Not on file   Physical Activity: Not on file   Stress: Not on file   Social Connections: Not on file   Intimate Partner Violence: Not on file   Housing Stability: Not on file           Medications  Allergies   Current Outpatient Medications   Medication Sig Dispense Refill     evolocumab (REPATHA) 140 MG/ML prefilled autoinjector Inject 1 mL (140 mg) Subcutaneous every 14 days for 24 doses 6 mL 3     ezetimibe (ZETIA) 10 MG tablet Take 1 tablet (10 mg) by mouth daily 90 tablet 2     Fremanezumab-vfrm 225 MG/1.5ML SOAJ INJECT 225MG SUBCUTANEOUS EVERY 30 DAYS 1.5 mL 12     GARLIC PO Take 1 tablet by mouth 2 times daily       lisinopril (ZESTRIL) 10 MG tablet Take 2 tablets (20 mg) by mouth daily 180 tablet 2     loratadine (CLARITIN) 10 MG tablet Take 1 tablet (10 mg) by mouth daily as needed for allergies 90  tablet 3     omeprazole (PRILOSEC) 40 MG DR capsule Take 1 capsule (40 mg) by mouth daily 90 capsule 3     order for DME Rolling walker with seat for home use 1 Device 0     oxyCODONE-acetaminophen (PERCOCET) 5-325 MG tablet Take 1 tablet by mouth every 6 hours as needed for moderate to severe pain Maximum of 4 tablets per day. This is a 30 day supply 115 tablet 0     Rimegepant Sulfate 75 MG TBDP Take 75 mg by mouth See Admin Instructions Take 75 mg orally per 24 hours placed on or under the tongue; MAX 75 mg/24 hours; 8 tablet 9     rivaroxaban ANTICOAGULANT (XARELTO ANTICOAGULANT) 20 MG TABS tablet Take 1 tablet (20 mg) by mouth daily (with dinner) 90 tablet 3     sotalol (BETAPACE) 80 MG tablet Take 1 tablet (80 mg) by mouth 2 times daily 60 tablet 11     ZOLMitriptan (ZOMIG) 5 MG tablet TAKE ONE TABLET BY MOUTH AT ONSET OF HEADACHE. 9 tablet 5       Allergies   Allergen Reactions     Hmg-Coa-R Inhibitors Other (See Comments)     Severe joint and muscle ache from Atorvastastin     Codeine Dizziness and Other (See Comments)     Rosuvastatin Muscle Pain (Myalgia)     Gabapentin Palpitations          Lab Results    Chemistry/lipid CBC Cardiac Enzymes/BNP/TSH/INR   Recent Labs   Lab Test 08/04/22  0752   CHOL 238*   HDL 53   *   TRIG 139     Recent Labs   Lab Test 08/04/22  0752 02/17/22  0920 05/25/21  1031   * 137* 183*     Recent Labs   Lab Test 06/30/22  0322      POTASSIUM 4.2   CHLORIDE 110*   CO2 23      BUN 31*   CR 1.02   GFRESTIMATED 66   DALJIT 10.1     Recent Labs   Lab Test 06/30/22  0322 06/17/22  1034 05/31/22  0950   CR 1.02 0.80 0.89     Recent Labs   Lab Test 05/04/22  0918   A1C 5.3          Recent Labs   Lab Test 06/30/22  0322   WBC 7.9   HGB 14.4   HCT 44.1   MCV 96        Recent Labs   Lab Test 06/30/22  0322 02/17/22  0920 05/25/21  1031   HGB 14.4 14.4 14.6    Recent Labs   Lab Test 06/30/22  0322 06/25/19  0420 06/25/19  0118   TROPONINI <0.01 <0.01 <0.01      No results for input(s): BNP, NTBNPI, NTBNP in the last 04029 hours.  Recent Labs   Lab Test 06/30/22  0322   TSH 1.75     No results for input(s): INR in the last 37958 hours.     Romario Gutierrez MD

## 2022-12-07 ENCOUNTER — OFFICE VISIT (OUTPATIENT)
Dept: CARDIOLOGY | Facility: CLINIC | Age: 52
End: 2022-12-07
Attending: NURSE PRACTITIONER
Payer: COMMERCIAL

## 2022-12-07 VITALS
WEIGHT: 288 LBS | DIASTOLIC BLOOD PRESSURE: 81 MMHG | HEART RATE: 74 BPM | RESPIRATION RATE: 16 BRPM | BODY MASS INDEX: 42.65 KG/M2 | SYSTOLIC BLOOD PRESSURE: 133 MMHG | HEIGHT: 69 IN

## 2022-12-07 DIAGNOSIS — I48.0 PAROXYSMAL ATRIAL FIBRILLATION (H): Primary | ICD-10-CM

## 2022-12-07 PROCEDURE — 99214 OFFICE O/P EST MOD 30 MIN: CPT | Performed by: INTERNAL MEDICINE

## 2022-12-07 PROCEDURE — 93000 ELECTROCARDIOGRAM COMPLETE: CPT | Performed by: INTERNAL MEDICINE

## 2022-12-07 NOTE — LETTER
12/7/2022    Huong Stahl, DO  [address] [place] [address]    RE: Arielle Pham       Dear Colleague,     I had the [facility] in [facility].    HEART CARE ENCOUNTER CONSULTATON NOTE      [facility]  [phone]      Assessment/Recommendations   Assessment/Plan:    Arielle Pham is a very pleasant 52 year old lady with PMH of paroxysmal atrial fibrillation, chronic pain, who presents to EP clinic for further management of atrial fibrillation.    1. Paroxysmal Atrial fibrillation  - We reviewed the pathophysiology of atrial fibrillation and management considerations including stroke risk and anticoagulation, rate control, cardioversion, antiarrhythmic drug therapy, and catheter ablation. We discussed atrial fibrillation ablation procedures, anticipated success rates, the potential need for re-do ablation vs addition of anti-arrhythmic drugs, procedural risks (including groin bleeding, tamponade, phrenic or esophageal injury, stroke, pulmonary vein stenosis) and recovery expectations.  - She would like to continue Sotalol 80 mg BID for now.   - She has a sleep study appointment was encouraged to keep it.  - Importance of life style modification discussed and she is working on increasing her physical activity level. Emphasized that afib control is greatly improved with weight loss.  - If she has prolonged break through episodes with Sotalol will pursue an ablation.   - follow up with EP ASHUTOSH in 6 months    2. Anticoagulation  - CHADSVASC score 3  - On Xarelto and tolerating it well               History of Present Illness/Subjective    HPI: Arielle Pham is a very pleasant 52 year old lady with PMH of paroxysmal atrial fibrillation, chronic pain due to degenerative disc disease, migraines who presents to EP clinic for further management of atrial fibrillation.    She was started on Sotalol 80 mg BID in August 2022 which had to reduced to 40 mg BID  "due to side effects of headaches, nervousness, fogginess and increased sweating and occasional tremors. She has noted an improved tolerance of Sotalol since and has gone back to the 80 mg BID regimen.     Since the last visit, the patient reports having two episodes of atrial fibrillation lasting about 40 minutes. She has been compliant with the medication and reports she is tolerating them well. She would like to continue meds for now.    Recent Echocardiogram Results: Personally reviewed, normal systolic function         Physical Examination  Review of Systems   Vitals: /81 (BP Location: Other (Comment), Patient Position: Sitting, Cuff Size: Adult Large)   Pulse 74   Resp 16   Ht 1.753 m (5' 9\")   Wt 130.6 kg (288 lb)   BMI 42.53 kg/m    BMI= Body mass index is 42.53 kg/m .  Wt Readings from Last 3 Encounters:   12/07/22 130.6 kg (288 lb)   11/02/22 127 kg (280 lb)   09/15/22 128.4 kg (283 lb)       General Appearance:   no distress, normal body habitus   ENT/Mouth: membranes moist, no oral lesions or bleeding gums.      EYES:  no scleral icterus, normal conjunctivae   Neck: no carotid bruits or thyromegaly   Chest/Lungs:   lungs are clear to auscultation, no rales or wheezing,  sternal scar, equal chest wall expansion    Cardiovascular:   Regular. Normal first and second heart sounds with no murmurs, rubs, or gallops; the carotid, radial and posterior tibial pulses are intact, no edema bilaterally    Abdomen:  no organomegaly, masses, bruits, or tenderness; bowel sounds are present   Extremities: no cyanosis or clubbing   Skin: no xanthelasma, warm.    Neurologic: normal  bilateral, no tremors     Psychiatric: alert and oriented x3, calm        Please refer above for cardiac ROS details.        Medical History  Surgical History Family History Social History   Past Medical History:   Diagnosis Date     Atypical chest pain 09/30/2015     Coronary artery disease involving native coronary artery of " native heart without angina pectoris 09/29/2021     Hyperlipidemia      Hypertension      Kidney stones      Migraines      Paroxysmal atrial fibrillation (H) 07/05/2022     TMJ (dislocation of temporomandibular joint)      Past Surgical History:   Procedure Laterality Date     CHOLECYSTECTOMY       Family History   Problem Relation Age of Onset     Hypertension Mother      Coronary Artery Disease Mother      Alcoholism Father      Coronary Artery Disease Father      Prostate Cancer Father      Atrial fibrillation Father      Kidney Disease Sister      Morbid Obesity Sister      Colorectal Cancer No family hx of         Social History     Socioeconomic History     Marital status: Single     Spouse name: Not on file     Number of children: Not on file     Years of education: Not on file     Highest education level: Not on file   Occupational History     Not on file   Tobacco Use     Smoking status: Never     Smokeless tobacco: Never   Substance and Sexual Activity     Alcohol use: No     Drug use: No     Sexual activity: Yes     Partners: Male   Other Topics Concern     Parent/sibling w/ CABG, MI or angioplasty before 65F 55M? No   Social History Narrative     Not on file     Social Determinants of Health     Financial Resource Strain: Not on file   Food Insecurity: Not on file   Transportation Needs: Not on file   Physical Activity: Not on file   Stress: Not on file   Social Connections: Not on file   Intimate Partner Violence: Not on file   Housing Stability: Not on file           Medications  Allergies   Current Outpatient Medications   Medication Sig Dispense Refill     evolocumab (REPATHA) 140 MG/ML prefilled autoinjector Inject 1 mL (140 mg) Subcutaneous every 14 days for 24 doses 6 mL 3     ezetimibe (ZETIA) 10 MG tablet Take 1 tablet (10 mg) by mouth daily 90 tablet 2     Fremanezumab-vfrm 225 MG/1.5ML SOAJ INJECT 225MG SUBCUTANEOUS EVERY 30 DAYS 1.5 mL 12     GARLIC PO Take 1 tablet by mouth 2 times daily        lisinopril (ZESTRIL) 10 MG tablet Take 2 tablets (20 mg) by mouth daily 180 tablet 2     loratadine (CLARITIN) 10 MG tablet Take 1 tablet (10 mg) by mouth daily as needed for allergies 90 tablet 3     omeprazole (PRILOSEC) 40 MG DR capsule Take 1 capsule (40 mg) by mouth daily 90 capsule 3     order for DME Rolling walker with seat for home use 1 Device 0     oxyCODONE-acetaminophen (PERCOCET) 5-325 MG tablet Take 1 tablet by mouth every 6 hours as needed for moderate to severe pain Maximum of 4 tablets per day. This is a 30 day supply 115 tablet 0     Rimegepant Sulfate 75 MG TBDP Take 75 mg by mouth See Admin Instructions Take 75 mg orally per 24 hours placed on or under the tongue; MAX 75 mg/24 hours; 8 tablet 9     rivaroxaban ANTICOAGULANT (XARELTO ANTICOAGULANT) 20 MG TABS tablet Take 1 tablet (20 mg) by mouth daily (with dinner) 90 tablet 3     sotalol (BETAPACE) 80 MG tablet Take 1 tablet (80 mg) by mouth 2 times daily 60 tablet 11     ZOLMitriptan (ZOMIG) 5 MG tablet TAKE ONE TABLET BY MOUTH AT ONSET OF HEADACHE. 9 tablet 5       Allergies   Allergen Reactions     Hmg-Coa-R Inhibitors Other (See Comments)     Severe joint and muscle ache from Atorvastastin     Codeine Dizziness and Other (See Comments)     Rosuvastatin Muscle Pain (Myalgia)     Gabapentin Palpitations          Lab Results    Chemistry/lipid CBC Cardiac Enzymes/BNP/TSH/INR   Recent Labs   Lab Test 08/04/22  0752   CHOL 238*   HDL 53   *   TRIG 139     Recent Labs   Lab Test 08/04/22  0752 02/17/22  0920 05/25/21  1031   * 137* 183*     Recent Labs   Lab Test 06/30/22  0322      POTASSIUM 4.2   CHLORIDE 110*   CO2 23      BUN 31*   CR 1.02   GFRESTIMATED 66   DALJIT 10.1     Recent Labs   Lab Test 06/30/22  0322 06/17/22  1034 05/31/22  0950   CR 1.02 0.80 0.89     Recent Labs   Lab Test 05/04/22  0918   A1C 5.3          Recent Labs   Lab Test 06/30/22  0322   WBC 7.9   HGB 14.4   HCT 44.1   MCV 96         Recent Labs   Lab Test 06/30/22  0322 02/17/22  0920 05/25/21  1031   HGB 14.4 14.4 14.6    Recent Labs   Lab Test 06/30/22  0322 06/25/19  0420 06/25/19  0118   TROPONINI <0.01 <0.01 <0.01     No results for input(s): BNP, NTBNPI, NTBNP in the last 70247 hours.  Recent Labs   Lab Test 06/30/22  0322   TSH 1.75     No results for input(s): INR in the last 11596 hours.     Romario Gutierrez MD                Thank you for allowing me to participate in the care of your patient.      Sincerely,     Romario Gutierrez MD     [facility]  cc:   DONN Kebede CNP  [address], [address]  [place] [address]

## 2022-12-07 NOTE — PATIENT INSTRUCTIONS
[facility]  Cardiac Electrophysiology  [address]  [place] [address]   Office: [phone]  Fax: [phone]       Thank you for seeing us in clinic today - it is a pleasure to be a part of your care team.  Below is a summary of our plan from today's visit.      Atrial fibrillation  - Continue Sotalol and Xarelto for now  - Pursue sleep study and weight loss  - follow up with EP ASHUTOSH        Please do not hesitate to be in touch with our office at [phone] with any questions that may arise.      Thank you for trusting us with your care,    Romario Gutierrez MD  Clinical Cardiac Electrophysiology  [facility]  [address]  [place] [address]   Office: [phone]  Fax: [phone]

## 2022-12-08 LAB
ATRIAL RATE - MUSE: 74 BPM
DIASTOLIC BLOOD PRESSURE - MUSE: NORMAL MMHG
INTERPRETATION ECG - MUSE: NORMAL
P AXIS - MUSE: 27 DEGREES
PR INTERVAL - MUSE: 192 MS
QRS DURATION - MUSE: 82 MS
QT - MUSE: 388 MS
QTC - MUSE: 430 MS
R AXIS - MUSE: -10 DEGREES
SYSTOLIC BLOOD PRESSURE - MUSE: NORMAL MMHG
T AXIS - MUSE: -1 DEGREES
VENTRICULAR RATE- MUSE: 74 BPM

## 2022-12-26 ENCOUNTER — HEALTH MAINTENANCE LETTER (OUTPATIENT)
Age: 52
End: 2022-12-26

## 2023-01-18 ENCOUNTER — TELEPHONE (OUTPATIENT)
Dept: NEUROLOGY | Facility: CLINIC | Age: 53
End: 2023-01-18
Payer: COMMERCIAL

## 2023-01-18 NOTE — TELEPHONE ENCOUNTER
"Prior Authorization Retail Medication Request     Medication/Dose: Fremanezumab-vfrm 225 MG/1.5ML SOAJ; Inject 225 mg Subcutaneous every 30 days   ICD code (if different than what is on RX):    G43.709  Previously Tried and Failed:  Zomig as needed and helps but headaches   Sumatriptan   Stopped Excedrin and tylenol many years ago  Ibuprofen a couple times per day and no side effects  Percocet -for back pain and foot pain and prescribed by Dr Stahl but not for headaches  Currently on lisinopril for hypertension  Amitriptyline -just stopped working and was on it for a while  Nortriptyline  Protriptyline  Topiramate  Zonegran  Gabapentin-caused anxiety/panic attack  Verapamil  Propranol  Emgality -took for a couple of month and caused dizziness. Did not try any other CGRPs  escitalopram -does not recall side effects but it wasn't helpful with headaches  Losartan in the past-has been a while  Topiramate a long time ago and \"very bad dreams\". History of renal stones so contraindicated     Botox -fear of potential side effects-  Rationale:  Chronic migraine     Insurance Name:  [employer]  Insurance ID:  [id number]         Pharmacy Information (if different than what is on RX)  Name:    Phone:    "

## 2023-01-19 ENCOUNTER — VIRTUAL VISIT (OUTPATIENT)
Dept: NEUROLOGY | Facility: CLINIC | Age: 53
End: 2023-01-19
Payer: COMMERCIAL

## 2023-01-19 DIAGNOSIS — G43.709 CHRONIC MIGRAINE WITHOUT AURA WITHOUT STATUS MIGRAINOSUS, NOT INTRACTABLE: ICD-10-CM

## 2023-01-19 PROCEDURE — 99212 OFFICE O/P EST SF 10 MIN: CPT | Mod: 95 | Performed by: NURSE PRACTITIONER

## 2023-01-19 ASSESSMENT — HEADACHE IMPACT TEST (HIT 6)
HOW OFTEN DID HEADACHS LIMIT CONCENTRATION ON WORK OR DAILY ACTIVITY: ALWAYS
HOW OFTEN DO HEADACHES LIMIT YOUR DAILY ACTIVITIES: VERY OFTEN
HOW OFTEN HAVE YOU FELT TOO TIRED TO WORK BECAUSE OF YOUR HEADACHES: VERY OFTEN
HIT6 TOTAL SCORE: 74
WHEN YOU HAVE A HEADACHE HOW OFTEN DO YOU WISH YOU COULD LIE DOWN: ALWAYS
WHEN YOU HAVE HEADACHES HOW OFTEN IS THE PAIN SEVERE: ALWAYS
HOW OFTEN HAVE YOU FELT FED UP OR IRRITATED BECAUSE OF YOUR HEADACHES: ALWAYS

## 2023-01-19 ASSESSMENT — PATIENT HEALTH QUESTIONNAIRE - PHQ9
SUM OF ALL RESPONSES TO PHQ QUESTIONS 1-9: 13
SUM OF ALL RESPONSES TO PHQ QUESTIONS 1-9: 13
10. IF YOU CHECKED OFF ANY PROBLEMS, HOW DIFFICULT HAVE THESE PROBLEMS MADE IT FOR YOU TO DO YOUR WORK, TAKE CARE OF THINGS AT HOME, OR GET ALONG WITH OTHER PEOPLE: EXTREMELY DIFFICULT

## 2023-01-19 ASSESSMENT — MIGRAINE DISABILITY ASSESSMENT (MIDAS)
HOW MANY DAYS WAS HOUSEWORK PRODUCTIVITY CUT IN HALF DUE TO HEADACHES: 45
HOW OFTEN WERE SOCIAL ACTIVITIES MISSED DUE TO HEADACHES: 45
HOW MANY DAYS IN THE PAST 3 MONTHS HAVE YOU HAD A HEADACHE: 90
ON A SCALE FROM 0-10 ON AVERAGE HOW PAINFUL WERE HEADACHES: 8
HOW MANY DAYS DID YOU MISS WORK OR SCHOOL BECAUSE OF HEADACHES: 45
HOW MANY DAYS WAS YOUR PRODUCTIVITY CUT IN HALF BECAUSE OF HEADACHES: 45
TOTAL SCORE: 225
HOW MANY DAYS DID YOU NOT DO HOUSEWORK BECAUSE OF HEADACHES: 45

## 2023-01-19 NOTE — LETTER
1/19/2023       RE: Arielle Pham  [address] [place] [address]     Dear Colleague,    Thank you for referring your patient, Arielle Pham, to the [facility] [place] at [facility]. Please see a copy of my visit note below.      Headache Clinic follow up visit note:  Initial Headache Clinic on 3/15/2021, see note for details.   Has been going to [facility] and gets occipital nerve blocks which are helpful but do not help with headaches daily around her eyes.     was started on Ajovy monthly in June of 2021 and Nurtec as needed.   Ajovy has been very helpful and reduced pain around the eyes.   Prior to starting Ajovy was waking up with a severe headaches and now wakes up with much milder headaches after being on Ajovy and Ajovy helped with severity of headaches.   Patient reports that headaches were horrible before starting Ajovy and headaches are milder. No side effects with Ajovy or Nurtec.     Plan:  No changes in treatment   Patient is satisfied with her current treatment plan.   Follow up in a year or sooner if needed     Patient is alert and no in apparent acute distress,  mentation appears normal, judgement and insight intact, normal speech.    11 minutes spent on the date of the encounter doing video access, chart  review,  meds review, treatment plan, documentation and further activities as noted above    DONN Cronin, CNP [facility]  Headache certified  [facility]

## 2023-01-19 NOTE — PROGRESS NOTES
Arielle is a 52 year old who is being evaluated via a billable video visit.      How would you like to obtain your AVS? MyChart  If the video visit is dropped, the invitation should be resent by: Text to cell phone: [phone]  Will anyone else be joining your video visit? No        Video-Visit Details    Type of service:  Video Visit   Start 4:36 PM  End 4:47 PM    Originating Location (pt. Location): Home  Distant Location (provider location):  On-site  Platform used for Video Visit: [facility]    Headache Clinic follow up visit note:  Initial Headache Clinic on 3/15/2021, see note for details.   Has been going to [facility] and gets occipital nerve blocks which are helpful but do not help with headaches daily around her eyes.     was started on Ajovy monthly in June of 2021 and Nurtec as needed.   Ajovy has been very helpful and reduced pain around the eyes.   Prior to starting Ajovy was waking up with a severe headaches and now wakes up with much milder headaches after being on Ajovy and Ajovy helped with severity of headaches.   Patient reports that headaches were horrible before starting Ajovy and headaches are milder. No side effects with Ajovy or Nurtec.     Plan:  No changes in treatment   Patient is satisfied with her current treatment plan.   Follow up in a year or sooner if needed     Patient is alert and no in apparent acute distress,  mentation appears normal, judgement and insight intact, normal speech.    11 minutes spent on the date of the encounter doing video access, chart  review,  meds review, treatment plan, documentation and further activities as noted above    DONN Cronin, CNP [facility]  Headache certified  [facility]

## 2023-01-20 NOTE — TELEPHONE ENCOUNTER
Prior Authorization Approval    Authorization Effective Date: 1/1/2023  Authorization Expiration Date: 1/20/2024  Medication: Fremanezumab-vfrm (AJOVY) 225 MG/1.5ML SOAJ--APPROVED  Approved Dose/Quantity:   Reference #:     Insurance Company: [employer] - Phone [phone] Fax [phone]  Expected CoPay:       CoPay Card Available:      Foundation Assistance Needed:    Which Pharmacy is filling the prescription (Not needed for infusion/clinic administered): [facility] [address] [place] [address]  Pharmacy Notified: Yes  Patient Notified: Yes **Instructed pharmacy to notify patient when script is ready to /ship.**

## 2023-01-20 NOTE — TELEPHONE ENCOUNTER
PA Initiation    Medication: Fremanezumab-vfrm (AJOVY) 225 MG/1.5ML SOAJ   Insurance Company: [email or web address] - Phone [phone] Fax [phone]  Pharmacy Filling the Rx: [facility] [address] [place] [address]  Filling Pharmacy Phone: [phone]  Filling Pharmacy Fax: [phone]  Start Date: 1/19/2023

## 2023-01-26 ASSESSMENT — SLEEP AND FATIGUE QUESTIONNAIRES
HOW LIKELY ARE YOU TO NOD OFF OR FALL ASLEEP WHILE LYING DOWN TO REST IN THE AFTERNOON WHEN CIRCUMSTANCES PERMIT: SLIGHT CHANCE OF DOZING
HOW LIKELY ARE YOU TO NOD OFF OR FALL ASLEEP WHILE WATCHING TV: MODERATE CHANCE OF DOZING
HOW LIKELY ARE YOU TO NOD OFF OR FALL ASLEEP WHEN YOU ARE A PASSENGER IN A CAR FOR AN HOUR WITHOUT A BREAK: WOULD NEVER DOZE
HOW LIKELY ARE YOU TO NOD OFF OR FALL ASLEEP WHILE SITTING QUIETLY AFTER LUNCH WITHOUT ALCOHOL: SLIGHT CHANCE OF DOZING
HOW LIKELY ARE YOU TO NOD OFF OR FALL ASLEEP WHILE SITTING INACTIVE IN A PUBLIC PLACE: MODERATE CHANCE OF DOZING
HOW LIKELY ARE YOU TO NOD OFF OR FALL ASLEEP IN A CAR, WHILE STOPPED FOR A FEW MINUTES IN TRAFFIC: WOULD NEVER DOZE
HOW LIKELY ARE YOU TO NOD OFF OR FALL ASLEEP WHILE SITTING AND TALKING TO SOMEONE: WOULD NEVER DOZE
HOW LIKELY ARE YOU TO NOD OFF OR FALL ASLEEP WHILE SITTING AND READING: SLIGHT CHANCE OF DOZING

## 2023-01-30 ENCOUNTER — LAB REQUISITION (OUTPATIENT)
Dept: LAB | Facility: CLINIC | Age: 53
End: 2023-01-30
Payer: COMMERCIAL

## 2023-01-30 DIAGNOSIS — R10.13 EPIGASTRIC PAIN: ICD-10-CM

## 2023-01-30 PROCEDURE — 87338 HPYLORI STOOL AG IA: CPT | Mod: ORL | Performed by: PHYSICIAN ASSISTANT

## 2023-02-01 LAB — H PYLORI AG STL QL IA: NEGATIVE

## 2023-02-02 ENCOUNTER — TELEPHONE (OUTPATIENT)
Dept: CARDIOLOGY | Facility: CLINIC | Age: 53
End: 2023-02-02

## 2023-02-02 ENCOUNTER — VIRTUAL VISIT (OUTPATIENT)
Dept: SLEEP MEDICINE | Facility: CLINIC | Age: 53
End: 2023-02-02
Payer: COMMERCIAL

## 2023-02-02 VITALS — HEIGHT: 69 IN | BODY MASS INDEX: 42.95 KG/M2 | WEIGHT: 290 LBS

## 2023-02-02 DIAGNOSIS — G47.30 SLEEP-DISORDERED BREATHING: Primary | ICD-10-CM

## 2023-02-02 DIAGNOSIS — E78.5 HYPERLIPIDEMIA LDL GOAL <70: ICD-10-CM

## 2023-02-02 DIAGNOSIS — I25.10 CORONARY ARTERY DISEASE INVOLVING NATIVE CORONARY ARTERY OF NATIVE HEART WITHOUT ANGINA PECTORIS: Primary | ICD-10-CM

## 2023-02-02 NOTE — PATIENT INSTRUCTIONS
"      MY TREATMENT INFORMATION FOR SLEEP APNEA-  Arielle Pham    DOCTOR : DONN Mota CNP, Look at the home sleep test videos listed below. That may give you more information.     Am I having a home sleep study?  --->Watch the video for the device you are using:    -/drop off device-   https://www.IKOTECHube.com/watch?v=yGGFBdELGhk    -Disposable device sent out require phone/computer application-   https://www.IKOTECHube.com/watch?v=BCce_vbiwxE      Frequently asked questions:  1. What is Obstructive Sleep Apnea (PRAVEEN)? PRAVEEN is the most common type of sleep apnea. Apnea means, \"without breath.\"  Apnea is most often caused by narrowing or collapse of the upper airway as muscles relax during sleep.   Almost everyone has occasional apneas. Most people with sleep apnea have had brief interruptions at night frequently for many years.  The severity of sleep apnea is related to how frequent and severe the events are.   2. What are the consequences of PRAVEEN? Symptoms include: feeling sleepy during the day, snoring loudly, gasping or stopping of breathing, trouble sleeping, and occasionally morning headaches or heartburn at night.  Sleepiness can be serious and even increase the risk of falling asleep while driving. Other health consequences may include development of high blood pressure and other cardiovascular disease in persons who are susceptible. Untreated PRAVEEN  can contribute to heart disease, stroke and diabetes.   3. What are the treatment options? In most situations, sleep apnea is a lifelong disease that must be managed with daily therapy. Medications are not effective for sleep apnea and surgery is generally not considered until other therapies have been tried. Your treatment is your choice . Continuous Positive Airway (CPAP) works right away and is the therapy that is effective in nearly everyone. An oral device to hold your jaw forward is usually the next most reliable option. Other options " include postioning devices (to keep you off your back), weight loss, and surgery including a tongue pacing device. There is more detail about some of these options below.  4. Are my sleep studies covered by insurance? Although we will request verification of coverage, we advise you also check in advance of the study to ensure there is coverage.    Important tips for those choosing CPAP and similar devices   Know your equipment:  CPAP is continuous positive airway pressure that prevents obstructive sleep apnea by keeping the throat from collapsing while you are sleeping. In most cases, the device is  smart  and can slowly self-adjusts if your throat collapses and keeps a record every day of how well you are treated-this information is available to you and your care team.  BPAP is bilevel positive airway pressure that keeps your throat open and also assists each breath with a pressure boost to maintain adequate breathing.  Special kinds of BPAP are used in patients who have inadequate breathing from lung or heart disease. In most cases, the device is  smart  and can slowly self-adjusts to assist breathing. Like CPAP, the device keeps a record of how well you are treated.  Your mask is your connection to the device. You get to choose what feels most comfortable and the staff will help to make sure if fits. Here: are some examples of the different masks that are available:       Key points to remember on your journey with sleep apnea:  Sleep study.  PAP devices often need to be adjusted during a sleep study to show that they are effective and adjusted right.  Good tips to remember: Try wearing just the mask during a quiet time during the day so your body adapts to wearing it. A humidifier is recommended for comfort in most cases to prevent drying of your nose and throat. Allergy medication from your provider may help you if you are having nasal congestion.  Getting settled-in. It takes more than one night for most of us  to get used to wearing a mask. Try wearing just the mask during a quiet time during the day so your body adapts to wearing it. A humidifier is recommended for comfort in most cases. Our team will work with you carefully on the first day and will be in contact within 4 days and again at 2 and 4 weeks for advice and remote device adjustments. Your therapy is evaluated by the device each day.   Use it every night. The more you are able to sleep naturally for 7-8 hours, the more likely you will have good sleep and to prevent health risks or symptoms from sleep apnea. Even if you use it 4 hours it helps. Occasionally all of us are unable to use a medical therapy, in sleep apnea, it is not dangerous to miss one night.   Communicate. Call our skilled team on the number provided on the first day if your visit for problems that make it difficult to wear the device. Over 2 out of 3 patients can learn to wear the device long-term with help from our team. Remember to call our team or your sleep providers if you are unable to wear the device as we may have other solutions for those who cannot adapt to mask CPAP therapy. It is recommended that you sleep your sleep provider within the first 3 months and yearly after that if you are not having problems.   Use it for your health. We encourage use of CPAP masks during daytime quiet periods to allow your face and brain to adapt to the sensation of CPAP so that it will be a more natural sensation to awaken to at night or during naps. This can be very useful during the first few weeks or months of adapting to CPAP though it does not help medically to wear CPAP during wakefulness and  should not be used as a strategy just to meet guidelines.  Take care of your equipment. Make sure you clean your mask and tubing using directions every day and that your filter and mask are replaced as recommended or if they are not working.     Your BMI is Body mass index is 42.83 kg/m .    What is  BMI?  Body mass index (BMI) is one way to tell whether you are at a healthy weight, overweight, or obese. It measures your weight in relation to your height.  A BMI of 18.5 to 24.9 is in the healthy range. A person with a BMI of 25 to 29.9 is considered overweight, and someone with a BMI of 30 or greater is considered obese.  Another way to find out if you are at risk for health problems caused by overweight and obesity is to measure your waist. If you are a woman and your waist is more than 35 inches, or if you are a man and your waist is more than 40 inches, your risk of disease may be higher.  More than two-thirds of American adults are considered overweight or obese. Being overweight or obese increases the risk for further weight gain.  Excess weight may lead to heart disease and diabetes. Creating and following plans for healthy eating and physical activity may help you improve your health.    Methods for maintaining or losing weight.  Weight control is part of healthy lifestyle and includes exercise, emotional health, and healthy eating habits.  Careful eating habits lifelong is the mainstay of weight control.  Though there are significant health benefits from weight loss, long-term weight loss with diet alone may be very difficult to achieve- studies show long-term success with dietary management in less than 10% of people. Attaining a healthy weight may be especially difficult to achieve in those with severe obesity. In some cases, medications, devices and surgical management might be considered.    What can you do?  If you are overweight or obese and are interested in methods for weight loss, you should discuss this with your provider. In addition, we recommend that you review healthy life styles and methods for weight loss available through the National Institutes of Health patient information sites:   http://win.niddk.nih.gov/publications/index.htm           Remember to Drive Safe... Drive Alive     Sleep  health profoundly affects your health, mood, and your safety.  Thirty three percent of the population (one in three of us) is not getting enough sleep and many have a sleep disorder. Not getting enough sleep or having an untreated / undertreated sleep condition may make us sleepy without even knowing it. In fact, our driving could be dramatically impaired due to our sleep health. As your provider, here are some things I would like you to know about driving:     Here are some warning signs for impairment and dangerous drowsy driving:              -Having been awake more than 16 hours               -Looking tired               -Eyelid drooping              -Head nodding (it could be too late at this point)              -Driving for more than 30 minutes     Some things you could do to make the driving safer if you are experiencing some drowsiness:              -Stop driving and rest              -Call for transportation              -Make sure your sleep disorder is adequately treated     Some things that have been shown NOT to work when experiencing drowsiness while driving:              -Turning on the radio              -Opening windows              -Eating any  distracting  /  entertaining  foods (e.g., sunflower seeds, candy, or any other)              -Talking on the phone      Your decision may not only impact your life, but also the life of others. Please, remember to drive safe for yourself and all of us.

## 2023-02-02 NOTE — PROGRESS NOTES
Arielle is a 52 year old who is being evaluated via a non-billable video visit.      How would you like to obtain your AVS? [employer]  If the video visit is dropped, the invitation should be resent by: Text to cell phone: [phone]  Will anyone else be joining your video visit? No  Kim Jain      Patient had questions concerning where she could  HST. She lives in [place] and would like to  in [place].     Will have MA reach out to provide acceptable location to obtain equipment, and notify provider if disposable Watch Pat is necessary. If so, if a new order is needed, notify provider.     Visit cx'd as that was all she needed. She has no other questions. Will contact on [employer] prn.

## 2023-02-02 NOTE — TELEPHONE ENCOUNTER
"Per Dr. Herrera's 8-16-22 visit note \"follow-up with me in 4 months\" - no follow-up sched or pending - follow-up order placed - msg sent to sched with request to arrange first available appt.  mg  "

## 2023-02-02 NOTE — TELEPHONE ENCOUNTER
Please review patient's sx update - follow-up sched on 4-4-23 - any new orders during interim?  mg

## 2023-02-02 NOTE — TELEPHONE ENCOUNTER
[facility] Call Center    Phone Message    May a detailed message be left on voicemail: yes     Reason for Call: Other: Patient currently taking evolocumab (REPATHA) 140 MG/ML prefilled autoinjector , and started to get really bad pains, felt really tired and achy, Dr advised to follow up with perscribing provider of medication to discuss symptoms. Please reach out to patient at [phone] to discuss.     Action Taken: Other: Cardiology    Travel Screening: Not Applicable     Thank you!  [facility]

## 2023-02-02 NOTE — TELEPHONE ENCOUNTER
"Return call to patient who stated she developed sx of \"severe leg aching\" and increased fatigue after her last 2 doses of Repatha (approx 3wks ago) so she did not take most recent dose - sx have \"slightly improved\" but she was instructed by PCP to update Dr. Herrera.      Patient explained that her sx w/ statins were similar but only affected her hands and she has noted now only one finger aches.    Informed patient that she was due for follow-up 12/2022 so next available appt should be scheduled - reassured her that update would be forwarded to Dr. Herrera for any new recommendations during interim - patient verbalized understanding - call transf to sched.    "

## 2023-02-03 NOTE — TELEPHONE ENCOUNTER
Left detailed message with my direct number to call for message/recommendations below.  --------------------------------------------  Trinity Herrera MD Gorshe, Maureen, RN 1 minute ago (2:34 PM)     KL  There is no mention of muscle or joint aches in the medication profile under side effects.  The only side effects mentioned tend to be hypersensitivity at the injection sites or actual allergic reaction, i.e. angioedema.  She should retry the Repatha next week.     KML

## 2023-02-03 NOTE — TELEPHONE ENCOUNTER
Reached out to patient after receiving VM.  Reviewed recommendation and relayed message below.  Patient verbalized understanding and is agreeable with plan.  Encouraged patient to return the call after next injection with any further questions or concerns.

## 2023-04-04 ENCOUNTER — OFFICE VISIT (OUTPATIENT)
Dept: CARDIOLOGY | Facility: CLINIC | Age: 53
End: 2023-04-04
Payer: COMMERCIAL

## 2023-04-04 VITALS
BODY MASS INDEX: 42.53 KG/M2 | WEIGHT: 288 LBS | DIASTOLIC BLOOD PRESSURE: 74 MMHG | RESPIRATION RATE: 16 BRPM | OXYGEN SATURATION: 99 % | HEART RATE: 76 BPM | SYSTOLIC BLOOD PRESSURE: 126 MMHG

## 2023-04-04 DIAGNOSIS — I25.10 CORONARY ARTERY DISEASE INVOLVING NATIVE CORONARY ARTERY OF NATIVE HEART WITHOUT ANGINA PECTORIS: Primary | ICD-10-CM

## 2023-04-04 DIAGNOSIS — E78.5 HYPERLIPIDEMIA LDL GOAL <70: ICD-10-CM

## 2023-04-04 DIAGNOSIS — I48.0 PAROXYSMAL ATRIAL FIBRILLATION (H): ICD-10-CM

## 2023-04-04 DIAGNOSIS — Z78.9 STATIN INTOLERANCE: ICD-10-CM

## 2023-04-04 DIAGNOSIS — R93.1 ELEVATED CORONARY ARTERY CALCIUM SCORE: ICD-10-CM

## 2023-04-04 DIAGNOSIS — E66.01 MORBID OBESITY (H): ICD-10-CM

## 2023-04-04 PROCEDURE — 99214 OFFICE O/P EST MOD 30 MIN: CPT | Performed by: INTERNAL MEDICINE

## 2023-04-04 NOTE — PROGRESS NOTES
Preventive Cardiology Visit                   Thank you, Lia Kilpatrick PA-C, for asking the [facility] team to see Ms. Arielle Pham to follow-up on hypercholesterolemia, coronary artery disease, paroxysmal atrial fibrillation.    Assessment/Recommendations   Assessment:    1.  Coronary artery disease, moderate by CT coronary angiography in July 2019 with calcium score of 752.  She reports no symptoms of exertional chest discomfort or dyspnea.  She is doing pool exercises without any issues.  At this point would continue with aggressive risk factor modification and close observation.  2.  Hypercholesterolemia, inadequately controlled.  She did have a lipid profile in November while on Repatha with an LDL at 80 which is down from her previous levels.  She tells me today, however, she was not taking the Repatha consistently as she was having some issues with the injections as well as having some intermittent leg pain which is persisted off the Repatha.  She has to try to go back on it and did submit a new prescription today.  Recommended that we repeat a lipid profile after she has been on it consistently for 3 months.  Continue Zetia as well.  3.  Paroxysmal atrial fibrillation, fairly well suppressed with sotalol therapy.  She does report occasional breakthroughs which may be related to undiagnosed PRAVEEN.  She is scheduled to undergo sleep study.  She did meet with one of our EP's to discuss A-fib ablation but has held off at this time.  4.  Possible PRAVEEN.  Patient scheduled to undergo sleep study with further recommendations to follow.    Plan:  1.  Restart Repatha injections as prescribed.  Continue all other medications as is  2.  Repeat lipid profile 3 months after being on Repatha consistently.  3.  Follow through with sleep study as this may be triggering episodes of atrial fibrillation.  Follow-up in the A-fib clinic as recommended.  4.  Follow-up in 1 year     History of Present Illness     Ms. Arielle Pham is a 53 year old female with history of elevated coronary artery calcium score and evidence of mild to moderate coronary artery disease by CTA, essential hypertension, paroxysmal atrial fibrillation on a combination of sotalol and Xarelto anticoagulation, hypercholesterolemia who presents to the office today for follow-up visit.  Currently reports no symptoms of exertional chest discomfort or dyspnea and tells me she is doing pool exercises regularly without symptoms.  Does continue to have intermittent episodes of atrial fibrillation that can last upwards of 2 hours.  Has not completed a sleep study yet but has one scheduled and I told her that if this shows PRAVEEN, treatment of that may limit her episodes of A-fib.  She has met with one of our EP physicians regarding A-fib ablation but it elected to continue with medications for now.  Denies any symptoms of orthopnea or PND.    ECG (personally reviewed): No ECG today    Cardiac Imaging Studies (personally reviewed): No new imaging     Physical Examination Review of Systems   /74 (BP Location: Left arm, Patient Position: Sitting, Cuff Size: Adult Large)   Pulse 76   Resp 16   Wt 130.6 kg (288 lb)   SpO2 99%   BMI 42.53 kg/m    Body mass index is 42.53 kg/m .  Wt Readings from Last 3 Encounters:   04/04/23 130.6 kg (288 lb)   02/02/23 131.5 kg (290 lb)   12/07/22 130.6 kg (288 lb)     General Appearance:   Awake, Alert, No acute distress.   HEENT:  No scleral icterus; the mucous membranes were pink and moist.   Neck: No cervical bruits or jugular venous distention    Chest: The spine was straight. The chest was symmetric.   Lungs:   Respirations unlabored; the lungs are clear to auscultation. No wheezing   Cardiovascular:    Regular rate and rhythm.  S1, S2 normal.  No murmur or gallop   Abdomen:  No organomegaly, masses, bruits, or tenderness. Bowels sounds are present   Extremities:  No peripheral edema bilaterally   Skin: No  xanthelasma. Warm, Dry.   Musculoskeletal: No tenderness.   Neurologic: Mood and affect are appropriate.    Enc Vitals  BP: 126/74  Pulse: 76  Resp: 16  SpO2: 99 %  Weight: 130.6 kg (288 lb) (shoes and coat on)                                         Medical History  Surgical History Family History Social History   Past Medical History:   Diagnosis Date     Atypical chest pain 09/30/2015     Coronary artery disease involving native coronary artery of native heart without angina pectoris 09/29/2021     Hyperlipidemia      Hypertension      Kidney stones      Migraines      Paroxysmal atrial fibrillation (H) 07/05/2022     TMJ (dislocation of temporomandibular joint)     Past Surgical History:   Procedure Laterality Date     CHOLECYSTECTOMY      Family History   Problem Relation Age of Onset     Hypertension Mother      Coronary Artery Disease Mother      Alcoholism Father      Coronary Artery Disease Father      Prostate Cancer Father      Atrial fibrillation Father      Kidney Disease Sister      Morbid Obesity Sister      Colorectal Cancer No family hx of     Social History     Socioeconomic History     Marital status: Single     Spouse name: Not on file     Number of children: Not on file     Years of education: Not on file     Highest education level: Not on file   Occupational History     Not on file   Tobacco Use     Smoking status: Never     Smokeless tobacco: Never   Vaping Use     Vaping status: Not on file   Substance and Sexual Activity     Alcohol use: No     Drug use: No     Sexual activity: Yes     Partners: Male   Other Topics Concern     Parent/sibling w/ CABG, MI or angioplasty before 65F 55M? No   Social History Narrative     Not on file     Social Determinants of Health     Financial Resource Strain: Low Risk  (5/7/2020)    Overall Financial Resource Strain (CARDIA)      Difficulty of Paying Living Expenses: Not hard at all   Food Insecurity: No Food Insecurity (5/7/2020)    Hunger Vital Sign       Worried About Running Out of Food in the Last Year: Never true      Ran Out of Food in the Last Year: Never true   Transportation Needs: Not on file   Physical Activity: Not on file   Stress: Not on file   Social Connections: Not on file   Intimate Partner Violence: Not on file   Housing Stability: Unknown (5/7/2020)    Housing Stability Vital Sign      Unable to Pay for Housing in the Last Year: No      Number of Places Lived in the Last Year: Not on file      Unstable Housing in the Last Year: No          Medications  Allergies   Current Outpatient Medications   Medication Sig Dispense Refill     evolocumab (REPATHA) 140 MG/ML prefilled autoinjector Inject 1 mL (140 mg) Subcutaneous every 14 days 6 mL 3     ezetimibe (ZETIA) 10 MG tablet Take 1 tablet (10 mg) by mouth daily 90 tablet 2     Fremanezumab-vfrm (AJOVY) 225 MG/1.5ML SOAJ Inject 225 mg Subcutaneous every 30 days 1.5 mL 12     GARLIC PO Take 1 tablet by mouth 2 times daily       lisinopril (ZESTRIL) 10 MG tablet Take 2 tablets (20 mg) by mouth daily 180 tablet 2     loratadine (CLARITIN) 10 MG tablet Take 1 tablet (10 mg) by mouth daily as needed for allergies 90 tablet 3     omeprazole (PRILOSEC) 40 MG DR capsule Take 1 capsule (40 mg) by mouth daily 90 capsule 3     order for DME Rolling walker with seat for home use 1 Device 0     oxyCODONE-acetaminophen (PERCOCET) 5-325 MG tablet Take 1 tablet by mouth every 6 hours as needed for moderate to severe pain Maximum of 4 tablets per day. This is a 30 day supply 115 tablet 0     Rimegepant Sulfate 75 MG TBDP Take 75 mg by mouth See Admin Instructions Take 75 mg orally per 24 hours placed on or under the tongue; MAX 75 mg/24 hours; 8 tablet 9     rivaroxaban ANTICOAGULANT (XARELTO ANTICOAGULANT) 20 MG TABS tablet Take 1 tablet (20 mg) by mouth daily (with dinner) 90 tablet 3     sotalol (BETAPACE) 80 MG tablet Take 1 tablet (80 mg) by mouth 2 times daily 60 tablet 11     ZOLMitriptan (ZOMIG) 5 MG tablet  TAKE ONE TABLET BY MOUTH AT ONSET OF HEADACHE. 9 tablet 5      Allergies   Allergen Reactions     Hmg-Coa-R Inhibitors Other (See Comments)     Severe joint and muscle ache from Atorvastastin     Codeine Dizziness and Other (See Comments)     Rosuvastatin Muscle Pain (Myalgia)     Gabapentin Palpitations         Lab Results    Chemistry/lipid CBC Cardiac Enzymes/BNP/TSH/INR   Recent Labs   Lab Test 11/02/22  1518   TRIG 225*   LDL 80   BUN 20.5*      CO2 18*    Recent Labs   Lab Test 06/30/22  0322   WBC 7.9   HGB 14.4   HCT 44.1   MCV 96       Recent Labs   Lab Test 11/02/22  1518 06/30/22  0322   TROPONINI  --  <0.01   TSH 0.03* 1.75        A total of 32 minutes was spent reviewing patient's medical records, obtaining history and performing examination, as well as discussing diagnoses/ recommendations with patient and answering all questions.

## 2023-04-04 NOTE — LETTER
4/4/2023    Pamella Kilpatrick PA-C  [address]  [facility] [address]    RE: Arielle AGUDELO Vero       Dear Colleague,     I had the pleasure of seeing Arielle Pham in the [facility].Preventive Cardiology Visit                   Thank you, Lia Kilpatrick PA-C, for asking the [facility] team to see Ms. Arielle Pham to follow-up on hypercholesterolemia, coronary artery disease, paroxysmal atrial fibrillation.    Assessment/Recommendations   Assessment:    1.  Coronary artery disease, moderate by CT coronary angiography in July 2019 with calcium score of 752.  She reports no symptoms of exertional chest discomfort or dyspnea.  She is doing pool exercises without any issues.  At this point would continue with aggressive risk factor modification and close observation.  2.  Hypercholesterolemia, inadequately controlled.  She did have a lipid profile in November while on Repatha with an LDL at 80 which is down from her previous levels.  She tells me today, however, she was not taking the Repatha consistently as she was having some issues with the injections as well as having some intermittent leg pain which is persisted off the Repatha.  She has to try to go back on it and did submit a new prescription today.  Recommended that we repeat a lipid profile after she has been on it consistently for 3 months.  Continue Zetia as well.  3.  Paroxysmal atrial fibrillation, fairly well suppressed with sotalol therapy.  She does report occasional breakthroughs which may be related to undiagnosed PRAVEEN.  She is scheduled to undergo sleep study.  She did meet with one of our EP's to discuss A-fib ablation but has held off at this time.  4.  Possible PRAVEEN.  Patient scheduled to undergo sleep study with further recommendations to follow.    Plan:  1.  Restart Repatha injections as prescribed.  Continue all other medications as is  2.  Repeat lipid profile 3 months after being on Repatha  consistently.  3.  Follow through with sleep study as this may be triggering episodes of atrial fibrillation.  Follow-up in the A-fib clinic as recommended.  4.  Follow-up in 1 year     History of Present Illness    Ms. Arielle Pham is a 53 year old female with history of elevated coronary artery calcium score and evidence of mild to moderate coronary artery disease by CTA, essential hypertension, paroxysmal atrial fibrillation on a combination of sotalol and Xarelto anticoagulation, hypercholesterolemia who presents to the office today for follow-up visit.  Currently reports no symptoms of exertional chest discomfort or dyspnea and tells me she is doing pool exercises regularly without symptoms.  Does continue to have intermittent episodes of atrial fibrillation that can last upwards of 2 hours.  Has not completed a sleep study yet but has one scheduled and I told her that if this shows PRAVEEN, treatment of that may limit her episodes of A-fib.  She has met with one of our EP physicians regarding A-fib ablation but it elected to continue with medications for now.  Denies any symptoms of orthopnea or PND.    ECG (personally reviewed): No ECG today    Cardiac Imaging Studies (personally reviewed): No new imaging     Physical Examination Review of Systems   /74 (BP Location: Left arm, Patient Position: Sitting, Cuff Size: Adult Large)   Pulse 76   Resp 16   Wt 130.6 kg (288 lb)   SpO2 99%   BMI 42.53 kg/m    Body mass index is 42.53 kg/m .  Wt Readings from Last 3 Encounters:   04/04/23 130.6 kg (288 lb)   02/02/23 131.5 kg (290 lb)   12/07/22 130.6 kg (288 lb)     General Appearance:   Awake, Alert, No acute distress.   HEENT:  No scleral icterus; the mucous membranes were pink and moist.   Neck: No cervical bruits or jugular venous distention    Chest: The spine was straight. The chest was symmetric.   Lungs:   Respirations unlabored; the lungs are clear to auscultation. No wheezing   Cardiovascular:     Regular rate and rhythm.  S1, S2 normal.  No murmur or gallop   Abdomen:  No organomegaly, masses, bruits, or tenderness. Bowels sounds are present   Extremities:  No peripheral edema bilaterally   Skin: No xanthelasma. Warm, Dry.   Musculoskeletal: No tenderness.   Neurologic: Mood and affect are appropriate.    Enc Vitals  BP: 126/74  Pulse: 76  Resp: 16  SpO2: 99 %  Weight: 130.6 kg (288 lb) (shoes and coat on)                                         Medical History  Surgical History Family History Social History   Past Medical History:   Diagnosis Date    Atypical chest pain 09/30/2015    Coronary artery disease involving native coronary artery of native heart without angina pectoris 09/29/2021    Hyperlipidemia     Hypertension     Kidney stones     Migraines     Paroxysmal atrial fibrillation (H) 07/05/2022    TMJ (dislocation of temporomandibular joint)     Past Surgical History:   Procedure Laterality Date    CHOLECYSTECTOMY      Family History   Problem Relation Age of Onset    Hypertension Mother     Coronary Artery Disease Mother     Alcoholism Father     Coronary Artery Disease Father     Prostate Cancer Father     Atrial fibrillation Father     Kidney Disease Sister     Morbid Obesity Sister     Colorectal Cancer No family hx of     Social History     Socioeconomic History    Marital status: Single     Spouse name: Not on file    Number of children: Not on file    Years of education: Not on file    Highest education level: Not on file   Occupational History    Not on file   Tobacco Use    Smoking status: Never    Smokeless tobacco: Never   Vaping Use    Vaping status: Not on file   Substance and Sexual Activity    Alcohol use: No    Drug use: No    Sexual activity: Yes     Partners: Male   Other Topics Concern    Parent/sibling w/ CABG, MI or angioplasty before 65F 55M? No   Social History Narrative    Not on file     Social Determinants of Health     Financial Resource Strain: Low Risk  (5/7/2020)     Overall Financial Resource Strain (CARDIA)     Difficulty of Paying Living Expenses: Not hard at all   Food Insecurity: No Food Insecurity (5/7/2020)    Hunger Vital Sign     Worried About Running Out of Food in the Last Year: Never true     Ran Out of Food in the Last Year: Never true   Transportation Needs: Not on file   Physical Activity: Not on file   Stress: Not on file   Social Connections: Not on file   Intimate Partner Violence: Not on file   Housing Stability: Unknown (5/7/2020)    Housing Stability Vital Sign     Unable to Pay for Housing in the Last Year: No     Number of Places Lived in the Last Year: Not on file     Unstable Housing in the Last Year: No          Medications  Allergies   Current Outpatient Medications   Medication Sig Dispense Refill    evolocumab (REPATHA) 140 MG/ML prefilled autoinjector Inject 1 mL (140 mg) Subcutaneous every 14 days 6 mL 3    ezetimibe (ZETIA) 10 MG tablet Take 1 tablet (10 mg) by mouth daily 90 tablet 2    Fremanezumab-vfrm (AJOVY) 225 MG/1.5ML SOAJ Inject 225 mg Subcutaneous every 30 days 1.5 mL 12    GARLIC PO Take 1 tablet by mouth 2 times daily      lisinopril (ZESTRIL) 10 MG tablet Take 2 tablets (20 mg) by mouth daily 180 tablet 2    loratadine (CLARITIN) 10 MG tablet Take 1 tablet (10 mg) by mouth daily as needed for allergies 90 tablet 3    omeprazole (PRILOSEC) 40 MG DR capsule Take 1 capsule (40 mg) by mouth daily 90 capsule 3    order for DME Rolling walker with seat for home use 1 Device 0    oxyCODONE-acetaminophen (PERCOCET) 5-325 MG tablet Take 1 tablet by mouth every 6 hours as needed for moderate to severe pain Maximum of 4 tablets per day. This is a 30 day supply 115 tablet 0    Rimegepant Sulfate 75 MG TBDP Take 75 mg by mouth See Admin Instructions Take 75 mg orally per 24 hours placed on or under the tongue; MAX 75 mg/24 hours; 8 tablet 9    rivaroxaban ANTICOAGULANT (XARELTO ANTICOAGULANT) 20 MG TABS tablet Take 1 tablet (20 mg) by mouth daily  (with dinner) 90 tablet 3    sotalol (BETAPACE) 80 MG tablet Take 1 tablet (80 mg) by mouth 2 times daily 60 tablet 11    ZOLMitriptan (ZOMIG) 5 MG tablet TAKE ONE TABLET BY MOUTH AT ONSET OF HEADACHE. 9 tablet 5      Allergies   Allergen Reactions    Hmg-Coa-R Inhibitors Other (See Comments)     Severe joint and muscle ache from Atorvastastin    Codeine Dizziness and Other (See Comments)    Rosuvastatin Muscle Pain (Myalgia)    Gabapentin Palpitations         Lab Results    Chemistry/lipid CBC Cardiac Enzymes/BNP/TSH/INR   Recent Labs   Lab Test 11/02/22  1518   TRIG 225*   LDL 80   BUN 20.5*      CO2 18*    Recent Labs   Lab Test 06/30/22  0322   WBC 7.9   HGB 14.4   HCT 44.1   MCV 96       Recent Labs   Lab Test 11/02/22  1518 06/30/22  0322   TROPONINI  --  <0.01   TSH 0.03* 1.75        A total of 32 minutes was spent reviewing patient's medical records, obtaining history and performing examination, as well as discussing diagnoses/ recommendations with patient and answering all questions.                          Thank you for allowing me to participate in the care of your patient.    Sincerely,     Trinity Herrera MD     [facility]

## 2023-04-04 NOTE — PATIENT INSTRUCTIONS
Continue current medications  Restart Repatha to lower cholesterol. We should repeat your lipid levels after you have been on it consistently for 3 months.  Continue regular exercise  Follow through with sleep study

## 2023-04-24 ENCOUNTER — TELEPHONE (OUTPATIENT)
Dept: SLEEP MEDICINE | Facility: CLINIC | Age: 53
End: 2023-04-24
Payer: COMMERCIAL

## 2023-04-24 DIAGNOSIS — G47.30 SLEEP-RELATED BREATHING DISORDER: Primary | ICD-10-CM

## 2023-04-24 DIAGNOSIS — I48.0 PAROXYSMAL ATRIAL FIBRILLATION (H): ICD-10-CM

## 2023-04-24 NOTE — TELEPHONE ENCOUNTER
We got notification that this patient's insurance does not cover home sleep studies. Would you like to order an overnight study instead?

## 2023-05-10 ENCOUNTER — LAB REQUISITION (OUTPATIENT)
Dept: LAB | Facility: CLINIC | Age: 53
End: 2023-05-10
Payer: COMMERCIAL

## 2023-05-10 PROCEDURE — 87186 SC STD MICRODIL/AGAR DIL: CPT | Mod: ORL | Performed by: PHYSICIAN ASSISTANT

## 2023-05-12 LAB — BACTERIA UR CULT: ABNORMAL

## 2023-05-18 ENCOUNTER — TELEPHONE (OUTPATIENT)
Dept: SLEEP MEDICINE | Facility: CLINIC | Age: 53
End: 2023-05-18
Payer: COMMERCIAL

## 2023-05-18 NOTE — TELEPHONE ENCOUNTER
Called patient to see if she had heard anything different from her insurance company as the last we heard it had been denied. The patient stated that a PA needed to be done and I let her know that per the results said that this is a non covered service per her insurance. The patient then said she wanted to know why she was not called sooner after waiting months. I let her know that we try are hardest to get to everyone as soon as we here and that is why we called her back in April. Patient stated that she has kids and is not able to come in for a in-lab as she has no help or anyone that can watch her children for a night. And that we need to do better in knowing what the insurance is going to cover before we offer it to the patients. I explained that because of the large number of insurances we would not be able to know every thing that is covered under every policy. I let her know that is why we send prior auths to get approval and if they come back denied as a non covered service there is not a lot that we are able to do on our end. The patient once again said that it's not her fault and the patient then hung up the phone.    Chastity Boateng CMA, HST Specialist  [place] [facility]

## 2023-05-25 ENCOUNTER — LAB REQUISITION (OUTPATIENT)
Dept: LAB | Facility: CLINIC | Age: 53
End: 2023-05-25
Payer: COMMERCIAL

## 2023-05-25 DIAGNOSIS — R30.0 DYSURIA: ICD-10-CM

## 2023-05-25 PROCEDURE — 87086 URINE CULTURE/COLONY COUNT: CPT | Mod: ORL | Performed by: PHYSICIAN ASSISTANT

## 2023-05-27 LAB — BACTERIA UR CULT: NORMAL

## 2023-06-27 DIAGNOSIS — G43.009 MIGRAINE WITHOUT AURA AND WITHOUT STATUS MIGRAINOSUS, NOT INTRACTABLE: ICD-10-CM

## 2023-06-27 RX ORDER — RIMEGEPANT SULFATE 75 MG/75MG
TABLET, ORALLY DISINTEGRATING ORAL
Qty: 8 TABLET | Refills: 11 | Status: SHIPPED | OUTPATIENT
Start: 2023-06-27 | End: 2024-07-09

## 2023-06-27 NOTE — TELEPHONE ENCOUNTER
Rx Authorization:  Requested Medication/ Dose Nurtec 75 MG Oral Tablet Disintegrating  Date last refill ordered: 6/23/22  Quantity ordered: 8 tabs  # refills: 9  Date of last clinic visit with ordering provider:   Date of next clinic visit with ordering provider:   All pertinent protocol data (lab date/result):   Include pertinent information from patients message:

## 2023-06-30 ENCOUNTER — TELEPHONE (OUTPATIENT)
Dept: NEUROLOGY | Facility: CLINIC | Age: 53
End: 2023-06-30
Payer: COMMERCIAL

## 2023-06-30 NOTE — TELEPHONE ENCOUNTER
"Prior Authorization Retail Medication Request    Medication/Dose: NURTEC 75 MG ODT tablet 8 tablet   ICD code (if different than what is on RX):    G43.709  Previously Tried and Failed:  Zomig as needed and helps but headaches   Sumatriptan   Stopped Excedrin and tylenol many years ago  Ibuprofen a couple times per day and no side effects  Percocet -for back pain and foot pain and prescribed by Dr Stahl but not for headaches  Currently on lisinopril for hypertension  Amitriptyline -just stopped working and was on it for a while  Nortriptyline  Protriptyline  Topiramate  Zonegran  Gabapentin-caused anxiety/panic attack  Verapamil  Propranol  Emgality -took for a couple of month and caused dizziness. Did not try any other CGRPs  escitalopram -does not recall side effects but it wasn't helpful with headaches  Losartan in the past-has been a while  Topiramate a long time ago and \"very bad dreams\". History of renal stones so contraindicated     Botox -fear of potential side effects-  Rationale:  migraine - has used nurtec for acute migraine with good relief. PA Renewal     Insurance Name:  [employer]  Insurance ID:  [id number]         Pharmacy Information (if different than what is on RX)  Name:    Phone:    "

## 2023-07-06 NOTE — TELEPHONE ENCOUNTER
Central Prior Authorization Team   Phone: [phone]      PA Initiation    Medication: NURTEC 75 MG PO TBDP  Insurance Company: [employer] - Phone [phone] Fax [phone]  Pharmacy Filling the Rx: [facility] [address] [place] [address]  Filling Pharmacy Phone: [phone]  Filling Pharmacy Fax:    Start Date: 7/6/2023

## 2023-07-06 NOTE — TELEPHONE ENCOUNTER
Prior Authorization Approval    Medication: NURTEC 75 MG PO TBDP  Authorization Effective Date: 4/7/2023  Authorization Expiration Date: 7/6/2024  Approved Dose/Quantity:   Reference #:     Insurance Company: [employer] - Phone [phone] Fax [phone]  Expected CoPay:       CoPay Card Available:      Financial Assistance Needed:   Which Pharmacy is filling the prescription: [facility] [address] [place] [address]  Pharmacy Notified: Yes  Patient Notified: No

## 2023-07-09 ENCOUNTER — HEALTH MAINTENANCE LETTER (OUTPATIENT)
Age: 53
End: 2023-07-09

## 2023-08-30 ENCOUNTER — ANCILLARY ORDERS (OUTPATIENT)
Dept: RADIOLOGY | Facility: HOSPITAL | Age: 53
End: 2023-08-30

## 2023-08-30 DIAGNOSIS — K44.9 HIATAL HERNIA: ICD-10-CM

## 2023-08-30 DIAGNOSIS — K21.9 CHRONIC GERD: Primary | ICD-10-CM

## 2023-08-30 DIAGNOSIS — K31.9 GASTRIC ERYTHEMA: ICD-10-CM

## 2023-10-02 ENCOUNTER — TELEPHONE (OUTPATIENT)
Dept: CARDIOLOGY | Facility: CLINIC | Age: 53
End: 2023-10-02
Payer: COMMERCIAL

## 2023-10-02 DIAGNOSIS — I48.0 PAROXYSMAL ATRIAL FIBRILLATION (H): ICD-10-CM

## 2023-10-02 RX ORDER — SOTALOL HYDROCHLORIDE 80 MG/1
80 TABLET ORAL 2 TIMES DAILY
Qty: 180 TABLET | Refills: 0 | Status: SHIPPED | OUTPATIENT
Start: 2023-10-02 | End: 2024-01-06

## 2023-10-02 NOTE — TELEPHONE ENCOUNTER
Pt due to see EP NP per last office visit with Dr. Gutierrez, order is in epic.  90 day refill sent for pt to .    Will forward to scheduling to call and schedule.    Marisel

## 2023-11-07 ENCOUNTER — TELEPHONE (OUTPATIENT)
Dept: CARDIOLOGY | Facility: CLINIC | Age: 53
End: 2023-11-07
Payer: COMMERCIAL

## 2023-11-07 NOTE — TELEPHONE ENCOUNTER
PA Initiation    Medication:  Repatha 140mg / ml  Insurance Company:  [employer] [facility]  Pharmacy Filling the Rx:  [facility]  Filling Pharmacy Phone:  [phone]  Filling Pharmacy Fax:  [phone]  Start Date:   4-4-23

## 2023-11-09 NOTE — TELEPHONE ENCOUNTER
Central Prior Authorization Team   Phone: [phone]    PA Initiation    Medication: Repatha Rx  Insurance Company: [employer] - Phone [phone] Fax [phone]  Pharmacy Filling the Rx: [address]  Filling Pharmacy Phone: [phone]  Filling Pharmacy Fax:    Start Date: 11/9/2023

## 2023-11-10 NOTE — TELEPHONE ENCOUNTER
Prior Authorization Approval    Authorization Effective Date: 8/11/2023  Authorization Expiration Date: 11/9/2024  Medication: Repatha Rx  Approved Dose/Quantity:   Reference #:     Insurance Company: [employer] - Phone [phone] Fax [phone]  Expected CoPay:       CoPay Card Available:      [employer] Assistance Needed:    Which Pharmacy is filling the prescription (Not needed for infusion/clinic administered): [facility] [address] [place] [address]  Pharmacy Notified:  yes  Patient Notified:  yes- Pharmacy will contact patient when ready to /ship

## 2024-01-06 DIAGNOSIS — I48.0 PAROXYSMAL ATRIAL FIBRILLATION (H): ICD-10-CM

## 2024-01-08 RX ORDER — SOTALOL HYDROCHLORIDE 80 MG/1
80 TABLET ORAL 2 TIMES DAILY
Qty: 180 TABLET | Refills: 3 | Status: SHIPPED | OUTPATIENT
Start: 2024-01-08 | End: 2024-02-13

## 2024-02-04 ENCOUNTER — HEALTH MAINTENANCE LETTER (OUTPATIENT)
Age: 54
End: 2024-02-04

## 2024-02-06 ENCOUNTER — TELEPHONE (OUTPATIENT)
Dept: NEUROLOGY | Facility: CLINIC | Age: 54
End: 2024-02-06
Payer: COMMERCIAL

## 2024-02-06 DIAGNOSIS — G43.709 CHRONIC MIGRAINE WITHOUT AURA WITHOUT STATUS MIGRAINOSUS, NOT INTRACTABLE: ICD-10-CM

## 2024-02-06 NOTE — TELEPHONE ENCOUNTER
"Prior Authorization Retail Medication Request  Renewal     Medication/Dose: Fremanezumab-vfrm 225 MG/1.5ML SOAJ; Inject 225 mg Subcutaneous every 30 days   ICD code (if different than what is on RX):    G43.709  Previously Tried and Failed:  Zomig as needed and helps but headaches   Sumatriptan   Stopped Excedrin and tylenol many years ago  Ibuprofen a couple times per day and no side effects  Percocet -for back pain and foot pain and prescribed by Dr Stahl but not for headaches  Currently on lisinopril for hypertension  Amitriptyline -just stopped working and was on it for a while  Nortriptyline  Protriptyline  Topiramate  Zonegran  Gabapentin-caused anxiety/panic attack  Verapamil  Propranol  Emgality -took for a couple of month and caused dizziness. Did not try any other CGRPs  escitalopram -does not recall side effects but it wasn't helpful with headaches  Losartan in the past-has been a while  Topiramate a long time ago and \"very bad dreams\". History of renal stones so contraindicated     Botox -fear of potential side effects-  Rationale:  Chronic migraine     Insurance Name:  [employer]  Insurance ID:  [id number]         Pharmacy Information (if different than what is on RX)  Name:    Phone:    "

## 2024-02-06 NOTE — TELEPHONE ENCOUNTER
Health Call Center    Phone Message    May a detailed message be left on voicemail: yes     Reason for Call: Medication Refill Request    Has the patient contacted the pharmacy for the refill? Yes   Name of medication being requested: AJOVY  Provider who prescribed the medication: Thuy Tracey  Pharmacy: [facility] [address] [place] [address]  Date medication is needed: ASAP     Pt is requesting a refill of the medication listed above. Pt states the care team reported to the pharmacy that she has never taken this medication and pt states that is false.     Please call back to advise at # [phone]    Action Taken: Message routed to:  [facility] Neurology     Travel Screening: Not Applicable

## 2024-02-06 NOTE — TELEPHONE ENCOUNTER
Rx Authorization:  Requested Medication/ Dose Fremanezumab-vfrm (AJOVY) 225 MG/1.5ML SOAJ   Date last refill ordered: 1/19/23  Quantity ordered: 1.5 mL  # refills: 6  Date of last clinic visit with ordering provider: 1/19/23  Date of next clinic visit with ordering provider: 4/24/24  All pertinent protocol data (lab date/result):   Include pertinent information from patients message:

## 2024-02-06 NOTE — TELEPHONE ENCOUNTER
I called and spoke   Refill request for AJOVY  was sent to to Thuy MILLS. PA for AJOVY  was sent to PA team. PA can take up to 10 days Patient understood.

## 2024-02-13 ENCOUNTER — MYC REFILL (OUTPATIENT)
Dept: CARDIOLOGY | Facility: CLINIC | Age: 54
End: 2024-02-13
Payer: COMMERCIAL

## 2024-02-13 DIAGNOSIS — I48.0 PAROXYSMAL ATRIAL FIBRILLATION (H): ICD-10-CM

## 2024-02-13 RX ORDER — SOTALOL HYDROCHLORIDE 80 MG/1
80 TABLET ORAL 2 TIMES DAILY
Qty: 180 TABLET | Refills: 0 | Status: SHIPPED | OUTPATIENT
Start: 2024-02-13 | End: 2024-07-11

## 2024-02-16 ENCOUNTER — TELEPHONE (OUTPATIENT)
Dept: NEUROLOGY | Facility: CLINIC | Age: 54
End: 2024-02-16
Payer: COMMERCIAL

## 2024-02-16 NOTE — TELEPHONE ENCOUNTER
[facility] Call Center    Phone Message    May a detailed message be left on voicemail: yes     Reason for Call:  Patient called states that insurance h as not received PA mandi Johnston, patient inquiring what status is? Please call to discuss    Action Taken: Message routed to:  [facility] neurology    Travel Screening: Not Applicable

## 2024-02-16 NOTE — TELEPHONE ENCOUNTER
Central Prior Authorization Team - Phone: [phone]     PA Initiation    Medication: AJOVY 225 MG/1.5ML SC SOAJ  Insurance Company: [employer] - Phone [phone] Fax [phone]  Pharmacy Filling the Rx: [facility] [address] [place] [address]  Filling Pharmacy Phone: [phone]  Filling Pharmacy Fax:    Start Date: 2/16/2024

## 2024-02-16 NOTE — TELEPHONE ENCOUNTER
CONY - PA now in process. [employer] message sent. Advised to call clinic back if further questions.

## 2024-02-19 NOTE — TELEPHONE ENCOUNTER
Central Prior Authorization Team - Phone: [phone]     Prior Authorization Approval    Medication: AJOVY 225 MG/1.5ML SC SOAJ  Authorization Effective Date: 1/1/2024  Authorization Expiration Date: 2/16/2025  Approved Dose/Quantity: 1.5  Reference #:     Insurance Company: [employer] BLUE PLUS - Phone [phone] Fax [phone]  Expected CoPay: $    CoPay Card Available:      Financial Assistance Needed:   Which Pharmacy is filling the prescription: [facility] [address] [place] [address]  Pharmacy Notified: YES  Patient Notified: YES Pharmacy will notify when ready

## 2024-04-18 ASSESSMENT — MIGRAINE DISABILITY ASSESSMENT (MIDAS)
HOW MANY DAYS WAS HOUSEWORK PRODUCTIVITY CUT IN HALF DUE TO HEADACHES: 20
HOW MANY DAYS IN THE PAST 3 MONTHS HAVE YOU HAD A HEADACHE: 20
ON A SCALE FROM 0-10 ON AVERAGE HOW PAINFUL WERE HEADACHES: 7
HOW MANY DAYS DID YOU NOT DO HOUSEWORK BECAUSE OF HEADACHES: 20
HOW MANY DAYS WAS YOUR PRODUCTIVITY CUT IN HALF BECAUSE OF HEADACHES: 20
TOTAL SCORE: 100
HOW OFTEN WERE SOCIAL ACTIVITIES MISSED DUE TO HEADACHES: 20
HOW MANY DAYS DID YOU MISS WORK OR SCHOOL BECAUSE OF HEADACHES: 20

## 2024-04-18 ASSESSMENT — HEADACHE IMPACT TEST (HIT 6)
HOW OFTEN DO HEADACHES LIMIT YOUR DAILY ACTIVITIES: VERY OFTEN
WHEN YOU HAVE A HEADACHE HOW OFTEN DO YOU WISH YOU COULD LIE DOWN: VERY OFTEN
HOW OFTEN DID HEADACHS LIMIT CONCENTRATION ON WORK OR DAILY ACTIVITY: VERY OFTEN
HOW OFTEN HAVE YOU FELT TOO TIRED TO WORK BECAUSE OF YOUR HEADACHES: VERY OFTEN
HIT6 TOTAL SCORE: 68
HOW OFTEN HAVE YOU FELT FED UP OR IRRITATED BECAUSE OF YOUR HEADACHES: ALWAYS
WHEN YOU HAVE HEADACHES HOW OFTEN IS THE PAIN SEVERE: VERY OFTEN

## 2024-04-25 ENCOUNTER — VIRTUAL VISIT (OUTPATIENT)
Dept: NEUROLOGY | Facility: CLINIC | Age: 54
End: 2024-04-25
Payer: COMMERCIAL

## 2024-04-25 DIAGNOSIS — G43.709 CHRONIC MIGRAINE WITHOUT AURA WITHOUT STATUS MIGRAINOSUS, NOT INTRACTABLE: Primary | ICD-10-CM

## 2024-04-25 PROCEDURE — 99213 OFFICE O/P EST LOW 20 MIN: CPT | Mod: 95 | Performed by: NURSE PRACTITIONER

## 2024-04-25 NOTE — LETTER
4/25/2024       RE: Arielle Pham  [address] [place] [address]       Dear Colleague,    Thank you for referring your patient, Arielle Pham, to the [facility] [place] [facility]. Please see a copy of my visit note below.    Arielle is a 54 year old who is being evaluated via a billable video visit.      Subjective  Areille is a 54 year old, presenting for the following health issues:  Video Visit (Follow-up )  History of paroxysmal A-fib, hypertension, coronary artery disease-has been followed by cardiology  Patient was last seen in our headache clinic in January 2023.  She was started on Ajovy for migraine prevention.  At the last follow-up visit patient reported that Ajovy has been very helpful and reduce pain around her eyes.  Patient's headaches were horrible before starting Ajovy.    Has been going to [facility] and gets occipital nerve blocks which are helpful but do not help with headaches daily around her eyes. Ajovy helped with pain around her eyes and migraines less intense. Zomig helps as needed. Headaches can be triggered by food/perfume smells.   Ajovy works well and no side effects with Ajovy. Used to wake up every single morning with a horrible pain and now pain is lesser intensity and hardly any some days. Headache gets worse about a week before Ajovy re-injection.   No other new concerns.   Takes Nurtec or zomig as needed and help. Takes Zomig first and uses 9 days per month and uses nurtec when does not have zolmitriptan left. Before Ajovy used rescue treatment more often and headaches were more severe.   Discussed frequency of rescue treatment use. May need to adjust the treatment.   Eye exam about a month ago and presumed stable     Plan:  Continue Ajovy for migraine prevention   May try Nurtec every 2 days for headache  rescue /prevention if tolerated. May cause nausea  Zolmitriptan as needed but limit use to  "no more than 9 days per month, Do not take it if blood pressure consistently above 140-may cause increase in blood pressure and stroke if blood pressure not controlled or chest pain  Follow up with PCP if blood pressure high  Follow up with Headache Clinic 3-6 months or sooner if needed          Objective   Vitals - Patient Reported  Pain Score: Mild Pain (3)  Pain Loc: Head  PCP visit last VSs reviewed  Vital Sign Reading Time Taken Comments   Blood Pressure 146/84 04/19/2024 11:09 AM CDT     Pulse 75 04/19/2024 11:09 AM CDT     Temperature - -     Respiratory Rate - -     Oxygen Saturation 98% 04/19/2024 11:09 AM CDT     Inhaled Oxygen Concentration - -     Weight 139.3 kg (307 lb 1.6 oz) 04/19/2024 11:09 AM CDT shoes on   Height 166.5 cm (5' 5.55\") 04/19/2024 11:09 AM CDT shoes on   Body Mass Index 50.25           5/12/2022     8:26 AM 1/19/2023     4:17 PM 4/18/2024    12:52 PM   HIT-6   When you have headaches, how often is the pain severe 11 13 11   How often do headaches limit your ability to do usual daily activities including household work, work, school, or social activities? 11 11 11   When you have a headache, how often do you wish you could lie down? 11 13 11   In the past 4 weeks, how often have you felt too tired to do work or daily activities because of your headaches 11 11 11   In the past 4 weeks, how often have you felt fed up or irritated because of your headaches 13 13 13   In the past 4 weeks, how often did headaches limit your ability to concentrate on work or daily activities 13 13 11   HIT-6 Total Score 70 74 68           5/12/2022     8:49 AM 1/19/2023     4:21 PM 4/18/2024    12:54 PM   MIDAS - in the past three months:   On how many days did you miss work or school because of your headaches? 0 45 20   How many days was your productivity at work or school reduced by half or more because of your headaches? 0 45 20   On how many days did you not do household work because of your headaches? " 45 45 20   How many days was your productivity in household work reduced by half or more because of your headaches? 36 45 20   On how many days did you miss family, social, or leisure activities because of your headaches? 10 45 20   On how many days did you have a headache? 36 90 20   On a scale of 0-10, on average how painful were these headaches?  8 7   MIDAS Score 91 (IV - Severe Disability) 225 (IV - Severe Disability) 100 (IV - Severe Disability)     Physical Exam   GENERAL: alert and no distress  EYES: Eyes grossly normal to inspection. RESP: No audible wheeze, cough, or visible cyanosis.    SKIN: Visible skin clear.NEURO: Face is symmetrical and symmetrical facial expressions, no weakness observed.  Mentation and speech appropriate for age.  PSYCH: Appropriate affect, tone, and pace of words    I discussed all my recommendations with Arielle Pham who verbalizes understanding and comfortable with the plan.    26 minutes spent on the date of the encounter doing video access, chart  review, blood pressure, cardiology and headache questionnaire  results review,  meds review, treatment plan, documentation and further activities as noted above        Again, thank you for allowing me to participate in the care of your patient.      Sincerely,    DONN Mora CNP

## 2024-04-25 NOTE — PROGRESS NOTES
Arielle is a 54 year old who is being evaluated via a billable video visit.      Subjective   Arielle is a 54 year old, presenting for the following health issues:  Video Visit (Follow-up )  History of paroxysmal A-fib, hypertension, coronary artery disease-has been followed by cardiology  Patient was last seen in our headache clinic in January 2023.  She was started on Ajovy for migraine prevention.  At the last follow-up visit patient reported that Ajovy has been very helpful and reduce pain around her eyes.  Patient's headaches were horrible before starting Ajovy.    Has been going to [facility] and gets occipital nerve blocks which are helpful but do not help with headaches daily around her eyes. Ajovy helped with pain around her eyes and migraines less intense. Zomig helps as needed. Headaches can be triggered by food/perfume smells.   Ajovy works well and no side effects with Ajovy. Used to wake up every single morning with a horrible pain and now pain is lesser intensity and hardly any some days. Headache gets worse about a week before Ajovy re-injection.   No other new concerns.   Takes Nurtec or zomig as needed and help. Takes Zomig first and uses 9 days per month and uses nurtec when does not have zolmitriptan left. Before Ajovy used rescue treatment more often and headaches were more severe.   Discussed frequency of rescue treatment use. May need to adjust the treatment.   Eye exam about a month ago and presumed stable     Plan:  Continue Ajovy for migraine prevention   May try Nurtec every 2 days for headache  rescue /prevention if tolerated. May cause nausea  Zolmitriptan as needed but limit use to no more than 9 days per month, Do not take it if blood pressure consistently above 140-may cause increase in blood pressure and stroke if blood pressure not controlled or chest pain  Follow up with PCP if blood pressure high  Follow up with Headache Clinic 3-6 months or sooner if  "needed          Objective    Vitals - Patient Reported  Pain Score: Mild Pain (3)  Pain Loc: Head  PCP visit last VSs reviewed  Vital Sign Reading Time Taken Comments   Blood Pressure 146/84 04/19/2024 11:09 AM CDT     Pulse 75 04/19/2024 11:09 AM CDT     Temperature - -     Respiratory Rate - -     Oxygen Saturation 98% 04/19/2024 11:09 AM CDT     Inhaled Oxygen Concentration - -     Weight 139.3 kg (307 lb 1.6 oz) 04/19/2024 11:09 AM CDT shoes on   Height 166.5 cm (5' 5.55\") 04/19/2024 11:09 AM CDT shoes on   Body Mass Index 50.25           5/12/2022     8:26 AM 1/19/2023     4:17 PM 4/18/2024    12:52 PM   HIT-6   When you have headaches, how often is the pain severe 11 13 11   How often do headaches limit your ability to do usual daily activities including household work, work, school, or social activities? 11 11 11   When you have a headache, how often do you wish you could lie down? 11 13 11   In the past 4 weeks, how often have you felt too tired to do work or daily activities because of your headaches 11 11 11   In the past 4 weeks, how often have you felt fed up or irritated because of your headaches 13 13 13   In the past 4 weeks, how often did headaches limit your ability to concentrate on work or daily activities 13 13 11   HIT-6 Total Score 70 74 68           5/12/2022     8:49 AM 1/19/2023     4:21 PM 4/18/2024    12:54 PM   MIDAS - in the past three months:   On how many days did you miss work or school because of your headaches? 0 45 20   How many days was your productivity at work or school reduced by half or more because of your headaches? 0 45 20   On how many days did you not do household work because of your headaches? 45 45 20   How many days was your productivity in household work reduced by half or more because of your headaches? 36 45 20   On how many days did you miss family, social, or leisure activities because of your headaches? 10 45 20   On how many days did you have a headache? 36 90 " 20   On a scale of 0-10, on average how painful were these headaches?  8 7   MIDAS Score 91 (IV - Severe Disability) 225 (IV - Severe Disability) 100 (IV - Severe Disability)     Physical Exam   GENERAL: alert and no distress  EYES: Eyes grossly normal to inspection. RESP: No audible wheeze, cough, or visible cyanosis.    SKIN: Visible skin clear.NEURO: Face is symmetrical and symmetrical facial expressions, no weakness observed.  Mentation and speech appropriate for age.  PSYCH: Appropriate affect, tone, and pace of words    I discussed all my recommendations with Arielle Pham who verbalizes understanding and comfortable with the plan.    26 minutes spent on the date of the encounter doing video access, chart  review, blood pressure, cardiology and headache questionnaire  results review,  meds review, treatment plan, documentation and further activities as noted above    DONN Cronin, CNP [facility]  Headache certified  [facility]    Video-Visit Details    Type of service:  Video Visit   Originating Location (pt. Location): Home  Distant Location (provider location):  Off-site  Platform used for Video Visit: Katherine

## 2024-04-25 NOTE — NURSING NOTE
Is the patient currently in the state of [place]? YES    Visit mode:VIDEO    If the visit is dropped, the patient can be reconnected by: TELEPHONE VISIT: Phone number: [phone]    Will anyone else be joining the visit? NO  (If patient encounters technical issues they should call [phone]    How would you like to obtain your AVS? MyChart    Are changes needed to the allergy or medication list? Pt stated no med changes    Are refills needed on medications prescribed by this physician? NO    Reason for visit: Video Visit (Follow-up )    Cassi KINNEY

## 2024-04-25 NOTE — PATIENT INSTRUCTIONS
Plan:  Continue Ajovy for migraine prevention   May try Nurtec every 2 days for headache  rescue /prevention if tolerated. May cause nausea  Zolmitriptan as needed but limit use to no more than 9 days per month, Do not take it if blood pressure consistently above 140-may cause increase in blood pressure and stroke if blood pressure not controlled   Follow up with PCP if blood pressure high  Follow up with Headache Clinic 3-6 months or sooner if needed

## 2024-04-29 ENCOUNTER — DOCUMENTATION ONLY (OUTPATIENT)
Dept: CARDIOLOGY | Facility: CLINIC | Age: 54
End: 2024-04-29

## 2024-04-29 ENCOUNTER — TELEPHONE (OUTPATIENT)
Dept: CARDIOLOGY | Facility: CLINIC | Age: 54
End: 2024-04-29

## 2024-04-29 ENCOUNTER — OFFICE VISIT (OUTPATIENT)
Dept: CARDIOLOGY | Facility: CLINIC | Age: 54
End: 2024-04-29
Payer: COMMERCIAL

## 2024-04-29 VITALS
HEART RATE: 78 BPM | BODY MASS INDEX: 44.89 KG/M2 | RESPIRATION RATE: 16 BRPM | SYSTOLIC BLOOD PRESSURE: 151 MMHG | OXYGEN SATURATION: 97 % | WEIGHT: 293 LBS | DIASTOLIC BLOOD PRESSURE: 93 MMHG

## 2024-04-29 DIAGNOSIS — I48.0 PAROXYSMAL ATRIAL FIBRILLATION (H): Primary | ICD-10-CM

## 2024-04-29 DIAGNOSIS — I25.10 CORONARY ARTERY DISEASE INVOLVING NATIVE CORONARY ARTERY OF NATIVE HEART WITHOUT ANGINA PECTORIS: ICD-10-CM

## 2024-04-29 DIAGNOSIS — I10 ESSENTIAL HYPERTENSION: ICD-10-CM

## 2024-04-29 PROCEDURE — 99214 OFFICE O/P EST MOD 30 MIN: CPT | Performed by: NURSE PRACTITIONER

## 2024-04-29 PROCEDURE — 93000 ELECTROCARDIOGRAM COMPLETE: CPT | Performed by: INTERNAL MEDICINE

## 2024-04-29 RX ORDER — FAMOTIDINE 40 MG/1
1 TABLET, FILM COATED ORAL DAILY
COMMUNITY
Start: 2024-02-01

## 2024-04-29 NOTE — PROGRESS NOTES
H&P:  Card OV  Date:  EP ASHUTOSH [] PVI  Order Case Req Y  Order Set Y Lab/EKG   Orders [] Imaging Order None     AC Xarelto- SWITCH to Eliquis   AAD Sotalol-Hold 3 days prior   PPI/H2 Blocker Continue current PPI omeprazole 40mg daily   Diuretics None   DM/GLP-1 DM Meds- None  GLP-1- None     PVI Referral Request  Consult: Pt will meet with you am of PVI, as pt has had previous consult with you prior  Imaging Prior: Per guidelines no imaging will be ordered, HARSHAD and CT of PV will not be ordered unless you indicate otherwise  Anticoagulation Status:  Pt takes Xarelto- her BMI is 44.89, would you like her to switch over to Eliquis due to BMI >35 prior to procedure?  AAD: Sotalol-will hold 3 days prior to PVI per guidelines  PPI: continue current documented PPI medication as prescribed- omeprazole 40mg daily  Scheduling Info: Dr Gutierrez preferance- 3:1 day  Mapping: Dr Gutierrez preference- Carto        Agree with switching to Eliquis  Okay to schedule for a PVI  Romario Gutierrez MD Judy A [place], 1970, [phone]  Home:[phone] (home) Cell:[phone] (mobile)  Emergency Contact: Vianney Soria [phone]  PCP: Pamella Kilpatrick, [phone]    Important patient information for [facility] Lab staff : None    [facility] EP Cath Lab Procedure Order   Ablation Type:  PVI- Atrial Fibrillation  Ordering Provider: Dr Gutierrez  Date Ordered and Prepped: 4/29/2024 Shanita Mathews RN    Scheduling Information:  Anticipated Case Duration:  Standard ( Case per day SA 2:1, DW 4:1, KA 3:1)   Scheduling Timeframe:  Next Available  Scheduling Restrictions: Pt to change AC 1 wk prior to procedure, please schedule accordingly to allow time for this  Scheduling Contact: Please contact pt to schedule, if you are unable to schedule date within the next 24 hours please contact pt to update on scheduling process  EP RN Follow Up Apt: Schedule EP RN PC visit 3-4 days s/p PVI  MD Preference: Scheduling with ordering provider  Current  Device/Device Co Needed for Procedure: None NoneNone  Pre-Procedural Testing needed: None  Mapping System Required:  Carto (Dr Gould and Dr Gutierrez)  ICE Needed:  Yes  Anesthesia:General Anesthesia    [facility] EP Cath Lab Prep   H&P:  Compled by cardiology on 4/29/24 if scheduled within 30 days, pt will need RN education and Labs apt within 3 days of procedure. If pts PVI scheduled outside of 30 days, pt to schedule H&P with EP ASHUTOSH, RN Teach, and Labs within 30 days of PVI  Pre-op Labs: CBC, BMP, Beta HcG if appropriate, and INR if on Warfarin will be ordered AM of procedure, if not completed at pre-op H&P within 7 days of procedure.  T&S Pre-Procedure Review: Does not need for PVI procedures  Medical Records Pertinent for Procedure:   EKG 4/29/24, ECHO 8/5/22.  Iodinated Contrast Dye Allergies (Does not include Shellfish, Egg, and/or Iodine Allergy): NA  GLP-1 Protocol: If on Dulaglutide (Trulicity) (weekly)- Injection hold 7 days prior to procedure  , Exenatide extended release (Bydureon bcise) (weekly)- Injection hold 7 days prior to procedure, Exenatide (Byetta) (twice daily)- Oral Tablet hold day prior and morning of procedure and for Injection hold 7 days prior to procedure, Semaglutide (Ozempic) (weekly)- Injection and Oral hold 7 days prior to procedure, Liraglutide (Victoza, Saxenda) (daily)- Injection hold day prior and morning of procedure  Follow Up S/P: EP RN 3-4 PC Visit and EP ASHUTOSH 6wk (To be scheduled at time of case scheduling)    Allergies   Allergen Reactions    Statins Other (See Comments)     Severe joint and muscle ache from Atorvastastin    Codeine Dizziness and Other (See Comments)    Rosuvastatin Muscle Pain (Myalgia)    Gabapentin Palpitations       Current Outpatient Medications:     evolocumab (REPATHA) 140 MG/ML prefilled autoinjector, Inject 1 mL (140 mg) Subcutaneous every 14 days (Patient not taking: Reported on 4/29/2024), Disp: 6 mL, Rfl: 3    ezetimibe (ZETIA) 10 MG tablet, Take 1  tablet (10 mg) by mouth daily, Disp: 90 tablet, Rfl: 2    famotidine (PEPCID) 40 MG tablet, Take 1 tablet by mouth daily, Disp: , Rfl:     Fremanezumab-vfrm (AJOVY) 225 MG/1.5ML SOAJ, Inject 225 mg Subcutaneous every 30 days, Disp: 1.5 mL, Rfl: 12    GARLIC PO, Take 1 tablet by mouth 2 times daily, Disp: , Rfl:     lisinopril (ZESTRIL) 10 MG tablet, Take 2 tablets (20 mg) by mouth daily, Disp: 180 tablet, Rfl: 2    loratadine (CLARITIN) 10 MG tablet, Take 1 tablet (10 mg) by mouth daily as needed for allergies, Disp: 90 tablet, Rfl: 3    NURTEC 75 MG ODT tablet, TAKE 1 TABLET (75MG) BY MOUTH, SEE ADMIN INSTRUCTIONS, PER 24 HOURS PLACED ON OR UNDER THE TONGUE. MAX 1 TABLET PER 24 HOURS., Disp: 8 tablet, Rfl: 11    omeprazole (PRILOSEC) 40 MG DR capsule, Take 1 capsule (40 mg) by mouth daily, Disp: 90 capsule, Rfl: 0    order for DME, Rolling walker with seat for home use, Disp: 1 Device, Rfl: 0    oxyCODONE-acetaminophen (PERCOCET) 5-325 MG tablet, Take 1 tablet by mouth every 6 hours as needed for moderate to severe pain Maximum of 4 tablets per day. This is a 30 day supply, Disp: 115 tablet, Rfl: 0    rivaroxaban ANTICOAGULANT (XARELTO ANTICOAGULANT) 20 MG TABS tablet, Take 1 tablet (20 mg) by mouth daily (with dinner), Disp: 90 tablet, Rfl: 3    sotalol (BETAPACE) 80 MG tablet, Take 1 tablet (80 mg) by mouth 2 times daily, Disp: 180 tablet, Rfl: 0    ZOLMitriptan (ZOMIG) 5 MG tablet, TAKE ONE TABLET BY MOUTH AT ONSET OF HEADACHE., Disp: 9 tablet, Rfl: 5    Documentation Date:4/29/2024 4:22 PM  Shanita Mathews RN

## 2024-04-29 NOTE — TELEPHONE ENCOUNTER
Angella Rivera, DONN CNP  [facility] Ep Support Pool - e  Pt would like to proceed with scheduling PVI with Angella Ness

## 2024-04-29 NOTE — PROGRESS NOTES
"  HEART CARE ELECTROPHYSIOLOGY NOTE      [facility]  [phone]      Assessment/Recommendations   Assessment/Plan:  1.  Paroxysmal Atrial Fibrillation: Breakthrough A-fib 1-2 times a month lasting for a couple of hours.  We reviewed natural progression of atrial fibrillation and treatment options with medications or pulmonary vein isolation ablation.  We further discussed ablation including the procedure, <2% risk for complication, and expected recovery.  We discussed that success rates are better the earlier in AF progression that ablation is performed.  She is interested in pursuing catheter ablation.  --Continue sotalol 80 mg twice daily  --Schedule PVI with Dr. Gutierrez     She was reassured that atrial fibrillation is not life-threatening, but carries an increased risk for stroke.  She has a [id number] score of 3 for female gender, CAD, and hypertension.  Continue Xarelto 20 mg daily with a full meal to ensure proper absorption.     2.  Hypertension: Blood pressure elevated today in clinic, but she states she is very nervous.  She was instructed to check her blood pressures at home and follow-up with PCP if consistently elevated.      3.  Coronary artery disease: Denies anginal symptoms.  She continues on Zetia.  She was previously on Repatha, but states her prescription renewal was \"declined\".       4.  Possible sleep apnea: We discussed the correlation between untreated sleep apnea and atrial fibrillation.  She was encouraged to reconsider sleep study.  Recommend treatment as indicated.    Follow up with Dr. Herrera on 6/5/2024  Anticipate next EP follow-up will be in preparation for ablation     History of Present Illness/Subjective    HPI: Arielle Pham is a 54 year old female who comes in today for EP follow-up of atrial fibrillation.  She has a history of paroxysmal atrial fibrillation, symptomatic PVCs, coronary artery disease by CTA, hypertension, hyperlipidemia, chronic pain " syndrome, morbid obesity, possible sleep apnea.      Arrhythmia history  Dx/date: Documented with atrial fibrillation with rapid ventricular response 6/30/2022, but prior history of intermittent palpitations.  Sx: Palpitations and dyspnea on exertion  [id number] score: 3 for female gender, CAD, HTN  Oral anticoagulation: Xarelto 20 mg daily  Antiarrhythmic medications, AV allan blocking agents: Sotalol 80 mg twice daily  Procedures  DCCV: N/A  Ablation: N/A    Arielle states that she feels pretty well, but is very tired.  She has 1-2 episodes of A-fib per month lasting for a few hours.  She denies chest discomfort, abdominal fullness/bloating or peripheral edema, shortness of breath at rest, paroxysmal nocturnal dyspnea, orthopnea, lightheadedness, dizziness, pre-syncope, or syncope.  She states that she did not do the sleep study because she could not have a home study.    Cardiographics (EKG personally reviewed):  EKG done 4/29/2024 shows sinus rhythm at 78 bpm, QRS 76 ms, QT/QTc 396/451 ms.  EKG done 12/7/2022 shows sinus rhythm at 74 bpm, QRS 82 ms, QT/QTc 388/430 ms  EKG done 7/5/2022 shows sinus rhythm at 93 bpm, QRS 88 ms, QT/QTc 338/420 ms  EKG done 6/30/2022 shows atrial fibrillation with rapid ventricular response at 155 bpm     24-hour Holter monitor worn 8/5/2022 shows sinus rhythm with an average heart rate of 78 bpm and a range of 54 to 131 bpm.  No significant bradycardia or pauses.  No atrial fibrillation.  Rare PVCs.  Rare PVCs, though intervals of more frequent PVCs including bigeminy, but overall PVC burden less than 1%.  Symptoms of palpitations correlated to sinus rhythm with PVCs.  No significant ventricular ectopy.     Zio patch monitor worn 5/4/2022 to 5/8/2022 shows sinus rhythm with an average heart rate of 78 bpm and a range of 49 to 151 bpm.  No significant bradycardia or pauses.  2 brief episodes of atrial tachycardia, longest 7 beats.  No atrial fibrillation.  Rare PACs and PVCs.  No  significant ventricular ectopy.     ECHO done 8/5/2022:  1. Normal left ventricular size and systolic performance with a visually  estimated ejection fraction of 60-65%.  2. No significant valvular heart disease is identified on this study.  3. Normal right ventricular size and systolic performance.  The left atrium is of normal size.     NM stress test done 2/25/2022:    The nuclear stress test is negative for inducible myocardial ischemia or infarction.    The left ventricular ejection fraction at stress is 70%.    The patient is at a low risk of future cardiac ischemic events.    A prior study was conducted on 9/25/2015. The previously noted anterior and apical fixed perfusion defect is no longer present.     Cardiac CTA done 2/23/2022:  1.  No pulmonary emboli. Normal thoracic aorta.   2.  Mild elevation right hemidiaphragm.   3.  Small to moderate-sized esophageal hiatal hernia.   4.  Small fat-containing left Bochdalek hernia.   5.  Dense calcified atheromatous plaque proximal and mid LAD and mild calcification proximal circumflex and right coronary arteries.     I have reviewed and updated the patient's Past Medical History, Social History, Family History and Medication List.  Outside records personally reviewed.     Physical Examination  Review of Systems   Vitals: BP (!) 151/93 (BP Location: Right arm, Patient Position: Sitting, Cuff Size: Adult Large)   Pulse 78   Resp 16   Wt 137.9 kg (304 lb)   SpO2 97%   BMI 44.89 kg/m    BMI= Body mass index is 44.89 kg/m .  Wt Readings from Last 3 Encounters:   04/29/24 137.9 kg (304 lb)   04/04/23 130.6 kg (288 lb)   02/02/23 131.5 kg (290 lb)       General Appearance:   Alert, well-appearing and in no acute distress.   HEENT: Atraumatic, normocephalic.  No scleral icterus, normal conjunctivae, EOMs intact, PERRL.  Mucous membranes pink and moist.     Chest/Lungs:   Chest symmetric, spine straight.  Respirations unlabored.  Lungs are clear to auscultation.    Cardiovascular:   Regular rate and rhythm.  Normal first and second heart sounds with no murmurs, rubs, or gallops; radial pulses are intact.   Abdomen:  Soft, nondistended.   Extremities: No cyanosis or clubbing.   Musculoskeletal: Moves all extremities.    Skin: Warm, dry, intact.    Neurologic: Mood and affect are appropriate.  Alert and oriented to person, place, time, and situation.     ROS: 10 point ROS neg other than the symptoms noted above in the HPI.         Medical History  Surgical History Family History Social History   Past Medical History:   Diagnosis Date    Atypical chest pain 09/30/2015    Coronary artery disease involving native coronary artery of native heart without angina pectoris 09/29/2021    Hyperlipidemia     Hypertension     Kidney stones     Migraines     Paroxysmal atrial fibrillation (H) 07/05/2022    TMJ (dislocation of temporomandibular joint)      Past Surgical History:   Procedure Laterality Date    CHOLECYSTECTOMY       Family History   Problem Relation Age of Onset    Hypertension Mother     Coronary Artery Disease Mother     Alcoholism Father     Coronary Artery Disease Father     Prostate Cancer Father     Atrial fibrillation Father     Kidney Disease Sister     Morbid Obesity Sister     Colorectal Cancer No family hx of         Social History     Socioeconomic History    Marital status: Single     Spouse name: Not on file    Number of children: Not on file    Years of education: Not on file    Highest education level: Not on file   Occupational History    Not on file   Tobacco Use    Smoking status: Never    Smokeless tobacco: Never   Substance and Sexual Activity    Alcohol use: No    Drug use: No    Sexual activity: Yes     Partners: Male   Other Topics Concern    Parent/sibling w/ CABG, MI or angioplasty before 65F 55M? No   Social History Narrative    Not on file     Social Determinants of Health     Financial Resource Strain: Low Risk  (12/4/2023)    Received from [facility]    Financial Resource Strain     Difficulty of Paying Living Expenses: 3     Difficulty of Paying Living Expenses: Not on file   Food Insecurity: No Food Insecurity (12/4/2023)    Received from [facility]    Food Insecurity     Worried About Running Out of Food in the Last Year: 1   Transportation Needs: Unmet Transportation Needs (12/4/2023)    Received from [facility]    Transportation Needs     Lack of Transportation (Medical): 2   Physical Activity: Not on file   Stress: Not on file   Social Connections: Socially Integrated (12/4/2023)    Received from [facility]    Social Connections     Frequency of Communication with Friends and Family: 0   Interpersonal Safety: Not on file   Housing Stability: Low Risk  (12/4/2023)    Received from [facility]    Housing Stability     Unable to Pay for Housing in the Last Year: 1           Medications  Allergies   Current Outpatient Medications   Medication Sig Dispense Refill    ezetimibe (ZETIA) 10 MG tablet Take 1 tablet (10 mg) by mouth daily 90 tablet 2    famotidine (PEPCID) 40 MG tablet Take 1 tablet by mouth daily      Fremanezumab-vfrm (AJOVY) 225 MG/1.5ML SOAJ Inject 225 mg Subcutaneous every 30 days 1.5 mL 12    GARLIC PO Take 1 tablet by mouth 2 times daily      lisinopril (ZESTRIL) 10 MG tablet Take 2 tablets (20 mg) by mouth daily 180 tablet 2    loratadine (CLARITIN) 10 MG tablet Take 1 tablet (10 mg) by mouth daily as needed for allergies 90 tablet 3    NURTEC 75 MG ODT tablet TAKE 1 TABLET (75MG) BY MOUTH, SEE ADMIN INSTRUCTIONS, PER 24 HOURS PLACED ON OR UNDER THE TONGUE. MAX 1 TABLET PER 24 HOURS. 8 tablet 11    omeprazole (PRILOSEC) 40 MG DR capsule Take 1 capsule (40 mg) by mouth daily 90 capsule 0    order for DME Rolling walker with seat for home use 1 Device 0     oxyCODONE-acetaminophen (PERCOCET) 5-325 MG tablet Take 1 tablet by mouth every 6 hours as needed for moderate to severe pain Maximum of 4 tablets per day. This is a 30 day supply 115 tablet 0    rivaroxaban ANTICOAGULANT (XARELTO ANTICOAGULANT) 20 MG TABS tablet Take 1 tablet (20 mg) by mouth daily (with dinner) 90 tablet 3    sotalol (BETAPACE) 80 MG tablet Take 1 tablet (80 mg) by mouth 2 times daily 180 tablet 0    ZOLMitriptan (ZOMIG) 5 MG tablet TAKE ONE TABLET BY MOUTH AT ONSET OF HEADACHE. 9 tablet 5    evolocumab (REPATHA) 140 MG/ML prefilled autoinjector Inject 1 mL (140 mg) Subcutaneous every 14 days (Patient not taking: Reported on 4/29/2024) 6 mL 3       Allergies   Allergen Reactions    Statins Other (See Comments)     Severe joint and muscle ache from Atorvastastin    Codeine Dizziness and Other (See Comments)    Rosuvastatin Muscle Pain (Myalgia)    Gabapentin Palpitations          Lab Results    Chemistry/lipid CBC Cardiac Enzymes/BNP/TSH/INR   Recent Labs   Lab Test 11/02/22  1518   CHOL 176   HDL 51   LDL 80   TRIG 225*     Recent Labs   Lab Test 11/02/22  1518 08/04/22  0752 02/17/22  0920   LDL 80 157* 137*     Recent Labs   Lab Test 11/02/22  1518      POTASSIUM 4.4   CHLORIDE 106   CO2 18*   GLC 99   BUN 20.5*   CR 0.74   GFRESTIMATED >90   DALJIT 9.9     Recent Labs   Lab Test 11/02/22  1518 06/30/22  0322 06/17/22  1034   CR 0.74 1.02 0.80     Recent Labs   Lab Test 05/04/22  0918   A1C 5.3   Outside labs reviewed   Recent Labs   Lab Test 06/30/22  0322   WBC 7.9   HGB 14.4   HCT 44.1   MCV 96        Recent Labs   Lab Test 06/30/22  0322 02/17/22  0920 05/25/21  1031   HGB 14.4 14.4 14.6    TSH   Date Value Ref Range Status   11/02/2022 0.03 (L) 0.30 - 4.20 uIU/mL Final   06/30/2022 1.75 0.30 - 5.00 uIU/mL Final   01/28/2021 1.68 0.40 - 4.00 mU/L Final        The longitudinal plan of care for the diagnosis(es)/condition(s) as documented were addressed during this visit. Due to the  added complexity in care, I will continue to support Arielle in the subsequent management and with ongoing continuity of care.

## 2024-04-29 NOTE — PATIENT INSTRUCTIONS
Arielle Pham,    It was a pleasure to see you today at the [facility].     My recommendations after this visit include:    Continue current medications    Plan for ablation with Dr. Gutierrez    Follow up with Dr. Herrera 6/5/2024    Angella Rivera, [facility], Electrophysiology  [phone]  EP nurses [phone]

## 2024-04-29 NOTE — LETTER
"4/29/2024    Pamella Kilpatrick PA-C  [address]  [facility] [address]    RE: Arielle Pham       Dear Colleague,     I had the pleasure of seeing Arielle Pham in the [facility].    HEART CARE ELECTROPHYSIOLOGY NOTE      [facility]  [phone]      Assessment/Recommendations   Assessment/Plan:  1.  Paroxysmal Atrial Fibrillation: Breakthrough A-fib 1-2 times a month lasting for a couple of hours.  We reviewed natural progression of atrial fibrillation and treatment options with medications or pulmonary vein isolation ablation.  We further discussed ablation including the procedure, <2% risk for complication, and expected recovery.  We discussed that success rates are better the earlier in AF progression that ablation is performed.  She is interested in pursuing catheter ablation.  --Continue sotalol 80 mg twice daily  --Schedule PVI with Dr. Gutierrez     She was reassured that atrial fibrillation is not life-threatening, but carries an increased risk for stroke.  She has a EQS0XP1-NNFb score of 3 for female gender, CAD, and hypertension.  Continue Xarelto 20 mg daily with a full meal to ensure proper absorption.     2.  Hypertension: Blood pressure elevated today in clinic, but she states she is very nervous.  She was instructed to check her blood pressures at home and follow-up with PCP if consistently elevated.      3.  Coronary artery disease: Denies anginal symptoms.  She continues on Zetia.  She was previously on Repatha, but states her prescription renewal was \"declined\".       4.  Possible sleep apnea: We discussed the correlation between untreated sleep apnea and atrial fibrillation.  She was encouraged to reconsider sleep study.  Recommend treatment as indicated.    Follow up with Dr. Herrera on 6/5/2024  Anticipate next EP follow-up will be in preparation for ablation     History of Present Illness/Subjective    HPI: Arielle Pham is a 54 year old female who " comes in today for EP follow-up of atrial fibrillation.  She has a history of paroxysmal atrial fibrillation, symptomatic PVCs, coronary artery disease by CTA, hypertension, hyperlipidemia, chronic pain syndrome, morbid obesity, possible sleep apnea.      Arrhythmia history  Dx/date: Documented with atrial fibrillation with rapid ventricular response 6/30/2022, but prior history of intermittent palpitations.  Sx: Palpitations and dyspnea on exertion  KQS1PS3-AOGv score: 3 for female gender, CAD, HTN  Oral anticoagulation: Xarelto 20 mg daily  Antiarrhythmic medications, AV allan blocking agents: Sotalol 80 mg twice daily  Procedures  DCCV: N/A  Ablation: N/A    Arielle states that she feels pretty well, but is very tired.  She has 1-2 episodes of A-fib per month lasting for a few hours.  She denies chest discomfort, abdominal fullness/bloating or peripheral edema, shortness of breath at rest, paroxysmal nocturnal dyspnea, orthopnea, lightheadedness, dizziness, pre-syncope, or syncope.  She states that she did not do the sleep study because she could not have a home study.    Cardiographics (EKG personally reviewed):  EKG done 4/29/2024 shows sinus rhythm at 78 bpm, QRS 76 ms, QT/QTc 396/451 ms.  EKG done 12/7/2022 shows sinus rhythm at 74 bpm, QRS 82 ms, QT/QTc 388/430 ms  EKG done 7/5/2022 shows sinus rhythm at 93 bpm, QRS 88 ms, QT/QTc 338/420 ms  EKG done 6/30/2022 shows atrial fibrillation with rapid ventricular response at 155 bpm     24-hour Holter monitor worn 8/5/2022 shows sinus rhythm with an average heart rate of 78 bpm and a range of 54 to 131 bpm.  No significant bradycardia or pauses.  No atrial fibrillation.  Rare PVCs.  Rare PVCs, though intervals of more frequent PVCs including bigeminy, but overall PVC burden less than 1%.  Symptoms of palpitations correlated to sinus rhythm with PVCs.  No significant ventricular ectopy.     Zio patch monitor worn 5/4/2022 to 5/8/2022 shows sinus rhythm with an  average heart rate of 78 bpm and a range of 49 to 151 bpm.  No significant bradycardia or pauses.  2 brief episodes of atrial tachycardia, longest 7 beats.  No atrial fibrillation.  Rare PACs and PVCs.  No significant ventricular ectopy.     ECHO done 8/5/2022:  1. Normal left ventricular size and systolic performance with a visually  estimated ejection fraction of 60-65%.  2. No significant valvular heart disease is identified on this study.  3. Normal right ventricular size and systolic performance.  The left atrium is of normal size.     NM stress test done 2/25/2022:    The nuclear stress test is negative for inducible myocardial ischemia or infarction.    The left ventricular ejection fraction at stress is 70%.    The patient is at a low risk of future cardiac ischemic events.    A prior study was conducted on 9/25/2015. The previously noted anterior and apical fixed perfusion defect is no longer present.     Cardiac CTA done 2/23/2022:  1.  No pulmonary emboli. Normal thoracic aorta.   2.  Mild elevation right hemidiaphragm.   3.  Small to moderate-sized esophageal hiatal hernia.   4.  Small fat-containing left Bochdalek hernia.   5.  Dense calcified atheromatous plaque proximal and mid LAD and mild calcification proximal circumflex and right coronary arteries.     I have reviewed and updated the patient's Past Medical History, Social History, Family History and Medication List.  Outside records personally reviewed.     Physical Examination  Review of Systems   Vitals: BP (!) 151/93 (BP Location: Right arm, Patient Position: Sitting, Cuff Size: Adult Large)   Pulse 78   Resp 16   Wt 137.9 kg (304 lb)   SpO2 97%   BMI 44.89 kg/m    BMI= Body mass index is 44.89 kg/m .  Wt Readings from Last 3 Encounters:   04/29/24 137.9 kg (304 lb)   04/04/23 130.6 kg (288 lb)   02/02/23 131.5 kg (290 lb)       General Appearance:   Alert, well-appearing and in no acute distress.   HEENT: Atraumatic, normocephalic.  No  scleral icterus, normal conjunctivae, EOMs intact, PERRL.  Mucous membranes pink and moist.     Chest/Lungs:   Chest symmetric, spine straight.  Respirations unlabored.  Lungs are clear to auscultation.   Cardiovascular:   Regular rate and rhythm.  Normal first and second heart sounds with no murmurs, rubs, or gallops; radial pulses are intact.   Abdomen:  Soft, nondistended.   Extremities: No cyanosis or clubbing.   Musculoskeletal: Moves all extremities.    Skin: Warm, dry, intact.    Neurologic: Mood and affect are appropriate.  Alert and oriented to person, place, time, and situation.     ROS: 10 point ROS neg other than the symptoms noted above in the HPI.         Medical History  Surgical History Family History Social History   Past Medical History:   Diagnosis Date    Atypical chest pain 09/30/2015    Coronary artery disease involving native coronary artery of native heart without angina pectoris 09/29/2021    Hyperlipidemia     Hypertension     Kidney stones     Migraines     Paroxysmal atrial fibrillation (H) 07/05/2022    TMJ (dislocation of temporomandibular joint)      Past Surgical History:   Procedure Laterality Date    CHOLECYSTECTOMY       Family History   Problem Relation Age of Onset    Hypertension Mother     Coronary Artery Disease Mother     Alcoholism Father     Coronary Artery Disease Father     Prostate Cancer Father     Atrial fibrillation Father     Kidney Disease Sister     Morbid Obesity Sister     Colorectal Cancer No family hx of         Social History     Socioeconomic History    Marital status: Single     Spouse name: Not on file    Number of children: Not on file    Years of education: Not on file    Highest education level: Not on file   Occupational History    Not on file   Tobacco Use    Smoking status: Never    Smokeless tobacco: Never   Substance and Sexual Activity    Alcohol use: No    Drug use: No    Sexual activity: Yes     Partners: Male   Other Topics Concern     Parent/sibling w/ CABG, MI or angioplasty before 65F 55M? No   Social History Narrative    Not on file     Social Determinants of Health     Financial Resource Strain: Low Risk  (12/4/2023)    Received from [facility]    Financial Resource Strain     Difficulty of Paying Living Expenses: 3     Difficulty of Paying Living Expenses: Not on file   Food Insecurity: No Food Insecurity (12/4/2023)    Received from [facility]    Food Insecurity     Worried About Running Out of Food in the Last Year: 1   Transportation Needs: Unmet Transportation Needs (12/4/2023)    Received from [facility]    Transportation Needs     Lack of Transportation (Medical): 2   Physical Activity: Not on file   Stress: Not on file   Social Connections: Socially Integrated (12/4/2023)    Received from [facility]    Social Connections     Frequency of Communication with Friends and Family: 0   Interpersonal Safety: Not on file   Housing Stability: Low Risk  (12/4/2023)    Received from [facility]    Housing Stability     Unable to Pay for Housing in the Last Year: 1           Medications  Allergies   Current Outpatient Medications   Medication Sig Dispense Refill    ezetimibe (ZETIA) 10 MG tablet Take 1 tablet (10 mg) by mouth daily 90 tablet 2    famotidine (PEPCID) 40 MG tablet Take 1 tablet by mouth daily      Fremanezumab-vfrm (AJOVY) 225 MG/1.5ML SOAJ Inject 225 mg Subcutaneous every 30 days 1.5 mL 12    GARLIC PO Take 1 tablet by mouth 2 times daily      lisinopril (ZESTRIL) 10 MG tablet Take 2 tablets (20 mg) by mouth daily 180 tablet 2    loratadine (CLARITIN) 10 MG tablet Take 1 tablet (10 mg) by mouth daily as needed for allergies 90 tablet 3    NURTEC 75 MG ODT tablet TAKE 1 TABLET (75MG) BY MOUTH, SEE ADMIN INSTRUCTIONS, PER 24 HOURS PLACED ON OR UNDER THE TONGUE. MAX 1  TABLET PER 24 HOURS. 8 tablet 11    omeprazole (PRILOSEC) 40 MG DR capsule Take 1 capsule (40 mg) by mouth daily 90 capsule 0    order for DME Rolling walker with seat for home use 1 Device 0    oxyCODONE-acetaminophen (PERCOCET) 5-325 MG tablet Take 1 tablet by mouth every 6 hours as needed for moderate to severe pain Maximum of 4 tablets per day. This is a 30 day supply 115 tablet 0    rivaroxaban ANTICOAGULANT (XARELTO ANTICOAGULANT) 20 MG TABS tablet Take 1 tablet (20 mg) by mouth daily (with dinner) 90 tablet 3    sotalol (BETAPACE) 80 MG tablet Take 1 tablet (80 mg) by mouth 2 times daily 180 tablet 0    ZOLMitriptan (ZOMIG) 5 MG tablet TAKE ONE TABLET BY MOUTH AT ONSET OF HEADACHE. 9 tablet 5    evolocumab (REPATHA) 140 MG/ML prefilled autoinjector Inject 1 mL (140 mg) Subcutaneous every 14 days (Patient not taking: Reported on 4/29/2024) 6 mL 3       Allergies   Allergen Reactions    Statins Other (See Comments)     Severe joint and muscle ache from Atorvastastin    Codeine Dizziness and Other (See Comments)    Rosuvastatin Muscle Pain (Myalgia)    Gabapentin Palpitations          Lab Results    Chemistry/lipid CBC Cardiac Enzymes/BNP/TSH/INR   Recent Labs   Lab Test 11/02/22  1518   CHOL 176   HDL 51   LDL 80   TRIG 225*     Recent Labs   Lab Test 11/02/22  1518 08/04/22  0752 02/17/22  0920   LDL 80 157* 137*     Recent Labs   Lab Test 11/02/22  1518      POTASSIUM 4.4   CHLORIDE 106   CO2 18*   GLC 99   BUN 20.5*   CR 0.74   GFRESTIMATED >90   DALJIT 9.9     Recent Labs   Lab Test 11/02/22  1518 06/30/22  0322 06/17/22  1034   CR 0.74 1.02 0.80     Recent Labs   Lab Test 05/04/22  0918   A1C 5.3   Outside labs reviewed   Recent Labs   Lab Test 06/30/22  0322   WBC 7.9   HGB 14.4   HCT 44.1   MCV 96        Recent Labs   Lab Test 06/30/22  0322 02/17/22  0920 05/25/21  1031   HGB 14.4 14.4 14.6    TSH   Date Value Ref Range Status   11/02/2022 0.03 (L) 0.30 - 4.20 uIU/mL Final   06/30/2022 1.75  0.30 - 5.00 uIU/mL Final   01/28/2021 1.68 0.40 - 4.00 mU/L Final        The longitudinal plan of care for the diagnosis(es)/condition(s) as documented were addressed during this visit. Due to the added complexity in care, I will continue to support Arielle in the subsequent management and with ongoing continuity of care.              Thank you for allowing me to participate in the care of your patient.      Sincerely,     DONN Kebede CNP     [facility]  cc:   DONN Kebede CNP  [address], [address]  [place] [address]

## 2024-04-29 NOTE — TELEPHONE ENCOUNTER
PVI Referral Request    Dr Dr Gutierrez-    Pt has PAF    If agreeable will schedule as follows:  Consult: Pt will meet with you am of PVI, as pt has had previous consult with you prior  Imaging Prior: Per guidelines no imaging will be ordered, HARSHAD and CT of PV will not be ordered unless you indicate otherwise  Anticoagulation Status:  Pt takes Xarelto- her BMI is 44.89, would you like her to switch over to Eliquis due to BMI >35 prior to procedure?    AAD: Sotalol-will hold 3 days prior to PVI per guidelines  PPI: continue current documented PPI medication as prescribed- omeprazole 40mg daily  Scheduling Info: Dr Gutierrez preferance- 3:1 day  Mapping: Dr Gutierrez preference- Carto      Ok to to order/schedule with you?  Please advise  Thank you  Marisel Li RN  4/29/2024 4:04 PM

## 2024-04-30 ENCOUNTER — HOSPITAL ENCOUNTER (OUTPATIENT)
Facility: HOSPITAL | Age: 54
End: 2024-04-30
Attending: INTERNAL MEDICINE | Admitting: INTERNAL MEDICINE
Payer: COMMERCIAL

## 2024-04-30 ENCOUNTER — PREP FOR PROCEDURE (OUTPATIENT)
Dept: CARDIOLOGY | Facility: CLINIC | Age: 54
End: 2024-04-30
Payer: COMMERCIAL

## 2024-04-30 DIAGNOSIS — I48.0 PAROXYSMAL ATRIAL FIBRILLATION (H): Primary | ICD-10-CM

## 2024-04-30 DIAGNOSIS — I48.0 PAROXYSMAL ATRIAL FIBRILLATION (H): ICD-10-CM

## 2024-04-30 LAB
ATRIAL RATE - MUSE: 78 BPM
DIASTOLIC BLOOD PRESSURE - MUSE: NORMAL MMHG
INTERPRETATION ECG - MUSE: NORMAL
P AXIS - MUSE: 21 DEGREES
PR INTERVAL - MUSE: 208 MS
QRS DURATION - MUSE: 76 MS
QT - MUSE: 396 MS
QTC - MUSE: 451 MS
R AXIS - MUSE: 5 DEGREES
SYSTOLIC BLOOD PRESSURE - MUSE: NORMAL MMHG
T AXIS - MUSE: -12 DEGREES
VENTRICULAR RATE- MUSE: 78 BPM

## 2024-04-30 RX ORDER — SODIUM CHLORIDE 9 MG/ML
100 INJECTION, SOLUTION INTRAVENOUS CONTINUOUS
Status: CANCELLED | OUTPATIENT
Start: 2024-04-30

## 2024-04-30 RX ORDER — LIDOCAINE 40 MG/G
CREAM TOPICAL
Status: CANCELLED | OUTPATIENT
Start: 2024-04-30

## 2024-04-30 NOTE — TELEPHONE ENCOUNTER
Spoke with pt to let her know that PVI has been approved and to discuss need for medication change from Xarelto to Eliquis, starting at least 1 week prior to procedure. Pt verbalized understanding and script is being sent to pharmacy now. She understands to stop Xarelto when starting Eliquis.    Shanita Mathews RN

## 2024-05-10 ENCOUNTER — TELEPHONE (OUTPATIENT)
Dept: NEUROLOGY | Facility: CLINIC | Age: 54
End: 2024-05-10
Payer: COMMERCIAL

## 2024-05-10 NOTE — TELEPHONE ENCOUNTER
Left Voicemail (1st Attempt) and Sent Mychart (1st Attempt) for the patient to call back and schedule the following:    Appointment type: Return Headache  Provider: Thuy  Return date: July/August  Specialty phone number: [phone]  Additional appointment(s) needed:   Additonal Notes:       Livia Us on 5/10/2024 at 9:29 AM

## 2024-05-31 ENCOUNTER — TELEPHONE (OUTPATIENT)
Dept: CARDIOLOGY | Facility: CLINIC | Age: 54
End: 2024-05-31
Payer: COMMERCIAL

## 2024-05-31 NOTE — TELEPHONE ENCOUNTER
5/31 pt called at 4:30pm to discuss [facility] appt that was cancelled. I notified pt that we were calling because appts were cancelled via MyChart and pt denies cancelling them. She said the 6/5 ov with [facility] she needed to have as that was her annual appt I told her that [facility] next avail is July 16, 2024 and have her scheduled there as her 6/5 appt was no longer avail. Pt said she wanted to r/s 6/10 procedure as she did more research and had questions. I have removed her from 6/10 per her request. I mentioned our nursing staff also left her message and to check that with call back instructions to speak to her further about ablation.

## 2024-05-31 NOTE — TELEPHONE ENCOUNTER
----- Message from Demi Black sent at 5/31/2024  2:22 PM CDT -----  Regarding: please call pt  Hello,     Earlier this week pt lvm saying she needed to reschedule appt. I called pt back and lvm and have not heard back. I called again today and left another  and I see she has cancelled her pre-ops appts through [facility]. Can you guys please try and get a hold of her. She is scheduled to go with ARIADNA on 6/10.     Thank you,  Demi

## 2024-05-31 NOTE — TELEPHONE ENCOUNTER
1st Attempt to contact pt, voicemail message was left with contact information and instructing pt to call back.  5/31/2024 2:30 PM  Eliza Washburn RN

## 2024-06-03 ENCOUNTER — TELEPHONE (OUTPATIENT)
Dept: CARDIOLOGY | Facility: CLINIC | Age: 54
End: 2024-06-03
Payer: COMMERCIAL

## 2024-06-03 NOTE — TELEPHONE ENCOUNTER
Demi Black S3 days ago     AG  5/31 pt called at 4:30pm to discuss [facility] appt that was cancelled. I notified pt that we were calling because appts were cancelled via MyChart and pt denies cancelling them. She said the 6/5 ov with [facility] she needed to have as that was her annual appt I told her that [facility] next avail is July 16, 2024 and have her scheduled there as her 6/5 appt was no longer avail. Pt said she wanted to r/s 6/10 procedure as she did more research and had questions. I have removed her from 6/10 per her request. I mentioned our nursing staff also left her message and to check that with call back instructions to speak to her further about ablation.

## 2024-06-03 NOTE — TELEPHONE ENCOUNTER
[place] Community Measures Blood Pressure [place] reviewed.  Patients recent blood pressure is outside of guideline parameters.  Called pt to review, no answer.  Left voicemail message asking patient to check their blood pressure using a home blood pressure cuff or by going to a [facility].  Patient instructed to then call [phone] (East) and leave a message with their name, date of birth, and blood pressure reading that was completed within the last 24 hours and where it was completed.  Will await call back for further review.    Called patient to review recent elevated blood pressure reading.  Per [place] Community Measures guidelines, patients blood pressure is out of parameters and recheck blood pressure is recommended.    Last Blood Pressure: 151/93  Last Heart Rate: 78  Date: 4/29/24  Location: [facility]

## 2024-06-03 NOTE — TELEPHONE ENCOUNTER
VM left after hours.  2nd Attempt to contact pt, voicemail message was left with contact information and instructing pt to call back.  6/3/2024 8:23 AM  Eliza Washburn RN

## 2024-06-05 NOTE — TELEPHONE ENCOUNTER
4th Attempt to contact pt. LM reporting this is 4th and final VM to speak with pt and left EP RN number for return call if pt desires.     Shanita Mathews RN

## 2024-07-03 DIAGNOSIS — G43.009 MIGRAINE WITHOUT AURA AND WITHOUT STATUS MIGRAINOSUS, NOT INTRACTABLE: ICD-10-CM

## 2024-07-09 DIAGNOSIS — G43.009 MIGRAINE WITHOUT AURA AND WITHOUT STATUS MIGRAINOSUS, NOT INTRACTABLE: ICD-10-CM

## 2024-07-09 NOTE — TELEPHONE ENCOUNTER
RX Authorization    Medication: NURTEC 75 MG ODT tablet    Date last refill ordered: 6/27/2023    Quantity ordered: 8    # refills: 11    Date of last clinic visit with ordering provider: 4/25/2024    Date of next clinic visit with ordering provider:    All pertinent protocol data (lab date/result):    Include pertinent information from patients message:

## 2024-07-11 ENCOUNTER — TELEPHONE (OUTPATIENT)
Dept: CARDIOLOGY | Facility: CLINIC | Age: 54
End: 2024-07-11
Payer: COMMERCIAL

## 2024-07-11 ENCOUNTER — TELEPHONE (OUTPATIENT)
Dept: NEUROLOGY | Facility: CLINIC | Age: 54
End: 2024-07-11
Payer: COMMERCIAL

## 2024-07-11 DIAGNOSIS — I48.0 PAROXYSMAL ATRIAL FIBRILLATION (H): ICD-10-CM

## 2024-07-11 RX ORDER — SOTALOL HYDROCHLORIDE 80 MG/1
80 TABLET ORAL 2 TIMES DAILY
Qty: 180 TABLET | Refills: 2 | Status: SHIPPED | OUTPATIENT
Start: 2024-07-11

## 2024-07-11 NOTE — TELEPHONE ENCOUNTER
[facility] Call Center    Phone Message    May a detailed message be left on voicemail: yes     Reason for Call: Medication Refill Request    Has the patient contacted the pharmacy for the refill? Yes   Name of medication being requested:  sotalol (BETAPACE) 80 MG tablet  ezetimibe (ZETIA) 10 MG tablet  Provider who prescribed the medication: Dr. Herrera  Pharmacy:      [facility] [address] [place] [address]    Date medication is needed: 07/11/24   Patient states that she is out of these 2 medications and the pharmacy has not heard back.     Action Taken: Other: cardiology    Travel Screening: Not Applicable  Thank you!  Specialty Access Center       Date of Service:

## 2024-07-11 NOTE — TELEPHONE ENCOUNTER
Pt last saw Dr Herrera 4/04/23, is scheduled 7/16/24, but neither medication is prescribed by her.    LVM for pt stating meds are not from [facility], nor have we gotten refill requests from the pharmacy.  Asked pt to contact prescribing providers for refills.  Asked for call back to clinic with questions.  Sotalol rx refill request sent to EP.

## 2024-07-11 NOTE — TELEPHONE ENCOUNTER
"Prior Authorization Retail Medication Request-NEW INSURANCE     Medication/Dose: NURTEC 75 MG ODT tablet 8 tablet   ICD code (if different than what is on RX):    G43.709  Previously Tried and Failed:  Zomig as needed and helps but headaches   Sumatriptan   Stopped Excedrin and tylenol many years ago  Ibuprofen a couple times per day and no side effects  Percocet -for back pain and foot pain and prescribed by Dr Stahl but not for headaches  Currently on lisinopril for hypertension  Amitriptyline -just stopped working and was on it for a while  Nortriptyline  Protriptyline  Topiramate  Zonegran  Gabapentin-caused anxiety/panic attack  Verapamil  Propranol  Emgality -took for a couple of month and caused dizziness. Did not try any other CGRPs  escitalopram -does not recall side effects but it wasn't helpful with headaches  Losartan in the past-has been a while  Topiramate a long time ago and \"very bad dreams\". History of renal stones so contraindicated     Botox -fear of potential side effects-  Rationale:  migraine - has used nurtec for acute migraine with good relief. PA Renewal     Insurance Name:  [employer] [place] [employer]  Insurance ID:  [id number]          Pharmacy Information (if different than what is on RX)  Name:    Phone:    "

## 2024-07-15 NOTE — TELEPHONE ENCOUNTER
Central Prior Authorization Team  Phone: [phone]    PA Initiation    Medication: NURTEC 75 MG PO TBDP  Insurance Company: [employer] [facility] - Phone [phone] Fax [phone]  Pharmacy Filling the Rx: [facility] [address] [place] [address]  Filling Pharmacy Phone: [phone]  Filling Pharmacy Fax: [phone]  Start Date: 7/15/2024

## 2024-07-16 ENCOUNTER — OFFICE VISIT (OUTPATIENT)
Dept: CARDIOLOGY | Facility: CLINIC | Age: 54
End: 2024-07-16
Payer: COMMERCIAL

## 2024-07-16 VITALS
BODY MASS INDEX: 44.41 KG/M2 | WEIGHT: 293 LBS | SYSTOLIC BLOOD PRESSURE: 111 MMHG | DIASTOLIC BLOOD PRESSURE: 53 MMHG | HEART RATE: 69 BPM | RESPIRATION RATE: 14 BRPM | HEIGHT: 68 IN

## 2024-07-16 DIAGNOSIS — I48.0 PAROXYSMAL ATRIAL FIBRILLATION (H): ICD-10-CM

## 2024-07-16 DIAGNOSIS — R93.1 ELEVATED CORONARY ARTERY CALCIUM SCORE: Primary | ICD-10-CM

## 2024-07-16 DIAGNOSIS — Z78.9 STATIN INTOLERANCE: ICD-10-CM

## 2024-07-16 DIAGNOSIS — E78.5 HYPERLIPIDEMIA LDL GOAL <70: ICD-10-CM

## 2024-07-16 DIAGNOSIS — I10 ESSENTIAL HYPERTENSION: ICD-10-CM

## 2024-07-16 PROCEDURE — 99214 OFFICE O/P EST MOD 30 MIN: CPT | Performed by: INTERNAL MEDICINE

## 2024-07-16 RX ORDER — NALOXONE HYDROCHLORIDE 4 MG/.1ML
SPRAY NASAL
COMMUNITY
Start: 2024-04-23

## 2024-07-16 RX ORDER — TRAZODONE HYDROCHLORIDE 50 MG/1
50 TABLET, FILM COATED ORAL AT BEDTIME
COMMUNITY
Start: 2024-05-04

## 2024-07-16 RX ORDER — FUROSEMIDE 20 MG
20 TABLET ORAL DAILY PRN
COMMUNITY
Start: 2024-04-19

## 2024-07-16 RX ORDER — ONDANSETRON 8 MG/1
8 TABLET, ORALLY DISINTEGRATING ORAL EVERY 8 HOURS PRN
COMMUNITY

## 2024-07-16 NOTE — TELEPHONE ENCOUNTER
Prior Authorization Approval    Medication: NURTEC 75 MG PO TBDP  Authorization Effective Date: 4/17/2024  Authorization Expiration Date: 7/16/2025  Approved Dose/Quantity: max of 16/ 30 days  Reference #:     Insurance Company: [employer] - Phone [phone] Fax [phone]  Expected CoPay: $    CoPay Card Available:      Financial Assistance Needed:   Which Pharmacy is filling the prescription: [facility] [address] [place] [address]  Pharmacy Notified: Yes    Patient Notified: No

## 2024-07-16 NOTE — PROGRESS NOTES
Thank you, Pamella Kilpatrick PA-C, for asking the [facility] team to see Ms. Arielle Pham to follow-up on coronary artery disease, hypercholesterolemia, paroxysmal atrial fibrillation.    Assessment/Recommendations   Assessment:    1.  Coronary artery disease, mild to moderate by CT coronary angiography in July 2019.  She reports no current symptoms of exertional chest discomfort or dyspnea.  At this point we will continue with aggressive risk factor modification and close observation.  2.  Hypercholesterolemia, intolerant of statins.  Patient has been on combination of Repatha and Zetia but unfortunately ran out of her Repatha several months ago and did not contact us.  Will reinitiate prescription for Repatha.  Recommended that 3 months after she is on it, we will plan to check her lipids in our office.  3.  Paroxysmal atrial fibrillation, currently treated with combination of sotalol and Xarelto anticoagulation.  Elected not to proceed with A-fib ablation.  Will have her follow-up through A-fib clinic.  4.  Essential hypertension, well-controlled on current therapy.      Plan:  1.  Continue current medications and restart Repatha once prescription available  2.  Plan lipid profile 3 months after initiation of Repatha  3.  Follow-up with me in 1 year or sooner if new symptoms       History of Present Illness    Ms. Arielle Pham is a 54 year old female with history of morbid obesity, mild to moderate coronary artery disease by CT coronary angiography in July 2019, paroxysmal atrial fibrillation on combination of Xarelto and sotalol therapy who presents today for follow-up visit.  Unfortunately she ran out of her Repatha 3 to 4 months ago and has not been on it.  Is currently only on Zetia.  Never contacted our office to let us know.  Currently reports no symptoms of chest tightness or shortness of breath.  Has had 2 episodes of bilateral ankle swelling after prolonged sitting but otherwise has  "no edema.  Denies orthopnea or PND.    ECG (personally reviewed): No ECG today    Cardiac Imaging Studies (personally reviewed): No new imaging     Physical Examination Review of Systems   /53 (BP Location: Left arm, Patient Position: Sitting, Cuff Size: Adult Large)   Pulse 69   Resp 14   Ht 1.727 m (5' 8\")   Wt 137 kg (302 lb)   BMI 45.92 kg/m    Body mass index is 45.92 kg/m .  Wt Readings from Last 3 Encounters:   07/16/24 137 kg (302 lb)   04/29/24 137.9 kg (304 lb)   04/04/23 130.6 kg (288 lb)     General Appearance:   Awake, Alert, No acute distress.   HEENT:  No scleral icterus; the mucous membranes were pink and moist.   Neck: No cervical bruits or jugular venous distention    Chest: The spine was straight. The chest was symmetric.   Lungs:   Respirations unlabored; the lungs are clear to auscultation. No wheezing   Cardiovascular:   Regular rate and rhythm.  S1, S2 normal.  No murmur or gallop   Abdomen:  No organomegaly, masses, bruits, or tenderness. Bowels sounds are present   Extremities: No peripheral edema bilaterally   Skin: No xanthelasma. Warm, Dry.   Musculoskeletal: No tenderness.   Neurologic: Mood and affect are appropriate.    Enc Vitals  BP: 111/53  Pulse: 69  Resp: 14  Weight: 137 kg (302 lb) (With shoes.)  Height: 172.7 cm (5' 8\")                                         Medical History  Surgical History Family History Social History   Past Medical History:   Diagnosis Date    Atypical chest pain 09/30/2015    Coronary artery disease involving native coronary artery of native heart without angina pectoris 09/29/2021    Hyperlipidemia     Hypertension     Kidney stones     Migraines     Paroxysmal atrial fibrillation (H) 07/05/2022    TMJ (dislocation of temporomandibular joint)     Past Surgical History:   Procedure Laterality Date    CHOLECYSTECTOMY      Family History   Problem Relation Age of Onset    Hypertension Mother     Coronary Artery Disease Mother     Alcoholism Father "     Coronary Artery Disease Father     Prostate Cancer Father     Atrial fibrillation Father     Kidney Disease Sister     Morbid Obesity Sister     Colorectal Cancer No family hx of     Social History     Socioeconomic History    Marital status: Single     Spouse name: Not on file    Number of children: Not on file    Years of education: Not on file    Highest education level: Not on file   Occupational History    Not on file   Tobacco Use    Smoking status: Never    Smokeless tobacco: Never   Substance and Sexual Activity    Alcohol use: No    Drug use: No    Sexual activity: Yes     Partners: Male   Other Topics Concern    Parent/sibling w/ CABG, MI or angioplasty before 65F 55M? No   Social History Narrative    Not on file     Social Determinants of Health     Financial Resource Strain: Low Risk  (12/4/2023)    Received from [employer]    Financial Resource Strain     Difficulty of Paying Living Expenses: 3     Difficulty of Paying Living Expenses: Not on file   Food Insecurity: No Food Insecurity (12/4/2023)    Received from [employer]    Food Insecurity     Worried About Running Out of Food in the Last Year: 1   Transportation Needs: Unmet Transportation Needs (12/4/2023)    Received from [employer]    Transportation Needs     Lack of Transportation (Medical): 2   Physical Activity: Not on file   Stress: Not on file   Social Connections: Socially Integrated (12/4/2023)    Received from [employer]    Social Connections     Frequency of Communication with Friends and Family: 0   Interpersonal Safety: Not on file   Housing Stability: Low Risk  (12/4/2023)    Received from [employer]    Housing Stability     Unable to Pay for Housing in the Last Year: 1          Medications  Allergies   Current Outpatient Medications   Medication Sig Dispense  Refill    evolocumab (REPATHA) 140 MG/ML prefilled autoinjector Inject 1 mL (140 mg) subcutaneously every 14 days 6 mL 3    ezetimibe (ZETIA) 10 MG tablet Take 1 tablet (10 mg) by mouth daily 90 tablet 2    famotidine (PEPCID) 40 MG tablet Take 1 tablet by mouth daily      Fremanezumab-vfrm (AJOVY) 225 MG/1.5ML SOAJ Inject 225 mg Subcutaneous every 30 days 1.5 mL 12    furosemide (LASIX) 20 MG tablet Take 20 mg by mouth daily as needed      GARLIC PO Take 1 tablet by mouth 2 times daily      lisinopril (ZESTRIL) 10 MG tablet Take 2 tablets (20 mg) by mouth daily 180 tablet 2    loratadine (CLARITIN) 10 MG tablet Take 1 tablet (10 mg) by mouth daily as needed for allergies 90 tablet 3    omeprazole (PRILOSEC) 40 MG DR capsule Take 1 capsule (40 mg) by mouth daily 90 capsule 0    ondansetron (ZOFRAN ODT) 8 MG ODT tab Take 8 mg by mouth every 8 hours as needed for nausea      order for DME Rolling walker with seat for home use 1 Device 0    oxyCODONE-acetaminophen (PERCOCET) 5-325 MG tablet Take 1 tablet by mouth every 6 hours as needed for moderate to severe pain Maximum of 4 tablets per day. This is a 30 day supply 115 tablet 0    rimegepant (NURTEC) 75 MG ODT tablet TAKE 1 TABLET (75MG) BY MOUTH, SEE ADMIN INSTRUCTIONS, PER 24 HOURS PLACED ON OR UNDER THE TONGUE. MAX 1 TABLET PER 24 HOURS. 8 tablet 11    rivaroxaban ANTICOAGULANT (XARELTO ANTICOAGULANT) 20 MG TABS tablet Take 1 tablet (20 mg) by mouth daily (with dinner) 90 tablet 3    sotalol (BETAPACE) 80 MG tablet Take 1 tablet (80 mg) by mouth 2 times daily 180 tablet 2    traZODone (DESYREL) 50 MG tablet Take 50 mg by mouth at bedtime      NARCAN 4 MG/0.1ML nasal spray SPRAY 1 SPRAY INTO AFFECTED NOSTRILS(S) EACH TIME AS NEEDED FOR PATIENT DIFF TO AROUSE OR RESP RATE < 8/ MIN (OVERDOSE). ADDITIONAL DOSES MAY BE GIVEN EVERY 2 TO 3 MINUTES UNTIL EMERGENCY MEDICAL ASSISTANCE ARRIVES (Patient not taking: Reported on 7/16/2024)        Allergies   Allergen Reactions     Statins Other (See Comments)     Severe joint and muscle ache from Atorvastastin    Codeine Dizziness and Other (See Comments)    Rosuvastatin Muscle Pain (Myalgia)    Gabapentin Palpitations         Lab Results    Chemistry/lipid CBC Cardiac Enzymes/BNP/TSH/INR   Recent Labs   Lab Test 11/02/22  1518   TRIG 225*   LDL 80   BUN 20.5*      CO2 18*    Recent Labs   Lab Test 06/30/22  0322   WBC 7.9   HGB 14.4   HCT 44.1   MCV 96       Recent Labs   Lab Test 11/02/22  1518 06/30/22  0322   TROPONINI  --  <0.01   TSH 0.03* 1.75        A total of 30 minutes was spent reviewing patient's medical records, obtaining history and performing examination, as well as discussing diagnoses/ recommendations with patient and answering all questions.

## 2024-07-16 NOTE — LETTER
7/16/2024    Pamella Kilpatrick PA-C  [address]  [place] [address]    RE: Arielle Pham       Dear Colleague,     I had the pleasure of seeing Arielle Pham in the [facility].      Thank you, Pamella Kilpatrick PA-C, for asking the [facility] Care team to see Ms. Arielle Pham to follow-up on coronary artery disease, hypercholesterolemia, paroxysmal atrial fibrillation.    Assessment/Recommendations   Assessment:    1.  Coronary artery disease, mild to moderate by CT coronary angiography in July 2019.  She reports no current symptoms of exertional chest discomfort or dyspnea.  At this point we will continue with aggressive risk factor modification and close observation.  2.  Hypercholesterolemia, intolerant of statins.  Patient has been on combination of Repatha and Zetia but unfortunately ran out of her Repatha several months ago and did not contact us.  Will reinitiate prescription for Repatha.  Recommended that 3 months after she is on it, we will plan to check her lipids in our office.  3.  Paroxysmal atrial fibrillation, currently treated with combination of sotalol and Xarelto anticoagulation.  Elected not to proceed with A-fib ablation.  Will have her follow-up through A-fib clinic.  4.  Essential hypertension, well-controlled on current therapy.      Plan:  1.  Continue current medications and restart Repatha once prescription available  2.  Plan lipid profile 3 months after initiation of Repatha  3.  Follow-up with me in 1 year or sooner if new symptoms       History of Present Illness    Ms. Arielle Pham is a 54 year old female with history of morbid obesity, mild to moderate coronary artery disease by CT coronary angiography in July 2019, paroxysmal atrial fibrillation on combination of Xarelto and sotalol therapy who presents today for follow-up visit.  Unfortunately she ran out of her Repatha 3 to 4 months ago and has not been on it.  Is currently only on Zetia.  Never  "contacted our office to let us know.  Currently reports no symptoms of chest tightness or shortness of breath.  Has had 2 episodes of bilateral ankle swelling after prolonged sitting but otherwise has no edema.  Denies orthopnea or PND.    ECG (personally reviewed): No ECG today    Cardiac Imaging Studies (personally reviewed): No new imaging     Physical Examination Review of Systems   /53 (BP Location: Left arm, Patient Position: Sitting, Cuff Size: Adult Large)   Pulse 69   Resp 14   Ht 1.727 m (5' 8\")   Wt 137 kg (302 lb)   BMI 45.92 kg/m    Body mass index is 45.92 kg/m .  Wt Readings from Last 3 Encounters:   07/16/24 137 kg (302 lb)   04/29/24 137.9 kg (304 lb)   04/04/23 130.6 kg (288 lb)     General Appearance:   Awake, Alert, No acute distress.   HEENT:  No scleral icterus; the mucous membranes were pink and moist.   Neck: No cervical bruits or jugular venous distention    Chest: The spine was straight. The chest was symmetric.   Lungs:   Respirations unlabored; the lungs are clear to auscultation. No wheezing   Cardiovascular:   Regular rate and rhythm.  S1, S2 normal.  No murmur or gallop   Abdomen:  No organomegaly, masses, bruits, or tenderness. Bowels sounds are present   Extremities: No peripheral edema bilaterally   Skin: No xanthelasma. Warm, Dry.   Musculoskeletal: No tenderness.   Neurologic: Mood and affect are appropriate.    Enc Vitals  BP: 111/53  Pulse: 69  Resp: 14  Weight: 137 kg (302 lb) (With shoes.)  Height: 172.7 cm (5' 8\")                                         Medical History  Surgical History Family History Social History   Past Medical History:   Diagnosis Date    Atypical chest pain 09/30/2015    Coronary artery disease involving native coronary artery of native heart without angina pectoris 09/29/2021    Hyperlipidemia     Hypertension     Kidney stones     Migraines     Paroxysmal atrial fibrillation (H) 07/05/2022    TMJ (dislocation of temporomandibular joint)     " Past Surgical History:   Procedure Laterality Date    CHOLECYSTECTOMY      Family History   Problem Relation Age of Onset    Hypertension Mother     Coronary Artery Disease Mother     Alcoholism Father     Coronary Artery Disease Father     Prostate Cancer Father     Atrial fibrillation Father     Kidney Disease Sister     Morbid Obesity Sister     Colorectal Cancer No family hx of     Social History     Socioeconomic History    Marital status: Single     Spouse name: Not on file    Number of children: Not on file    Years of education: Not on file    Highest education level: Not on file   Occupational History    Not on file   Tobacco Use    Smoking status: Never    Smokeless tobacco: Never   Substance and Sexual Activity    Alcohol use: No    Drug use: No    Sexual activity: Yes     Partners: Male   Other Topics Concern    Parent/sibling w/ CABG, MI or angioplasty before 65F 55M? No   Social History Narrative    Not on file     Social Determinants of Health     Financial Resource Strain: Low Risk  (12/4/2023)    [facility]    Financial Resource Strain     Difficulty of Paying Living Expenses: 3     Difficulty of Paying Living Expenses: Not on file   Food Insecurity: No Food Insecurity (12/4/2023)    [facility]    Food Insecurity     Worried About Running Out of Food in the Last Year: 1   Transportation Needs: Unmet Transportation Needs (12/4/2023)    Received from [employer]    Transportation Needs     Lack of Transportation (Medical): 2   Physical Activity: Not on file   Stress: Not on file   Social Connections: Socially Integrated (12/4/2023)    Received from [employer]    Social Connections     Frequency of Communication with Friends and Family: 0   Interpersonal Safety: Not on file   Housing Stability: Low Risk  (12/4/2023)    Received from [employer]  [facility]    Housing Stability     Unable to Pay for Housing in the Last Year: 1          Medications  Allergies   Current Outpatient Medications   Medication Sig Dispense Refill    evolocumab (REPATHA) 140 MG/ML prefilled autoinjector Inject 1 mL (140 mg) subcutaneously every 14 days 6 mL 3    ezetimibe (ZETIA) 10 MG tablet Take 1 tablet (10 mg) by mouth daily 90 tablet 2    famotidine (PEPCID) 40 MG tablet Take 1 tablet by mouth daily      Fremanezumab-vfrm (AJOVY) 225 MG/1.5ML SOAJ Inject 225 mg Subcutaneous every 30 days 1.5 mL 12    furosemide (LASIX) 20 MG tablet Take 20 mg by mouth daily as needed      GARLIC PO Take 1 tablet by mouth 2 times daily      lisinopril (ZESTRIL) 10 MG tablet Take 2 tablets (20 mg) by mouth daily 180 tablet 2    loratadine (CLARITIN) 10 MG tablet Take 1 tablet (10 mg) by mouth daily as needed for allergies 90 tablet 3    omeprazole (PRILOSEC) 40 MG DR capsule Take 1 capsule (40 mg) by mouth daily 90 capsule 0    ondansetron (ZOFRAN ODT) 8 MG ODT tab Take 8 mg by mouth every 8 hours as needed for nausea      order for DME Rolling walker with seat for home use 1 Device 0    oxyCODONE-acetaminophen (PERCOCET) 5-325 MG tablet Take 1 tablet by mouth every 6 hours as needed for moderate to severe pain Maximum of 4 tablets per day. This is a 30 day supply 115 tablet 0    rimegepant (NURTEC) 75 MG ODT tablet TAKE 1 TABLET (75MG) BY MOUTH, SEE ADMIN INSTRUCTIONS, PER 24 HOURS PLACED ON OR UNDER THE TONGUE. MAX 1 TABLET PER 24 HOURS. 8 tablet 11    rivaroxaban ANTICOAGULANT (XARELTO ANTICOAGULANT) 20 MG TABS tablet Take 1 tablet (20 mg) by mouth daily (with dinner) 90 tablet 3    sotalol (BETAPACE) 80 MG tablet Take 1 tablet (80 mg) by mouth 2 times daily 180 tablet 2    traZODone (DESYREL) 50 MG tablet Take 50 mg by mouth at bedtime      NARCAN 4 MG/0.1ML nasal spray SPRAY 1 SPRAY INTO AFFECTED NOSTRILS(S) EACH TIME AS NEEDED FOR PATIENT DIFF TO AROUSE OR RESP  RATE < 8/ MIN (OVERDOSE). ADDITIONAL DOSES MAY BE GIVEN EVERY 2 TO 3 MINUTES UNTIL EMERGENCY MEDICAL ASSISTANCE ARRIVES (Patient not taking: Reported on 7/16/2024)        Allergies   Allergen Reactions    Statins Other (See Comments)     Severe joint and muscle ache from Atorvastastin    Codeine Dizziness and Other (See Comments)    Rosuvastatin Muscle Pain (Myalgia)    Gabapentin Palpitations         Lab Results    Chemistry/lipid CBC Cardiac Enzymes/BNP/TSH/INR   Recent Labs   Lab Test 11/02/22  1518   TRIG 225*   LDL 80   BUN 20.5*      CO2 18*    Recent Labs   Lab Test 06/30/22  0322   WBC 7.9   HGB 14.4   HCT 44.1   MCV 96       Recent Labs   Lab Test 11/02/22  1518 06/30/22  0322   TROPONINI  --  <0.01   TSH 0.03* 1.75        A total of 30 minutes was spent reviewing patient's medical records, obtaining history and performing examination, as well as discussing diagnoses/ recommendations with patient and answering all questions.                        Thank you for allowing me to participate in the care of your patient.      Sincerely,     Trinity Herrera MD     [facility]  cc:   No referring provider defined for this encounter.

## 2024-08-06 ASSESSMENT — HEADACHE IMPACT TEST (HIT 6)
HOW OFTEN HAVE YOU FELT FED UP OR IRRITATED BECAUSE OF YOUR HEADACHES: ALWAYS
HOW OFTEN DO HEADACHES LIMIT YOUR DAILY ACTIVITIES: ALWAYS
WHEN YOU HAVE HEADACHES HOW OFTEN IS THE PAIN SEVERE: VERY OFTEN
HOW OFTEN HAVE YOU FELT TOO TIRED TO WORK BECAUSE OF YOUR HEADACHES: VERY OFTEN
HOW OFTEN DID HEADACHS LIMIT CONCENTRATION ON WORK OR DAILY ACTIVITY: VERY OFTEN
WHEN YOU HAVE A HEADACHE HOW OFTEN DO YOU WISH YOU COULD LIE DOWN: VERY OFTEN
HIT6 TOTAL SCORE: 70

## 2024-08-08 ENCOUNTER — VIRTUAL VISIT (OUTPATIENT)
Dept: NEUROLOGY | Facility: CLINIC | Age: 54
End: 2024-08-08
Payer: COMMERCIAL

## 2024-08-08 VITALS — HEIGHT: 68 IN | BODY MASS INDEX: 44.41 KG/M2 | WEIGHT: 293 LBS

## 2024-08-08 DIAGNOSIS — G43.009 MIGRAINE WITHOUT AURA AND WITHOUT STATUS MIGRAINOSUS, NOT INTRACTABLE: ICD-10-CM

## 2024-08-08 DIAGNOSIS — G43.709 CHRONIC MIGRAINE WITHOUT AURA WITHOUT STATUS MIGRAINOSUS, NOT INTRACTABLE: Primary | ICD-10-CM

## 2024-08-08 PROCEDURE — 99213 OFFICE O/P EST LOW 20 MIN: CPT | Mod: 95 | Performed by: NURSE PRACTITIONER

## 2024-08-08 PROCEDURE — G2211 COMPLEX E/M VISIT ADD ON: HCPCS | Performed by: NURSE PRACTITIONER

## 2024-08-08 RX ORDER — BUPROPION HYDROCHLORIDE 150 MG/1
150 TABLET ORAL EVERY MORNING
COMMUNITY
Start: 2024-08-07

## 2024-08-08 ASSESSMENT — MIGRAINE DISABILITY ASSESSMENT (MIDAS)
ON A SCALE FROM 0-10 ON AVERAGE HOW PAINFUL WERE HEADACHES: 7
HOW MANY DAYS DID YOU NOT DO HOUSEWORK BECAUSE OF HEADACHES: 40
HOW MANY DAYS WAS YOUR PRODUCTIVITY CUT IN HALF BECAUSE OF HEADACHES: 40
TOTAL SCORE: 190
HOW MANY DAYS WAS HOUSEWORK PRODUCTIVITY CUT IN HALF DUE TO HEADACHES: 40
HOW MANY DAYS DID YOU MISS WORK OR SCHOOL BECAUSE OF HEADACHES: 30
HOW OFTEN WERE SOCIAL ACTIVITIES MISSED DUE TO HEADACHES: 40
HOW MANY DAYS IN THE PAST 3 MONTHS HAVE YOU HAD A HEADACHE: 40

## 2024-08-08 ASSESSMENT — PAIN SCALES - GENERAL: PAINLEVEL: MILD PAIN (2)

## 2024-08-08 NOTE — LETTER
8/8/2024       RE: Arielle Pham  [address] [place] [address]     Dear Colleague,    Thank you for referring your patient, Arielle Pham, to the [facility]. Please see a copy of my visit note below.    Virtual Visit Details    Type of service:  Video Visit   Originating Location (pt. Location): Home  Distant Location (provider location):  Off-site  Platform used for Video Visit: [facility]    Headache Neurology Progress Note  August 8, 2024      Assessment/Plan:   Arielle Pham is a 54 year old   Chronic daily headaches   Possible rebound headaches from chronic opioid use for chronic pain   Headache treatment reviewed with the patient, reviewed what to expect and goal of headache prevention.   Acute Treatment:  -For acute treatment of moderate to severe headache, take zolmitriptan at the onset of headache, with a repeat dose in two hours if needed. Do not exceed more than 9 days per month to avoid medication overuse. Check with your cardiologist if they are Ok with zolmitriptan. Monitor blood pressure   -For headache related nausea, or as a rescue medicine for headache, take ondansetron as needed.   Avoid any opioids for headache treatment     Preventive Treatment:  -For headache prevention,   Continue Ajovy   Nurtec one tab sublingual every other day     Follow up in 6 months if stable or sooner if needed     The longitudinal plan of care for Arielle was addressed during this visit. Due to the added complexity in care, I will continue to support Arielle in the subsequent management of chronic migraine  and with the ongoing continuity of care of this condition.    I discussed all my recommendations with Arielle Pham who verbalizes understanding and comfortable with the plan.  All of patient's questions were answered from the best of my knowledge.  Patient is in agreement with the plan.  "    22 minutes spent on the date of the encounter doing video access, chart  review, results review,  meds review, treatment plan, documentation and further activities as noted above    DONN Cronin, CNP [facility]      Subjective:    Arielle Pham returns for follow up of headache   Ajovy has been helping with severity of headaches. Before Ajovy pain was horrible every day and would wake up with a pain around eyes and Ajovy helped with this kind of pain since patient started on Ajovy. Headaches do not get as severe as to be.   Once a week or every 2 weeks -more severe migraine but not as constant.   Hard to function when they are bad and some of the days headaches not as bed.   Was doing occipital nerve blocks does recall whether it was beneficial  Different triggers headaches -food, stress, wakes up with a headache at times, aunt who talks, sun  Headaches daily and severity about 7/10 on the numeric pain scale and goes away quick.   Uses Nurtec when headaches are bed and safes it when headaches are bad. Helps but headaches do come back sometimes.   Zolmitriptan -not using it  Has been using acetaminophen a little bit   Not using ibuprofen because on Xeralto due to afib and cannot take NSAIDs   Reports that blood pressure has been normal.   Takes percocet and uses 3 times per day every day and hard to function without -knee pain, other chronic pain   Objective:    Vitals: Ht 1.727 m (5' 7.99\")   Wt 137 kg (302 lb)   BMI 45.93 kg/m    General: Cooperative, NAD  Neurologic:  Mental Status: Fully alert, attentive and oriented. Speech clear and fluent.   Cranial Nerves: Facial movements symmetric.   Motor: No abnormal movements.      Pertinent Investigations:          1/19/2023     4:17 PM 4/18/2024    12:52 PM 8/6/2024     7:18 PM   HIT-6   When you have headaches, how often is the pain severe 13 11 11   How often do headaches limit your ability to do usual daily activities including " household work, work, school, or social activities? 11 11 13   When you have a headache, how often do you wish you could lie down? 13 11 11   In the past 4 weeks, how often have you felt too tired to do work or daily activities because of your headaches 11 11 11   In the past 4 weeks, how often have you felt fed up or irritated because of your headaches 13 13 13   In the past 4 weeks, how often did headaches limit your ability to concentrate on work or daily activities 13 11 11   HIT-6 Total Score 74 68 70           1/19/2023     4:21 PM 4/18/2024    12:54 PM 8/8/2024     9:27 AM   MIDAS - in the past three months:   On how many days did you miss work or school because of your headaches? 45 20 30   How many days was your productivity at work or school reduced by half or more because of your headaches? 45 20 40   On how many days did you not do household work because of your headaches? 45 20 40   How many days was your productivity in household work reduced by half or more because of your headaches? 45 20 40   On how many days did you miss family, social, or leisure activities because of your headaches? 45 20 40   On how many days did you have a headache? 90 20 40   On a scale of 0-10, on average how painful were these headaches? 8 7 7   MIDAS Score 225 (IV - Severe Disability) 100 (IV - Severe Disability) 190 (IV - Severe Disability)          Again, thank you for allowing me to participate in the care of your patient.      Sincerely,    DONN Mora CNP

## 2024-08-08 NOTE — NURSING NOTE
Current patient location: [address] [place] [address] [address] [place] [address]    Is the patient currently in the state of [place]? YES    Visit mode:VIDEO    If the visit is dropped, the patient can be reconnected by: VIDEO VISIT: Text to cell phone:   Telephone Information:   Mobile [phone]       Will anyone else be joining the visit? NO  (If patient encounters technical issues they should call [phone]    How would you like to obtain your AVS? MyChart    Are changes needed to the allergy or medication list? No    Are refills needed on medications prescribed by this physician? NO    Rooming Documentation:  Assigned questionnaire(s) completed      Reason for visit: Follow Up    Vera KINNEY

## 2024-08-08 NOTE — PROGRESS NOTES
Virtual Visit Details    Type of service:  Video Visit   Originating Location (pt. Location): Home  Distant Location (provider location):  Off-site  Platform used for Video Visit: [facility]    Headache Neurology Progress Note  August 8, 2024      Assessment/Plan:   Arielle Pham is a 54 year old   Chronic daily headaches   Possible rebound headaches from chronic opioid use for chronic pain   Headache treatment reviewed with the patient, reviewed what to expect and goal of headache prevention.   Acute Treatment:  -For acute treatment of moderate to severe headache, take zolmitriptan at the onset of headache, with a repeat dose in two hours if needed. Do not exceed more than 9 days per month to avoid medication overuse. Check with your cardiologist if they are Ok with zolmitriptan. Monitor blood pressure   -For headache related nausea, or as a rescue medicine for headache, take ondansetron as needed.   Avoid any opioids for headache treatment     Preventive Treatment:  -For headache prevention,   Continue Ajovy   Nurtec one tab sublingual every other day     Follow up in 6 months if stable or sooner if needed     The longitudinal plan of care for Arielle was addressed during this visit. Due to the added complexity in care, I will continue to support Arielle in the subsequent management of chronic migraine  and with the ongoing continuity of care of this condition.    I discussed all my recommendations with Arielle Pham who verbalizes understanding and comfortable with the plan.  All of patient's questions were answered from the best of my knowledge.  Patient is in agreement with the plan.     22 minutes spent on the date of the encounter doing video access, chart  review, results review,  meds review, treatment plan, documentation and further activities as noted above    DONN Cronin, CNP [facility]      Subjective:    Arielle Pham returns for follow up of  "headache   Ajovy has been helping with severity of headaches. Before Ajovy pain was horrible every day and would wake up with a pain around eyes and Ajovy helped with this kind of pain since patient started on Ajovy. Headaches do not get as severe as to be.   Once a week or every 2 weeks -more severe migraine but not as constant.   Hard to function when they are bad and some of the days headaches not as bed.   Was doing occipital nerve blocks does recall whether it was beneficial  Different triggers headaches -food, stress, wakes up with a headache at times, aunt who talks, sun  Headaches daily and severity about 7/10 on the numeric pain scale and goes away quick.   Uses Nurtec when headaches are bed and safes it when headaches are bad. Helps but headaches do come back sometimes.   Zolmitriptan -not using it  Has been using acetaminophen a little bit   Not using ibuprofen because on Xeralto due to afib and cannot take NSAIDs   Reports that blood pressure has been normal.   Takes percocet and uses 3 times per day every day and hard to function without -knee pain, other chronic pain   Objective:    Vitals: Ht 1.727 m (5' 7.99\")   Wt 137 kg (302 lb)   BMI 45.93 kg/m    General: Cooperative, NAD  Neurologic:  Mental Status: Fully alert, attentive and oriented. Speech clear and fluent.   Cranial Nerves: Facial movements symmetric.   Motor: No abnormal movements.      Pertinent Investigations:          1/19/2023     4:17 PM 4/18/2024    12:52 PM 8/6/2024     7:18 PM   HIT-6   When you have headaches, how often is the pain severe 13 11 11   How often do headaches limit your ability to do usual daily activities including household work, work, school, or social activities? 11 11 13   When you have a headache, how often do you wish you could lie down? 13 11 11   In the past 4 weeks, how often have you felt too tired to do work or daily activities because of your headaches 11 11 11   In the past 4 weeks, how often have you " felt fed up or irritated because of your headaches 13 13 13   In the past 4 weeks, how often did headaches limit your ability to concentrate on work or daily activities 13 11 11   HIT-6 Total Score 74 68 70           1/19/2023     4:21 PM 4/18/2024    12:54 PM 8/8/2024     9:27 AM   MIDAS - in the past three months:   On how many days did you miss work or school because of your headaches? 45 20 30   How many days was your productivity at work or school reduced by half or more because of your headaches? 45 20 40   On how many days did you not do household work because of your headaches? 45 20 40   How many days was your productivity in household work reduced by half or more because of your headaches? 45 20 40   On how many days did you miss family, social, or leisure activities because of your headaches? 45 20 40   On how many days did you have a headache? 90 20 40   On a scale of 0-10, on average how painful were these headaches? 8 7 7   MIDAS Score 225 (IV - Severe Disability) 100 (IV - Severe Disability) 190 (IV - Severe Disability)

## 2024-08-08 NOTE — PATIENT INSTRUCTIONS
Acute Treatment:  -For acute treatment of moderate to severe headache, take zolmitriptan at the onset of headache, with a repeat dose in two hours if needed. Do not exceed more than 9 days per month to avoid medication overuse. Check with your cardiologist if they are Ok with zolmitriptan. Monitor blood pressure   -For headache related nausea, or as a rescue medicine for headache, take ondansetron as needed.   Avoid any opioids for headache treatment     Preventive Treatment:  -For headache prevention,   Continue Ajovy   Nurtec one tab sublingual every other day     Follow up in 6 months if stable or sooner if needed     .

## 2024-09-01 ENCOUNTER — HEALTH MAINTENANCE LETTER (OUTPATIENT)
Age: 54
End: 2024-09-01

## 2024-10-01 RX ORDER — RIMEGEPANT SULFATE 75 MG/75MG
TABLET, ORALLY DISINTEGRATING ORAL
Qty: 8 TABLET | Refills: 0 | OUTPATIENT
Start: 2024-10-01

## 2024-10-21 ENCOUNTER — MYC REFILL (OUTPATIENT)
Dept: FAMILY MEDICINE | Facility: CLINIC | Age: 54
End: 2024-10-21
Payer: COMMERCIAL

## 2024-10-21 DIAGNOSIS — Z79.891 CHRONIC PRESCRIPTION OPIATE USE: ICD-10-CM

## 2024-10-21 DIAGNOSIS — M51.369 DDD (DEGENERATIVE DISC DISEASE), LUMBAR: ICD-10-CM

## 2024-10-21 DIAGNOSIS — M54.2 NECK PAIN: ICD-10-CM

## 2024-10-21 RX ORDER — OXYCODONE AND ACETAMINOPHEN 5; 325 MG/1; MG/1
1 TABLET ORAL EVERY 6 HOURS PRN
Qty: 115 TABLET | Refills: 0 | OUTPATIENT
Start: 2024-10-21

## 2024-11-21 ENCOUNTER — TELEPHONE (OUTPATIENT)
Dept: CARDIOLOGY | Facility: CLINIC | Age: 54
End: 2024-11-21
Payer: COMMERCIAL

## 2024-11-22 NOTE — TELEPHONE ENCOUNTER
Central Prior Authorization Team   Phone: [phone]    PA Initiation    Medication: Repatha SureClick 140MG/ML auto-injectors    Insurance Company: [employer] [facility] - Phone [phone] Fax [phone]  Pharmacy Filling the Rx: [facility] [address] [place] [address]  Filling Pharmacy Phone: [phone]  Filling Pharmacy Fax:    Start Date: 11/22/2024

## 2024-11-26 NOTE — TELEPHONE ENCOUNTER
Prior Authorization Approval    Authorization Effective Date: 8/25/2024  Authorization Expiration Date: 11/22/2025  Medication: Repatha SureClick 140MG/ML auto-injectors    Approved Dose/Quantity:   Reference #:     Insurance Company: [employer] - Phone [phone] Fax [phone]  Expected CoPay:       CoPay Card Available:      Foundation Assistance Needed:    Which Pharmacy is filling the prescription (Not needed for infusion/clinic administered): [facility] [address] [place] [address]  Pharmacy Notified:  yes  Patient Notified:  yes- Pharmacy will contact patient when ready to /ship

## 2025-03-03 ENCOUNTER — TELEPHONE (OUTPATIENT)
Dept: NEUROLOGY | Facility: CLINIC | Age: 55
End: 2025-03-03
Payer: COMMERCIAL

## 2025-03-03 DIAGNOSIS — G43.709 CHRONIC MIGRAINE WITHOUT AURA WITHOUT STATUS MIGRAINOSUS, NOT INTRACTABLE: ICD-10-CM

## 2025-03-03 NOTE — TELEPHONE ENCOUNTER
Patient confirmed scheduled appointment:  Date: 5/15/25  Time: 9am  Visit type: Return Headache  Provider: Thuy Tracey  Location: Virtual  Testing/imaging: NA  Additional notes: follow up scheduled per PAT Ramos on 3/3/2025 at 4:51 PM

## 2025-03-04 ENCOUNTER — TELEPHONE (OUTPATIENT)
Dept: NEUROLOGY | Facility: CLINIC | Age: 55
End: 2025-03-04
Payer: COMMERCIAL

## 2025-03-04 DIAGNOSIS — G43.709 CHRONIC MIGRAINE WITHOUT AURA WITHOUT STATUS MIGRAINOSUS, NOT INTRACTABLE: ICD-10-CM

## 2025-03-04 NOTE — TELEPHONE ENCOUNTER
Rx Authorization:  Requested Medication/ Dose Fremanezumab-vfrm (AJOVY) 225 MG/1.5ML SOAJ   Date last refill ordered: 2/8/24  Quantity ordered: 1.5mL  # refills: 12  Date of last clinic visit with ordering provider: 8/8/24  Date of next clinic visit with ordering provider:   All pertinent protocol data (lab date/result):   Include pertinent information from patients message:

## 2025-03-04 NOTE — TELEPHONE ENCOUNTER
[facility] Call Center    Phone Message    May a detailed message be left on voicemail: yes     Reason for Call: Medication Refill Request    Has the patient contacted the pharmacy for the refill? Yes   Name of medication being requested: Fremanezumab-vfrm (AJOVY) 225 MG/1.5ML SOAJ     Provider who prescribed the medication: Thuy Tracey    Pharmacy: [facility] [address] [place] [address]    Date medication is needed: 03/07    Action Taken: Message routed to:  [facility] Neurology    Travel Screening: Not Applicable     Date of Service:

## 2025-03-05 ENCOUNTER — TELEPHONE (OUTPATIENT)
Dept: NEUROLOGY | Facility: CLINIC | Age: 55
End: 2025-03-05
Payer: COMMERCIAL

## 2025-03-05 NOTE — TELEPHONE ENCOUNTER
PA Initiation    Medication: AJOVY 225 MG/1.5ML SC SOAJ  Insurance Company: [employer] - Phone [phone] Fax [phone]  Pharmacy Filling the Rx: [address]  Filling Pharmacy Phone: [phone]  Filling Pharmacy Fax: [phone]  Start Date: 3/5/2025

## 2025-03-05 NOTE — TELEPHONE ENCOUNTER
Prior Authorization Approval    Medication: AJOVY 225 MG/1.5ML SC SOAJ  Authorization Effective Date: 1/1/2025  Authorization Expiration Date: 3/5/2026  Approved Dose/Quantity:   Reference #:     Insurance Company: [employer] - Phone [phone] Fax [phone]  Expected CoPay: $    CoPay Card Available:      Financial Assistance Needed:   Which Pharmacy is filling the prescription: [facility] [address] [place] [address]  Pharmacy Notified: YES  Patient Notified: **Instructed pharmacy to notify patient when script is ready to /ship.**

## 2025-03-05 NOTE — TELEPHONE ENCOUNTER
LVM - refill signed, PA submitted, and ensured she keeps follow-up in May for future refills. Clinic # provided for questions.

## 2025-03-05 NOTE — TELEPHONE ENCOUNTER
"Prior Authorization Retail Medication Request     Medication/Dose: Fremanezumab-vfrm 225 MG/1.5ML SOAJ; Inject 225 mg Subcutaneous every 30 days   ICD code (if different than what is on RX):    G43.709  Previously Tried and Failed:  Zomig as needed and helps but headaches   Sumatriptan   Stopped Excedrin and tylenol many years ago  Ibuprofen a couple times per day and no side effects  Percocet -for back pain and foot pain and prescribed by Dr Stahl but not for headaches  Currently on lisinopril for hypertension  Amitriptyline -just stopped working and was on it for a while  Nortriptyline  Protriptyline  Topiramate  Zonegran  Gabapentin-caused anxiety/panic attack  Verapamil  Propranol  Emgality -took for a couple of month and caused dizziness. Did not try any other CGRPs  escitalopram -does not recall side effects but it wasn't helpful with headaches  Losartan in the past-has been a while  Topiramate a long time ago and \"very bad dreams\". History of renal stones so contraindicated     Botox -fear of potential side effects-  Rationale:  Chronic migraine     Insurance Name:  Blue plus  Insurance ID:  [id number]        Pharmacy Information (if different than what is on RX)  Name:    Phone:  "

## 2025-03-05 NOTE — TELEPHONE ENCOUNTER
Clinic is not refusing medication. Refills have been queued for Thuy to sign. Routed as high priority.

## 2025-03-05 NOTE — TELEPHONE ENCOUNTER
Health Call Center    Phone Message    May a detailed message be left on voicemail: yes     Reason for Call: Other: Patient calling in stating that they have been waiting since yesterday for their medication to be refilled. Reporting of frustration, as the pharmacy told her that the clinic is reusing to refill the medication because they have not had the medication in the past, which is false, as patient states that they have been on the medication for many years now. Please call patient to discuss at [phone]     Action Taken: Message routed to:  [facility] Neurology

## 2025-04-16 DIAGNOSIS — I48.0 PAROXYSMAL ATRIAL FIBRILLATION (H): ICD-10-CM

## 2025-04-16 RX ORDER — SOTALOL HYDROCHLORIDE 80 MG/1
80 TABLET ORAL 2 TIMES DAILY
Qty: 180 TABLET | Refills: 0 | Status: SHIPPED | OUTPATIENT
Start: 2025-04-16

## 2025-05-28 NOTE — TELEPHONE ENCOUNTER
Pt is calling and asking for her medication to be filled.     Ila Mead, [place]     
Patient should contact her pharmacy. Looks like she has a remaining refill in the med list. Estela Rust RN    
Script walked over to the [facility].    Triniyt Altamirano, [facility]       
Scripts done  Huong Stahl    
Spoke with pt, she will contact her pharmacy to refill the amitriptyline.    Pt states she also will need a refill of her Percocet soon. Says she was instructed by her pharmacy to contact the provider's office at least 5 days before she runs out.    Routed to Dr. Stahl.    Amena HARRIS RN      
None known

## 2025-05-29 ENCOUNTER — TELEPHONE (OUTPATIENT)
Dept: NEUROLOGY | Facility: CLINIC | Age: 55
End: 2025-05-29
Payer: COMMERCIAL

## 2025-05-29 NOTE — TELEPHONE ENCOUNTER
Called patient to schedule Botox appointment and patient was no longer interested in scheduling a Botox appt. She has been doing research and is not comfortable with the possible side effects. If anything changes, pt will reach out.     Juana Puckett on 5/29/2025 at 11:44 AM

## 2025-06-06 NOTE — PROGRESS NOTES
"        Assessment/Recommendations   Patient with known mild to moderate coronary artery disease on CT angiography and atypical chest discomfort.  She has been stable but has not been able to tolerate statins.  We will try her on Zetia 10 mg a day for 3 months recheck a fasting lipid panel.  We will also consider a PCSK9 inhibitor if we do not get much reduction in LDL cholesterol with Zetia.  She is agreeable.    Blood pressure is at goal today and she continues to take an antiplatelet agent.  Have encouraged her to maintain an active lifestyle to the degree her orthopedic issues will allow.    Thank you for allowing us to participate in her care.       History of Present Illness/Subjective    Ms. Arielle Pham is a 51 year old female with known mild to moderate coronary artery disease by CT angiography and history of atypical pain.  She continues to get some discomfort in her mid chest which is intermittent, unpredictable, and is worse when she pushes on it.  She reports that her breathing is stable.  She has difficulty with physical activity because of orthopedic issues and right leg pain.  She has had an evaluation for this.  She has not tolerated statins and had an aching and jaw pain which all went away when she discontinued the statin.  She has been on at least 2 statins.           Physical Examination Review of Systems   /60 (BP Location: Left arm, Patient Position: Sitting, Cuff Size: Adult Large)   Pulse 76   Resp 16   Ht 1.753 m (5' 9\")   Wt 127.9 kg (282 lb)   BMI 41.64 kg/m    Body mass index is 41.64 kg/m .  Wt Readings from Last 3 Encounters:   09/29/21 127.9 kg (282 lb)   06/08/21 125.7 kg (277 lb 3.2 oz)   05/25/21 126.6 kg (279 lb)     General Appearance:   Alert, cooperative and in no acute distress.   ENT/Mouth: Patient wearing a mask.      EYES:  no scleral icterus, normal conjunctivae   Neck: JVP normal. No Hepatojugular reflux. Thyroid not visualized.   Chest/Lungs:   Lungs are clear " "to auscultation, equal chest wall expansion.   Cardiovascular:   S1, S2 without murmur ,clicks or rubs. Brachial, radial pulses are intact and symetric. No carotid bruits noted   Abdomen:     Upper extremities: No cyanosis, clubbing or edema   Skin: no xanthelasma, warm.    Neurologic: normal arm movement bilateral, no tremors     Psychiatric: Appropriate affect.      Enc Vitals  BP: 126/60  Pulse: 76  Resp: 16  Weight: 127.9 kg (282 lb)  Height: 175.3 cm (5' 9\")                                           Medical History  Surgical History Family History Social History   Past Medical History:   Diagnosis Date     Atypical chest pain 9/30/2015     Atypical chest pain 9/30/2015     Hyperlipidemia      Hyperlipidemia      Hypertension      Hypertension      Kidney stones      Migraines      Migraines      TMJ (dislocation of temporomandibular joint)     Past Surgical History:   Procedure Laterality Date     CHOLECYSTECTOMY       CHOLECYSTECTOMY      Family History   Problem Relation Age of Onset     Hypertension Mother      Alcoholism Father      Colorectal Cancer No family hx of      Coronary Artery Disease Mother      Coronary Artery Disease Father     Social History     Socioeconomic History     Marital status: Single     Spouse name: Not on file     Number of children: Not on file     Years of education: Not on file     Highest education level: Not on file   Occupational History     Not on file   Tobacco Use     Smoking status: Never Smoker     Smokeless tobacco: Never Used   Substance and Sexual Activity     Alcohol use: No     Drug use: No     Sexual activity: Yes     Partners: Male   Other Topics Concern     Parent/sibling w/ CABG, MI or angioplasty before 65F 55M? No   Social History Narrative     Not on file     Social Determinants of Health     Financial Resource Strain: Low Risk      Difficulty of Paying Living Expenses: Not hard at all   Food Insecurity: No Food Insecurity     Worried About Running Out of " Food in the Last Year: Never true     Ran Out of Food in the Last Year: Never true   Transportation Needs:      Lack of Transportation (Medical):      Lack of Transportation (Non-Medical):    Physical Activity:      Days of Exercise per Week:      Minutes of Exercise per Session:    Stress:      Feeling of Stress :    Social Connections:      Frequency of Communication with Friends and Family:      Frequency of Social Gatherings with Friends and Family:      Attends Hoahaoism Services:      Active Member of Clubs or Organizations:      Attends Club or Organization Meetings:      Marital Status:    Intimate Partner Violence:      Fear of Current or Ex-Partner:      Emotionally Abused:      Physically Abused:      Sexually Abused:           Medications  Allergies   Current Outpatient Medications   Medication Sig Dispense Refill     aspirin 81 MG EC tablet Take 81 mg by mouth daily        ezetimibe (ZETIA) 10 MG tablet Take 1 tablet (10 mg) by mouth daily 90 tablet 3     Fremanezumab-vfrm 225 MG/1.5ML SOAJ Inject 225 mg Subcutaneous every 30 days 1.5 mL 11     GARLIC PO Take 1 tablet by mouth 2 times daily       ibuprofen (ADVIL/MOTRIN) 800 MG tablet TAKE 1 TABLET BY MOUTH THREE TIMES DAILY AS NEEDED 270 tablet 3     lisinopril (ZESTRIL) 5 MG tablet Take 1 tablet (5 mg) by mouth daily 90 tablet 3     loratadine (CLARITIN) 10 MG tablet Take 1 tablet (10 mg) by mouth daily 90 tablet 1     order for DME Rolling walker with seat for home use 1 Device 0     oxyCODONE-acetaminophen (PERCOCET) 5-325 MG tablet Take 1 tablet by mouth every 6 hours as needed for moderate to severe pain Maximum of 4 tablets per day 120 tablet 0     ZOLMitriptan (ZOMIG) 5 MG tablet TAKE 1 TABLET BY MOUTH AT ONSET OF HEADACHE 9 tablet 2    Allergies   Allergen Reactions     Hmg-Coa-R Inhibitors Other (See Comments)     Severe joint and muscle ache from Atorvastastin     Codeine Dizziness and Other (See Comments)     Gabapentin Palpitations          Lab Results    Chemistry/lipid CBC Cardiac Enzymes/BNP/TSH/INR   Lab Results   Component Value Date    CHOL 273 (H) 05/25/2021    HDL 55 05/25/2021    TRIG 175 (H) 05/25/2021    BUN 19 05/25/2021     05/25/2021    CO2 24 05/25/2021    Lab Results   Component Value Date    WBC 6.7 05/25/2021    HGB 14.6 05/25/2021    HCT 43.8 05/25/2021    MCV 95 05/25/2021     05/25/2021    Lab Results   Component Value Date    TROPONINI <0.01 06/25/2019    TSH 1.68 01/28/2021                                            Female

## 2025-07-28 ENCOUNTER — TELEPHONE (OUTPATIENT)
Dept: FAMILY MEDICINE | Facility: CLINIC | Age: 55
End: 2025-07-28
Payer: COMMERCIAL

## 2025-07-28 NOTE — TELEPHONE ENCOUNTER
Patient Quality Outreach    Patient is due for the following:   Hypertension -  BP check    Action(s) Taken:   Patient has transferred care to [facility] - sees specialist at [facility]    Type of outreach:    Chart review performed, no outreach needed.    Questions for provider review:    None         Aileen Aguirre CMA  Chart routed to None.